# Patient Record
Sex: MALE | Race: WHITE | NOT HISPANIC OR LATINO | Employment: OTHER | URBAN - METROPOLITAN AREA
[De-identification: names, ages, dates, MRNs, and addresses within clinical notes are randomized per-mention and may not be internally consistent; named-entity substitution may affect disease eponyms.]

---

## 2017-01-26 ENCOUNTER — ALLSCRIPTS OFFICE VISIT (OUTPATIENT)
Dept: OTHER | Facility: OTHER | Age: 77
End: 2017-01-26

## 2017-01-31 ENCOUNTER — GENERIC CONVERSION - ENCOUNTER (OUTPATIENT)
Dept: OTHER | Facility: OTHER | Age: 77
End: 2017-01-31

## 2017-02-07 ENCOUNTER — APPOINTMENT (OUTPATIENT)
Dept: LAB | Facility: HOSPITAL | Age: 77
End: 2017-02-07
Attending: INTERNAL MEDICINE
Payer: MEDICARE

## 2017-02-07 ENCOUNTER — TRANSCRIBE ORDERS (OUTPATIENT)
Dept: ADMINISTRATIVE | Facility: HOSPITAL | Age: 77
End: 2017-02-07

## 2017-02-07 DIAGNOSIS — R06.89 HYPOVENTILATION, IDIOPATHIC: ICD-10-CM

## 2017-02-07 DIAGNOSIS — E78.5 HYPERLIPIDEMIA, UNSPECIFIED HYPERLIPIDEMIA TYPE: ICD-10-CM

## 2017-02-07 DIAGNOSIS — Z00.00 ROUTINE GENERAL MEDICAL EXAMINATION AT A HEALTH CARE FACILITY: ICD-10-CM

## 2017-02-07 DIAGNOSIS — R06.89 HYPOVENTILATION, IDIOPATHIC: Primary | ICD-10-CM

## 2017-02-07 LAB
ALBUMIN SERPL BCP-MCNC: 3.7 G/DL (ref 3.5–5)
ALP SERPL-CCNC: 67 U/L (ref 46–116)
ALT SERPL W P-5'-P-CCNC: 23 U/L (ref 12–78)
ANION GAP SERPL CALCULATED.3IONS-SCNC: 8 MMOL/L (ref 4–13)
AST SERPL W P-5'-P-CCNC: 14 U/L (ref 5–45)
BILIRUB SERPL-MCNC: 0.7 MG/DL (ref 0.2–1)
BUN SERPL-MCNC: 11 MG/DL (ref 5–25)
CALCIUM SERPL-MCNC: 8.9 MG/DL (ref 8.3–10.1)
CHLORIDE SERPL-SCNC: 101 MMOL/L (ref 100–108)
CHOLEST SERPL-MCNC: 105 MG/DL (ref 50–200)
CO2 SERPL-SCNC: 29 MMOL/L (ref 21–32)
CREAT SERPL-MCNC: 0.85 MG/DL (ref 0.6–1.3)
GFR SERPL CREATININE-BSD FRML MDRD: >60 ML/MIN/1.73SQ M
GLUCOSE SERPL-MCNC: 103 MG/DL (ref 65–140)
HDLC SERPL-MCNC: 58 MG/DL (ref 40–60)
LDLC SERPL CALC-MCNC: 22 MG/DL (ref 0–100)
POTASSIUM SERPL-SCNC: 4.2 MMOL/L (ref 3.5–5.3)
PROT SERPL-MCNC: 7.2 G/DL (ref 6.4–8.2)
SODIUM SERPL-SCNC: 138 MMOL/L (ref 136–145)
TRIGL SERPL-MCNC: 126 MG/DL
TSH SERPL DL<=0.05 MIU/L-ACNC: 1.28 UIU/ML (ref 0.36–3.74)

## 2017-02-07 PROCEDURE — 80061 LIPID PANEL: CPT

## 2017-02-07 PROCEDURE — 36415 COLL VENOUS BLD VENIPUNCTURE: CPT | Performed by: INTERNAL MEDICINE

## 2017-02-07 PROCEDURE — 84443 ASSAY THYROID STIM HORMONE: CPT

## 2017-02-07 PROCEDURE — 80053 COMPREHEN METABOLIC PANEL: CPT | Performed by: INTERNAL MEDICINE

## 2017-02-13 ENCOUNTER — ALLSCRIPTS OFFICE VISIT (OUTPATIENT)
Dept: OTHER | Facility: OTHER | Age: 77
End: 2017-02-13

## 2017-02-16 ENCOUNTER — APPOINTMENT (OUTPATIENT)
Dept: LAB | Facility: HOSPITAL | Age: 77
End: 2017-02-16
Attending: FAMILY MEDICINE
Payer: MEDICARE

## 2017-02-16 ENCOUNTER — TRANSCRIBE ORDERS (OUTPATIENT)
Dept: ADMINISTRATIVE | Facility: HOSPITAL | Age: 77
End: 2017-02-16

## 2017-02-16 DIAGNOSIS — Z12.5 SPECIAL SCREENING FOR MALIGNANT NEOPLASM OF PROSTATE: ICD-10-CM

## 2017-02-16 DIAGNOSIS — Z12.5 SPECIAL SCREENING FOR MALIGNANT NEOPLASM OF PROSTATE: Primary | ICD-10-CM

## 2017-02-16 LAB — PSA SERPL-MCNC: 7.8 NG/ML (ref 0–4)

## 2017-02-16 PROCEDURE — G0103 PSA SCREENING: HCPCS

## 2017-02-16 PROCEDURE — 36415 COLL VENOUS BLD VENIPUNCTURE: CPT

## 2017-03-01 ENCOUNTER — ALLSCRIPTS OFFICE VISIT (OUTPATIENT)
Dept: OTHER | Facility: OTHER | Age: 77
End: 2017-03-01

## 2017-03-21 ENCOUNTER — APPOINTMENT (OUTPATIENT)
Dept: LAB | Facility: HOSPITAL | Age: 77
End: 2017-03-21
Attending: SPECIALIST
Payer: MEDICARE

## 2017-03-21 ENCOUNTER — TRANSCRIBE ORDERS (OUTPATIENT)
Dept: ADMINISTRATIVE | Facility: HOSPITAL | Age: 77
End: 2017-03-21

## 2017-03-21 DIAGNOSIS — R97.20 ELEVATED PROSTATE SPECIFIC ANTIGEN (PSA): Primary | ICD-10-CM

## 2017-03-21 DIAGNOSIS — R97.20 ELEVATED PROSTATE SPECIFIC ANTIGEN (PSA): ICD-10-CM

## 2017-03-21 PROCEDURE — 36415 COLL VENOUS BLD VENIPUNCTURE: CPT

## 2017-03-21 PROCEDURE — 84153 ASSAY OF PSA TOTAL: CPT

## 2017-03-21 PROCEDURE — 84154 ASSAY OF PSA FREE: CPT

## 2017-03-22 LAB
PSA FREE MFR SERPL: 18.4 %
PSA FREE SERPL-MCNC: 1.34 NG/ML
PSA SERPL-MCNC: 7.3 NG/ML (ref 0–4)

## 2017-03-30 ENCOUNTER — GENERIC CONVERSION - ENCOUNTER (OUTPATIENT)
Dept: OTHER | Facility: OTHER | Age: 77
End: 2017-03-30

## 2017-03-30 DIAGNOSIS — M54.50 LOW BACK PAIN: ICD-10-CM

## 2017-03-30 DIAGNOSIS — G89.4 CHRONIC PAIN SYNDROME: ICD-10-CM

## 2017-03-30 DIAGNOSIS — Z98.890 OTHER SPECIFIED POSTPROCEDURAL STATES: ICD-10-CM

## 2017-03-30 DIAGNOSIS — Z01.812 ENCOUNTER FOR PREPROCEDURAL LABORATORY EXAMINATION: ICD-10-CM

## 2017-03-30 DIAGNOSIS — I48.20 CHRONIC ATRIAL FIBRILLATION (HCC): ICD-10-CM

## 2017-03-30 DIAGNOSIS — M51.16 INTERVERTEBRAL DISC DISORDER WITH RADICULOPATHY OF LUMBAR REGION: ICD-10-CM

## 2017-04-03 ENCOUNTER — GENERIC CONVERSION - ENCOUNTER (OUTPATIENT)
Dept: OTHER | Facility: OTHER | Age: 77
End: 2017-04-03

## 2017-04-05 ENCOUNTER — ALLSCRIPTS OFFICE VISIT (OUTPATIENT)
Dept: OTHER | Facility: OTHER | Age: 77
End: 2017-04-05

## 2017-04-06 ENCOUNTER — TRANSCRIBE ORDERS (OUTPATIENT)
Dept: ADMINISTRATIVE | Facility: HOSPITAL | Age: 77
End: 2017-04-06

## 2017-04-06 ENCOUNTER — LAB (OUTPATIENT)
Dept: LAB | Facility: HOSPITAL | Age: 77
End: 2017-04-06
Payer: MEDICARE

## 2017-04-06 DIAGNOSIS — M54.5 LOW BACK PAIN, UNSPECIFIED BACK PAIN LATERALITY, UNSPECIFIED CHRONICITY, WITH SCIATICA PRESENCE UNSPECIFIED: ICD-10-CM

## 2017-04-06 DIAGNOSIS — M54.5 LOW BACK PAIN, UNSPECIFIED BACK PAIN LATERALITY, UNSPECIFIED CHRONICITY, WITH SCIATICA PRESENCE UNSPECIFIED: Primary | ICD-10-CM

## 2017-04-06 DIAGNOSIS — Z01.812 PRE-OPERATIVE LABORATORY EXAMINATION: ICD-10-CM

## 2017-04-06 DIAGNOSIS — M51.16 NEURITIS OR RADICULITIS DUE TO RUPTURE OF LUMBAR INTERVERTEBRAL DISC: ICD-10-CM

## 2017-04-06 LAB
ALBUMIN SERPL BCP-MCNC: 3.6 G/DL (ref 3.5–5)
ALP SERPL-CCNC: 64 U/L (ref 46–116)
ALT SERPL W P-5'-P-CCNC: 26 U/L (ref 12–78)
ANION GAP SERPL CALCULATED.3IONS-SCNC: 8 MMOL/L (ref 4–13)
APTT PPP: 33 SECONDS (ref 24–36)
AST SERPL W P-5'-P-CCNC: 17 U/L (ref 5–45)
BASOPHILS # BLD AUTO: 0 THOUSANDS/ΜL (ref 0–0.1)
BASOPHILS NFR BLD AUTO: 0 % (ref 0–1)
BILIRUB SERPL-MCNC: 0.8 MG/DL (ref 0.2–1)
BUN SERPL-MCNC: 11 MG/DL (ref 5–25)
CALCIUM SERPL-MCNC: 9 MG/DL (ref 8.3–10.1)
CHLORIDE SERPL-SCNC: 98 MMOL/L (ref 100–108)
CO2 SERPL-SCNC: 30 MMOL/L (ref 21–32)
CREAT SERPL-MCNC: 0.94 MG/DL (ref 0.6–1.3)
EOSINOPHIL # BLD AUTO: 0.1 THOUSAND/ΜL (ref 0–0.61)
EOSINOPHIL NFR BLD AUTO: 1 % (ref 0–6)
ERYTHROCYTE [DISTWIDTH] IN BLOOD BY AUTOMATED COUNT: 13.7 % (ref 11.6–15.1)
GFR SERPL CREATININE-BSD FRML MDRD: >60 ML/MIN/1.73SQ M
GLUCOSE P FAST SERPL-MCNC: 98 MG/DL (ref 65–99)
HCT VFR BLD AUTO: 43.8 % (ref 42–52)
HGB BLD-MCNC: 14.6 G/DL (ref 14–18)
INR PPP: 1.18 (ref 0.86–1.16)
LYMPHOCYTES # BLD AUTO: 1.6 THOUSANDS/ΜL (ref 0.6–4.47)
LYMPHOCYTES NFR BLD AUTO: 18 % (ref 14–44)
MCH RBC QN AUTO: 32.8 PG (ref 27–31)
MCHC RBC AUTO-ENTMCNC: 33.3 G/DL (ref 31.4–37.4)
MCV RBC AUTO: 99 FL (ref 82–98)
MONOCYTES # BLD AUTO: 1.1 THOUSAND/ΜL (ref 0.17–1.22)
MONOCYTES NFR BLD AUTO: 12 % (ref 4–12)
NEUTROPHILS # BLD AUTO: 6 THOUSANDS/ΜL (ref 1.85–7.62)
NEUTS SEG NFR BLD AUTO: 68 % (ref 43–75)
NRBC BLD AUTO-RTO: 0 /100 WBCS
PLATELET # BLD AUTO: 262 THOUSANDS/UL (ref 130–400)
PMV BLD AUTO: 7.6 FL (ref 8.9–12.7)
POTASSIUM SERPL-SCNC: 4.2 MMOL/L (ref 3.5–5.3)
PROT SERPL-MCNC: 7 G/DL (ref 6.4–8.2)
PROTHROMBIN TIME: 12.4 SECONDS (ref 9.4–11.7)
RBC # BLD AUTO: 4.44 MILLION/UL (ref 4.7–6.1)
SODIUM SERPL-SCNC: 136 MMOL/L (ref 136–145)
WBC # BLD AUTO: 8.8 THOUSAND/UL (ref 4.8–10.8)

## 2017-04-06 PROCEDURE — 85730 THROMBOPLASTIN TIME PARTIAL: CPT

## 2017-04-06 PROCEDURE — 80053 COMPREHEN METABOLIC PANEL: CPT | Performed by: ANESTHESIOLOGY

## 2017-04-06 PROCEDURE — 36415 COLL VENOUS BLD VENIPUNCTURE: CPT | Performed by: ANESTHESIOLOGY

## 2017-04-06 PROCEDURE — 85610 PROTHROMBIN TIME: CPT

## 2017-04-06 PROCEDURE — 85025 COMPLETE CBC W/AUTO DIFF WBC: CPT

## 2017-04-12 ENCOUNTER — ANESTHESIA EVENT (OUTPATIENT)
Dept: PERIOP | Facility: AMBULARY SURGERY CENTER | Age: 77
End: 2017-04-12
Payer: MEDICARE

## 2017-04-13 ENCOUNTER — HOSPITAL ENCOUNTER (OUTPATIENT)
Dept: RADIOLOGY | Facility: HOSPITAL | Age: 77
Setting detail: OUTPATIENT SURGERY
Discharge: HOME | End: 2017-04-13
Payer: MEDICARE

## 2017-04-13 ENCOUNTER — ANESTHESIA (OUTPATIENT)
Dept: PERIOP | Facility: AMBULARY SURGERY CENTER | Age: 77
End: 2017-04-13
Payer: MEDICARE

## 2017-04-13 ENCOUNTER — HOSPITAL ENCOUNTER (OUTPATIENT)
Facility: AMBULARY SURGERY CENTER | Age: 77
Setting detail: OUTPATIENT SURGERY
Discharge: HOME | End: 2017-04-13
Attending: ANESTHESIOLOGY | Admitting: ANESTHESIOLOGY
Payer: MEDICARE

## 2017-04-13 VITALS
TEMPERATURE: 97.7 F | HEART RATE: 60 BPM | SYSTOLIC BLOOD PRESSURE: 148 MMHG | OXYGEN SATURATION: 95 % | WEIGHT: 250 LBS | RESPIRATION RATE: 20 BRPM | DIASTOLIC BLOOD PRESSURE: 67 MMHG | BODY MASS INDEX: 35.87 KG/M2

## 2017-04-13 DIAGNOSIS — R52 PAIN AGGRAVATED BY ACTIVITIES OF DAILY LIVING: ICD-10-CM

## 2017-04-13 PROBLEM — M51.16 INTERVERTEBRAL DISC DISORDER WITH RADICULOPATHY OF LUMBAR REGION: Status: ACTIVE | Noted: 2017-04-13

## 2017-04-13 PROCEDURE — C1897 LEAD, NEUROSTIM TEST KIT: HCPCS | Performed by: ANESTHESIOLOGY

## 2017-04-13 PROCEDURE — 72080 X-RAY EXAM THORACOLMB 2/> VW: CPT

## 2017-04-13 DEVICE — HEADER ACCESS KIT EXTRNL PULSE GEN INVISIBLE TRIAL SYS: Type: IMPLANTABLE DEVICE | Status: FUNCTIONAL

## 2017-04-13 DEVICE — LEAD KIT 60 CM OCTRODE 3086: Type: IMPLANTABLE DEVICE | Status: FUNCTIONAL

## 2017-04-13 RX ORDER — SODIUM CHLORIDE 9 MG/ML
125 INJECTION, SOLUTION INTRAVENOUS CONTINUOUS
Status: DISCONTINUED | OUTPATIENT
Start: 2017-04-13 | End: 2017-04-13 | Stop reason: HOSPADM

## 2017-04-13 RX ORDER — SODIUM CHLORIDE, SODIUM LACTATE, POTASSIUM CHLORIDE, CALCIUM CHLORIDE 600; 310; 30; 20 MG/100ML; MG/100ML; MG/100ML; MG/100ML
75 INJECTION, SOLUTION INTRAVENOUS CONTINUOUS
Status: DISCONTINUED | OUTPATIENT
Start: 2017-04-13 | End: 2017-04-13 | Stop reason: HOSPADM

## 2017-04-13 RX ORDER — LIDOCAINE WITH 8.4% SOD BICARB 0.9%(10ML)
SYRINGE (ML) INJECTION AS NEEDED
Status: DISCONTINUED | OUTPATIENT
Start: 2017-04-13 | End: 2017-04-13 | Stop reason: HOSPADM

## 2017-04-13 RX ORDER — PROPOFOL 10 MG/ML
INJECTION, EMULSION INTRAVENOUS AS NEEDED
Status: DISCONTINUED | OUTPATIENT
Start: 2017-04-13 | End: 2017-04-13 | Stop reason: SURG

## 2017-04-13 RX ADMIN — CEFAZOLIN SODIUM 1000 MG: 2 SOLUTION INTRAVENOUS at 14:25

## 2017-04-13 RX ADMIN — LIDOCAINE HYDROCHLORIDE 50 MG: 20 INJECTION, SOLUTION INTRAVENOUS at 14:33

## 2017-04-13 RX ADMIN — PROPOFOL 50 MG: 10 INJECTION, EMULSION INTRAVENOUS at 14:33

## 2017-04-13 RX ADMIN — SODIUM CHLORIDE 125 ML/HR: 0.9 INJECTION, SOLUTION INTRAVENOUS at 14:09

## 2017-04-13 RX ADMIN — PROPOFOL 50 MG: 10 INJECTION, EMULSION INTRAVENOUS at 14:31

## 2017-04-14 ENCOUNTER — GENERIC CONVERSION - ENCOUNTER (OUTPATIENT)
Dept: OTHER | Facility: OTHER | Age: 77
End: 2017-04-14

## 2017-04-19 ENCOUNTER — HOSPITAL ENCOUNTER (OUTPATIENT)
Dept: RADIOLOGY | Facility: CLINIC | Age: 77
Discharge: HOME | End: 2017-04-19
Payer: MEDICARE

## 2017-04-19 ENCOUNTER — ALLSCRIPTS OFFICE VISIT (OUTPATIENT)
Dept: OTHER | Facility: OTHER | Age: 77
End: 2017-04-19

## 2017-04-19 DIAGNOSIS — Z98.890 OTHER SPECIFIED POSTPROCEDURAL STATES: ICD-10-CM

## 2017-04-19 PROCEDURE — 72070 X-RAY EXAM THORAC SPINE 2VWS: CPT

## 2017-05-17 ENCOUNTER — ALLSCRIPTS OFFICE VISIT (OUTPATIENT)
Dept: OTHER | Facility: OTHER | Age: 77
End: 2017-05-17

## 2017-05-17 DIAGNOSIS — J44.9 CHRONIC OBSTRUCTIVE PULMONARY DISEASE (HCC): ICD-10-CM

## 2017-05-22 ENCOUNTER — ALLSCRIPTS OFFICE VISIT (OUTPATIENT)
Dept: OTHER | Facility: OTHER | Age: 77
End: 2017-05-22

## 2017-05-25 ENCOUNTER — HOSPITAL ENCOUNTER (OUTPATIENT)
Dept: RADIOLOGY | Facility: HOSPITAL | Age: 77
Discharge: HOME | End: 2017-05-25
Payer: MEDICARE

## 2017-05-25 ENCOUNTER — TRANSCRIBE ORDERS (OUTPATIENT)
Dept: ADMINISTRATIVE | Facility: HOSPITAL | Age: 77
End: 2017-05-25

## 2017-05-25 DIAGNOSIS — J44.9 CHRONIC OBSTRUCTIVE PULMONARY DISEASE (HCC): ICD-10-CM

## 2017-05-25 PROCEDURE — 71020 HB CHEST X-RAY 2VW FRONTAL&LATL: CPT

## 2017-06-01 ENCOUNTER — GENERIC CONVERSION - ENCOUNTER (OUTPATIENT)
Dept: OTHER | Facility: OTHER | Age: 77
End: 2017-06-01

## 2017-06-19 ENCOUNTER — APPOINTMENT (OUTPATIENT)
Dept: LAB | Facility: HOSPITAL | Age: 77
End: 2017-06-19
Attending: FAMILY MEDICINE
Payer: MEDICARE

## 2017-06-19 ENCOUNTER — TRANSCRIBE ORDERS (OUTPATIENT)
Dept: ADMINISTRATIVE | Facility: HOSPITAL | Age: 77
End: 2017-06-19

## 2017-06-19 DIAGNOSIS — N35.010 POST-TRAUMATIC MALE URETHRAL MEATAL STRICTURE: Primary | ICD-10-CM

## 2017-06-19 DIAGNOSIS — R35.0 URINARY FREQUENCY: Primary | ICD-10-CM

## 2017-06-19 LAB
BACTERIA UR QL AUTO: ABNORMAL /HPF
BILIRUB UR QL STRIP: NEGATIVE
CLARITY UR: ABNORMAL
COLOR UR: YELLOW
GLUCOSE UR STRIP-MCNC: NEGATIVE MG/DL
HGB UR QL STRIP.AUTO: ABNORMAL
KETONES UR STRIP-MCNC: NEGATIVE MG/DL
LEUKOCYTE ESTERASE UR QL STRIP: ABNORMAL
MUCOUS THREADS UR QL AUTO: ABNORMAL
NITRITE UR QL STRIP: POSITIVE
NON-SQ EPI CELLS URNS QL MICRO: ABNORMAL /HPF
PH UR STRIP.AUTO: 5 [PH] (ref 5–9)
PROT UR STRIP-MCNC: ABNORMAL MG/DL
RBC #/AREA URNS AUTO: ABNORMAL /HPF
SP GR UR STRIP.AUTO: 1.02 (ref 1–1.03)
UROBILINOGEN UR QL STRIP.AUTO: 1 E.U./DL
WBC #/AREA URNS AUTO: ABNORMAL /HPF

## 2017-06-19 PROCEDURE — 87077 CULTURE AEROBIC IDENTIFY: CPT

## 2017-06-19 PROCEDURE — 87086 URINE CULTURE/COLONY COUNT: CPT

## 2017-06-19 PROCEDURE — 81001 URINALYSIS AUTO W/SCOPE: CPT | Performed by: FAMILY MEDICINE

## 2017-06-19 PROCEDURE — 87186 SC STD MICRODIL/AGAR DIL: CPT

## 2017-06-21 LAB — BACTERIA UR CULT: NORMAL

## 2017-06-22 ENCOUNTER — GENERIC CONVERSION - ENCOUNTER (OUTPATIENT)
Dept: OTHER | Facility: OTHER | Age: 77
End: 2017-06-22

## 2017-06-22 DIAGNOSIS — I25.10 ATHEROSCLEROTIC HEART DISEASE OF NATIVE CORONARY ARTERY WITHOUT ANGINA PECTORIS: ICD-10-CM

## 2017-06-22 DIAGNOSIS — M48.061 SPINAL STENOSIS OF LUMBAR REGION: ICD-10-CM

## 2017-06-22 DIAGNOSIS — M51.36 OTHER INTERVERTEBRAL DISC DEGENERATION, LUMBAR REGION: ICD-10-CM

## 2017-06-22 DIAGNOSIS — M54.16 RADICULOPATHY OF LUMBAR REGION: ICD-10-CM

## 2017-06-22 DIAGNOSIS — G89.4 CHRONIC PAIN SYNDROME: ICD-10-CM

## 2017-06-22 DIAGNOSIS — M54.50 LOW BACK PAIN: ICD-10-CM

## 2017-06-22 DIAGNOSIS — K44.9 DIAPHRAGMATIC HERNIA WITHOUT OBSTRUCTION OR GANGRENE: ICD-10-CM

## 2017-06-23 ENCOUNTER — TRANSCRIBE ORDERS (OUTPATIENT)
Dept: ADMINISTRATIVE | Facility: HOSPITAL | Age: 77
End: 2017-06-23

## 2017-06-23 ENCOUNTER — ALLSCRIPTS OFFICE VISIT (OUTPATIENT)
Dept: OTHER | Facility: OTHER | Age: 77
End: 2017-06-23

## 2017-06-23 DIAGNOSIS — G89.4 CHRONIC PAIN SYNDROME: Primary | ICD-10-CM

## 2017-06-29 ENCOUNTER — ALLSCRIPTS OFFICE VISIT (OUTPATIENT)
Dept: OTHER | Facility: OTHER | Age: 77
End: 2017-06-29

## 2017-07-14 ENCOUNTER — APPOINTMENT (OUTPATIENT)
Dept: LAB | Facility: HOSPITAL | Age: 77
End: 2017-07-14
Payer: MEDICARE

## 2017-07-14 ENCOUNTER — TRANSCRIBE ORDERS (OUTPATIENT)
Dept: ADMINISTRATIVE | Facility: HOSPITAL | Age: 77
End: 2017-07-14

## 2017-07-14 DIAGNOSIS — K44.9 DIAPHRAGMATIC HERNIA WITHOUT OBSTRUCTION OR GANGRENE: ICD-10-CM

## 2017-07-14 DIAGNOSIS — I25.10 ATHEROSCLEROTIC HEART DISEASE OF NATIVE CORONARY ARTERY WITHOUT ANGINA PECTORIS: ICD-10-CM

## 2017-07-14 DIAGNOSIS — M48.061 SPINAL STENOSIS OF LUMBAR REGION: ICD-10-CM

## 2017-07-14 DIAGNOSIS — M54.50 LOW BACK PAIN: ICD-10-CM

## 2017-07-14 DIAGNOSIS — M54.16 RADICULOPATHY OF LUMBAR REGION: ICD-10-CM

## 2017-07-14 LAB
ALBUMIN SERPL BCP-MCNC: 3.6 G/DL (ref 3.5–5)
ALP SERPL-CCNC: 71 U/L (ref 46–116)
ALT SERPL W P-5'-P-CCNC: 20 U/L (ref 12–78)
ANION GAP SERPL CALCULATED.3IONS-SCNC: 6 MMOL/L (ref 4–13)
APTT PPP: 36 SECONDS (ref 23–35)
AST SERPL W P-5'-P-CCNC: 11 U/L (ref 5–45)
BASOPHILS # BLD AUTO: 0 THOUSANDS/ΜL (ref 0–0.1)
BASOPHILS NFR BLD AUTO: 1 % (ref 0–1)
BILIRUB SERPL-MCNC: 0.7 MG/DL (ref 0.2–1)
BILIRUB UR QL STRIP: NEGATIVE
BUN SERPL-MCNC: 12 MG/DL (ref 5–25)
CALCIUM SERPL-MCNC: 8.9 MG/DL (ref 8.3–10.1)
CHLORIDE SERPL-SCNC: 101 MMOL/L (ref 100–108)
CLARITY UR: CLEAR
CO2 SERPL-SCNC: 29 MMOL/L (ref 21–32)
COLOR UR: YELLOW
CREAT SERPL-MCNC: 1.01 MG/DL (ref 0.6–1.3)
EOSINOPHIL # BLD AUTO: 0.2 THOUSAND/ΜL (ref 0–0.61)
EOSINOPHIL NFR BLD AUTO: 2 % (ref 0–6)
ERYTHROCYTE [DISTWIDTH] IN BLOOD BY AUTOMATED COUNT: 14.2 % (ref 11.6–15.1)
EST. AVERAGE GLUCOSE BLD GHB EST-MCNC: 137 MG/DL
GFR SERPL CREATININE-BSD FRML MDRD: >60 ML/MIN/1.73SQ M
GLUCOSE P FAST SERPL-MCNC: 109 MG/DL (ref 65–99)
GLUCOSE UR STRIP-MCNC: NEGATIVE MG/DL
HBA1C MFR BLD: 6.4 % (ref 4.2–6.3)
HCT VFR BLD AUTO: 44.2 % (ref 42–52)
HGB BLD-MCNC: 14.7 G/DL (ref 14–18)
HGB UR QL STRIP.AUTO: NEGATIVE
INR PPP: 1.27 (ref 0.86–1.16)
KETONES UR STRIP-MCNC: NEGATIVE MG/DL
LEUKOCYTE ESTERASE UR QL STRIP: NEGATIVE
LYMPHOCYTES # BLD AUTO: 1.9 THOUSANDS/ΜL (ref 0.6–4.47)
LYMPHOCYTES NFR BLD AUTO: 27 % (ref 14–44)
MCH RBC QN AUTO: 32.4 PG (ref 27–31)
MCHC RBC AUTO-ENTMCNC: 33.2 G/DL (ref 31.4–37.4)
MCV RBC AUTO: 98 FL (ref 82–98)
MONOCYTES # BLD AUTO: 0.8 THOUSAND/ΜL (ref 0.17–1.22)
MONOCYTES NFR BLD AUTO: 11 % (ref 4–12)
NEUTROPHILS # BLD AUTO: 4.1 THOUSANDS/ΜL (ref 1.85–7.62)
NEUTS SEG NFR BLD AUTO: 59 % (ref 43–75)
NITRITE UR QL STRIP: NEGATIVE
NRBC BLD AUTO-RTO: 0 /100 WBCS
PH UR STRIP.AUTO: 6 [PH] (ref 5–9)
PLATELET # BLD AUTO: 251 THOUSANDS/UL (ref 130–400)
PMV BLD AUTO: 7.4 FL (ref 8.9–12.7)
POTASSIUM SERPL-SCNC: 4.1 MMOL/L (ref 3.5–5.3)
PROT SERPL-MCNC: 7.2 G/DL (ref 6.4–8.2)
PROT UR STRIP-MCNC: NEGATIVE MG/DL
PROTHROMBIN TIME: 13.4 SECONDS (ref 9.4–11.7)
RBC # BLD AUTO: 4.53 MILLION/UL (ref 4.7–6.1)
SODIUM SERPL-SCNC: 136 MMOL/L (ref 136–145)
SP GR UR STRIP.AUTO: <=1.005 (ref 1–1.03)
UROBILINOGEN UR QL STRIP.AUTO: 0.2 E.U./DL
WBC # BLD AUTO: 7 THOUSAND/UL (ref 4.8–10.8)

## 2017-07-14 PROCEDURE — 36415 COLL VENOUS BLD VENIPUNCTURE: CPT

## 2017-07-14 PROCEDURE — 83036 HEMOGLOBIN GLYCOSYLATED A1C: CPT

## 2017-07-14 PROCEDURE — 85025 COMPLETE CBC W/AUTO DIFF WBC: CPT

## 2017-07-14 PROCEDURE — 81003 URINALYSIS AUTO W/O SCOPE: CPT

## 2017-07-14 PROCEDURE — 85610 PROTHROMBIN TIME: CPT

## 2017-07-14 PROCEDURE — 80053 COMPREHEN METABOLIC PANEL: CPT

## 2017-07-14 PROCEDURE — 85730 THROMBOPLASTIN TIME PARTIAL: CPT

## 2017-08-03 ENCOUNTER — ALLSCRIPTS OFFICE VISIT (OUTPATIENT)
Dept: OTHER | Facility: OTHER | Age: 77
End: 2017-08-03

## 2017-08-11 ENCOUNTER — HOSPITAL ENCOUNTER (OUTPATIENT)
Dept: RADIOLOGY | Facility: HOSPITAL | Age: 77
Discharge: HOME | End: 2017-08-11
Payer: MEDICARE

## 2017-08-11 DIAGNOSIS — G89.4 CHRONIC PAIN SYNDROME: ICD-10-CM

## 2017-08-11 DIAGNOSIS — M51.36 OTHER INTERVERTEBRAL DISC DEGENERATION, LUMBAR REGION: ICD-10-CM

## 2017-08-11 PROCEDURE — 72128 CT CHEST SPINE W/O DYE: CPT

## 2017-08-15 ENCOUNTER — GENERIC CONVERSION - ENCOUNTER (OUTPATIENT)
Dept: OTHER | Facility: OTHER | Age: 77
End: 2017-08-15

## 2017-08-17 ENCOUNTER — ANESTHESIA EVENT (OUTPATIENT)
Dept: PERIOP | Facility: HOSPITAL | Age: 77
End: 2017-08-17
Payer: MEDICARE

## 2017-08-18 ENCOUNTER — ALLSCRIPTS OFFICE VISIT (OUTPATIENT)
Dept: OTHER | Facility: OTHER | Age: 77
End: 2017-08-18

## 2017-08-21 RX ORDER — TAMSULOSIN HYDROCHLORIDE 0.4 MG/1
0.4 CAPSULE ORAL
COMMUNITY

## 2017-08-21 RX ORDER — ALBUTEROL SULFATE 90 UG/1
2 AEROSOL, METERED RESPIRATORY (INHALATION) EVERY 6 HOURS PRN
COMMUNITY
End: 2020-02-04 | Stop reason: SDUPTHER

## 2017-08-21 RX ORDER — FLUTICASONE PROPIONATE 50 MCG
2 SPRAY, SUSPENSION (ML) NASAL DAILY
COMMUNITY

## 2017-08-22 ENCOUNTER — GENERIC CONVERSION - ENCOUNTER (OUTPATIENT)
Dept: OTHER | Facility: OTHER | Age: 77
End: 2017-08-22

## 2017-08-23 ENCOUNTER — ANESTHESIA (OUTPATIENT)
Dept: PERIOP | Facility: HOSPITAL | Age: 77
End: 2017-08-23
Payer: MEDICARE

## 2017-08-23 ENCOUNTER — GENERIC CONVERSION - ENCOUNTER (OUTPATIENT)
Dept: PERIOP | Facility: HOSPITAL | Age: 77
End: 2017-08-23

## 2017-08-23 ENCOUNTER — HOSPITAL ENCOUNTER (OUTPATIENT)
Facility: HOSPITAL | Age: 77
Setting detail: OUTPATIENT SURGERY
Discharge: HOME | End: 2017-08-23
Attending: NEUROLOGICAL SURGERY | Admitting: NEUROLOGICAL SURGERY
Payer: MEDICARE

## 2017-08-23 ENCOUNTER — HOSPITAL ENCOUNTER (OUTPATIENT)
Dept: RADIOLOGY | Facility: HOSPITAL | Age: 77
Setting detail: OUTPATIENT SURGERY
Discharge: HOME | End: 2017-08-23
Payer: MEDICARE

## 2017-08-23 VITALS
HEART RATE: 70 BPM | DIASTOLIC BLOOD PRESSURE: 95 MMHG | BODY MASS INDEX: 35.79 KG/M2 | HEIGHT: 70 IN | RESPIRATION RATE: 16 BRPM | SYSTOLIC BLOOD PRESSURE: 162 MMHG | TEMPERATURE: 98 F | WEIGHT: 250 LBS | OXYGEN SATURATION: 98 %

## 2017-08-23 DIAGNOSIS — G89.4 CHRONIC PAIN SYNDROME: ICD-10-CM

## 2017-08-23 LAB
ABO GROUP BLD: NORMAL
BLD GP AB SCN SERPL QL: NEGATIVE
GLUCOSE SERPL-MCNC: 111 MG/DL (ref 65–140)
GLUCOSE SERPL-MCNC: 120 MG/DL (ref 65–140)
RH BLD: POSITIVE
SPECIMEN EXPIRATION DATE: NORMAL

## 2017-08-23 PROCEDURE — 82948 REAGENT STRIP/BLOOD GLUCOSE: CPT

## 2017-08-23 PROCEDURE — 72070 X-RAY EXAM THORAC SPINE 2VWS: CPT

## 2017-08-23 PROCEDURE — 86901 BLOOD TYPING SEROLOGIC RH(D): CPT | Performed by: NEUROLOGICAL SURGERY

## 2017-08-23 PROCEDURE — C1778 LEAD, NEUROSTIMULATOR: HCPCS | Performed by: NEUROLOGICAL SURGERY

## 2017-08-23 PROCEDURE — 86900 BLOOD TYPING SEROLOGIC ABO: CPT | Performed by: NEUROLOGICAL SURGERY

## 2017-08-23 PROCEDURE — 86850 RBC ANTIBODY SCREEN: CPT | Performed by: NEUROLOGICAL SURGERY

## 2017-08-23 PROCEDURE — C1820 GENERATOR NEURO RECHG BAT SY: HCPCS | Performed by: NEUROLOGICAL SURGERY

## 2017-08-23 DEVICE — LEAD NEUROSTIM PENTA 3MM X 60CM: Type: IMPLANTABLE DEVICE | Site: SPINE THORACIC | Status: FUNCTIONAL

## 2017-08-23 DEVICE — IPG PROCLAIM XR5: Type: IMPLANTABLE DEVICE | Site: BUTTOCKS | Status: FUNCTIONAL

## 2017-08-23 RX ORDER — GLYCOPYRROLATE 0.2 MG/ML
INJECTION INTRAMUSCULAR; INTRAVENOUS AS NEEDED
Status: DISCONTINUED | OUTPATIENT
Start: 2017-08-23 | End: 2017-08-23 | Stop reason: SURG

## 2017-08-23 RX ORDER — MORPHINE SULFATE 2 MG/ML
2 INJECTION, SOLUTION INTRAMUSCULAR; INTRAVENOUS EVERY 4 HOURS PRN
Status: DISCONTINUED | OUTPATIENT
Start: 2017-08-23 | End: 2017-08-23 | Stop reason: HOSPADM

## 2017-08-23 RX ORDER — OXYCODONE HYDROCHLORIDE AND ACETAMINOPHEN 5; 325 MG/1; MG/1
2 TABLET ORAL EVERY 4 HOURS PRN
Status: DISCONTINUED | OUTPATIENT
Start: 2017-08-23 | End: 2017-08-23 | Stop reason: HOSPADM

## 2017-08-23 RX ORDER — SUCCINYLCHOLINE CHLORIDE 20 MG/ML
INJECTION INTRAMUSCULAR; INTRAVENOUS AS NEEDED
Status: DISCONTINUED | OUTPATIENT
Start: 2017-08-23 | End: 2017-08-23 | Stop reason: SURG

## 2017-08-23 RX ORDER — ALBUTEROL SULFATE 2.5 MG/3ML
2.5 SOLUTION RESPIRATORY (INHALATION) ONCE AS NEEDED
Status: DISCONTINUED | OUTPATIENT
Start: 2017-08-23 | End: 2017-08-23 | Stop reason: HOSPADM

## 2017-08-23 RX ORDER — CHLORHEXIDINE GLUCONATE 0.12 MG/ML
15 RINSE ORAL ONCE
Status: DISCONTINUED | OUTPATIENT
Start: 2017-08-23 | End: 2017-08-23

## 2017-08-23 RX ORDER — ONDANSETRON 2 MG/ML
4 INJECTION INTRAMUSCULAR; INTRAVENOUS EVERY 6 HOURS PRN
Status: DISCONTINUED | OUTPATIENT
Start: 2017-08-23 | End: 2017-08-23 | Stop reason: HOSPADM

## 2017-08-23 RX ORDER — ROCURONIUM BROMIDE 10 MG/ML
INJECTION, SOLUTION INTRAVENOUS AS NEEDED
Status: DISCONTINUED | OUTPATIENT
Start: 2017-08-23 | End: 2017-08-23 | Stop reason: SURG

## 2017-08-23 RX ORDER — FENTANYL CITRATE/PF 50 MCG/ML
25 SYRINGE (ML) INJECTION
Status: COMPLETED | OUTPATIENT
Start: 2017-08-23 | End: 2017-08-23

## 2017-08-23 RX ORDER — SODIUM CHLORIDE, SODIUM LACTATE, POTASSIUM CHLORIDE, CALCIUM CHLORIDE 600; 310; 30; 20 MG/100ML; MG/100ML; MG/100ML; MG/100ML
20 INJECTION, SOLUTION INTRAVENOUS CONTINUOUS
Status: DISCONTINUED | OUTPATIENT
Start: 2017-08-23 | End: 2017-08-23 | Stop reason: HOSPADM

## 2017-08-23 RX ORDER — KETAMINE HYDROCHLORIDE 50 MG/ML
INJECTION, SOLUTION, CONCENTRATE INTRAMUSCULAR; INTRAVENOUS AS NEEDED
Status: DISCONTINUED | OUTPATIENT
Start: 2017-08-23 | End: 2017-08-23 | Stop reason: SURG

## 2017-08-23 RX ORDER — ONDANSETRON 2 MG/ML
4 INJECTION INTRAMUSCULAR; INTRAVENOUS ONCE AS NEEDED
Status: DISCONTINUED | OUTPATIENT
Start: 2017-08-23 | End: 2017-08-23 | Stop reason: HOSPADM

## 2017-08-23 RX ORDER — CHLORHEXIDINE GLUCONATE 0.12 MG/ML
15 RINSE ORAL ONCE
Status: COMPLETED | OUTPATIENT
Start: 2017-08-23 | End: 2017-08-23

## 2017-08-23 RX ORDER — PROPOFOL 10 MG/ML
INJECTION, EMULSION INTRAVENOUS AS NEEDED
Status: DISCONTINUED | OUTPATIENT
Start: 2017-08-23 | End: 2017-08-23 | Stop reason: SURG

## 2017-08-23 RX ORDER — LIDOCAINE HYDROCHLORIDE 10 MG/ML
INJECTION, SOLUTION INFILTRATION; PERINEURAL AS NEEDED
Status: DISCONTINUED | OUTPATIENT
Start: 2017-08-23 | End: 2017-08-23 | Stop reason: SURG

## 2017-08-23 RX ORDER — EPHEDRINE SULFATE 50 MG/ML
INJECTION, SOLUTION INTRAVENOUS AS NEEDED
Status: DISCONTINUED | OUTPATIENT
Start: 2017-08-23 | End: 2017-08-23 | Stop reason: SURG

## 2017-08-23 RX ORDER — PROPOFOL 10 MG/ML
INJECTION, EMULSION INTRAVENOUS CONTINUOUS PRN
Status: DISCONTINUED | OUTPATIENT
Start: 2017-08-23 | End: 2017-08-23 | Stop reason: SURG

## 2017-08-23 RX ORDER — FENTANYL CITRATE 50 UG/ML
INJECTION, SOLUTION INTRAMUSCULAR; INTRAVENOUS AS NEEDED
Status: DISCONTINUED | OUTPATIENT
Start: 2017-08-23 | End: 2017-08-23 | Stop reason: SURG

## 2017-08-23 RX ORDER — BUPIVACAINE HYDROCHLORIDE AND EPINEPHRINE 5; 5 MG/ML; UG/ML
INJECTION, SOLUTION EPIDURAL; INTRACAUDAL; PERINEURAL AS NEEDED
Status: DISCONTINUED | OUTPATIENT
Start: 2017-08-23 | End: 2017-08-23 | Stop reason: HOSPADM

## 2017-08-23 RX ORDER — LIDOCAINE HYDROCHLORIDE AND EPINEPHRINE 10; 10 MG/ML; UG/ML
INJECTION, SOLUTION INFILTRATION; PERINEURAL AS NEEDED
Status: DISCONTINUED | OUTPATIENT
Start: 2017-08-23 | End: 2017-08-23 | Stop reason: HOSPADM

## 2017-08-23 RX ORDER — SODIUM CHLORIDE 9 MG/ML
INJECTION, SOLUTION INTRAVENOUS CONTINUOUS PRN
Status: DISCONTINUED | OUTPATIENT
Start: 2017-08-23 | End: 2017-08-23 | Stop reason: SURG

## 2017-08-23 RX ORDER — SODIUM CHLORIDE, SODIUM LACTATE, POTASSIUM CHLORIDE, CALCIUM CHLORIDE 600; 310; 30; 20 MG/100ML; MG/100ML; MG/100ML; MG/100ML
75 INJECTION, SOLUTION INTRAVENOUS CONTINUOUS
Status: DISCONTINUED | OUTPATIENT
Start: 2017-08-23 | End: 2017-08-23 | Stop reason: HOSPADM

## 2017-08-23 RX ADMIN — KETAMINE HYDROCHLORIDE 50 MG: 50 INJECTION, SOLUTION INTRAMUSCULAR; INTRAVENOUS at 10:52

## 2017-08-23 RX ADMIN — DEXMEDETOMIDINE HYDROCHLORIDE 0.5 MCG/KG/HR: 100 INJECTION, SOLUTION INTRAVENOUS at 11:19

## 2017-08-23 RX ADMIN — CEFAZOLIN SODIUM 2000 MG: 2 SOLUTION INTRAVENOUS at 11:21

## 2017-08-23 RX ADMIN — EPHEDRINE SULFATE 5 MG: 50 INJECTION, SOLUTION INTRAMUSCULAR; INTRAVENOUS; SUBCUTANEOUS at 11:42

## 2017-08-23 RX ADMIN — CHLORHEXIDINE GLUCONATE 15 ML: 1.2 RINSE ORAL at 09:42

## 2017-08-23 RX ADMIN — EPHEDRINE SULFATE 5 MG: 50 INJECTION, SOLUTION INTRAMUSCULAR; INTRAVENOUS; SUBCUTANEOUS at 11:25

## 2017-08-23 RX ADMIN — PROPOFOL 100 MCG/KG/MIN: 10 INJECTION, EMULSION INTRAVENOUS at 11:19

## 2017-08-23 RX ADMIN — EPHEDRINE SULFATE 5 MG: 50 INJECTION, SOLUTION INTRAMUSCULAR; INTRAVENOUS; SUBCUTANEOUS at 12:17

## 2017-08-23 RX ADMIN — LIDOCAINE HYDROCHLORIDE 50 MG: 10 INJECTION, SOLUTION INFILTRATION; PERINEURAL at 10:52

## 2017-08-23 RX ADMIN — NEOSTIGMINE METHYLSULFATE 3 MG: 1 INJECTION, SOLUTION INTRAMUSCULAR; INTRAVENOUS; SUBCUTANEOUS at 11:52

## 2017-08-23 RX ADMIN — FENTANYL CITRATE 25 MCG: 50 INJECTION INTRAMUSCULAR; INTRAVENOUS at 13:29

## 2017-08-23 RX ADMIN — HYDROMORPHONE HYDROCHLORIDE 0.4 MG: 1 INJECTION, SOLUTION INTRAMUSCULAR; INTRAVENOUS; SUBCUTANEOUS at 13:52

## 2017-08-23 RX ADMIN — OXYCODONE HYDROCHLORIDE AND ACETAMINOPHEN 2 TABLET: 5; 325 TABLET ORAL at 15:32

## 2017-08-23 RX ADMIN — EPHEDRINE SULFATE 5 MG: 50 INJECTION, SOLUTION INTRAMUSCULAR; INTRAVENOUS; SUBCUTANEOUS at 11:33

## 2017-08-23 RX ADMIN — GLYCOPYRROLATE 0.2 MG: 0.2 INJECTION, SOLUTION INTRAMUSCULAR; INTRAVENOUS at 11:52

## 2017-08-23 RX ADMIN — SUCCINYLCHOLINE CHLORIDE 140 MG: 20 INJECTION, SOLUTION INTRAMUSCULAR; INTRAVENOUS at 10:52

## 2017-08-23 RX ADMIN — SODIUM CHLORIDE: 0.9 INJECTION, SOLUTION INTRAVENOUS at 11:13

## 2017-08-23 RX ADMIN — PROPOFOL 200 MG: 10 INJECTION, EMULSION INTRAVENOUS at 10:52

## 2017-08-23 RX ADMIN — ROCURONIUM BROMIDE 10 MG: 10 INJECTION, SOLUTION INTRAVENOUS at 10:52

## 2017-08-23 RX ADMIN — FENTANYL CITRATE 100 MCG: 50 INJECTION, SOLUTION INTRAMUSCULAR; INTRAVENOUS at 10:45

## 2017-08-23 RX ADMIN — FENTANYL CITRATE 25 MCG: 50 INJECTION INTRAMUSCULAR; INTRAVENOUS at 13:18

## 2017-08-23 RX ADMIN — SODIUM CHLORIDE, SODIUM LACTATE, POTASSIUM CHLORIDE, AND CALCIUM CHLORIDE 20 ML/HR: .6; .31; .03; .02 INJECTION, SOLUTION INTRAVENOUS at 10:13

## 2017-08-23 RX ADMIN — ROCURONIUM BROMIDE 20 MG: 10 INJECTION, SOLUTION INTRAVENOUS at 10:59

## 2017-08-23 RX ADMIN — REMIFENTANIL HYDROCHLORIDE 0.1 MCG/KG/MIN: 1 INJECTION, POWDER, LYOPHILIZED, FOR SOLUTION INTRAVENOUS at 11:19

## 2017-09-02 ENCOUNTER — TRANSCRIBE ORDERS (OUTPATIENT)
Dept: ADMINISTRATIVE | Facility: HOSPITAL | Age: 77
End: 2017-09-02

## 2017-09-02 ENCOUNTER — APPOINTMENT (OUTPATIENT)
Dept: LAB | Facility: HOSPITAL | Age: 77
End: 2017-09-02
Attending: SPECIALIST
Payer: MEDICARE

## 2017-09-02 DIAGNOSIS — R97.20 ELEVATED PROSTATE SPECIFIC ANTIGEN (PSA): ICD-10-CM

## 2017-09-02 DIAGNOSIS — R97.20 ELEVATED PROSTATE SPECIFIC ANTIGEN (PSA): Primary | ICD-10-CM

## 2017-09-02 PROCEDURE — 84154 ASSAY OF PSA FREE: CPT

## 2017-09-02 PROCEDURE — 84153 ASSAY OF PSA TOTAL: CPT

## 2017-09-02 PROCEDURE — 36415 COLL VENOUS BLD VENIPUNCTURE: CPT

## 2017-09-06 LAB
PSA FREE MFR SERPL: 16.2 %
PSA FREE SERPL-MCNC: 7.27 NG/ML
PSA SERPL-MCNC: 44.9 NG/ML (ref 0–4)

## 2017-09-07 ENCOUNTER — ALLSCRIPTS OFFICE VISIT (OUTPATIENT)
Dept: OTHER | Facility: OTHER | Age: 77
End: 2017-09-07

## 2017-09-07 ENCOUNTER — GENERIC CONVERSION - ENCOUNTER (OUTPATIENT)
Dept: OTHER | Facility: OTHER | Age: 77
End: 2017-09-07

## 2017-09-13 ENCOUNTER — GENERIC CONVERSION - ENCOUNTER (OUTPATIENT)
Dept: OTHER | Facility: OTHER | Age: 77
End: 2017-09-13

## 2017-10-04 ENCOUNTER — ALLSCRIPTS OFFICE VISIT (OUTPATIENT)
Dept: OTHER | Facility: OTHER | Age: 77
End: 2017-10-04

## 2017-10-05 ENCOUNTER — GENERIC CONVERSION - ENCOUNTER (OUTPATIENT)
Dept: OTHER | Facility: OTHER | Age: 77
End: 2017-10-05

## 2017-10-05 DIAGNOSIS — G89.4 CHRONIC PAIN SYNDROME: ICD-10-CM

## 2017-10-05 DIAGNOSIS — M51.36 OTHER INTERVERTEBRAL DISC DEGENERATION, LUMBAR REGION: ICD-10-CM

## 2017-10-06 NOTE — CONSULTS
Assessment  1  Elevated PSA (790 93) (R97 20)    Plan   Analgesic use, Elevated PSA    · (1) URINE CULTURE; Source:Urine, First AM; Status:Active; Requested [id number];    Perform:[facility]; CHIQUIS:[id number]; Ordered; For:Analgesic use, Elevated PSA; Ordered By:Garcia Villavicencio;  Elevated PSA    · (1) PSA, DIAGNOSTIC (FOLLOW-UP); Status:Active; Requested [id number];    Perform:[facility]; QGB:[id number]; Ordered;For:Elevated PSA; Ordered By:Garcia Villavicencio;   · (1) URINALYSIS (will reflex a microscopy if leukocytes, occult blood, protein or nitrites are  not within normal limits); Status:Active; Requested UFQ:[id number];    Perform:[facility]; FWE:[id number]; Ordered;For:Elevated PSA; Ordered By:Joshua Villavicencio;    Uroflow w/ Post Void Residual - POC; Status:Active - Perform Order; Requested VFF:[id number];   Perform: In Office; WSO:[id number]; Ordered;    For:Elevated PSA; Ordered By:Joshua Villavicencio;      Discussion/Summary  Discussion Summary:   Patient is seen in regards to a PSA rise from mid 7 range to 44 9  Small nodule noted on examination as well  Patient has significant medical comorbidities  Suspect present PSA may be spurious due to recent surgical procedure  He will undergo PSA free and total in the next 3-4 weeks  Urinalysis and culture to be obtained along with ultrasound postvoid residual  Further recommendations to follow  All questions answered at this time  Chief Complaint  Chief Complaint Free Text Note Form: elevated PSA 44 9      History of Present Illness  HPI: Patient is a pleasant 53-year-old gentleman seen in regards to an elevated PSA of 44 9  Previous PSAs typically were in the mid 7 range  He has an established history of BPH reporting nocturia times 3-4, hesitancy, a fair flow, and incomplete bladder emptying sensation  He has significant comorbidities including severe back pain  Status post a recent spinal implant surgery   He has difficulty with sitting and maintaining position  No history of hematuria urinary tract infection  Review of Systems  Complete-Male Urology:   Constitutional: No fever or chills, feels well, no tiredness, no recent weight gain or weight loss  Respiratory: No complaints of shortness of breath, no wheezing, no cough, no SOB on exertion, no orthopnea or PND  Cardiovascular: No complaints of slow heart rate, no fast heart rate, no chest pain, no palpitations, no leg claudication, no lower extremity  Gastrointestinal: No complaints of abdominal pain, no constipation, no nausea or vomiting, no diarrhea or bloody stools  Genitourinary: stream quality fair, but-as noted in HPI,-no hematuria,-no empty sensation-(sometimes),-no incontinence-and-no feelings of urinary urgency-   The patient presents with complaints of dysuria (at times)  The patient presents with complaints of no nocturia (3-4 x night)   Musculoskeletal: No complaints of arthralgia, no myalgias, no joint swelling or stiffness, no limb pain or swelling  Integumentary: No complaints of skin rash or skin lesions, no itching, no skin wound, no dry skin  Hematologic/Lymphatic: No complaints of swollen glands, no swollen glands in the neck, does not bleed easily, no easy bruising  Neurological: No compliants of headache, no confusion, no convulsions, no numbness or tingling, no dizziness or fainting, no limb weakness, no difficulty walking  Active Problems  1  Allergic rhinitis due to pollen, unspecified rhinitis seasonality   2  Analgesic use (V58 69) (Z79 899)   3  Arteriosclerotic coronary artery disease (414 00) (I25 10)   4  Chronic atrial fibrillation (427 31) (I48 2)   5  Chronic low back pain (724 2,338 29) (M54 5,G89 29)   6  Chronic obstructive pulmonary disease (496) (J44 9)   7  Chronic pain syndrome (338 4) (G89 4)   8  Cigarette nicotine dependence with nicotine-induced disorder (292 9) (F17 219)   9  Continuous opioid dependence (304 01) (F11 20)   10   Dyspnea on exertion (786 09) (R06 09)   11  Elevated PSA (790 93) (R97 20)   12  Encounter for removal of staples (V58 32) (Z48 02)   13  Hiatal hernia (553 3) (K44 9)   14  Hyperlipidemia (272 4) (E78 5)   15  Hypertension (401 9) (I10)   16  Hypoxemia requiring supplemental oxygen (799 02) (R09 02,Z99 81)   17  Long term prescription opiate use (V58 69) (Z79 891)   18  Lumbar degenerative disc disease (722 52) (M51 36)   19  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)   20  Lumbar facet arthropathy (721 3) (M12 88)   21  Lumbar radiculopathy (724 4) (M54 16)   22  Lumbar spinal stenosis (724 02) (M48 061)   23  Morbid or severe obesity due to excess calories (278 01) (E66 01)   24  Nocturnal hypoxemia (327 24)   25  Obstructive sleep apnea (327 23) (G47 33)   26  PAD (peripheral artery disease) (443 9) (I73 9)   27  Preop examination (V72 84) (Z01 818)   28  Pre-procedure lab exam (V72 63) (Z01 812)   29  Sacroiliitis (720 2) (M46 1)   30  Status post surgery (V45 89) (V75 282)    Past Medical History  1  History of Alcohol abuse (305 00) (F10 10)   2  History of Atelectasis (518 0) (J98 11)   3  History of Compression fracture (829 0)   4  History of Coronary Artery Disease (V12 59)   5  History of Diabetes Mellitus (250 00)   6  History of acute renal failure (V13 09) (Z87 448)   7  History of atrial fibrillation (V12 59) (Z86 79)   8  History of hypercholesterolemia (V12 29) (Z86 39)   9  History of leukocytosis (V12 3) (Z86 2)   10  History of osteopenia (V13 59) (Z87 39)   11  History of pleural effusion (V12 69) (Z87 09)   12  History of Hypernatremia (276 0) (E87 0)   13  History of Multiple fractures of ribs of right side (807 09) (S22 41XA)   14  Personal history of fall (V15 88) (Z91 81)   15  Personal history of hydronephrosis (V13 09) (Z87 448)   16  History of Urinary retention (788 20) (R33 9)   17   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)  Active Problems And Past Medical History Reviewed: The active problems and past medical history were reviewed and updated today  Surgical History  1  History of Atrial Cardioversion   2  History of Cataract Surgery   3  History of Total Hip Replacement  Surgical History Reviewed: The surgical history was reviewed and updated today  Family History  Mother    1  No pertinent family history  Family History    2  Family history of Coronary Artery Disease (V17 49)  Family History Reviewed: The family history was reviewed and updated today  Social History   · Active smoker (305 1) (Z72 0)   · Alcohol   · Caffeine Use  Social History Reviewed: The social history was reviewed and updated today  Current Meds   1  Aspirin Low Dose 81 MG TABS; TAKE 1 TABLET DAILY; Therapy: (Kavita Mccallum) to Recorded   2  Bystolic 10 MG Oral Tablet; TAKE 1 TABLET DAILY  Requested for: 77NRU9393; Last   Rx:28Mar2017 Ordered   3  DilTIAZem HCl  MG Oral Capsule Extended Release 24 Hour; take 1 capsule   daily  Requested for: 65YKW1471; Last Rx:23May2017 Ordered   4  Fluticasone Propionate 50 MCG/ACT Nasal Suspension; USE 2 SPRAYS IN EACH   NOSTRIL ONCE DAILY; Therapy: 56KMR2205 to (Evaluate:16Jun2017); Last Rx:17May2017 Ordered   5  Hydrocodone-Acetaminophen  MG Oral Tablet; TAKE 1 TABLET 3 times daily PRN   pain; Therapy: 38YFR5067 to (Evaluate:12Nov2017); Last Rx:35Pzl4035 Ordered   6  Lipitor 40 MG Oral Tablet; 1 DAILY; Therapy: (Kavita Mccallum) to Recorded   7  Omeprazole 20 MG Oral Capsule Delayed Release; TAKE 2 CAPSULES DAILY EVERY   MORNING BEFORE BREAKFAST; Therapy: 47WYV3503 to Recorded   8  ProAir  (90 Base) MCG/ACT Inhalation Aerosol Solution; 2 PUFFS EVERY 6   HOURS  Requested for: 25TGC0153; Last Rx:17May2017 Ordered   9  Spiriva HandiHaler 18 MCG Inhalation Capsule; INHALE 1 CAPSULE Daily; Therapy: 72OSI5146 to (Evaluate:12May2018)  Requested for: 65XAR0956; Last   Rx:17May2017 Ordered   10   Tamsulosin HCl - 0 4 MG Oral Capsule; Therapy: 43Gjn7778 to Recorded   11  Xarelto 20 MG Oral Tablet; 1 DAILY; Therapy: 89SUC3731 to (Last Rx:39Lxa5686)  Requested for: 75YPD8028 Ordered  Medication List Reviewed: The medication list was reviewed and updated today  Allergies  1  No Known Drug Allergies    Vitals  Vital Signs    Recorded: 08KUT1497 10:29AM   Heart Rate 70   Systolic 718   Diastolic 70   Height 5 ft 10 in   Weight 248 lb 4 oz   BMI Calculated 35 62   BSA Calculated 2 29     Physical Exam    Constitutional   General appearance: No acute distress, well appearing and well nourished  Pulmonary   Auscultation of lungs: Clear to auscultation  Cardiovascular   Auscultation of heart: Normal rate and rhythm, normal S1 and S2, without murmurs  Abdomen   Abdomen: Non-tender, no masses  Genitourinary   Anus and perineum: Normal     Scrotum contents: Normal size, no masses  Epididymis: Normal, no masses  Testes: Normal testes, no masses  Urethral meatus: Normal, no lesions  Penis: Normal, no lesions  Digital rectal exam of prostate: Abnormal  -Greater than 50 g  A distinct approximate 5 mm nodule noted toward midline apex  Anus, perineum, and rectum: Normal     Musculoskeletal   Gait and station: Normal     Digits and nails: Normal without clubbing or cyanosis  Inspection/palpation of joints, bones, and muscles: Normal     Skin   Skin and subcutaneous tissue: Normal without rashes or lesions  Lymphatic   Palpation of lymph nodes in groin: Normal     Neurologic   Cranial nerves: Cranial nerves 2-12 intact  Future Appointments    Date/Time Provider Specialty Site   11/07/2017 10:00 AM RANDI Acuna  Urology [facility] [place]   11/16/2017 09:00 AM PAM Wade Pulmonary Medicine [facility] [place]   11/08/2017 08:00 AM RANDI Parra   Pain Management [address]     Signatures   Electronically signed by : RANDI Farfan ; Oct  5 2017 9:26AM BEN                       (Author)

## 2017-11-03 ENCOUNTER — APPOINTMENT (OUTPATIENT)
Dept: LAB | Facility: HOSPITAL | Age: 77
End: 2017-11-03
Payer: MEDICARE

## 2017-11-03 ENCOUNTER — TRANSCRIBE ORDERS (OUTPATIENT)
Dept: ADMINISTRATIVE | Facility: HOSPITAL | Age: 77
End: 2017-11-03

## 2017-11-03 DIAGNOSIS — R97.20 ELEVATED PROSTATE SPECIFIC ANTIGEN (PSA): ICD-10-CM

## 2017-11-03 DIAGNOSIS — Z79.899 OTHER LONG TERM (CURRENT) DRUG THERAPY: ICD-10-CM

## 2017-11-03 LAB
BACTERIA UR QL AUTO: ABNORMAL /HPF
BILIRUB UR QL STRIP: NEGATIVE
CLARITY UR: CLEAR
COLOR UR: YELLOW
GLUCOSE UR STRIP-MCNC: NEGATIVE MG/DL
HGB UR QL STRIP.AUTO: ABNORMAL
KETONES UR STRIP-MCNC: NEGATIVE MG/DL
LEUKOCYTE ESTERASE UR QL STRIP: ABNORMAL
NITRITE UR QL STRIP: POSITIVE
NON-SQ EPI CELLS URNS QL MICRO: ABNORMAL /HPF
PH UR STRIP.AUTO: 6 [PH] (ref 5–9)
PROT UR STRIP-MCNC: NEGATIVE MG/DL
PSA SERPL-MCNC: 10.4 NG/ML (ref 0–4)
RBC #/AREA URNS AUTO: ABNORMAL /HPF
SP GR UR STRIP.AUTO: 1.01 (ref 1–1.03)
UROBILINOGEN UR QL STRIP.AUTO: 0.2 E.U./DL
WBC #/AREA URNS AUTO: ABNORMAL /HPF

## 2017-11-03 PROCEDURE — 87077 CULTURE AEROBIC IDENTIFY: CPT

## 2017-11-03 PROCEDURE — 84153 ASSAY OF PSA TOTAL: CPT

## 2017-11-03 PROCEDURE — 81001 URINALYSIS AUTO W/SCOPE: CPT

## 2017-11-03 PROCEDURE — 87186 SC STD MICRODIL/AGAR DIL: CPT

## 2017-11-03 PROCEDURE — 87086 URINE CULTURE/COLONY COUNT: CPT

## 2017-11-03 PROCEDURE — 36415 COLL VENOUS BLD VENIPUNCTURE: CPT

## 2017-11-04 DIAGNOSIS — R97.20 ELEVATED PROSTATE SPECIFIC ANTIGEN (PSA): ICD-10-CM

## 2017-11-04 DIAGNOSIS — Z79.899 OTHER LONG TERM (CURRENT) DRUG THERAPY: ICD-10-CM

## 2017-11-05 LAB — BACTERIA UR CULT: ABNORMAL

## 2017-11-08 ENCOUNTER — APPOINTMENT (OUTPATIENT)
Dept: RADIOLOGY | Facility: CLINIC | Age: 77
End: 2017-11-08
Payer: MEDICARE

## 2017-11-08 ENCOUNTER — ALLSCRIPTS OFFICE VISIT (OUTPATIENT)
Dept: OTHER | Facility: OTHER | Age: 77
End: 2017-11-08

## 2017-11-08 DIAGNOSIS — G89.4 CHRONIC PAIN SYNDROME: ICD-10-CM

## 2017-11-08 DIAGNOSIS — M51.36 OTHER INTERVERTEBRAL DISC DEGENERATION, LUMBAR REGION: ICD-10-CM

## 2017-11-08 PROCEDURE — 72072 X-RAY EXAM THORAC SPINE 3VWS: CPT

## 2017-11-09 NOTE — PROGRESS NOTES
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)   4  Lumbar facet arthropathy (721 3) (M12 88)   5  Lumbar spinal stenosis (724 02) (M48 061)    Plan  Chronic low back pain, Chronic pain syndrome, Lumbar disc herniation withradiculopathy, Lumbar facet arthropathy, Lumbar spinal stenosis    · Follow-up visit in 2 months Evaluation and Treatment  Follow-up  Status: Hold For -Scheduling  Requested for: 61RLJ6122   Ordered;Chronic low back pain, Chronic pain syndrome, Lumbar disc herniation with radiculopathy, Lumbar facet arthropathy, Lumbar spinal stenosis; Ordered By: Jose Hopkins Performed:  Due: 30WXL0707  Chronic low back pain, Chronic pain syndrome, Lumbar facet arthropathy, Sacroiliitis    · Hydrocodone-Acetaminophen  MG Oral Tablet (Norco); TAKE 1 TABLET 3times daily PRN pain; Do Not Fill Before: 77Avd3302   Rx By: Jose Hopkins; Dispense: 30 Days ; #:90 Tablet; Refill: 0;Chronic low back pain, Chronic pain syndrome, Lumbar facet arthropathy, Sacroiliitis; NATHALIA = N; Print Rx    Discussion/Summary    My impressions and treatment recommendations were discussed in detail with the patient, who verbalized understanding and had no further questions  that the patient reports overall reduced pain and improved level functioning without significant side effects, I felt a reasonable to continue the patient on hydrocodone/acetaminophen 10/325 mg 1 tablet 3 times daily as needed for pain  risks and side effects of chronic opioid treatment were discussed in detail with the patient  Side effects include but are not limited to nausea, vomiting, GI intolerance, sedation, constipation, mental clouding, opioid-induced hyperalgesia, endocrine dysfunction, addiction, dependence, and tolerance   The patient was asked to take his medications only as prescribed and directed, never in excess, and never for any other reason other than for pain control  The patient was also asked to keep his medications out of the reach of others and away from children, preferably in a locked drawer  The patient verbalized understanding and wished to use these opioid medications  [facility] spinal cord stimulator representative was in the office at today's visit, so I asked him to fine tune the patient's spinal cord stimulator settings since he is not receiving good relief with the spinal cord stimulator at this time  is planned with the patient in 2 months time or sooner as warranted  Discharge instructions were provided  I personally saw and examined the patient and I agree with the above discussed plan of care  Chief Complaint    1  Back Pain    History of Present Illness  Mr Verna Rdz is a 64-year-old  male who presents to [facility] for interval re-evaluation of the above-stated pain complaints  The patient has a past medical and chronic pain history as outlined in the impression section below  He was last seen on September 13, 2017 at which time he was maintained on hydrocodone/acetaminophen 10/325 mg 1 tablet 3 times daily as needed for pain   today's office visit, the patient's pain score is 8/10 on the verbal numerical pain rating scale  The patient states that his pain is primarily in his low back and right buttocks region  He states that his pain is worse in the morning, evening, and night  His pain is constant in nature and he reports the quality of his pain is âsharp  â The patient does report that the hydrocodone/acetaminophen 10/325 mg is giving him significant relief  He still reports that his spinal cord stimulator settings have not been reaching his level of pain and he would like them adjusted at today's visit  patient reports controlled opioid induced constipation on prune juice    than as stated above, the patient denies any interval changes in medications, medical condition, mental condition, symptoms, or allergies since the last office visit  Carla Armas presents with complaints of gradual onset of constant episodes of moderate bilateral lower back pain, described as sharp, radiating to the right buttock, right thigh and right lower leg  On a scale of 1 to 10, the patient rates the pain as 8  Symptoms are improved by opioid analgesics, but not by NSAIDs  Symptoms are made worse by standing, sitting, bending and walking down stairs  Symptoms are unchanged  Review of Systems   Constitutional: no fever,-- no recent weight gain-- and-- no recent weight loss  Eyes: no double vision-- and-- no blurry vision  Cardiovascular: no chest pain,-- no palpitations-- and-- no lower extremity edema  Respiratory: shortness of breath, but-- no wheezing  Musculoskeletal: difficulty walking,-- muscle weakness,-- joint stiffness-- and-- decreased range of motion, but-- no joint swelling,-- no limb swelling-- and-- no pain in extremity  Neurological: memory loss, but-- no dizziness,-- no difficulty swallowing,-- no loss of consciousness-- and-- no seizures  Gastrointestinal: no nausea,-- no vomiting,-- no constipation-- and-- no diarrhea  Genitourinary: no difficulty initiating urine stream,-- no genital pain-- and-- no frequent urination  Integumentary: no complaints of skin rash  Psychiatric: no depression  Endocrine: no excessive thirst,-- no adrenal disease,-- no hypothyroidism-- and-- no hyperthyroidism  Hematologic/Lymphatic: no tendency for easy bruising-- and-- no tendency for easy bleeding  ROS reviewed  Active Problems  1  Allergic rhinitis due to pollen, unspecified rhinitis seasonality   2  Analgesic use (V58 69) (Z79 899)   3  Arteriosclerotic coronary artery disease (414 00) (I25 10)   4  Chronic atrial fibrillation (427 31) (I48 2)   5  Chronic low back pain (724 2,338 29) (M54 5,G89 29)   6   Chronic obstructive pulmonary disease (496) (J44 9) 7  Chronic pain syndrome (338 4) (G89 4)   8  Cigarette nicotine dependence with nicotine-induced disorder (292 9) (F17 219)   9  Continuous opioid dependence (304 01) (F11 20)   10  Dyspnea on exertion (786 09) (R06 09)   11  Elevated PSA (790 93) (R97 20)   12  Encounter for removal of staples (V58 32) (Z48 02)   13  Hiatal hernia (553 3) (K44 9)   14  Hyperlipidemia (272 4) (E78 5)   15  Hypertension (401 9) (I10)   16  Hypoxemia requiring supplemental oxygen (799 02) (R09 02,Z99 81)   17  Long term prescription opiate use (V58 69) (Z79 891)   18  Lumbar degenerative disc disease (722 52) (M51 36)   19  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)   20  Lumbar facet arthropathy (721 3) (M12 88)   21  Lumbar radiculopathy (724 4) (M54 16)   22  Lumbar spinal stenosis (724 02) (M48 061)   23  Morbid or severe obesity due to excess calories (278 01) (E66 01)   24  Nocturnal hypoxemia (327 24)   25  Obstructive sleep apnea (327 23) (G47 33)   26  PAD (peripheral artery disease) (443 9) (I73 9)   27  Preop examination (V72 84) (Z01 818)   28  Pre-procedure lab exam (V72 63) (Z01 812)   29  Sacroiliitis (720 2) (M46 1)   30  Status post surgery (V45 89) (P35 240)    Past Medical History  1  History of Alcohol abuse (305 00) (F10 10)   2  History of Atelectasis (518 0) (J98 11)   3  History of Compression fracture (829 0)   4  History of Coronary Artery Disease (V12 59)   5  History of Diabetes Mellitus (250 00)   6  History of acute renal failure (V13 09) (Z87 448)   7  History of atrial fibrillation (V12 59) (Z86 79)   8  History of hypercholesterolemia (V12 29) (Z86 39)   9  History of leukocytosis (V12 3) (Z86 2)   10  History of osteopenia (V13 59) (Z87 39)   11  History of pleural effusion (V12 69) (Z87 09)   12  History of Hypernatremia (276 0) (E87 0)   13  History of Multiple fractures of ribs of right side (807 09) (S22 41XA)   14  Personal history of fall (V15 88) (Z91 81)   15   Personal history of hydronephrosis (V13 09) (Z87 448)   16  History of Urinary retention (788 20) (R33 9)   17  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)    Surgical History  1  History of Atrial Cardioversion   2  History of Cataract Surgery   3  History of Total Hip Replacement    Family History  Mother    1  No pertinent family history  Family History    2  Family history of Coronary Artery Disease (V17 49)    Social History     · Active smoker (305 1) (Z72 0)   · Alcohol   · Caffeine Use    Current Meds   1  Aspirin Low Dose 81 MG TABS; TAKE 1 TABLET DAILY; Therapy: (Ileana Boyd) to Recorded   2  Bystolic 10 MG Oral Tablet; TAKE 1 TABLET DAILY  Requested for: 12OLI4920; Last Rx:28Mar2017 Ordered   3  DilTIAZem HCl  MG Oral Capsule Extended Release 24 Hour; take 1 capsule daily  Requested for: 44UWS8646; Last Rx:23May2017 Ordered   4  Fluticasone Propionate 50 MCG/ACT Nasal Suspension; USE 2 SPRAYS IN EACH NOSTRIL ONCE DAILY; Therapy: 06ZPL4649 to (Evaluate:16Jun2017); Last Rx:17May2017 Ordered   5  Hydrocodone-Acetaminophen  MG Oral Tablet; TAKE 1 TABLET 3 times daily PRN pain; Therapy: 33SDS7160 to (Evaluate:12Nov2017); Last Rx:86Pot7788 Ordered   6  Lipitor 40 MG Oral Tablet; 1 DAILY; Therapy: (Ileana Boyd) to Recorded   7  Omeprazole 20 MG Oral Capsule Delayed Release; TAKE 2 CAPSULES DAILY EVERY MORNING BEFORE BREAKFAST; Therapy: 76RWT7041 to Recorded   8  ProAir  (90 Base) MCG/ACT Inhalation Aerosol Solution; 2 PUFFS EVERY 6 HOURS  Requested for: 84OAK0216; Last Rx:17May2017 Ordered   9  Spiriva HandiHaler 18 MCG Inhalation Capsule; INHALE 1 CAPSULE Daily; Therapy: 07PEE5843 to (Evaluate:12May2018)  Requested for: 53NBA4910; Last Rx:17May2017 Ordered   10  Tamsulosin HCl - 0 4 MG Oral Capsule; Therapy: 91Lxe0142 to Recorded   11  Xarelto 20 MG Oral Tablet; 1 DAILY; Therapy: 54AIF6006 to (Last Rx:17Oct2017)  Requested for: 17Oct2017 Ordered    Allergies  1   No Known Drug Allergies    Vitals  Vital Signs    Recorded: 43OGL3779 08:02AM   Temperature 98 5 F   Heart Rate 74   Systolic 425   Diastolic 80   Height 5 ft 10 in   Weight 245 lb    BMI Calculated 35 15   BSA Calculated 2 28   Pain Scale 8       Physical Exam   Constitutional  General appearance: Abnormal   obese  Eyes  Sclera: anicteric  HEENT  Hearing grossly intact  Pulmonary  Respiratory effort: Even and unlabored  Cardiovascular  Examination of extremities: No edema or pitting edema present  Skin  Skin and subcutaneous tissue: Normal without rashes or lesions, well hydrated  Psychiatric  Mood and affect: Mood and affect appropriate  Neurologic  Cranial nerves: Cranial nerves II-XII grossly intact  Musculoskeletal  Gait and station: Abnormal  -- Gait is antalgic and the patient ambulates with the assistance of a single-point cane  Lumbar/Sacral Spine examination demonstrates Lumbosacral Spine:  Appearance: Normal   Tenderness: at lumbar spine,-- at the sacral spine,-- right paraspinal-- and-- left paraspinal  Lumbar muscle rigidity noted in the lumbar paraspinal musculature  Lumbosacral Spine Sensory: intact to light touch and pinprick in the lower extremities  ROM Lumbosacral Spine: Full  ROM Hips: Full  Foot and ankle strength was normal bilaterally  Knee strength was normal bilaterally  Hip strength was normal bilaterally  Results/Data  Results Free Text Form [facility]:   Results    MRI:    Lumbar 10/6/16  Normal alignment of the lumbar spine  No spondylolisthesis or spondylolysis  BODIES: No acute compression collapse of the vertebral seen  CHANGES:  Thoracic spine: Normal lower thoracic disc spaces ]  Normal disc height  No herniation  Normal facet joints  No canal or foraminal stenosis  There is disc desiccation with the loss of disc height  There is broad-based disc bulge  There is prominent epidural fat  There is moderate central canal narrowing   There is no significant left and moderate to severe right foraminal narrowing  There is disc desiccation with vacuum disc  There is broad-based disc bulge  There is a prominent epidural fat, facet joint disease with moderate to severe central canal narrowing  There is moderate to severe right foraminal narrowing andto severe left foraminal narrowing  There is moderate to severe loss of disc height  There is broad-based disc disc bulge with broad-based central and right foraminal protrusion  There is severe central canal narrowing and there is moderate left and severe right foraminalsevere facet joint disease seen at L4-5Moderate to severe loss of disc height seen  There is a vacuum disc is a central right paracentral protrusion  There is associated severe right foraminal narrowing and severe left foraminal narrowing  There is mild abutment of the traversing right S1 nerve root  SOFT TISSUES: Normal     ACQUIRED CENTRAL CANAL WITH MODERATE TO SEVERE CENTRAL CANAL NARROWING AT L3-4  CENTRAL CANAL NARROWING AT L4-5  DISC CENTRAL RIGHT PARACENTRAL AND FORAMINAL PROTRUSION AT L5-S1 LEVEL WITH BILATERAL SEVERE FORAMINAL NARROWING  RIGHT FORAMINAL NARROWING AT L4-5 LEVEL WITH BROAD-BASED [address], RIGHT FORAMINAL PROTRUSION WITH MODERATE LEFT FORAMINAL NARROWING  LIPOMATOSIS AT L3-4 AND L2-3  CENTRAL NARROWING AT L2-3  CANAL STENOSIS AT L4-5 LEVEL IS MORE PRONOUNCED FROM THE PREVIOUS STUDY    Future Appointments    Date/Time Provider Specialty Site   11/29/2017 09:15 AM RANDI Garibay  Urology [address]   11/16/2017 08:45 AM LASHON Velazquez  Pulmonary Medicine [facility]   12/06/2017 08:00 AM RANDI Almaraz   Cardiology [address]       Signatures   Electronically signed by : RANDI Garcia ; Nov 8 2017  8:51AM EST                       (Author)

## 2017-11-16 ENCOUNTER — ALLSCRIPTS OFFICE VISIT (OUTPATIENT)
Dept: OTHER | Facility: OTHER | Age: 77
End: 2017-11-16

## 2017-11-17 NOTE — PROGRESS NOTES
Assessment    1  Allergic rhinitis due to pollen, unspecified rhinitis seasonality (477 0) (J30 1)   2  Chronic obstructive pulmonary disease (496) (J44 9)   3  Cigarette nicotine dependence with nicotine-induced disorder (292 9) (F17 219)   4  Nocturnal hypoxemia (327 24)    Plan  Allergic rhinitis due to pollen, unspecified rhinitis seasonality    · Fluticasone Propionate 50 MCG/ACT Nasal Suspension (Flonase Allergy Relief); USE 2 SPRAYS IN EACH NOSTRIL ONCE DAILY   Rx By: Laurie Brown; Dispense: 30 Days ; #:1 ML; Refill: 0;Allergic rhinitis due to pollen, unspecified rhinitis seasonality; NATHALIA = N; Record  Allergic rhinitis due to pollen, unspecified rhinitis seasonality, Chronic obstructivepulmonary disease    · Breo Ellipta 200-25 MCG/INH Inhalation Aerosol Powder Breath Activated; INHALE1 PUFFS Daily   Rx By: Laurie Brown; Dispense: 30 Days ; #:1 Aerosol Powder Breath Activated; Refill: 5;Allergic rhinitis due to pollen, unspecified rhinitis seasonality, Chronic obstructive pulmonary disease; NATHALIA = N; Record  Chronic obstructive pulmonary disease    · Breo Ellipta 100-25 MCG/INH Inhalation Aerosol Powder Breath Activated; USE 1INHALATION ONCE DAILY   Rx By: Laurie Brown; Dispense: 28 Days ; #:1 Aerosol Powder Breath Activated; Refill: 4;Chronic obstructive pulmonary disease; NATHALIA = N; Record   · ProAir  (90 Base) MCG/ACT Inhalation Aerosol Solution; 2 PUFFSEVERY 6 HOURS   Rx By: Laurie Brown; Dispense: 90 Days ; #:3 X 8 5 GM Inhaler; Refill: 1;For: Chronic obstructive pulmonary disease; NATHALIA = N; Verified Transmission to [facility]; Last Updated By: System, SureScripts; 5/17/2017 9:17:13 AM   · Spiriva HandiHaler 18 MCG Inhalation Capsule; INHALE 1 CAPSULE Daily   Rx By: Laurie Brown; Dispense: 90 Days ; #:3 Capsule; Refill: 3;For: Chronic obstructive pulmonary disease; NATHALIA = N; Verified Transmission to [facility];  Last Updated By: System, SureScripts; 5/17/2017 9:17:12 AM   · * XR CHEST PA & LATERAL; Status:Active; Requested for:[id number];    Perform:[facility]; WGA:[id number]; Ordered;obstructive pulmonary disease; Ordered By:Estella Davila;    Discussion/Summary  Discussion Summary:   Walker Mitchell has stable COPD  He is agreeable to continue the use of Spiriva handihaler one puff daily  I did give him sample of Breo 100 mcg 1 puff daily to use in addition  He has Combivent and uses twice daily  continues to smoke  He is aware of the increased risk of lung cancer  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  chest Xray is ordered  has untreated VINICIO  he uses supplemental oxygen on PRN basis  I advised him to use every night He reports stuffy nose  i gave him sample of Flonase nasal spray and asked him to use 2 sprays each nostril daily  is stable  Smoking cessation was discussed  Follow up in 6 months  Active Problems    1  Analgesic use (V58 69) (Z79 899)   2  Arteriosclerotic coronary artery disease (414 00) (I25 10)   3  Chronic atrial fibrillation (427 31) (I48 2)   4  Chronic low back pain (724 2,338 29) (M54 5,G89 29)   5  Chronic obstructive pulmonary disease (496) (J44 9)   6  Chronic pain syndrome (338 4) (G89 4)   7  Cigarette nicotine dependence with nicotine-induced disorder (292 9) (F17 219)   8  Continuous opioid dependence (304 01) (F11 20)   9  Dyspnea on exertion (786 09) (R06 09)   10  Hiatal hernia (553 3) (K44 9)   11  Hyperlipidemia (272 4) (E78 5)   12  Hypertension (401 9) (I10)   13  Hypoxemia requiring supplemental oxygen (799 02) (R09 02,Z99 81)   14  Long term prescription opiate use (V58 69) (Z79 891)   15  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)   16  Lumbar facet arthropathy (721 3) (M12 88)   17  Lumbar spinal stenosis (724 02) (M48 06)   18  Morbid or severe obesity due to excess calories (278 01) (E66 01)   19  Nocturnal hypoxemia (327 24)   20   Obstructive sleep apnea (327 23) (G47 33)   21  PAD (peripheral artery disease) (443 9) (I73 9)   22  Pre-procedure lab exam (V72 63) (Z01 812)   23  Sacroiliitis (720 2) (M46 1)   24  Status post surgery (V45 89) (Z98 890)    Chief Complaint  Chief Complaint Free Text Note Form: F/U COPD, VINICIO      History of Present Illness  HPI: Lorenzo Garcia is a 64year old male with COPD, VINICIO and hypoxemia and morbid obesity  he was seen by Dr Cailin Yuan in 11/16 and chest Xray was ordered but not done  he is using Spiriva 1 inhalation daily  He uses Combivent about twice daily, has not used supplemental oxygen as his nose is stuffy  Sylvester  has controlled atrial fibrillation  he is now using Xarelto 20mg daily; Plavix is now distributed  remains a smoker; he smokes 1 PPD and has not been able to quit  Review of Systems  Complete-Male Pulm:  Constitutional: No fever or chills, feels well, no tiredness, no recent weight gain or weight loss  Eyes: no complaints of vision problems  ENT: no rhinitis, no PND, no epistaxis  Cardiovascular: no palpitations, no chest pain  Respiratory: shortness of breath-- and-- shortness of breath during exertion--   The patient presents with complaints of sudden onset of intermittent episodes of moderate cough, described as loose  His symptoms are caused by allergen contact  Symptoms are improved by sitting up  Symptoms are made worse by smoke exposure  Symptoms are unchanged  Gastrointestinal: no complaints of esophageal reflux, no abdominal pain  Genitourinary: no urinary retention  Musculoskeletal: joint stiffness  Integumentary: no rash, no lesions  Neurological: no headache, no fainting, no weakness  Past Medical History  1  History of Alcohol abuse (305 00) (F10 10)   2  History of Atelectasis (518 0) (J98 11)   3  History of Compression fracture (829 0)   4  History of Coronary Artery Disease (V12 59)   5  History of Diabetes Mellitus (250 00)   6  History of acute renal failure (V13 09) (Z87 448)   7   History of atrial fibrillation (V12 59) (Z86 79)   8  History of hypercholesterolemia (V12 29) (Z86 39)   9  History of leukocytosis (V12 3) (Z86 2)   10  History of osteopenia (V13 59) (Z87 39)   11  History of pleural effusion (V12 69) (Z87 09)   12  History of Hypernatremia (276 0) (E87 0)   13  History of Multiple fractures of ribs of right side (807 09) (S22 41XA)   14  Personal history of fall (V15 88) (Z91 81)   15  Personal history of hydronephrosis (V13 09) (Z87 448)   16  History of Urinary retention (788 20) (R33 9)   17  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)    Surgical History  1  History of Atrial Cardioversion   2  History of Cataract Surgery   3  History of Total Hip Replacement  Surgical History Reviewed: The surgical history was reviewed and updated today  Family History  Mother    1  No pertinent family history  Family History    2  Family history of Coronary Artery Disease (V17 49)    Social History     · Alcohol   · Caffeine Use   · Current Smoker (305 1)   · Smoked 1-1 1/2 packs per day for all of his adult life  Presently he smokes 3/4 pk day    Current Meds   1  Aspirin Low Dose 81 MG TABS; TAKE 1 TABLET DAILY; Therapy: (Damaris Blair) to Recorded   2  Bystolic 10 MG Oral Tablet; TAKE 1 TABLET DAILY  Requested for: 63STE5154; Last Rx:28Mar2017 Ordered   3  Diltiazem HCl 240 MG CP24; take 1 capsule daily; Therapy: (Damaris Blair) to Recorded   4  Hydrocodone-Acetaminophen  MG Oral Tablet; TAKE 1 TABLET 3 times daily PRN pain; Therapy: 77UPH7861 to (Evaluate:53Hlg9174); Last Rx:19Apr2017 Ordered   5  Lipitor 40 MG Oral Tablet; 1 DAILY; Therapy: (Damaris Blair) to Recorded   6  Omeprazole 20 MG Oral Capsule Delayed Release; TAKE 2 CAPSULES DAILY EVERY MORNING BEFORE BREAKFAST; Therapy: 28HRG6477 to Recorded   7  Plavix 75 MG Oral Tablet; TAKE 1 TABLET DAILY; Therapy: (Preciousrkim Blair) to Recorded   8   ProAir  (90 Base) MCG/ACT Inhalation Aerosol Solution; INHALE 2 PUFFS EVERY 4 HOURS AS NEEDED; Last Rx:[id number] Ordered   9  Spiriva HandiHaler 18 MCG Inhalation Capsule; INHALE 1 CAPSULE Daily; Therapy: 70HNL8460 to (Jared Mota)  Requested for: 38TOD2090; Last Rx:34Ubu0604 Ordered   10  Tamsulosin HCl - 0 4 MG Oral Capsule; Therapy: 11Aug2016 to Recorded   11  Xarelto 10 MG Oral Tablet; Therapy: (Recorded:26Jan2017) to Recorded    Allergies  1  No Known Drug Allergies    Vitals  Vital Signs    Recorded: 90EIN4628 08:52AM   Temperature 97 7 F, Oral   Heart Rate 67   Respiration 20   Systolic 345, RUE, Sitting   Diastolic 84, RUE, Sitting   O2 Saturation 95       Physical Exam   Constitutional  General appearance: No acute distress, well appearing and well nourished  Eyes  Examination of pupil and irises: Anicteric, pupils reactive  Ears, Nose, Mouth, and Throat  External inspection of ears and nose: Normal    Nasal mucosa, septum, and turbinates: Normal without edema or erythema  Lips, teeth, and gums: Normal, good dentition  Oropharynx: Normal with no erythema, edema, exudate or lesions  Mallampati Classification: 4  Neck  Neck: Supple, symmetric, trachea midline, no masses  Jugular veins: Normal    Pulmonary  Chest: Normal    Respiratory effort: No increased work of breathing or signs of respiratory distress  Auscultation of lungs: Clear to auscultation, no rales, no crackles, no wheezing  Cardiovascular  Auscultation of heart: Normal rate and rhythm, normal S1 and S2, no murmurs  Examination of extremities for edema and/or varicosities: Normal    Abdomen  Abdomen: Soft, non-tender  Lymphatic  Palpation of lymph nodes in neck: No lymphadenopathy  Musculoskeletal  Gait and station: Normal    Digits and nails: Normal without clubbing or cyanosis  Neurologic  Mental Status: Normal  Not confused, no evidence of dementia, good comprehension, good concentration     Motor Exam: Gross motor function normal    Skin  Skin and subcutaneous tissue: Limited exam shows no rash  Psychiatric  Orientation to person, place and time: Normal    Mood and affect: Normal        Future Appointments    Date/Time Provider Specialty Site   06/22/2017 08:30 AM RANDI Ruvalcaba  Neurosurgery [facility]   05/22/2017 09:00 AM RANDI Lord   Cardiology [address]       Signatures   Electronically signed by : PAM Marion; May 17 2017  9:24AM EST                       (Author)    Electronically signed by : LASHON Guerrero ; Nov 16 2017  9:34AM EST                       (Author)

## 2017-11-17 NOTE — PROGRESS NOTES
Assessment    1  Centrilobular emphysema (492 8) (J43 2)   2  History of Spinal Neurostimulator Pulse  Subcutaneous Placement   3  Dyspnea on exertion (786 09) (R06 09)   4  Obstructive sleep apnea (327 23) (G47 33)    Plan  Centrilobular emphysema    · Breo Ellipta 100-25 MCG/INH Inhalation Aerosol Powder Breath Activated; INHALE1 PUFFS Daily   Rx By: Rosemary Wei; Dispense: 30 Days ; #:1 X 60 Aerosol Powder Breath Activated Disp Pack; Refill: 5;Centrilobular emphysema; NATHALIA = N; Print Rx   · Ipratropium-Albuterol 0 5-2 5 (3) MG/3ML Inhalation Solution; USE 1 UNIT DOSE INNEBULIZER 4 TIMES DAILY   Rx By: Rosemary Wei; Dispense: 30 Days ; #:3 X 3 ML Plas Cont (60 Plas Conts); Refill: 5;Centrilobular emphysema; NATHALIA = N; Print Rx   · Follow-up visit in 3 months Evaluation and Treatment  Follow-up  Status: Hold For -Scheduling  Requested for: 41UBV0595   Ordered; For: Centrilobular emphysema; Ordered By: Rosemary Wei Performed:  Due: 04LRJ0398  Chronic obstructive pulmonary disease    · Spiriva HandiHaler 18 MCG Inhalation Capsule   Rx By: Ensa; Dispense: 90 Days ; #:3 Capsule; Refill: 3;Chronic obstructive pulmonary disease; NATHALIA = N; Transmitted To: [facility]; Last Updated By: Rosemary Wei; 5/17/2017 9:17:12 AM    Discussion/Summary  Discussion Summary:     Centrilobular emphysema is stable  Last measured FEV1 August 2016 was 1 96 liters or 66 percent of predicted  Angie Young wanted to stop using Spiriva and try Breo 100 microgram 1 puff daily  I gave him samples of the Breo 100 microgram 1 puff daily to use  He does use a nebulizer with albuterol at least 3 times a day and gets his medication via [employer] mail order  I will switch his nebulizer solution to albuterol-ipratropium as he will be stopping the Spiriva      I did discuss smoking cessation with him but he states he has no desire to quit smoking at this time    He does have a history of severe VINICIO diagnosed 2006 but could not tolerate BiPAP therapy  He does not want to pursue treatment for this at this time  I did advise weight loss  He states he is not using oxygen at nighttime  Follow-up in 3 months  Goals and Barriers: The patient has the current Goals: Try to lose weight and try to reduce cigarettes smoking  Patient's Capacity to Self-Care: Patient is able to Self-Care  Patient agrees and allows to involve family/caregiver in development of care plan:   Medication SE Review and Pt Understands Tx: Possible side effects of new medications were reviewed with the patient/guardian today  The treatment plan was reviewed with the patient/guardian  The patient/guardian understands and agrees with the treatment plan      Active Problems    1  Allergic rhinitis due to pollen, unspecified rhinitis seasonality   2  Analgesic use (V58 69) (Z79 899)   3  Arteriosclerotic coronary artery disease (414 00) (I25 10)   4  Chronic atrial fibrillation (427 31) (I48 2)   5  Chronic low back pain (724 2,338 29) (M54 5,G89 29)   6  Chronic obstructive pulmonary disease (496) (J44 9)   7  Chronic pain syndrome (338 4) (G89 4)   8  Cigarette nicotine dependence with nicotine-induced disorder (292 9) (F17 219)   9  Continuous opioid dependence (304 01) (F11 20)   10  Dyspnea on exertion (786 09) (R06 09)   11  Elevated PSA (790 93) (R97 20)   12  Encounter for removal of staples (V58 32) (Z48 02)   13  Hiatal hernia (553 3) (K44 9)   14  Hyperlipidemia (272 4) (E78 5)   15  Hypertension (401 9) (I10)   16  Hypoxemia requiring supplemental oxygen (799 02) (R09 02,Z99 81)   17  Long term prescription opiate use (V58 69) (Z79 891)   18  Lumbar degenerative disc disease (722 52) (M51 36)   19  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)   20  Lumbar facet arthropathy (721 3) (M12 88)   21  Lumbar radiculopathy (724 4) (M54 16)   22  Lumbar spinal stenosis (724 02) (M48 061)   23   Morbid or severe obesity due to excess calories (278 01) (E66 01)   24  Nocturnal hypoxemia (327 24)   25  Obstructive sleep apnea (327 23) (G47 33)   26  PAD (peripheral artery disease) (443 9) (I73 9)   27  Preop examination (V72 84) (Z01 818)   28  Pre-procedure lab exam (V72 63) (Z01 812)   29  Sacroiliitis (720 2) (M46 1)   30  Status post surgery (V45 89) (Z98 890)    Chief Complaint  Chief Complaint Free Text Note Form: F/u copd and elier      History of Present Illness  HPI: Siddharth Mcclellan presents for follow-up of his COPD  On August 23, 2017 he had placement of the thoracic spinal cord stimulator paddle electrode be at T9-10 laminectomy and had an implantable pulse generator placed in the left buttocks region  This was done for chronic lower back pain  Thus far he states he not really having much improvement with the stimulator in his pain  Estle Garter is still smoking a pack of cigarettes per day and states he does use a nick out filter on every cigarette he smokes  He states he is not ready to quit smoking  He does have shortness of breath going up a flight of stairs or walking at a fast pace  He does have some cough in the morning expectorates a small amount of white mucus  He denies any recent respiratory tract infections  No nocturnal dyspnea  He did have his influenza vaccine    he does have a history of severe ELIER diagnosed 2006 and was titrated to BiPAP 19/15  He cannot tolerate BiPAP and has not used any therapy since then  He is not interested in pursuing treatment of ELIER at this time  Review of Systems  Complete-Male Pulm:  Constitutional: No fever or chills, feels well, no tiredness, no recent weight gain or weight loss  Eyes: no complaints of vision problems  ENT: no rhinitis, no PND, no epistaxis  Cardiovascular: no palpitations, no chest pain  Respiratory: as noted in HPI  Gastrointestinal: no complaints of esophageal reflux, no abdominal pain  Genitourinary: no urinary retention    Musculoskeletal: lower bck pain  Integumentary: no rash, no lesions  Neurological: no headache, no fainting, no weakness  Psychiatric: no anxiety, no depression  Hematologic/Lymphatic: no complaints of swollen glands  ROS Reviewed:   ROS reviewed  Past Medical History  1  History of Alcohol abuse (305 00) (F10 10)   2  History of Atelectasis (518 0) (J98 11)   3  History of Compression fracture (829 0)   4  History of Coronary Artery Disease (V12 59)   5  History of Diabetes Mellitus (250 00)   6  History of acute renal failure (V13 09) (Z87 448)   7  History of atrial fibrillation (V12 59) (Z86 79)   8  History of hypercholesterolemia (V12 29) (Z86 39)   9  History of leukocytosis (V12 3) (Z86 2)   10  History of osteopenia (V13 59) (Z87 39)   11  History of pleural effusion (V12 69) (Z87 09)   12  History of Hypernatremia (276 0) (E87 0)   13  History of Multiple fractures of ribs of right side (807 09) (S22 41XA)   14  Personal history of fall (V15 88) (Z91 81)   15  Personal history of hydronephrosis (V13 09) (Z87 448)   16  History of Urinary retention (788 20) (R33 9)   17  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)    Surgical History  1  History of Atrial Cardioversion   2  History of Cataract Surgery   3  History of Spinal Neurostimulator Pulse  Subcutaneous Placement   4  History of Total Hip Replacement  Surgical History Reviewed: The surgical history was reviewed and updated today  Family History  Mother    1  No pertinent family history  Family History    2  Family history of Coronary Artery Disease (V17 49)  Family History Reviewed: The family history was reviewed and updated today  Social History     · Active smoker (305 1) (Z72 0)   · Smoked 1-1 1/2 packs per day for all of his adult life  Presently he smokes 3/4 pk day   · Alcohol   · Caffeine Use  Social History Reviewed: The social history was reviewed and updated today  The social history was reviewed and is unchanged  Current Meds   1  Aspirin Low Dose 81 MG TABS; TAKE 1 TABLET DAILY; Therapy: (Dorothy Karimi) to Recorded   2  Bystolic 10 MG Oral Tablet; TAKE 1 TABLET DAILY  Requested for: 50XRG8328; Last Rx:28Mar2017 Ordered   3  DilTIAZem HCl  MG Oral Capsule Extended Release 24 Hour; take 1 capsule daily  Requested for: 84GPO9801; Last Rx:23May2017 Ordered   4  Fluticasone Propionate 50 MCG/ACT Nasal Suspension; USE 2 SPRAYS IN EACH NOSTRIL ONCE DAILY; Therapy: 99PLV9358 to (Evaluate:16Jun2017); Last Rx:17May2017 Ordered   5  Hydrocodone-Acetaminophen  MG Oral Tablet; TAKE 1 TABLET 3 times daily PRN pain; Therapy: 50TXZ4573 to (Evaluate:11Jan2018); Last Rx:08Nov2017 Ordered   6  Lipitor 40 MG Oral Tablet; 1 DAILY; Therapy: (Dorothy Karimi) to Recorded   7  Omeprazole 20 MG Oral Capsule Delayed Release; TAKE 2 CAPSULES DAILY EVERY MORNING BEFORE BREAKFAST; Therapy: 57WNK0649 to Recorded   8  ProAir  (90 Base) MCG/ACT Inhalation Aerosol Solution; 2 PUFFS EVERY 6 HOURS  Requested for: 45CSY0118; Last Rx:17May2017 Ordered   9  Spiriva HandiHaler 18 MCG Inhalation Capsule; INHALE 1 CAPSULE Daily; Therapy: 62VHS0728 to (Evaluate:12May2018)  Requested for: 54VLB3654; Last Rx:17May2017 Ordered   10  Tamsulosin HCl - 0 4 MG Oral Capsule; Therapy: 11Aug2016 to Recorded   11  Xarelto 20 MG Oral Tablet; 1 DAILY; Therapy: 66SVT5702 to (Last Rx:17Oct2017)  Requested for: 17Oct2017 Ordered  Medication List Reviewed: The medication list was reviewed and updated today  Allergies  1  No Known Drug Allergies    Vitals  Vital Signs    Recorded: 65NPF8425 08:52AM   Temperature 97 5 F, Oral   Heart Rate 77   Pulse Quality Normal   Respiration Quality Normal   Respiration 20   Systolic 573, LUE, Sitting   Diastolic 80, LUE, Sitting   Height 5 ft 10 in   Weight 245 lb    BMI Calculated 35 15   BSA Calculated 2 28   O2 Saturation 96       Physical Exam   Constitutional  General appearance: Abnormal   overweight  Eyes  Examination of pupil and irises: Anicteric, pupils reactive  Ears, Nose, Mouth, and Throat  External inspection of ears and nose: Normal    Nasal mucosa, septum, and turbinates: Normal without edema or erythema  Lips, teeth, and gums: Normal, good dentition  Oropharynx: Normal with no erythema, edema, exudate or lesions  Neck  Neck: Supple, symmetric, trachea midline, no masses  Jugular veins: Normal    Pulmonary  Chest: Normal    Respiratory effort: No increased work of breathing or signs of respiratory distress  Auscultation of lungs: Clear to auscultation, no rales, no crackles, no wheezing  Cardiovascular  Auscultation of heart: Normal rate and rhythm, normal S1 and S2, no murmurs  Examination of extremities for edema and/or varicosities: Normal    Abdomen  Abdomen: Soft, non-tender  Lymphatic  Palpation of lymph nodes in neck: No lymphadenopathy  Musculoskeletal  Gait and station: Normal    Digits and nails: Normal without clubbing or cyanosis  Neurologic  Mental Status: Normal  Not confused, no evidence of dementia, good comprehension, good concentration  Skin  Skin and subcutaneous tissue: Limited exam shows no rash  Psychiatric  Orientation to person, place and time: Normal    Mood and affect: Normal        Future Appointments    Date/Time Provider Specialty Site   12/21/2017 07:30 AM RANDI Wilson  Neurosurgery [facility]   11/29/2017 09:15 AM RANDI Diop  Urology [facility] [place]   02/06/2018 08:15 AM LASHON Pickard  Pulmonary Medicine [facility]   12/06/2017 08:00 AM RANDI Leonardo   Cardiology [address]       Signatures   Electronically signed by : LAHSON Hopkins ; Nov 16 2017  9:36AM EST                       (Author)    Electronically signed by : LASHON Hopkins ; Nov 16 2017  9:37AM EST                       (Author)

## 2017-11-29 ENCOUNTER — ALLSCRIPTS OFFICE VISIT (OUTPATIENT)
Dept: OTHER | Facility: OTHER | Age: 77
End: 2017-11-29

## 2017-11-29 LAB
CLARITY UR: NORMAL
COLOR UR: YELLOW
GLUCOSE (HISTORICAL): NORMAL
HGB UR QL STRIP.AUTO: NORMAL
KETONES UR STRIP-MCNC: NORMAL MG/DL
LEUKOCYTE ESTERASE UR QL STRIP: NORMAL
NITRITE UR QL STRIP: NORMAL
PH UR STRIP.AUTO: 5 [PH]
PROT UR STRIP-MCNC: NORMAL MG/DL
SP GR UR STRIP.AUTO: 1.01

## 2017-12-06 ENCOUNTER — GENERIC CONVERSION - ENCOUNTER (OUTPATIENT)
Dept: OTHER | Facility: OTHER | Age: 77
End: 2017-12-06

## 2017-12-06 ENCOUNTER — ALLSCRIPTS OFFICE VISIT (OUTPATIENT)
Dept: OTHER | Facility: OTHER | Age: 77
End: 2017-12-06

## 2017-12-07 ENCOUNTER — APPOINTMENT (OUTPATIENT)
Dept: LAB | Facility: HOSPITAL | Age: 77
End: 2017-12-07
Attending: INTERNAL MEDICINE
Payer: MEDICARE

## 2017-12-07 ENCOUNTER — TRANSCRIBE ORDERS (OUTPATIENT)
Dept: ADMINISTRATIVE | Facility: HOSPITAL | Age: 77
End: 2017-12-07

## 2017-12-07 DIAGNOSIS — F17.219 CIGARETTE NICOTINE DEPENDENCE WITH NICOTINE-INDUCED DISORDER: ICD-10-CM

## 2017-12-07 DIAGNOSIS — I10 ESSENTIAL HYPERTENSION, MALIGNANT: ICD-10-CM

## 2017-12-07 DIAGNOSIS — E78.5 HYPERLIPIDEMIA, UNSPECIFIED HYPERLIPIDEMIA TYPE: ICD-10-CM

## 2017-12-07 DIAGNOSIS — R97.20 ELEVATED PROSTATE SPECIFIC ANTIGEN (PSA): ICD-10-CM

## 2017-12-07 DIAGNOSIS — I48.20 CHRONIC ATRIAL FIBRILLATION (HCC): ICD-10-CM

## 2017-12-07 DIAGNOSIS — I48.20 CHRONIC ATRIAL FIBRILLATION (HCC): Primary | ICD-10-CM

## 2017-12-07 LAB
ALBUMIN SERPL BCP-MCNC: 3.7 G/DL (ref 3.5–5)
ALP SERPL-CCNC: 71 U/L (ref 46–116)
ALT SERPL W P-5'-P-CCNC: 20 U/L (ref 12–78)
ANION GAP SERPL CALCULATED.3IONS-SCNC: 10 MMOL/L (ref 4–13)
AST SERPL W P-5'-P-CCNC: 11 U/L (ref 5–45)
BILIRUB SERPL-MCNC: 0.6 MG/DL (ref 0.2–1)
BUN SERPL-MCNC: 16 MG/DL (ref 5–25)
CALCIUM SERPL-MCNC: 9 MG/DL (ref 8.3–10.1)
CHLORIDE SERPL-SCNC: 101 MMOL/L (ref 100–108)
CHOLEST SERPL-MCNC: 116 MG/DL (ref 50–200)
CO2 SERPL-SCNC: 28 MMOL/L (ref 21–32)
CREAT SERPL-MCNC: 0.88 MG/DL (ref 0.6–1.3)
GFR SERPL CREATININE-BSD FRML MDRD: 82 ML/MIN/1.73SQ M
GLUCOSE P FAST SERPL-MCNC: 109 MG/DL (ref 65–99)
HDLC SERPL-MCNC: 59 MG/DL (ref 40–60)
LDLC SERPL CALC-MCNC: 34 MG/DL (ref 0–100)
POTASSIUM SERPL-SCNC: 4.3 MMOL/L (ref 3.5–5.3)
PROT SERPL-MCNC: 7.6 G/DL (ref 6.4–8.2)
PSA SERPL-MCNC: 11.2 NG/ML (ref 0–4)
SODIUM SERPL-SCNC: 139 MMOL/L (ref 136–145)
TRIGL SERPL-MCNC: 113 MG/DL

## 2017-12-07 PROCEDURE — 84153 ASSAY OF PSA TOTAL: CPT

## 2017-12-07 PROCEDURE — 80061 LIPID PANEL: CPT

## 2017-12-07 PROCEDURE — 80053 COMPREHEN METABOLIC PANEL: CPT | Performed by: INTERNAL MEDICINE

## 2017-12-07 PROCEDURE — 36415 COLL VENOUS BLD VENIPUNCTURE: CPT | Performed by: INTERNAL MEDICINE

## 2017-12-07 NOTE — PROGRESS NOTES
Assessment  Assessed    1  Chronic atrial fibrillation (427 31) (I48 2)   2  Chronic obstructive pulmonary disease (496) (J44 9)   3  Arteriosclerotic coronary artery disease (414 00) (I25 10)   4  Chronic low back pain (724 2,338 29) (M54 5,G89 29)   5  Cigarette nicotine dependence with nicotine-induced disorder (292 9) (F17 219)   6  Chronic pain syndrome (338 4) (G89 4)   7  Dyspnea on exertion (786 09) (R06 09)   8  Hyperlipidemia (272 4) (E78 5)   9  Hypertension (401 9) (I10)   10  Hypoxemia requiring supplemental oxygen (799 02) (R09 02,Z99 81)   11  Long term prescription opiate use (V58 69) (Z79 891)   12  Morbid or severe obesity due to excess calories (278 01) (E66 01)   13  Obstructive sleep apnea (327 23) (G47 33)   14  Status post surgery (V45 89) (Z98 890)    Plan  Chronic atrial fibrillation    · EKG/ECG- POC; Status:Complete;   Done: 43CPG3082   Perform: In Office; [phone]; Last Updated By:Davon Gonsales; 12/6/2017 8:07:03 AM;Ordered;atrial fibrillation; Ordered By:Ramesh Perdomo;   1   Smoking cessation was counseled for 4 minutes  2   Continue present medications  3   Get previously ordered lipids and CMP tomorrow  4   Follow-up in 6 months with EKG  Discussion/Summary  Goals and Barriers: The patient has the current Goals: Maintenance of cardiovascular health  The patent has the current Barriers: Continued cigarette smokingobesity, class II  Patient's Capacity to Self-Care: Patient is able to Self-Care  Medication SE Review and Pt Understands Tx: The treatment plan was reviewed with the patient/guardian  The patient/guardian understands and agrees with the treatment plan   Counseling Documentation With Imm: The patient was counseled regarding diagnostic results,-- instructions for management,-- risk factor reductions,-- prognosis,-- patient and family education,-- impressions,-- risks and benefits of treatment options,-- importance of compliance with treatment     Self Referrals:   Self Referrals: Yes   Transitional Care: Co-manage care with PCP/Referring Provider  Discussion Summary:   Controlled chronic atrial fibrillation on Xarelto oral anticoagulation plus diltiazem and Bystolic for rate control  Frequent PVCs or aberrantly conducted complexes, asymptomatic, and pattern of bigeminy, chronic, less pronounced today   Stable CAD with history of remote PCI, details unknown to me, done 8 years ago, with patient alleging last stress test 2-3 years ago  Significant COPD with chronic exertional dyspnea with hypoxemia and ongoing cigarette abuse at one pack per day and prior occasional exacerbations  Obstructive sleep apnea, patient refusing CPAP and using nasal oxygen 2 L/m h s  and p r  n  Marked obesity, class II, with 3 pound weight gain in 6 months  History of right pleural effusion, smallHistory of asbestosis due to his exposure as a plumberChronic low back pain and continuous opioid dependence with failure of lumbar epidural steroid injections and lumbar facet radiofrequency ablations with unsatisfactory response to permanent spinal stimulator implant on 08/23/2017  Long-standing hypertension and dyslipidemia, treatedHistory of hiatal hernia and GERDHistory of former alcoholism, now controlled, with fall caused by alcohol abuse resulting in hospital stay 8/18/13 and recent trip and fall 6 months ago  History of bladder outlet obstruction 8/13 and attributed to [facility] of pneumonia/pleurisy March, 2012Unremarkable cardiac catheterization in 2005Cervical spine fracture after fall in 2009, rib fractures after fall in 2013, left total hip replacement April, 2010  Chief Complaint  Chief Complaint Free Text Note Form: Patient here for 6 month follow up visit with ekg; lipid/cmp panels not completed   Some shortness of breath  ylm/ma      History of Present Illness  Cardiology HPI Free Text Note Form [facility]:   59-year-old man underwent uneventful spinal stimulator implant on 08/23/2017 by Dr Briseyda Ashford, but he has had no relief of his back pain despite multiple attempts to adjust the settings of the system  He has follow-up with Dr Briseyda Ashford on 12/21/2017  There have been no new cardiopulmonary or medical symptoms otherwise  He continues with his chronic low back pain and need for opiate medication  He has a stable level of chronic exertional dyspnea, nonproductive cough, smoking of 1 pack per day, and use of nasal oxygen at 2 liters/minute HS daily  There have been no falls, syncope, or dizziness  He denies any palpitations  Review of Systems  ROS Reviewed:   ROS reviewed  All other systems negative, except as noted in history of present illness  Active Problems  Problems    1  Allergic rhinitis due to pollen, unspecified rhinitis seasonality   2  Analgesic use (V58 69) (Z79 899)   3  Arteriosclerotic coronary artery disease (414 00) (I25 10)   4  Centrilobular emphysema (492 8) (J43 2)   5  Chronic atrial fibrillation (427 31) (I48 2)   6  Chronic low back pain (724 2,338 29) (M54 5,G89 29)   7  Chronic obstructive pulmonary disease (496) (J44 9)   8  Chronic pain syndrome (338 4) (G89 4)   9  Cigarette nicotine dependence with nicotine-induced disorder (292 9) (F17 219)   10  Continuous opioid dependence (304 01) (F11 20)   11  Dyspnea on exertion (786 09) (R06 09)   12  Elevated PSA (790 93) (R97 20)   13  Encounter for removal of staples (V58 32) (Z48 02)   14  Hiatal hernia (553 3) (K44 9)   15  Hyperlipidemia (272 4) (E78 5)   16  Hypertension (401 9) (I10)   17  Hypoxemia requiring supplemental oxygen (799 02) (R09 02,Z99 81)   18  Long term prescription opiate use (V58 69) (Z79 891)   19  Lumbar degenerative disc disease (722 52) (M51 36)   20  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)   21  Lumbar facet arthropathy (721 3) (M46 96)   22  Lumbar radiculopathy (724 4) (M54 16)   23  Lumbar spinal stenosis (724 02) (M48 061)   24   Morbid or severe obesity due to excess calories (278 01) (E66 01)   25  Nocturnal hypoxemia (327 24)   26  Obstructive sleep apnea (327 23) (G47 33)   27  PAD (peripheral artery disease) (443 9) (I73 9)   28  Preop examination (V72 84) (Z01 818)   29  Pre-procedure lab exam (V72 63) (Z01 812)   30  Sacroiliitis (720 2) (M46 1)   31  Status post surgery (V45 89) (B68 618)    Past Medical History  Problems    1  History of Alcohol abuse (305 00) (F10 10)   2  History of Atelectasis (518 0) (J98 11)   3  History of Compression fracture (829 0)   4  History of Coronary Artery Disease (V12 59)   5  History of Diabetes Mellitus (250 00)   6  History of acute renal failure (V13 09) (Z87 448)   7  History of atrial fibrillation (V12 59) (Z86 79)   8  History of hypercholesterolemia (V12 29) (Z86 39)   9  History of leukocytosis (V12 3) (Z86 2)   10  History of osteopenia (V13 59) (Z87 39)   11  History of pleural effusion (V12 69) (Z87 09)   12  History of Hypernatremia (276 0) (E87 0)   13  History of Multiple fractures of ribs of right side (807 09) (S22 41XA)   14  Personal history of fall (V15 88) (Z91 81)   15  Personal history of hydronephrosis (V13 09) (Z87 448)   16  History of Urinary retention (788 20) (R33 9)   17  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)  Active Problems And Past Medical History Reviewed: The active problems and past medical history were reviewed and updated today  Surgical History  Problems    1  History of Atrial Cardioversion   2  History of Cataract Surgery   3  History of Spinal Neurostimulator Pulse  Subcutaneous Placement   4  History of Total Hip Replacement  Surgical History Reviewed: The surgical history was reviewed and updated today  Family History  Mother    1  No pertinent family history  Family History    2  Family history of Coronary Artery Disease (V17 49)  Family History Reviewed: The family history was reviewed and updated today         Social History  Problems    · Active smoker (305 1) (Z72 0)   · Alcohol   · Caffeine Use  Social History Reviewed: The social history was reviewed and is unchanged  Current Meds   1  Aspirin Low Dose 81 MG TABS; TAKE 1 TABLET DAILY; Therapy: (Cortes Pedraza) to Recorded   2  Breo Ellipta 100-25 MCG/INH Inhalation Aerosol Powder Breath Activated; INHALE 1 PUFFS Daily; Therapy: 96BZS4243 to (Evaluate:15May2018); Last Rx:16Nov2017 Ordered   3  Bystolic 10 MG Oral Tablet; TAKE 1 TABLET DAILY  Requested for: 45WDM0721; Last Rx:28Mar2017 Ordered   4  DilTIAZem HCl  MG Oral Capsule Extended Release 24 Hour; take 1 capsule daily  Requested for: 14ORI1108; Last Rx:23May2017 Ordered   5  Hydrocodone-Acetaminophen  MG Oral Tablet; TAKE 1 TABLET 3 times daily PRN pain; Therapy: 85BIA2897 to (Evaluate:11Jan2018); Last Rx:08Nov2017 Ordered   6  Ipratropium-Albuterol 0 5-2 5 (3) MG/3ML Inhalation Solution; USE 1 UNIT DOSE IN NEBULIZER 4 TIMES DAILY; Therapy: 30NLQ9527 to (Evaluate:15May2018); Last Rx:16Nov2017 Ordered   7  Lipitor 40 MG Oral Tablet; 1 DAILY; Therapy: (Cortes Pedraza) to Recorded   8  Omeprazole 20 MG Oral Capsule Delayed Release; TAKE 2 CAPSULES DAILY EVERY MORNING BEFORE BREAKFAST; Therapy: 07JYM5384 to Recorded   9  ProAir  (90 Base) MCG/ACT Inhalation Aerosol Solution; 2 PUFFS EVERY 6 HOURS  Requested for: 05NFY3418; Last Rx:17May2017 Ordered   10  Tamsulosin HCl - 0 4 MG Oral Capsule; Therapy: 11Aug2016 to Recorded   11  Xarelto 20 MG Oral Tablet; 1 DAILY; Therapy: 50SEM3449 to (Last Rx:17Oct2017)  Requested for: 17Oct2017 Ordered  Medication List Reviewed: The medication list was reviewed and updated today  Allergies  Medication    1   No Known Drug Allergies    Vitals  Vital Signs    Recorded: 10AAM8159 07:57AM   Heart Rate 67, Apical   Systolic 191, LUE, Sitting   Diastolic 64, LUE, Sitting   BP CUFF SIZE Large   Height 5 ft 10 in   Weight 253 lb    BMI Calculated 36 3   BSA Calculated 2 31   O2 Saturation 95, RA     3 lb weight gain in approximately 6 months  Physical Exam   Constitutional  General appearance: Abnormal  -- Markedly obese white male in no acute distress he is alert, oriented, and cooperative  Weight is currently similar to 2012 weight  Eyes  Conjunctiva and Sclera examination: Conjunctiva pink, sclera anicteric  Ears, Nose, Mouth, and Throat - Oropharynx: Clear, nares are clear, mucous membranes are moist   Neck  Neck and thyroid: Normal, supple, trachea midline, no thyromegaly  Pulmonary  Respiratory effort: No increased work of breathing or signs of respiratory distress  Auscultation of lungs: Abnormal  -- Diminished breath sounds with scattered inspiratory rhonchi and no rales or wheezes wheezes, rhonchi, rales  Cardiovascular  Auscultation of heart: Abnormal  -- Irregularly irregular cardiac rhythm with grade 2/6 aortic systolic ejection murmur transmitted to left sternal border and manubrium  No gallop or click  Carotid pulses: Normal, 2+ bilaterally  Peripheral vascular exam: Normal pulses throughout, no tenderness, erythema or swelling  Pedal pulses: Normal, 2+ bilaterally  Examination of extremities for edema and/or varicosities: Normal    Abdomen  Abdomen: Abnormal  -- Markedly obese and nontender  Liver and spleen: No hepatomegaly or splenomegaly  Musculoskeletal Gait and station: Normal gait  -- Digits and nails: Normal without clubbing or cyanosis  -- Inspection/palpation of joints, bones, and muscles: Normal, ROM normal    Skin - Skin and subcutaneous tissue: Normal without rashes or lesions  Skin is warm and well perfused, normal turgor  Neurologic - Cranial nerves: II - XII intact  -- Speech: Normal    Psychiatric - Orientation to person, place, and time: Normal -- Mood and affect: Normal       Results/Data  ECG Report: 12/06/2017  fibrillation with controlled ratewide complex beats consistent with aberrant conduction or PVCsrule out old inferior infarctR-wave progressionto 06/23/2017, there has been resolution of apparent ventricular bigeminy with no other change       Future Appointments    Date/Time Provider Specialty Site   12/21/2017 07:30 AM RANDI Chin  Neurosurgery [facility]   02/06/2018 08:15 AM Iva Hashimoto, D O   Pulmonary Medicine [facility]   04/17/2018 09:45 AM Pastora Moser Urology [facility] [place]       Signatures   Electronically signed by : RANDI Blount ; Dec  6 2017  8:49AM EST                       (Author)

## 2017-12-10 ENCOUNTER — GENERIC CONVERSION - ENCOUNTER (OUTPATIENT)
Dept: OTHER | Facility: OTHER | Age: 77
End: 2017-12-10

## 2017-12-18 ENCOUNTER — ALLSCRIPTS OFFICE VISIT (OUTPATIENT)
Dept: OTHER | Facility: OTHER | Age: 77
End: 2017-12-18

## 2017-12-21 ENCOUNTER — ALLSCRIPTS OFFICE VISIT (OUTPATIENT)
Dept: OTHER | Facility: OTHER | Age: 77
End: 2017-12-21

## 2017-12-22 NOTE — PROGRESS NOTES
Assessment   1  Chronic pain syndrome (338 4) (G89 4)   2  Lumbar spinal stenosis (724 02) (M48 061)   3  Lumbar degenerative disc disease (722 52) (M51 36)    Plan    · Follow-up visit in 6 weeks Evaluation and Treatment  Follow-up  Status: Hold For -    Scheduling  Requested for: 46Ysn6726   Ordered; For: Chronic pain syndrome; Ordered By: Krishna Amador Performed:  Due: 03NFC7005   · 1 - Maribel TRIPLETT, Latanya Zapata  (Neurosurgery) Co-Management  *  Status: Active  Requested for:    22VSE4355   Ordered; For: Lumbar degenerative disc disease; Ordered By: Krishna Amador Performed:  Due: 44JTG8277  Care Summary provided  : Yes    Discussion/Summary      This is a 63 yo male with chronic LBP and right greater than left leg pain  No previous lumbar surgery  Underwent successful SCS trial with Dr Judd Enter  is four months from SCS placement  He is not obtaining pain relief  Underwent programming adjustment in the office today and given a bipole programs with Burst DR PW 1000, Rate 40Hz, and intraburst 500Hz  He was also given a MSAR program in which the entire paddle was used as an anode with single cathode  neuromonitoring mapping in the OR was also not consistent as dictated in the operative note  His latest x-rays shows stable lead placement at the T8-9 interspace  Lying flat and midline  Has steel in his right eye and cannot obtain MR imaging  of CAD with cardiac stent and AFIB  Also history of LE stent  On Xarelto and Aspirin  of COPD on Home oxygen at night  one ppd for 60 years  does have moderate to severe lumbar DDD and central stenosis  He does have elements of vascular and neurogenic claudication  He appears to have a clear surgical pathology  Given the lack of relief with the spinal cord stimulator the additional option would be lumbar decompression and/or fusion  I have referred him to my partner Dr Ariella Delgadillo who can evaluate him for this procedure   He would be considered a high risk patient given his medical history  This was discussed with the patient  total time of encounter was 35 minutes  Greater than 50% in care coordination      Chief Complaint   Patient presents to office for 1 month f/u for continued pain in center lower back and right hip  History of Present Illness   He is four months from SCS placement  Reg incisional pain  No fevers or chills  He remains reporting only mild relief, but remains with severe LBP and hip pain  Difficulty walking  he has tried several tonic and bursal DR programs  previous office notes: review, this is a very pleasant 66yo male with long standing chronic pain of his back and legs  He reports a constant aching LBP that is always present and worse with standing and walking  He also has fairly constant right greater than left posterior/lateral leg pain to his knee  He underwent a successful SCS trial with Dr Linda Russell obtaining greater than 50% pain relief with improved sleep and ambulation  He was very pleased and wishes to proceed with permanent implant  a cane since the 1970's  of CAD with cardiac stent and AFIB  Also history of LE stent  On Xarelto and Aspirin  of emphysema on Home oxygen at night  one ppd for 60 years  steel in his right eye and cannot obtain MR imaging  Review of Systems        Constitutional: feeling tired, but-- no fever,-- no recent weight gain,-- no chills-- and-- no recent weight loss  Eyes: No complaints of eye pain, no red eyes, no discharge from eyes, no itchy eyes  ENT: no complaints of earache, no hearing loss, no nosebleeds, no nasal discharge, no sore throat, no hoarseness  Cardiovascular: Afib, CAD, stent in place  Respiratory: shortness of breath,-- shortness of breath during exertion-- and-- Emphysema, Sleep apnea, oxygen at HS  Gastrointestinal: No complaints of abdominal pain, no constipation, no nausea or vomiting, no diarrhea or bloody stools        Genitourinary: No complaints of dysuria, no incontinence, no hesitancy, no nocturia, no genital lesion, no testicular pain  Musculoskeletal: limb pain-- and-- bilateral leg pain worse on right, but-- no joint swelling-- and-- no limb swelling  Integumentary: dry skin  Neurological: limb weakness-- and-- difficulty walking, but-- no headache,-- no numbness,-- no tingling,-- no confusion,-- no dizziness-- and-- no fainting  Psychiatric: Is not suicidal, no sleep disturbances, no anxiety or depression, no change in personality, no emotional problems  Endocrine: No complaints of proptosis, no hot flashes, no muscle weakness, no erectile dysfunction, no deepening of the voice, no feelings of weakness  Hematologic/Lymphatic: Xarelto 20mg, Asa 81 mg  ROS reviewed  Active Problems   1  Allergic rhinitis due to pollen, unspecified rhinitis seasonality   2  Analgesic use (V58 69) (Z79 899)   3  Arteriosclerotic coronary artery disease (414 00) (I25 10)   4  Centrilobular emphysema (492 8) (J43 2)   5  Chronic atrial fibrillation (427 31) (I48 2)   6  Chronic low back pain (724 2,338 29) (M54 5,G89 29)   7  Chronic obstructive pulmonary disease (496) (J44 9)   8  Chronic pain syndrome (338 4) (G89 4)   9  Cigarette nicotine dependence with nicotine-induced disorder (292 9) (F17 219)   10  Continuous opioid dependence (304 01) (F11 20)   11  Dyspnea on exertion (786 09) (R06 09)   12  Elevated PSA (790 93) (R97 20)   13  Encounter for removal of staples (V58 32) (Z48 02)   14  Hiatal hernia (553 3) (K44 9)   15  Hyperlipidemia (272 4) (E78 5)   16  Hypertension (401 9) (I10)   17  Hypoxemia requiring supplemental oxygen (799 02) (R09 02,Z99 81)   18  Long term prescription opiate use (V58 69) (Z79 891)   19  Lumbar degenerative disc disease (722 52) (M51 36)   20  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)   21  Lumbar facet arthropathy (721 3) (M46 96)   22   Lumbar radiculopathy (724 4) (M54 16)   23  Lumbar spinal stenosis (724 02) (M48 061)   24  Morbid or severe obesity due to excess calories (278 01) (E66 01)   25  Nocturnal hypoxemia (327 24)   26  Obstructive sleep apnea (327 23) (G47 33)   27  PAD (peripheral artery disease) (443 9) (I73 9)   28  Preop examination (V72 84) (Z01 818)   29  Pre-procedure lab exam (V72 63) (Z01 812)   30  Sacroiliitis (720 2) (M46 1)   31  Status post surgery (V45 89) (V48 412)    Past Medical History   1  History of Alcohol abuse (305 00) (F10 10)   2  History of Atelectasis (518 0) (J98 11)   3  History of Compression fracture (829 0)   4  History of Coronary Artery Disease (V12 59)   5  History of Diabetes Mellitus (250 00)   6  History of acute renal failure (V13 09) (Z87 448)   7  History of atrial fibrillation (V12 59) (Z86 79)   8  History of hypercholesterolemia (V12 29) (Z86 39)   9  History of leukocytosis (V12 3) (Z86 2)   10  History of osteopenia (V13 59) (Z87 39)   11  History of pleural effusion (V12 69) (Z87 09)   12  History of Hypernatremia (276 0) (E87 0)   13  History of Multiple fractures of ribs of right side (807 09) (S22 41XA)   14  Personal history of fall (V15 88) (Z91 81)   15  Personal history of hydronephrosis (V13 09) (Z87 448)   16  History of Urinary retention (788 20) (R33 9)   17  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)     The active problems and past medical history were reviewed and updated today  Surgical History   1  History of Atrial Cardioversion   2  History of Cataract Surgery   3  History of Spinal Neurostimulator Pulse  Subcutaneous Placement   4  History of Total Hip Replacement     The surgical history was reviewed and updated today  Family History   Mother    1  No pertinent family history  Family History    2  Family history of Coronary Artery Disease (V17 49)     The family history was reviewed and updated today         Social History    · Active smoker (305 1) (Z72 0)   · Alcohol · Caffeine Use  The social history was reviewed and updated today  Current Meds    1  Aspirin Low Dose 81 MG TABS; TAKE 1 TABLET DAILY; Therapy: () to Recorded   2  Breo Ellipta 100-25 MCG/INH Inhalation Aerosol Powder Breath Activated; INHALE 1     PUFFS Daily; Therapy: 07ECB9660 to (Evaluate:15May2018); Last Rx:16Nov2017 Ordered   3  Bystolic 10 MG Oral Tablet; TAKE 1 TABLET DAILY  Requested for: 68LNL2984; Last     Rx:28Mar2017 Ordered   4  DilTIAZem HCl  MG Oral Capsule Extended Release 24 Hour; take 1 capsule     daily  Requested for: 25JFN6208; Last Rx:23May2017 Ordered   5  Hydrocodone-Acetaminophen  MG Oral Tablet; TAKE 1 TABLET 3 times daily PRN     pain; Therapy: 12ZKE2078 to (Evaluate:11Jan2018); Last Rx:08Nov2017 Ordered   6  Ipratropium-Albuterol 0 5-2 5 (3) MG/3ML Inhalation Solution; USE 1 UNIT DOSE IN     NEBULIZER 4 TIMES DAILY; Therapy: 85ENS2876 to (Evaluate:05Jun2018)  Requested for: 30HSR0392; Last     Rx:48Eao6180; Status: ACTIVE - Retrospective By Protocol Authorization Ordered   7  Ipratropium-Albuterol 0 5-2 5 (3) MG/3ML Inhalation Solution; USE 1 UNIT DOSE IN     NEBULIZER 4 TIMES DAILY; Therapy: 46YLF4244 to (Evaluate:16Jun2018)  Requested for: 33ILL2515; Last     Rx:28Apd1418 Ordered   8  Lipitor 40 MG Oral Tablet; 1 DAILY; Therapy: () to Recorded   9  Omeprazole 20 MG Oral Capsule Delayed Release; TAKE 2 CAPSULES DAILY EVERY     MORNING BEFORE BREAKFAST; Therapy: 26HGZ0326 to Recorded   10  ProAir  (90 Base) MCG/ACT Inhalation Aerosol Solution; 2 PUFFS EVERY 6      HOURS  Requested for: 75JUC9258; Last Rx:17May2017 Ordered   11  Tamsulosin HCl - 0 4 MG Oral Capsule; Therapy: 11Aug2016 to Recorded   12  Xarelto 20 MG Oral Tablet; 1 DAILY; Therapy: 23CVJ2960 to (Last Rx:17Oct2017)  Requested for: 17Oct2017 Ordered     The medication list was reviewed and updated today  Allergies   1   No Known Drug Allergies    Vitals   Vital Signs    Recorded: 99Dpm7390 07:32AM   Temperature 97 5 F, Tympanic   Heart Rate 72, L Brachial Artery   Pulse Quality Normal, L Brachial Artery   Respiration Quality Normal   Respiration 20   Systolic 640, LUE, Sitting   Diastolic 245, LUE, Sitting   Height 5 ft 10 in   Weight 256 lb    BMI Calculated 36 73   BSA Calculated 2 32     Physical Exam         Constitutional Patient appears healthy and well developed  Musculo: Spine Contour is normal  No tenderness of the spine billaterally  Incisions well healed  Neurologic - Mental Status: Alert and Oriented x3  Motor System General Motor Strength: No pronator drift and no parietal drift  Gait and Station: cane  Results/Data   Diagnostic Studies Reviewed Neurosurger [facility]:      I personally reviewed the Thoracic x-ray, CT lumbar spine in detail with the patient  X-ray Review reviewed images and report of thoracic x-ray November 2017 stable lead placement at the T8-9 interspace  Lying flat and midline  images and report of SCS trial with Dr Dino Alvarado  Single midline 8 contact St  Иван perc lead placed to Mid T8  Burst DR  CT Scan Review Reviewed images and report of CT lumbar spine 2016  Severe multilevel DDD throughout  Alignment fairly maintained  L3-S1 moderate to severe central stenosis  Future Appointments      Date/Time Provider Specialty Site   02/06/2018 08:15 AM Kriste Blizzard, D O   Pulmonary Medicine [facility]   04/17/2018 09:45 AM Pastora Moser Urology [address]     Signatures    Electronically signed by : RANDI Dillard ; Dec 21 2017  9:24AM EST                       (Author)

## 2018-01-10 NOTE — MISCELLANEOUS
Message   Recorded as Task   Date: 01/26/2017 11:33 AM, Created By: Moy Virk   Task Name: Med Renewal Request   Assigned To: [address]   Regarding Patient: Yumiko Lord, Status: In Progress   Comment:    Susan Newsomey - 26 Jan 2017 11:33 AM     TASK CREATED  Caller: Self; Renew Medication; [phone] (Home)  Received a vmlom from [phone] from the pt  requesting a refill on the hydrocodone/acet 10/325mg, last prescribed on 12/14 c/ a DNF date of 12/29  No sovs is scheduled  AS to advise  thanks   [facility] - 26 Jan 2017 12:44 PM     TASK REPLIED TO: Previously Assigned To [address]  Refilled prescription for him to  at [facility] office  He needs a follow up on or before Feb 27  Susan Newsomey - 26 Jan 2017 1:39 PM     TASK EDITED  Attempted to call the pt  and left a detailed mom in regards to the previous task  Anju Newsome - 26 Jan 2017 1:39 PM     TASK IN PROGRESS   So Hernandez - 31 Jan 2017 9:43 AM     TASK EDITED  Pt p/u scripts  Sovs w/Dr Celaya 2/13/17  Active Problems    1  Analgesic use (V58 69) (Z79 899)   2  Arteriosclerotic coronary artery disease (414 00) (I25 10)   3  Chronic low back pain (724 2,338 29) (M54 5,G89 29)   4  Chronic obstructive pulmonary disease (496) (J44 9)   5  Chronic pain syndrome (338 4) (G89 4)   6  Cigarette nicotine dependence with nicotine-induced disorder (292 9) (F17 219)   7  Continuous opioid dependence (304 01) (F11 20)   8  Dyspnea on exertion (786 09) (R06 09)   9  Hiatal hernia (553 3) (K44 9)   10  Hyperlipidemia (272 4) (E78 5)   11  Hypertension (401 9) (I10)   12  Hypoxemia requiring supplemental oxygen (799 02) (R09 02,Z99 81)   13  Long term prescription opiate use (V58 69) (Z79 891)   14  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)   15  Lumbar facet arthropathy (721 3) (M12 88)   16  Lumbar spinal stenosis (724 02) (M48 06)   17   Morbid or severe obesity due to excess calories (278 01) (E66 01)   18  Nocturnal hypoxemia (327 24)   19  Obstructive sleep apnea (327 23) (G47 33)   20  PAD (peripheral artery disease) (443 9) (I73 9)   21  Sacroiliitis (720 2) (M46 1)    Current Meds   1  Aspirin Low Dose 81 MG TABS; TAKE 1 TABLET DAILY; Therapy: (77 385 106) to Recorded   2  Bystolic 10 MG Oral Tablet; TAKE 1 TABLET DAILY; Therapy: (77 385 106) to Recorded   3  Diltiazem HCl 240 MG CP24; take 1 capsule daily; Therapy: (77 385 106) to Recorded   4  Hydrocodone-Acetaminophen  MG Oral Tablet (Norco); TAKE 1 TABLET 3 times   daily PRN pain; Therapy: 03YAP3479 to (Evaluate:44Jej4755); Last Rx:26Jan2017 Ordered   5  Lipitor 40 MG Oral Tablet (Atorvastatin Calcium); 1 DAILY; Therapy: (77 385 106) to Recorded   6  Omeprazole 20 MG Oral Capsule Delayed Release; TAKE 2 CAPSULES DAILY EVERY   MORNING BEFORE BREAKFAST; Therapy: 56POY9816 to Recorded   7  Plavix 75 MG Oral Tablet (Clopidogrel Bisulfate); TAKE 1 TABLET DAILY; Therapy: (77 385 106) to Recorded   8  ProAir  (90 Base) MCG/ACT Inhalation Aerosol Solution; Therapy: (Recorded:17Nov2016) to Recorded   9  Tamsulosin HCl - 0 4 MG Oral Capsule; Therapy: 11Aug2016 to Recorded   10  Xarelto 10 MG Oral Tablet; Therapy: (Recorded:26Jan2017) to Recorded    Allergies    1   No Known Drug Allergies    Signatures   Electronically signed by : Cassidy Adams RN; Jan 31 2017  9:43AM EST                       (Author)

## 2018-01-11 ENCOUNTER — GENERIC CONVERSION - ENCOUNTER (OUTPATIENT)
Dept: OTHER | Facility: OTHER | Age: 78
End: 2018-01-11

## 2018-01-11 NOTE — MISCELLANEOUS
Message   Recorded as Task   Date: 04/14/2017 01:48 PM, Created By: Jose Ramírez   Task Name: Follow Up   Assigned To: [address] [facility] [facility],Team   Regarding Patient: Lauryn Herndon, Status: Active   Comment:    So Hernandez - 14 Apr 2017 1:48 PM     TASK CREATED  Pt is post SCS TRIAL [facility] done on 4/13/17 w/Dr Celaya    Lead removal 4/19/17 @ 8:30am   So Hernandez - 14 Apr 2017 2:27 PM     TASK EDITED  Spoke to pt he states that he is a little sore but not that bad  He denies fever and forgot to  the abt  He will get them today and start on them  He has someone at home who is able to reinforce the tape around the dressing as it was coming loose  Pt states the dressing is fine otherwise  Confirmed lead removal/xray appt  Gwendolyn Whitten - 14 Apr 2017 3:13 PM     TASK REPLIED TO: Previously Assigned To [place]  Thanks  Active Problems    1  Analgesic use (V58 69) (Z79 899)   2  Arteriosclerotic coronary artery disease (414 00) (I25 10)   3  Chronic atrial fibrillation (427 31) (I48 2)   4  Chronic low back pain (724 2,338 29) (M54 5,G89 29)   5  Chronic obstructive pulmonary disease (496) (J44 9)   6  Chronic pain syndrome (338 4) (G89 4)   7  Cigarette nicotine dependence with nicotine-induced disorder (292 9) (F17 219)   8  Continuous opioid dependence (304 01) (F11 20)   9  Dyspnea on exertion (786 09) (R06 09)   10  Hiatal hernia (553 3) (K44 9)   11  Hyperlipidemia (272 4) (E78 5)   12  Hypertension (401 9) (I10)   13  Hypoxemia requiring supplemental oxygen (799 02) (R09 02,Z99 81)   14  Long term prescription opiate use (V58 69) (Z79 891)   15  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)   16  Lumbar facet arthropathy (721 3) (M12 88)   17  Lumbar spinal stenosis (724 02) (M48 06)   18  Morbid or severe obesity due to excess calories (278 01) (E66 01)   19  Nocturnal hypoxemia (327 24)   20   Obstructive sleep apnea (327 23) (G47 33) 21  PAD (peripheral artery disease) (443 9) (I73 9)   22  Pre-procedure lab exam (V72 63) (Z01 812)   23  Sacroiliitis (720 2) (M46 1)   24  Status post surgery (V45 89) (D91 312)    Current Meds   1  Aspirin Low Dose 81 MG TABS; TAKE 1 TABLET DAILY; Therapy: ([employer]) to Recorded   2  Bystolic 10 MG Oral Tablet; TAKE 1 TABLET DAILY  Requested for: 41UHK2901; Last   Rx:28Mar2017 Ordered   3  Cephalexin 500 MG Oral Tablet (Cephalexin Monohydrate); TAKE 1 CAPSULE 4 TIMES   DAILY with food for 7 days post procedure; Therapy: 91CAK7126 to (Evaluate:20Apr2017)  Requested for: 13Apr2017; Last   Rx:13Apr2017 Ordered   4  Diltiazem HCl 240 MG CP24; take 1 capsule daily; Therapy: ([employer]) to Recorded   5  Hydrocodone-Acetaminophen  MG Oral Tablet (Norco); TAKE 1 TABLET 3 times   daily PRN pain; Therapy: 43OPG4515 to (Evaluate:30Apr2017); Last Rx:29Mar2017 Ordered   6  Lipitor 40 MG Oral Tablet (Atorvastatin Calcium); 1 DAILY; Therapy: ([employer]) to Recorded   7  Omeprazole 20 MG Oral Capsule Delayed Release; TAKE 2 CAPSULES DAILY EVERY   MORNING BEFORE BREAKFAST; Therapy: 72JOU7325 to Recorded   8  Plavix 75 MG Oral Tablet (Clopidogrel Bisulfate); TAKE 1 TABLET DAILY; Therapy: (Roll[employer] Media) to Recorded   9  ProAir  (90 Base) MCG/ACT Inhalation Aerosol Solution; INHALE 2 PUFFS   EVERY 4 HOURS AS NEEDED; Last Rx:07Feb2017 Ordered   10  Spiriva HandiHaler 18 MCG Inhalation Capsule; INHALE 1 CAPSULE Daily; Therapy: 54BLO8680 to (95 265067)  Requested for: 90NUD4084; Last    Rx:07Feb2017 Ordered   11  Tamsulosin HCl - 0 4 MG Oral Capsule; Therapy: 11Aug2016 to Recorded   12  Xarelto 10 MG Oral Tablet; Therapy: (Recorded:26Jan2017) to Recorded    Allergies    1   No Known Drug Allergies    Signatures   Electronically signed by : Lily Cui RN; Apr 14 2017  3:16PM EST                       (Author)

## 2018-01-11 NOTE — PROGRESS NOTES
Assessment    1  Preop examination (V72 84) (Z01 818)   2  Chronic pain syndrome (338 4) (G89 4)   3  Cigarette nicotine dependence with nicotine-induced disorder (292 9) (F17 219)   4  Chronic obstructive pulmonary disease (496) (J44 9)   5  Lumbar degenerative disc disease (722 52) (M51 36)   6  Lumbar spinal stenosis (724 02) (M48 06)    Discussion/Summary    78-year-old male, presents for preoperative evaluation, he has planned "thoracic SCS with left buttocks IPG"scheduled for 8/23/17  The procedure is scheduled with Dr Shanice Cormier, and the patient reports Dr Shanice Cormier has explained in a very detailed fashion the proposed surgical procedure, risks and possible complications, as well as the benefits of the procedure  He expresses understanding of this information/explanation, and is in agreement with the proposed procedure and wishes to proceed  As noted:  He is a current every day smoker, one pack per day  Smoking cessation was reviewed, in addition the patient is advised to discontinue smoking 10 days prior to surgery  He has a prior history of surgery performed under general anesthesia which was tolerated without complication    He denies SOB or CP with activity  He denies bleeding disorder or history of same  He has a active history of COPD/emphysema  He is under the care of a primary care provider and is followed regularly for hypertension and dyslipidemia  He is under the care of a cardiologist and follows regularly for atrial fibrillation and coronary artery disease  He denies medication allergies  He understands to discontinue aspirin 7 days prior to his surgery, and additionally understands discontinue Xarelto, recommended 7 days, he reports his CARDIOLOGIST ADVISED TO 5 DAYS  He understands he is to take his usual medications with a small sip of water in early AM day of surgery      Postoperative activities were briefly reviewed, he understands he is to lift no greater than 10 pounds until follow-up in approximately 6 weeks, he also understands to avoid immersion in water for approximately 3 weeks, and additionally understands he may ambulate as tolerated and is encouraged to do so  Pre operative skin preparation was discussed with the patient and he understand to wash/ shower with Hibiclens (chlorhexidine) and utilize Anthony cloths as directed  These findings, impressions and recommendations are reviewed in great detail with the patient, he expressed understanding and agreement, his questions were answered completely and to his satisfaction  Follow up has been scheduled  The patient was counseled regarding instructions for management, patient and family education, importance of compliance with treatment  The patient has the current Goals: Improvement/resolution chronic back and leg pain  Patient is able to Self-Care  Chief Complaint  Preop H&P, planned thoracic dorsal column stimulator placement      History of Present Illness  Very pleasant 26-year-old male, presents for preoperative H&P as noted above    He has planned "thoracic SCS with left buttocks IPG" scheduled for 8/23/17, to be performed by Dr Elizabeth Vazquez  He reports chronic low back pain for many years, with right greater than left leg pain  He has spinal cord stimulator trial by Dr Juana Avila which he reports was particularly effective area    He is a current every day smoker, approximately one pack a day, with a fifth a 2-3 pack per day history  He has multiple surgeries in the past under general anesthesia with tolerated without complication including appendectomy, tonsillectomy, colon surgery, bilateral knee arthroplasty  He reports he tolerated these without complication      He denies shortness breath or chest pain with ambulation distance of approximately or blocks or climbing 2 flights of stairs, he does admit however that his back pain neck and leg pain is significantly increased by these activities  He has a history of COPD/emphysema times approximately 20 years he otherwise denies asthma, chronic bronchitis or recurrent pulmonary infections  He denies hospital admissions for pulmonary condition  His emphysema/COPD is managed by Jamia Lux twice daily, and Spiriva HandiHaler once daily, he also has an albuterol inhaler which he reports he uses very infrequently  He denies bleeding tendencies or history of same  He does take aspirin 81 mg once daily as well as Xarelto 20 once daily  He reports his cardiologist has advised him to stop the Xarelto for only 5 days  He is aware that our recommendation is 7 days preop 2 days postop  He reports he will discontinue the aspirin 7 days prior  Also his primary care provider, Dr Ivory Carrel on regular basis for hypertension and dyslipidemia  He follows with cardiology, Dr Sulma Gonzalez on a regular basis, for atrial fibrillation and coronary artery disease  Reports no known medication allergies  He otherwise denies diabetes mellitus, kidney or liver disorder, seizure, cancer, ulcer, rheumatic fever or tuberculosis  He understands the need for preoperative clearance and has appointment scheduled  He also reports he has had cardiac clearance with visit 6/23/17  Review of Systems    Constitutional: No fever or chills, feels well, no tiredness, no recent weight gain or weight loss  Eyes: No complaints of eye pain, no red eyes, no discharge from eyes, no itchy eyes  ENT: no complaints of earache, no hearing loss, no nosebleeds, no nasal discharge, no sore throat, no hoarseness  Cardiovascular: No complaints of slow heart rate, no fast heart rate, no chest pain, no palpitations, no leg claudication, no lower extremity  Respiratory: cough, wheezing, shortness of breath during exertion and emphysema and severe SOB on exertion     Gastrointestinal: No complaints of abdominal pain, no constipation, no nausea or vomiting, no diarrhea or bloody stools  Genitourinary: No complaints of dysuria, no incontinence, no hesitancy, no nocturia, no genital lesion, no testicular pain  Musculoskeletal: arthralgias  Integumentary: No complaints of skin rash or skin lesions, no itching, no skin wound, no dry skin  Neurological: limb weakness and difficulty walking  Psychiatric: anxiety, sleep disturbances and cant sleep right and easily irritated  Endocrine: No complaints of proptosis, no hot flashes, no muscle weakness, no erectile dysfunction, no deepening of the voice, no feelings of weakness  Hematologic/Lymphatic: a tendency for easy bruising and from xarelto  ROS reviewed  Active Problems    1  Allergic rhinitis due to pollen, unspecified rhinitis seasonality   2  Analgesic use (V58 69) (Z79 899)   3  Arteriosclerotic coronary artery disease (414 00) (I25 10)   4  Chronic atrial fibrillation (427 31) (I48 2)   5  Chronic low back pain (724 2,338 29) (M54 5,G89 29)   6  Chronic obstructive pulmonary disease (496) (J44 9)   7  Chronic pain syndrome (338 4) (G89 4)   8  Cigarette nicotine dependence with nicotine-induced disorder (292 9) (F17 219)   9  Continuous opioid dependence (304 01) (F11 20)   10  Dyspnea on exertion (786 09) (R06 09)   11  Hiatal hernia (553 3) (K44 9)   12  Hyperlipidemia (272 4) (E78 5)   13  Hypertension (401 9) (I10)   14  Hypoxemia requiring supplemental oxygen (799 02) (R09 02,Z99 81)   15  Long term prescription opiate use (V58 69) (Z79 891)   16  Lumbar degenerative disc disease (722 52) (M51 36)   17  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)   18  Lumbar facet arthropathy (721 3) (M12 88)   19  Lumbar radiculopathy (724 4) (M54 16)   20  Lumbar spinal stenosis (724 02) (M48 06)   21  Morbid or severe obesity due to excess calories (278 01) (E66 01)   22  Nocturnal hypoxemia (327 24)   23  Obstructive sleep apnea (327 23) (G47 33)   24   PAD (peripheral artery disease) (443  9) (I73 9)   25  Pre-procedure lab exam (V72 63) (Z01 812)   26  Sacroiliitis (720 2) (M46 1)   27  Status post surgery (V45 89) (J64 159)    Past Medical History    1  History of Alcohol abuse (305 00) (F10 10)   2  History of Atelectasis (518 0) (J98 11)   3  History of Compression fracture (829 0)   4  History of Coronary Artery Disease (V12 59)   5  History of Diabetes Mellitus (250 00)   6  History of acute renal failure (V13 09) (Z87 448)   7  History of atrial fibrillation (V12 59) (Z86 79)   8  History of hypercholesterolemia (V12 29) (Z86 39)   9  History of leukocytosis (V12 3) (Z86 2)   10  History of osteopenia (V13 59) (Z87 39)   11  History of pleural effusion (V12 69) (Z87 09)   12  History of Hypernatremia (276 0) (E87 0)   13  History of Multiple fractures of ribs of right side (807 09) (S22 41XA)   14  Personal history of fall (V15 88) (Z91 81)   15  Personal history of hydronephrosis (V13 09) (Z87 448)   16  History of Urinary retention (788 20) (R33 9)   17  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)    The active problems and past medical history were reviewed and updated today  Surgical History    1  History of Atrial Cardioversion   2  History of Cataract Surgery   3  History of Total Hip Replacement    The surgical history was reviewed and updated today  Family History  Mother    1  No pertinent family history  Family History    2  Family history of Coronary Artery Disease (V17 49)    The family history was reviewed and updated today  Social History    · Active smoker (305 1) (Z72 0)   · Alcohol   · Caffeine Use  The social history was reviewed and updated today  Current Meds   1  Aspirin Low Dose 81 MG TABS; TAKE 1 TABLET DAILY; Therapy: (71 805 106) to Recorded   2  Bystolic 10 MG Oral Tablet; TAKE 1 TABLET DAILY  Requested for: 88SRZ4722; Last   Rx:28Mar2017 Ordered   3   DilTIAZem HCl  MG Oral Capsule Extended Release 24 Hour; take 1 capsule daily  Requested for: 28GAK0935; Last Rx:23May2017 Ordered   4  Fluticasone Propionate 50 MCG/ACT Nasal Suspension; USE 2 SPRAYS IN EACH   NOSTRIL ONCE DAILY; Therapy: 46EHZ4276 to (Evaluate:16Jun2017); Last Rx:17May2017 Ordered   5  Hydrocodone-Acetaminophen  MG Oral Tablet; TAKE 1 TABLET 3 times daily PRN   pain; Therapy: 25YIK8280 to (Evaluate:89Rtx0783); Last Rx:29Jun2017 Ordered   6  Lipitor 40 MG Oral Tablet; 1 DAILY; Therapy: (Vera Arriaga) to Recorded   7  Omeprazole 20 MG Oral Capsule Delayed Release; TAKE 2 CAPSULES DAILY EVERY   MORNING BEFORE BREAKFAST; Therapy: 25KQR4993 to Recorded   8  ProAir  (90 Base) MCG/ACT Inhalation Aerosol Solution; 2 PUFFS EVERY 6   HOURS  Requested for: 31UDT9362; Last Rx:17May2017 Ordered   9  Spiriva HandiHaler 18 MCG Inhalation Capsule; INHALE 1 CAPSULE Daily; Therapy: 39AOI5031 to (Evaluate:12May2018)  Requested for: 41VNX5287; Last   Rx:17May2017 Ordered   10  Tamsulosin HCl - 0 4 MG Oral Capsule; Therapy: 37Kgi8574 to Recorded   11  Xarelto 20 MG Oral Tablet; 1 DAILY; Therapy: 61ADI5153 to (Last Rx:23May2017)  Requested for: 03MGG9762 Ordered    The medication list was reviewed and updated today  Allergies    1  No Known Drug Allergies    Vitals  Vital Signs    Recorded: 03Aug2017 08:06AM   Temperature 97 9 F, Tympanic   Heart Rate 56, L Radial   Respiration 16   Systolic 977, LUE, Sitting   Diastolic 76, LUE, Sitting   Height 5 ft 10 in   Weight 254 lb 12 8 oz   BMI Calculated 36 56   BSA Calculated 2 31   Pain Scale 8     Physical Exam     Constitutional   General appearance: Abnormal   uncomfortable, obese and appearance reflects stated age  Respiratory Respiratory effort: Normal   Auscultation of lungs: Clear to auscultation bilaterally  Cardiovascular Auscultation of heart: Rate is regular  Rhythm is regular  No heart murmur appreciated  Abdomen The abdomen is flat  No abdominal scars   Abdomen is soft, nontender, and nondistended  The abdomen was obese  The abdomen was soft and nontender  Neurologic - Mental Status: Alert and Oriented x3  Mood and affect: Affect is normal   Grossly nonfocal    Motor System General Motor Strength: No pronator drift and no parietal drift  Gait and Station:   Gait and station: Abnormal   Gait evaluation demonstrated a wide-based and ataxic gait and shuffling  Future Appointments    Date/Time Provider Specialty Site   08/23/2017 11:00 AM RANDI Floyd  [address] [place] [address],[address] OR   10/06/2017 08:30 AM RANDI Floyd  Neurosurgery [facility]   09/07/2017 08:00 AM Deb Qureshi, [facility] Neurosurgery [facility]   11/16/2017 09:00 AM PAM Miller Pulmonary Medicine [facility] Trip Bocanegra   09/13/2017 08:00 AM RANDI Olmedo  Pain Management [address]     Signatures   Electronically signed by : RAUDEL Sue; Aug  3 2017  9:13AM EST                       (Author)    Electronically signed by : RAUDEL Sue;  Aug  3 2017  9:16AM EST                       (Author)    Electronically signed by : RANDI Aguila ; Aug  8 2017 10:35AM EST                       (Author)

## 2018-01-12 VITALS
DIASTOLIC BLOOD PRESSURE: 80 MMHG | HEART RATE: 74 BPM | WEIGHT: 245 LBS | SYSTOLIC BLOOD PRESSURE: 140 MMHG | BODY MASS INDEX: 35.07 KG/M2 | HEIGHT: 70 IN | TEMPERATURE: 98.5 F

## 2018-01-12 VITALS
WEIGHT: 254 LBS | RESPIRATION RATE: 20 BRPM | OXYGEN SATURATION: 95 % | HEIGHT: 70 IN | HEART RATE: 64 BPM | SYSTOLIC BLOOD PRESSURE: 128 MMHG | DIASTOLIC BLOOD PRESSURE: 86 MMHG | TEMPERATURE: 98 F | BODY MASS INDEX: 36.36 KG/M2

## 2018-01-12 NOTE — MISCELLANEOUS
Message  Pre operative call for prior surgery in the AM w/ [facility] the following information is confirmed/verified:  Pharmacy, allergies and hold medications, Patient reports ASA on hold for the past 7 days , Xarelto past 5 days on hold, as per his PCP restart Xarelto day 2 post-op  NPO after MN, night prior surgery___  Any medications instructed by healthcare provider to take the morning of surgery w/ sip of water 4 OZ--no medications required am of surgery  Post operative scripts electronic transmission for ABX Keflex  Printed script for oxycodone  PDMP site reviewed , scheduled drug list printed and scanned into record--patient has hydrocodone from a script filled 6/29/17 qty 90 for 30 days with 10 pills remaining  He indicated he normally takes about 3 per day  he is advised to retain medications and do not combine with script for oxycodone -use this first starting tomorrow post op, total pills 30 should last for approximately 4 days, he can resume use of hydrocodone only when supply of oxycodone is depleted---he verbalized an understanding of the danger of mixing opiate drugs  Discuss preoperative shower ---tonight is the second shower and he plans to shower in the am  Clarify instructions as per protocol, chlorhexidine shower tonight , after use TAY wipes as per instructions on packing , reviewed with patient  Use a clean towel and wash cloth starting tonight and continue nightly until seen 2 weeks post -op for staple removal  Change bed linens tonight and continue at least 1-2 times weekly  PAT packet review and is complete as per protocol-  If you are a smoker -consider cessation to decrease risk factors for poor surgical outcome, wound healing etc   Will receive a phone call tonight from a hospital representative with report time for day of surgery  You will receive a f/u call within in 24 -48 hours post operative from DR [facility] NP to see how you are recovering, provide additional instructions and to answer any questions you may have   Patient verbalized an understanding of discussion  Plan  Status post surgery    · Cephalexin 500 MG Oral Capsule; TAKE 1 CAPSULE EVERY 6 HOURS DAILY   · Oxycodone-Acetaminophen 5-325 MG Oral Tablet; TAKE 1 TO 2 TABLETS EVERY  4 HOURS AS NEEDED FOR PAIN   NO MORE THAN 8 TABLETS PER DAY    Signatures   Electronically signed by : Eric Salgado NP; Aug 22 2017  6:26PM EST                       (Author)

## 2018-01-12 NOTE — MISCELLANEOUS
Message   Recorded as Task   Date: 09/07/2017 10:00 AM, Created By: Brandon Fragoso   Task Name: Call Back   Assigned To: [address] [facility] Clinical,Team   Regarding Patient: Rakel Arita, Status: Active   Comment:    Parvin Black YAN - 07 Sep 2017 10:00 AM     TASK CREATED  [facility] Call Center- Patient called requesting to get a refill of Hydrocodine 10 325T   stated he has 9 pills left (enough for 3 days)   patient is scheduled to be seen 09/13/17  [facility] [phone]   [facility] - 07 Sep 2017 11:33 AM     TASK EDITED  **CONNORI--[place] pt**    S/W pt  Advised pt need SOVS for narcotic refill and AS is out of the office to tuesday  Pt stated "No problem  Thank you "  Pt stated he will see AS at his office visit  Offered to get this message to the on call doctor to see if he has suggestions  Pt stated "It's fine "  Pt disconnected the call  [facility] - 07 Sep 2017 11:34 AM     TASK EDITED   Mahi Enriquez - 07 Sep 2017 1:04 PM     TASK REPLIED TO: Previously Assigned To Mahi Enriquez md aware        Active Problems    1  Allergic rhinitis due to pollen, unspecified rhinitis seasonality   2  Analgesic use (V58 69) (Z79 899)   3  Arteriosclerotic coronary artery disease (414 00) (I25 10)   4  Chronic atrial fibrillation (427 31) (I48 2)   5  Chronic low back pain (724 2,338 29) (M54 5,G89 29)   6  Chronic obstructive pulmonary disease (496) (J44 9)   7  Chronic pain syndrome (338 4) (G89 4)   8  Cigarette nicotine dependence with nicotine-induced disorder (292 9) (F17 219)   9  Continuous opioid dependence (304 01) (F11 20)   10  Dyspnea on exertion (786 09) (R06 09)   11  Hiatal hernia (553 3) (K44 9)   12  Hyperlipidemia (272 4) (E78 5)   13  Hypertension (401 9) (I10)   14  Hypoxemia requiring supplemental oxygen (799 02) (R09 02,Z99 81)   15  Long term prescription opiate use (V58 69) (Z79 891)   16  Lumbar degenerative disc disease (722 52) (M51 36)   17  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)   18  Lumbar facet arthropathy (721 3) (M12 88)   19  Lumbar radiculopathy (724 4) (M54 16)   20  Lumbar spinal stenosis (724 02) (M48 06)   21  Morbid or severe obesity due to excess calories (278 01) (E66 01)   22  Nocturnal hypoxemia (327 24)   23  Obstructive sleep apnea (327 23) (G47 33)   24  PAD (peripheral artery disease) (443 9) (I73 9)   25  Preop examination (V72 84) (Z01 818)   26  Pre-procedure lab exam (V72 63) (Z01 812)   27  Sacroiliitis (720 2) (M46 1)   28  Status post surgery (V45 89) (X22 822)    Current Meds   1  Aspirin Low Dose 81 MG TABS; TAKE 1 TABLET DAILY; Therapy: (Opal Garcia) to Recorded   2  Bystolic 10 MG Oral Tablet; TAKE 1 TABLET DAILY  Requested for: 87FAY2572; Last   Rx:28Mar2017 Ordered   3  DilTIAZem HCl  MG Oral Capsule Extended Release 24 Hour; take 1 capsule   daily  Requested for: 97NLD6285; Last Rx:23May2017 Ordered   4  Fluticasone Propionate 50 MCG/ACT Nasal Suspension (Flonase Allergy Relief); USE 2   SPRAYS IN EACH NOSTRIL ONCE DAILY; Therapy: 64ADO8603 to (Evaluate:16Jun2017); Last Rx:17May2017 Ordered   5  Hydrocodone-Acetaminophen  MG Oral Tablet (Norco); TAKE 1 TABLET 3 times   daily PRN pain; Therapy: 03FQH2759 to (Evaluate:69Vne6559); Last Rx:29Jun2017 Ordered   6  Lipitor 40 MG Oral Tablet (Atorvastatin Calcium); 1 DAILY; Therapy: (Opal Garcia) to Recorded   7  Omeprazole 20 MG Oral Capsule Delayed Release; TAKE 2 CAPSULES DAILY EVERY   MORNING BEFORE BREAKFAST; Therapy: 75BXV2188 to Recorded   8  ProAir  (90 Base) MCG/ACT Inhalation Aerosol Solution; 2 PUFFS EVERY 6   HOURS  Requested for: 80IVZ5276; Last Rx:17May2017 Ordered   9  Spiriva HandiHaler 18 MCG Inhalation Capsule; INHALE 1 CAPSULE Daily; Therapy: 29SQG0751 to (Evaluate:87Jxi7256)  Requested for: 39GKV1116; Last   Rx:06Jxr3262 Ordered   10  Tamsulosin HCl - 0 4 MG Oral Capsule; Therapy: 11Aug2016 to Recorded   11   Xarelto 20 MG Oral Tablet; 1 DAILY; Therapy: 83IMY2796 to (Last Rx:23Lsh6363)  Requested for: 21VNJ0149 Ordered    Allergies    1   No Known Drug Allergies    Signatures   Electronically signed by : Marcellus Joyec, ; Sep  7 2017  1:28PM EST                       (Author)

## 2018-01-13 VITALS
HEIGHT: 70 IN | WEIGHT: 250 LBS | BODY MASS INDEX: 35.79 KG/M2 | SYSTOLIC BLOOD PRESSURE: 114 MMHG | OXYGEN SATURATION: 96 % | HEART RATE: 78 BPM | DIASTOLIC BLOOD PRESSURE: 72 MMHG | TEMPERATURE: 97.8 F

## 2018-01-13 VITALS
OXYGEN SATURATION: 92 % | HEIGHT: 70 IN | WEIGHT: 250 LBS | HEART RATE: 80 BPM | SYSTOLIC BLOOD PRESSURE: 134 MMHG | DIASTOLIC BLOOD PRESSURE: 80 MMHG | BODY MASS INDEX: 35.79 KG/M2

## 2018-01-13 VITALS
TEMPERATURE: 97.9 F | HEIGHT: 70 IN | WEIGHT: 254.8 LBS | RESPIRATION RATE: 16 BRPM | SYSTOLIC BLOOD PRESSURE: 138 MMHG | BODY MASS INDEX: 36.48 KG/M2 | DIASTOLIC BLOOD PRESSURE: 76 MMHG | HEART RATE: 56 BPM

## 2018-01-13 VITALS
SYSTOLIC BLOOD PRESSURE: 111 MMHG | HEART RATE: 84 BPM | RESPIRATION RATE: 22 BRPM | DIASTOLIC BLOOD PRESSURE: 62 MMHG | TEMPERATURE: 98.3 F | HEIGHT: 70 IN | WEIGHT: 254 LBS | BODY MASS INDEX: 36.36 KG/M2

## 2018-01-13 VITALS
OXYGEN SATURATION: 92 % | BODY MASS INDEX: 35.4 KG/M2 | HEIGHT: 70 IN | SYSTOLIC BLOOD PRESSURE: 136 MMHG | WEIGHT: 247.25 LBS | DIASTOLIC BLOOD PRESSURE: 72 MMHG | HEART RATE: 80 BPM | TEMPERATURE: 98 F

## 2018-01-13 VITALS
DIASTOLIC BLOOD PRESSURE: 80 MMHG | SYSTOLIC BLOOD PRESSURE: 130 MMHG | HEART RATE: 72 BPM | OXYGEN SATURATION: 95 % | WEIGHT: 251.2 LBS | HEIGHT: 70 IN | BODY MASS INDEX: 35.96 KG/M2

## 2018-01-13 VITALS
SYSTOLIC BLOOD PRESSURE: 150 MMHG | HEART RATE: 67 BPM | RESPIRATION RATE: 20 BRPM | DIASTOLIC BLOOD PRESSURE: 84 MMHG | OXYGEN SATURATION: 95 % | TEMPERATURE: 97.7 F

## 2018-01-13 VITALS
HEIGHT: 70 IN | SYSTOLIC BLOOD PRESSURE: 134 MMHG | OXYGEN SATURATION: 90 % | HEART RATE: 81 BPM | DIASTOLIC BLOOD PRESSURE: 74 MMHG | WEIGHT: 245.25 LBS | BODY MASS INDEX: 35.11 KG/M2

## 2018-01-13 NOTE — MISCELLANEOUS
Message   Recorded as Task   Date: 2016 11:18 AM, Created By:  [facility]   Task Name: Med Renewal Request   Assigned To: [address]   Regarding Patient: Georgette Mejias, Status: Active   Comment:     [facility]  11:18 AM     TASK CREATED  Caller: Self; Renew Medication; [phone] (Home)  pt left vm on the [place] line calling b/c his hydrocodone is about to  and he need someone to c/b if he can refill  Last refill was filled at [facility] on [facility]  LMOM for pt to c/b the [facility] office to s/w nurse  Alissa Love - 2016 1:10 PM     TASK EDITED  S/W pt who called to say he only has 1 hydrocodone left, he said last Rx was filled on 10/24  I asked pt how he was taking it and he said usually 2 tabs in the morning and some days he doesn't take any more than that, other days he has to take 2 tabs 4-6 hrs later  I advised pt that he is not to take more than prescribed, he said Dr Cristiane Wang told him he could take whenever he needed them, he said he doesn't abuse them  Pt aware I will forward message to Dr Cristiane Wang at the [facility] office, pt said he would prfer to p/u another Rx at [facility] b/c that is closer for him  Pt has procedure this  w/ Dr Cristiane Wang  Please advise, [place] clinical team     [facility] - 2016 2:34 PM     TASK REASSIGNED: Previously Assigned To [employer] - 2016 2:47 PM     TASK REPLIED TO: Previously Assigned To [facility]  The patient is appropriately out of hydrocodone (he was due for it on )  I refilled his prescription for him to  at the [facility] office  Alissa Love - 2016 2:54 PM     TASK EDITED  Pt aware he can p/u hydrocodone Rx at the [facility] office today before 4pm or Tu after 8am         Active Problems    1  Analgesic use (V58 69) (Z79 899)   2  Chronic low back pain (724 2,338 29) (M54 5,H04 66)   3   Chronic obstructive pulmonary disease (496) (J44 9)   4  Chronic pain syndrome (338 4) (G89 4)   5  Cigarette nicotine dependence with nicotine-induced disorder (292 9) (F17 219)   6  Dyspnea on exertion (786 09) (R06 09)   7  Hiatal hernia (553 3) (K44 9)   8  Hyperlipidemia (272 4) (E78 5)   9  Hypertension (401 9) (I10)   10  Hypoxemia requiring supplemental oxygen (799 02) (R09 02,Z99 81)   11  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)   12  Lumbar facet arthropathy (721 3) (M12 88)   13  Lumbar spinal stenosis (724 02) (M48 06)   14  Morbid or severe obesity due to excess calories (278 01) (E66 01)   15  Nocturnal hypoxemia (327 24)   16  Obstructive sleep apnea (327 23) (G47 33)   17  Sacroiliitis (720 2) (M46 1)    Current Meds   1  Albuterol Sulfate (2 5 MG/3ML) 0 083% Inhalation Nebulization Solution; USE 1 UNIT   DOSE IN NEBULIZER 4 TIMES DAILY; Therapy: 14DNS3797 to (Evaluate:30Jan2017)  Requested for: 02Kpa0546; Last   Rx:98Bvw8184 Ordered   2  Aspirin Low Dose 81 MG TABS; Therapy: (Recorded:63Emq0661) to Recorded   3  Bystolic 10 MG Oral Tablet; Therapy: (Recorded:82Jkk7358) to Recorded   4  Combivent AERO; Therapy: ([phone]) to Recorded   5  Diltiazem HCl 240 MG CP24;   Therapy: (Recorded:96Qax9919) to Recorded   6  Hydrocodone-Acetaminophen  MG Oral Tablet (Norco); TAKE 1 TABLET 3 times   daily PRN pain; Therapy: 18UNT4602 to (Evaluate:72Cnw7456); Last Rx:28Nov2016 Ordered   7  Lipitor 40 MG Oral Tablet (Atorvastatin Calcium); Therapy: ([phone]) to Recorded   8  Omeprazole 20 MG Oral Capsule Delayed Release; Therapy: 21GIZ4164 to Recorded   9  Plavix 75 MG Oral Tablet (Clopidogrel Bisulfate); Therapy: [phone]) to Recorded   10  ProAir  (90 Base) MCG/ACT Inhalation Aerosol Solution; Therapy: (Recorded:17Nov2016) to Recorded   11  Spiriva HandiHaler 18 MCG Inhalation Capsule; INHALE 1 CAPSULE Daily;     Therapy: 14BHY4176 to (Evaluate:11Aug2017)  Requested for: 30Bqh0206; Last    Rx:93Gpa5411 Ordered   12  Tamsulosin HCl - 0 4 MG Oral Capsule; Therapy: 35CKK3816 to Recorded   13  Tamsulosin HCl 0 4 MG CAPS; Therapy: [phone]) to Recorded   14  Xarelto 20 MG Oral Tablet; Therapy: (Recorded:64Fbt7624) to Recorded    Allergies    1   No Known Drug Allergies    Signatures   Electronically signed by : Bhargav Lawson, ; Nov 28 2016  2:54PM EST                       (Author)

## 2018-01-13 NOTE — MISCELLANEOUS
Message   Recorded as Task   Date: 05/31/2017 08:09 AM, Created By: Kellen Carrion   Task Name: Miscellaneous   Assigned To: [facility] clerical,Team   Regarding Patient: Klever Mac, Status: Complete   Comment:    Talia Marie - 31 May 2017 8:09 AM     TASK CREATED  Pt called for refill of Hydrocodone  He said he is out of meds  He said he was sick and did not take them for a few days  Pt can be reached on cell # [phone] until 1:00  He said he can  script  Sergei Mak - 31 May 2017 8:49 AM     TASK REASSIGNED: Previously Assigned To [address]  No Fu ov w/ [facility] scheduled at this time  6/22 ov w/ Tona Mckee re: permanant scs  Last rx 4/19/2017 #90    **Should I offer an office visit for refill? Kerline Weathers - 31 May 2017 8:59 AM     TASK REPLIED TO: Previously Assigned To Kerline Weathers  Printed the script for him to  from [facility] office  Please have him return for an office visit on June 29-30 for a f/u before next refill  Sergei Mak - 31 May 2017 10:09 AM     TASK EDITED  s/w pt, advised of above  Pt verbalized understanding  States he will schedule his ov when he picks up his rx      ***Please schedule ov w/ Dr Donta Isaac on 6/29 or 30   Elo Jenkins - 01 Jun 2017 9:14 AM     TASK COMPLETED        Active Problems    1  Allergic rhinitis due to pollen, unspecified rhinitis seasonality (477 0) (J30 1)   2  Analgesic use (V58 69) (Z79 899)   3  Arteriosclerotic coronary artery disease (414 00) (I25 10)   4  Chronic atrial fibrillation (427 31) (I48 2)   5  Chronic low back pain (724 2,338 29) (M54 5,G89 29)   6  Chronic obstructive pulmonary disease (496) (J44 9)   7  Chronic pain syndrome (338 4) (G89 4)   8  Cigarette nicotine dependence with nicotine-induced disorder (292 9) (F17 219)   9  Continuous opioid dependence (304 01) (F11 20)   10  Dyspnea on exertion (786 09) (R06 09)   11  Hiatal hernia (553 3) (K44 9)   12   Hyperlipidemia (272 4) (E78 5)   13  Hypertension (401 9) (I10)   14  Hypoxemia requiring supplemental oxygen (799 02) (R09 02,Z99 81)   15  Long term prescription opiate use (V58 69) (Z79 891)   16  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)   17  Lumbar facet arthropathy (721 3) (M12 88)   18  Lumbar spinal stenosis (724 02) (M48 06)   19  Morbid or severe obesity due to excess calories (278 01) (E66 01)   20  Nocturnal hypoxemia (327 24)   21  Obstructive sleep apnea (327 23) (G47 33)   22  PAD (peripheral artery disease) (443 9) (I73 9)   23  Pre-procedure lab exam (V72 63) (Z01 812)   24  Sacroiliitis (720 2) (M46 1)   25  Status post surgery (V45 89) (R37 282)    Current Meds   1  Aspirin Low Dose 81 MG TABS; TAKE 1 TABLET DAILY; Therapy: (Deonna Silverman) to Recorded   2  Breo Ellipta 100-25 MCG/INH Inhalation Aerosol Powder Breath Activated; USE 1   INHALATION ONCE DAILY; Therapy: 60TPX8015 to (Evaluate:04Oct2017); Last Rx:17May2017 Ordered   3  Breo Ellipta 200-25 MCG/INH Inhalation Aerosol Powder Breath Activated; INHALE 1   PUFFS Daily; Therapy: 81HSH2980 to (Evaluate:13Nov2017); Last Rx:17May2017 Ordered   4  Bystolic 10 MG Oral Tablet; TAKE 1 TABLET DAILY  Requested for: 17CFY7286; Last   Rx:28Mar2017 Ordered   5  DilTIAZem HCl  MG Oral Capsule Extended Release 24 Hour; take 1 capsule   daily  Requested for: 00VZS3471; Last Rx:23May2017 Ordered   6  Fluticasone Propionate 50 MCG/ACT Nasal Suspension (Flonase Allergy Relief); USE 2   SPRAYS IN EACH NOSTRIL ONCE DAILY; Therapy: 35ZXP0625 to (Evaluate:16Jun2017); Last Rx:17May2017 Ordered   7  Hydrocodone-Acetaminophen  MG Oral Tablet (Norco); TAKE 1 TABLET 3 times   daily PRN pain; Therapy: 35EXD2343 to (Evaluate:30Jun2017); Last Rx:31May2017 Ordered   8  Lipitor 40 MG Oral Tablet (Atorvastatin Calcium); 1 DAILY; Therapy: (Deonna Herrera) to Recorded   9   Omeprazole 20 MG Oral Capsule Delayed Release; TAKE 2 CAPSULES DAILY EVERY MORNING BEFORE BREAKFAST; Therapy: 16JOU7651 to Recorded   10  Phenazopyridine HCl - 200 MG Oral Tablet; Therapy: 38Hts8214 to Recorded   11  Plavix 75 MG Oral Tablet (Clopidogrel Bisulfate); TAKE 1 TABLET DAILY; Therapy: (Armaan Ebbs) to Recorded   12  ProAir  (90 Base) MCG/ACT Inhalation Aerosol Solution; 2 PUFFS EVERY 6    HOURS  Requested for: 90OGX1667; Last Rx:15Voz4655 Ordered   13  Spiriva HandiHaler 18 MCG Inhalation Capsule; INHALE 1 CAPSULE Daily; Therapy: 45RNM7655 to (Evaluate:27Awp5745)  Requested for: 63JYP3952; Last    Rx:92Dcv0842 Ordered   14  Sulfamethoxazole-Trimethoprim 800-160 MG Oral Tablet; Therapy: 65Wrv6249 to Recorded   15  Tamsulosin HCl - 0 4 MG Oral Capsule; Therapy: 93Rao4347 to Recorded   16  Xarelto 20 MG Oral Tablet; 1 DAILY; Therapy: 27FAB1171 to (Last Rx:30Cxu3647)  Requested for: 89LJA3885 Ordered    Allergies    1   No Known Drug Allergies    Signatures   Electronically signed by : Hien Garcia, ; Jun 1 2017  9:15AM EST                       (Author)

## 2018-01-13 NOTE — MISCELLANEOUS
Message   Recorded as Task   Date: 10/17/2016 11:29 AM, Created By: Dustin Thomas   Task Name: Care Coordination   Assigned To: [address]   Regarding Patient: Bing Jerez, Status: In Progress   Comment:    Inocencia Bonilla - 17 Oct 2016 11:29 AM     TASK CREATED  Faxed Xarelto and Plavix hold request to Dr Ange Valencia for [facility]  [phone]   So Hernandez - 17 Oct 2016 11:50 AM     TASK EDITED   Garcia Portillo - 18 Oct 2016 9:59 AM     TASK EDITED       Received anticoagulant hold form back from Dr Stahl's office stating it has to be stopped by his cardiologist     Michelle Wu for pt to call the office with name of cardiologist    Garcia Portillo - 21 Oct 2016 8:27 AM     TASK EDITED          *2nd attempt to reach pt, states that he is no longer going to Dr Yi's and Dr Stahl does not know that  Informed pt I would call Dr Stahl's office and make them aware and ask if Dr Stahl would complete the form  Tried calling Dr Stahl at [phone], office is closed at present  Will try again today  So Hernandez - 21 Oct 2016 9:17 AM     TASK EDITED    Spoke to Dre Malone @ Dr Stahl's office who spoke to Dr Stahl  Informed him that th ept is no longer seeing [facility]  Dr Stahl is not going to give permission for pt to hold anticoagulant medications and his office will be calling the pt to inform him that he needs to see [facility]  Anticoagulant form unable to be completed at this time  [address] - 25 Oct 2016 8:04 AM     TASK REPLIED TO: Previously Assigned To Autumn Heaton - 25 Oct 2016 8:24 AM     TASK IN PROGRESS        Active Problems    1  Analgesic use (V58 69) (Z79 899)   2  Chronic low back pain (724 2,338 29) (M54 5,G89 29)   3  Chronic obstructive pulmonary disease (496) (J44 9)   4  Chronic pain syndrome (338 4) (G89 4)   5  Cigarette nicotine dependence with nicotine-induced disorder (292 9) (F17 219)   6   Dyspnea on exertion (786 09) (R06 09)   7  Hiatal hernia (553 3) (K44 9)   8  Hyperlipidemia (272 4) (E78 5)   9  Hypertension (401 9) (I10)   10  Hypoxemia requiring supplemental oxygen (799 02) (R09 02,Z99 81)   11  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)   12  Lumbar facet arthropathy (721 3) (M12 88)   13  Lumbar spinal stenosis (724 02) (M48 06)   14  Morbid or severe obesity due to excess calories (278 01) (E66 01)   15  Nocturnal hypoxemia (327 24)   16  Obstructive sleep apnea (327 23) (G47 33)   17  Pleural effusion (511 9) (J90)   18  Sacroiliitis (720 2) (M46 1)    Current Meds   1  Albuterol Sulfate (2 5 MG/3ML) 0 083% Inhalation Nebulization Solution; USE 1 UNIT   DOSE IN NEBULIZER 4 TIMES DAILY; Therapy: 66YMW5393 to (Evaluate:30Jan2017)  Requested for: 07Rde8234; Last   Rx:71Kfi4021 Ordered   2  Aspirin Low Dose 81 MG TABS; Therapy: (Recorded:17Lff8851) to Recorded   3  Bystolic 10 MG Oral Tablet; Therapy: (Recorded:58Sog4295) to Recorded   4  Combivent AERO; Therapy: ([phone]) to Recorded   5  Diltiazem HCl 240 MG CP24;   Therapy: (Recorded:32Wuy7528) to Recorded   6  Flovent  MCG/ACT Inhalation Aerosol; INHALE 2 PUFFS Every twelve hours; Therapy: 21OZE0558 to (Evaluate:01Phy7237)  Requested for: 99HCN7726; Last   Rx:25Ooj2070 Ordered   7  Hydrocodone-Acetaminophen  MG Oral Tablet (Norco); TAKE 1 TABLET 3 times   daily PRN pain; Therapy: 58DYK1013 to (Evaluate:22Nov2016); Last Rx:17Oct2016 Ordered   8  Incruse Ellipta 62 5 MCG/INH Inhalation Aerosol Powder Breath Activated; Therapy: (Recorded:84Ler3199) to Recorded   9  Lipitor 40 MG Oral Tablet (Atorvastatin Calcium); Therapy: ([phone]) to Recorded   10  Omeprazole 20 MG Oral Capsule Delayed Release; Therapy: 27YJM0434 to Recorded   11  Plavix 75 MG Oral Tablet (Clopidogrel Bisulfate); Therapy: [phone]) to Recorded   12   Spiriva HandiHaler 18 MCG Inhalation Capsule; INHALE 1 CAPSULE Daily; Therapy: 13RMU4958 to (Evaluate:18Uza1534)  Requested for: 45Tdr4482; Last    Rx:06Aur9825 Ordered   13  Tamsulosin HCl 0 4 MG CAPS; Therapy: ([phone]) to Recorded   14  Xarelto 20 MG Oral Tablet; Therapy: (Recorded:05Ruk9427) to Recorded    Allergies    1   No Known Drug Allergies    Signatures   Electronically signed by : Kelsey Jimenez RN; Oct 25 2016  8:25AM EST                       (Author)

## 2018-01-14 VITALS
DIASTOLIC BLOOD PRESSURE: 74 MMHG | OXYGEN SATURATION: 96 % | BODY MASS INDEX: 35.96 KG/M2 | TEMPERATURE: 97.7 F | WEIGHT: 251.19 LBS | SYSTOLIC BLOOD PRESSURE: 140 MMHG | HEART RATE: 78 BPM | HEIGHT: 70 IN

## 2018-01-14 VITALS
DIASTOLIC BLOOD PRESSURE: 70 MMHG | BODY MASS INDEX: 35.54 KG/M2 | SYSTOLIC BLOOD PRESSURE: 122 MMHG | HEIGHT: 70 IN | HEART RATE: 70 BPM | WEIGHT: 248.25 LBS

## 2018-01-14 VITALS
SYSTOLIC BLOOD PRESSURE: 144 MMHG | DIASTOLIC BLOOD PRESSURE: 92 MMHG | WEIGHT: 251 LBS | HEART RATE: 80 BPM | HEIGHT: 70 IN | BODY MASS INDEX: 35.93 KG/M2

## 2018-01-14 NOTE — RESULT NOTES
Message   Recorded as Task   Date: 03/28/2017 09:20 AM, Created By: Denia Ly   Task Name: Miscellaneous   Assigned To: [facility] [place] Clinical,Team   Regarding Patient: Janelle Caicedo, Status: Active   CommentCy Copping - 28 Mar 2017 9:20 AM     TASK CREATED  PT called regarding a picking up a prescription for his hydrocodeine  Call back number is: [phone]   Gladys Osborne - 28 Mar 2017 11:20 AM     TASK REASSIGNED: Previously Assigned To MONICA Guerrero - 28 Mar 2017 12:42 PM     TASK REPLIED TO: Previously Assigned To Denia Ly  not my patient - Dr Antione Beckett patient   Denia Ly - 28 Mar 2017 4:15 PM     TASK REASSIGNED: Previously Assigned To Júnior Sorto - 28 Mar 2017 4:23 PM     TASK EDITED  Spoke to pt, requesting to p/u a script for hydrocodone 10/325mg one tab tid  He p/u the last script on 3/1/17  Pt has no sovs as he is waiting to hear about the approval for the SCS trial    [address] - 29 Mar 2017 9:10 AM     TASK REPLIED TO: Previously Assigned To [address]  Prescription ready for pickup at [facility]  Also please reach out to Jeremiah Arechiga regarding the SCS so we can give the patient an update before calling him back  So Hernandez - 29 Mar 2017 9:16 AM     TASK EDITED  Sulma Macias an update on the scs trial per below*  Pt aware & will p/u script at [address] [facility] office  Fallon Tamayo - 29 Mar 2017 4:29 PM     TASK EDITED  The stim is being addressed in a separate task  Attempt to reach pt to scheduled, LM asking for return call          Signatures   Electronically signed by : Vernell Crenshaw, ; Mar 30 2017  8:49AM EST                       (Author)

## 2018-01-15 ENCOUNTER — ALLSCRIPTS OFFICE VISIT (OUTPATIENT)
Dept: OTHER | Facility: OTHER | Age: 78
End: 2018-01-15

## 2018-01-15 VITALS
SYSTOLIC BLOOD PRESSURE: 140 MMHG | HEIGHT: 70 IN | DIASTOLIC BLOOD PRESSURE: 80 MMHG | TEMPERATURE: 97.5 F | RESPIRATION RATE: 20 BRPM | OXYGEN SATURATION: 96 % | WEIGHT: 245 LBS | BODY MASS INDEX: 35.07 KG/M2 | HEART RATE: 77 BPM

## 2018-01-15 VITALS
OXYGEN SATURATION: 97 % | WEIGHT: 251.19 LBS | DIASTOLIC BLOOD PRESSURE: 80 MMHG | BODY MASS INDEX: 35.96 KG/M2 | HEART RATE: 82 BPM | SYSTOLIC BLOOD PRESSURE: 132 MMHG | HEIGHT: 70 IN

## 2018-01-15 VITALS
HEART RATE: 62 BPM | HEIGHT: 70 IN | OXYGEN SATURATION: 94 % | DIASTOLIC BLOOD PRESSURE: 78 MMHG | BODY MASS INDEX: 36.73 KG/M2 | WEIGHT: 256.6 LBS | SYSTOLIC BLOOD PRESSURE: 140 MMHG

## 2018-01-15 NOTE — MISCELLANEOUS
Message   Recorded as Task   Date: 12/16/2016 10:03 AM, Created By: Kareem Rothman   Task Name: Care Coordination   Assigned To: Kareem Rothman   Regarding Patient: Thuy Quintana, Status: In Progress   Comment:    Fallon Tamayo - 16 Dec 2016 10:03 AM     TASK CREATED  Pt to be a [facility] trial with Dr Lorenzo Mcadams in [facility]  Pt signed release of info  Pt received [facility] book, script for psych eval with list of providers and script for CT thoracic spine  Pt will not attend an education class  Email sent to Jodi to reach out to pt for education  Clinical info excluding psych eval faxed to [facility] at [facility] - 16 Dec 2016 10:03 AM     TASK IN PROGRESS   Fallon Tamayo - 15 Mar 2017 2:00 PM     TASK EDITED  Received psych eval from Dr Tsering Sánchez, forwarded to Dr Lorenzo Mcadams for review  Fallon Tamayo - 16 Mar 2017 10:24 AM     TASK EDITED  Signed psych eval faxed to [facility] at [facility]  LM informing pt of such  Fallon Tamayo - 29 Mar 2017 4:28 PM     TASK EDITED  Received benefit confirmation from [facility], pt ready to be scheduled  Attempt to reach pt  LM for return call  Fallon Tamayo - 30 Mar 2017 10:40 AM     TASK EDITED  Spoke with pt and he is scheduled as follows:  4/3/17 @ 0800 to sign consents in [facility]  4/13/17 arriving @ 1300 for 1400 trial at [facility]  4/19/17 @ 0830 for lead removal in [facility]  Pt taking ASA and Xarelto  NKDA  Email sent to [facility] to inform them of trial dates  Pt wanted all morning appointments  I was unable to accommodate him for the trial, pt was okay with time of trial    Fallon Tamayo - 30 Mar 2017 10:44 AM     TASK EDITED  Spoke with Pauline Long to have her double book 4/3/17 appointment as I do not have db access in [place]     Fallon Tamayo - 30 Mar 2017 11:00 AM     TASK EDITED  Request to hold Xarelto and ASA faxed to Dr Fitzgerald Ra scheduling form faxed to scheduling  Paperwork for 4/3/17 [facility] complete   Fallon Tamayo - 30 Mar 2017 11:00 AM     TASK REASSIGNED: Previously Assigned To MONICA Lucho Pagan - 03 Apr 2017 9:19 AM     TASK EDITED  Paperwork for 4/3/17 sent to [facility], Dr Latanya Collier MA  Active Problems    1  Analgesic use (V58 69) (Z79 899)   2  Arteriosclerotic coronary artery disease (414 00) (I25 10)   3  Chronic atrial fibrillation (427 31) (I48 2)   4  Chronic low back pain (724 2,338 29) (M54 5,G89 29)   5  Chronic obstructive pulmonary disease (496) (J44 9)   6  Chronic pain syndrome (338 4) (G89 4)   7  Cigarette nicotine dependence with nicotine-induced disorder (292 9) (F17 219)   8  Continuous opioid dependence (304 01) (F11 20)   9  Dyspnea on exertion (786 09) (R06 09)   10  Hiatal hernia (553 3) (K44 9)   11  Hyperlipidemia (272 4) (E78 5)   12  Hypertension (401 9) (I10)   13  Hypoxemia requiring supplemental oxygen (799 02) (R09 02,Z99 81)   14  Long term prescription opiate use (V58 69) (Z79 891)   15  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)   16  Lumbar facet arthropathy (721 3) (M12 88)   17  Lumbar spinal stenosis (724 02) (M48 06)   18  Morbid or severe obesity due to excess calories (278 01) (E66 01)   19  Nocturnal hypoxemia (327 24)   20  Obstructive sleep apnea (327 23) (G47 33)   21  PAD (peripheral artery disease) (443 9) (I73 9)   22  Pre-procedure lab exam (V72 63) (Z01 812)   23  Sacroiliitis (720 2) (M46 1)   24  Status post surgery (V45 89) (C22 879)    Current Meds   1  Aspirin Low Dose 81 MG TABS; TAKE 1 TABLET DAILY; Therapy: (Abgiail Kidney) to Recorded   2  Bystolic 10 MG Oral Tablet; TAKE 1 TABLET DAILY  Requested for: 30GAR5489; Last   Rx:28Mar2017 Ordered   3  Cephalexin 500 MG Oral Tablet (Cephalexin Monohydrate); TAKE 1 CAPSULE 4 TIMES   DAILY with food for 7 days post procedure; Therapy: 58RTZ5585 to (Evaluate:06Apr2017); Last Rx:30Mar2017 Ordered   4  Diltiazem HCl 240 MG CP24; take 1 capsule daily;    Therapy: (Abigail Kidney) to Recorded 5  Hydrocodone-Acetaminophen  MG Oral Tablet (Norco); TAKE 1 TABLET 3 times   daily PRN pain; Therapy: 02QWK1150 to (Evaluate:30Apr2017); Last Rx:29Mar2017 Ordered   6  Lipitor 40 MG Oral Tablet (Atorvastatin Calcium); 1 DAILY; Therapy: (Higinio Stephenson) to Recorded   7  Omeprazole 20 MG Oral Capsule Delayed Release; TAKE 2 CAPSULES DAILY EVERY   MORNING BEFORE BREAKFAST; Therapy: 25GWP0032 to Recorded   8  Plavix 75 MG Oral Tablet (Clopidogrel Bisulfate); TAKE 1 TABLET DAILY; Therapy: (Higinio Stephenson) to Recorded   9  ProAir  (90 Base) MCG/ACT Inhalation Aerosol Solution; INHALE 2 PUFFS   EVERY 4 HOURS AS NEEDED; Last Rx:35Rpr3812 Ordered   10  Spiriva HandiHaler 18 MCG Inhalation Capsule; INHALE 1 CAPSULE Daily; Therapy: 34UWC9774 to (Teresa Celis)  Requested for: 11QOZ6613; Last    Rx:07Tnv7464 Ordered   11  Tamsulosin HCl - 0 4 MG Oral Capsule; Therapy: 11Aug2016 to Recorded   12  Xarelto 10 MG Oral Tablet; Therapy: (Recorded:26Jan2017) to Recorded    Allergies    1   No Known Drug Allergies    Signatures   Electronically signed by : Sania Izaguirre, ; Apr  3 2017  9:19AM EST                       (Author)

## 2018-01-15 NOTE — RESULT NOTES
Message   Recorded as Task   Date: 10/17/2016 11:29 AM, Created By: Maldonado Burciaga   Task Name: Care Coordination   Assigned To: [address]   Regarding Patient: Steff Perez, Status: In Progress   Comment:    Inocencia Bonilla - 17 Oct 2016 11:29 AM     TASK CREATED  Faxed Xarelto and Plavix hold request to Dr Sabrina Chan for [facility]  [phone]   ShawnchristianoSo - 17 Oct 2016 11:50 AM     TASK EDITED   Cherise Brunson - 18 Oct 2016 9:59 AM     TASK EDITED       Received anticoagulant hold form back from Dr Stahl's office stating it has to be stopped by his cardiologist     Amy Hassan for pt to call the office with name of cardiologist    Cherise Brunson - 21 Oct 2016 8:27 AM     TASK EDITED          *2nd attempt to reach pt, states that he is no longer going to Dr Yi's and Dr Stahl does not know that  Informed pt I would call Dr Stahl's office and make them aware and ask if Dr Stahl would complete the form  Tried calling Dr Stahl at [phone], office is closed at present  Will try again today  So Hernandez - 21 Oct 2016 9:17 AM     TASK EDITED    Spoke to Heike West @ Dr Stahl's office who spoke to Dr Stahl  Informed him that th ept is no longer seeing Symone Stahl is not going to give permission for pt to hold anticoagulant medications and his office will be calling the pt to inform him that he needs to see Symone Monet  Anticoagulant form unable to be completed at this time  [address] - 25 Oct 2016 8:04 AM     TASK REPLIED TO: Previously Assigned To Autumn Heaton - 25 Oct 2016 8:24 AM     TASK IN PROGRESS   Autumn East - 25 Oct 2016 8:25 AM     TASK Real Presto - 10 Nov 2016 2:21 PM     TASK REACTIVATED   Cherise Brunson - 10 Nov 2016 2:26 PM     TASK EDITED  Pt called inquiring on when he can have injection done   I advised the pt that Dr Stahl refused to sign the hold for plavix,xarelto,asa and the pt confirmed he was no longer going to his cardiologist Tayla Rey  Pt stated that Tayla Rey wouldn't have given permission even if he was still seeing him  Pt would like to just take responsibility and hold it on his own  I explained to the pt that it would increase his risk for a stroke/death  Pt states that "what will be will be" Im not able to bianca anyone if Im dead  Advised pt I would speak to Dr Celaya & return his call  [address] [facility] [address] - 10 Nov 2016 2:28 PM     TASK REPLIED TO: Previously Assigned To [address] [facility] [employer] [address]  If patient would like to assume risk of increased stroke or death, then I am ok with it  Please discuss the holding instructions with the patient  Archie Hearn - 10 Nov 2016 3:35 PM     TASK EDITED     Spoke to pt, he is willing to assume that risk  Pt would like to be scheduled at [place]  He is requesting to call back tomorrow as he is tired at present  **Pt to be scheduled for L5-S1 LESI, he is taking plavix,xarelto,asa   Anju Newsome - 11 Nov 2016 10:00 AM     TASK EDITED  S/w the pt  and scheduled a L5-S1 LESI c/ AS on 12/2/16 at 0800 in [address] per pt  request  Pt  verbalized understanding of the preopertive instructions, NPO 1 hour before, need of a , to notify us if he becomes sick or starts an Antibx  Pt  will stop the Plavix on 11/25, Xarelto on 11/30, and the ASA on 11/26 per protocol  Pt  understands the risks and denies taking other NSAIDS  Pt  verbalized understanding of the preoperative instructions and will notify us if he has anymore questions  [address] [facility] [address] - 11 Nov 2016 11:57 AM     TASK REPLIED TO: Previously Assigned To [place]  Thanks          Signatures   Electronically signed by : Sandy Landry, ; Nov 11 2016 12:26PM EST                       (Author)

## 2018-01-16 NOTE — PROGRESS NOTES
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)   4  Lumbar facet arthropathy (721 3) (M46 96)   5  Lumbar spinal stenosis (724 02) (M48 061)    Plan   Chronic low back pain, Chronic pain syndrome, Lumbar disc herniation with    radiculopathy, Lumbar facet arthropathy, Lumbar spinal stenosis    · Follow-up visit in 2 months Evaluation and Treatment  Follow-up  Status: Hold For -    Scheduling  Requested for: 99MMO9975   Ordered; For: Chronic low back pain, Chronic pain syndrome, Lumbar disc herniation with radiculopathy, Lumbar facet arthropathy, Lumbar spinal stenosis; Ordered By: Chandana Benedict Performed:  Due: 38XQQ9116  Chronic low back pain, Chronic pain syndrome, Lumbar facet arthropathy, Sacroiliitis    · Hydrocodone-Acetaminophen  MG Oral Tablet (Norco); TAKE 1 TABLET 3    times daily PRN pain; Do Not Fill Before: 60GUI2967   Rx By: Chandana Benedict; Dispense: 30 Days ; #:90 Tablet; Refill: 0;For: Chronic low back pain, Chronic pain syndrome, Lumbar facet arthropathy, Sacroiliitis; NATHALIA = N; Print Rx    Discussion/Summary      My impressions and treatment recommendations were discussed in detail with the patient, who verbalized understanding and had no further questions  that the patient reports overall reduced pain and improved level of functioning without significant side effects, I felt it reasonable to continue the patient on hydrocodone/acetaminophen 10/325 mg 1 tablet 3 times daily as needed for pain  risks and side effects of chronic opioid treatment were discussed in detail with the patient  Side effects include but are not limited to nausea, vomiting, GI intolerance, sedation, constipation, mental clouding, opioid-induced hyperalgesia, endocrine dysfunction, addiction, dependence, and tolerance   The patient was asked to take his medications only as prescribed and directed, never in excess, and never for any other reason other than for pain control  The patient was also asked to keep his medications out of the reach of others and away from children, preferably in a locked drawer  The patient verbalized understanding and wished to use these opioid medications  urine drug test was collected at today's office visit as part of our medication management protocol  The point of care testing results were appropriate for what was being prescribed  The specimen will be sent for confirmatory testing  The drug screen is medically necessary because the patient is either dependent on opioid medication or is being considered for opioid medication therapy and the results could impact ongoing or future treatment  The drug screen is to evaluate for the presence or absence of prescribed, non-prescribed, and/or illicit drugs and substances  patient continues to work with the [employer] spinal cord stimulator representatives in regards to getting bit better pain relief with his spinal cord stimulator  He is also considering undergoing lumbar spine surgery and will be meeting with Dr Cynthia Rod at the end of January 2018  is planned with the patient in 2 months time or sooner as warranted  Discharge instructions were provided  I personally saw and examined the patient and I agree with the above discussed plan of care  The patient has the current Goals: Decreased pain and improved level of functioning  Patient is able to Self-Care  Chief Complaint   1  Back Pain    History of Present Illness   Mr Raul Stephenson is a pleasant 53-year-old  male who presents to [facility] for interval re-evaluation of the above-stated pain complaints  The patient has a past medical and chronic pain history as outlined in the impression section below   He was last seen on November 8, 2017 at which time he was maintained on hydrocodone/acetaminophen 10/325 mg 1 tablet 3 times daily as needed for pain    today's office visit, the patient's pain score is 8/10 on the verbal numerical pain rating scale  The patient continues to complain of low back pain, bilateral hip pain, and right lower extremity pain  CC he reports that his pain is worse in the morning, evening, and night  His pain is constant in nature and he reports the quality of his pain as âburning, sharp, throbbing, and shooting  â The patient would like to his continue using hydrocodone/acetaminophen to help treat his overall pain  He uses 3 tablets of hydrocodone/acetaminophen per day  The patient denies opioid induced constipation  patient also reports that his [employer] spinal cord stimulator has not been helping him significantly  He is currently considering undergoing surgery  than as stated above, the patient denies any interval changes in medications, medical condition, mental condition, symptoms, or allergies since the last office visit  Sofia Isabel presents with complaints of gradual onset of constant episodes of moderate bilateral lower back pain, described as sharp, burning and throbbing, radiating to the right thigh  Symptoms are unchanged  Review of Systems        Constitutional: no fever,-- no recent weight gain-- and-- no recent weight loss  Eyes: no double vision-- and-- no blurry vision  Cardiovascular: no chest pain,-- no palpitations-- and-- no lower extremity edema  Respiratory: shortness of breath, but-- no wheezing  Musculoskeletal: difficulty walking,-- joint stiffness,-- pain in extremity right hip-- and-- decreased range of motion, but-- no muscle weakness,-- no joint swelling-- and-- no limb swelling  Neurological: memory loss, but-- no dizziness,-- no difficulty swallowing,-- no loss of consciousness-- and-- no seizures  Gastrointestinal: no nausea,-- no vomiting,-- no constipation-- and-- no diarrhea        Genitourinary: no difficulty initiating urine stream,-- no genital pain-- and-- no frequent urination  Integumentary: no complaints of skin rash  Psychiatric: no depression  Endocrine: no excessive thirst,-- no adrenal disease,-- no hypothyroidism-- and-- no hyperthyroidism  Hematologic/Lymphatic: no tendency for easy bruising-- and-- no tendency for easy bleeding  ROS reviewed  Active Problems   1  Allergic rhinitis due to pollen, unspecified rhinitis seasonality   2  Analgesic use (V58 69) (Z79 899)   3  Arteriosclerotic coronary artery disease (414 00) (I25 10)   4  Centrilobular emphysema (492 8) (J43 2)   5  Chronic atrial fibrillation (427 31) (I48 2)   6  Chronic low back pain (724 2,338 29) (M54 5,G89 29)   7  Chronic obstructive pulmonary disease (496) (J44 9)   8  Chronic pain syndrome (338 4) (G89 4)   9  Cigarette nicotine dependence with nicotine-induced disorder (292 9) (F17 219)   10  Continuous opioid dependence (304 01) (F11 20)   11  Dyspnea on exertion (786 09) (R06 09)   12  Elevated PSA (790 93) (R97 20)   13  Encounter for removal of staples (V58 32) (Z48 02)   14  Hiatal hernia (553 3) (K44 9)   15  Hyperlipidemia (272 4) (E78 5)   16  Hypertension (401 9) (I10)   17  Hypoxemia requiring supplemental oxygen (799 02) (R09 02,Z99 81)   18  Long term prescription opiate use (V58 69) (Z79 891)   19  Lumbar degenerative disc disease (722 52) (M51 36)   20  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)   21  Lumbar facet arthropathy (721 3) (M46 96)   22  Lumbar radiculopathy (724 4) (M54 16)   23  Lumbar spinal stenosis (724 02) (M48 061)   24  Morbid or severe obesity due to excess calories (278 01) (E66 01)   25  Nocturnal hypoxemia (327 24)   26  Obstructive sleep apnea (327 23) (G47 33)   27  PAD (peripheral artery disease) (443 9) (I73 9)   28  Preop examination (V72 84) (Z01 818)   29  Pre-procedure lab exam (V72 63) (Z01 812)   30  Sacroiliitis (720 2) (M46 1)   31   Status post surgery (V45 89) (F81 864)    Past Medical History   1  History of Alcohol abuse (305 00) (F10 10)   2  History of Atelectasis (518 0) (J98 11)   3  History of Compression fracture (829 0)   4  History of Coronary Artery Disease (V12 59)   5  History of Diabetes Mellitus (250 00)   6  History of acute renal failure (V13 09) (Z87 448)   7  History of atrial fibrillation (V12 59) (Z86 79)   8  History of hypercholesterolemia (V12 29) (Z86 39)   9  History of leukocytosis (V12 3) (Z86 2)   10  History of osteopenia (V13 59) (Z87 39)   11  History of pleural effusion (V12 69) (Z87 09)   12  History of Hypernatremia (276 0) (E87 0)   13  History of Multiple fractures of ribs of right side (807 09) (S22 41XA)   14  Personal history of fall (V15 88) (Z91 81)   15  Personal history of hydronephrosis (V13 09) (Z87 448)   16  History of Urinary retention (788 20) (R33 9)   17  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)    Surgical History   1  History of Atrial Cardioversion   2  History of Cataract Surgery   3  History of Spinal Neurostimulator Pulse  Subcutaneous Placement   4  History of Total Hip Replacement    Family History   Mother    1  No pertinent family history  Family History    2  Family history of Coronary Artery Disease (V17 49)    Social History    · Active smoker (305 1) (Z72 0)   · Alcohol   · Caffeine Use    Current Meds    1  Aspirin Low Dose 81 MG TABS; TAKE 1 TABLET DAILY; Therapy: (Vazquez Esparza) to Recorded   2  Breo Ellipta 100-25 MCG/INH Inhalation Aerosol Powder Breath Activated; INHALE 1     PUFFS Daily; Therapy: 53WBR4252 to (Evaluate:82Xri9219); Last Rx:16Nov2017 Ordered   3  Bystolic 10 MG Oral Tablet; TAKE 1 TABLET DAILY  Requested for: 70PTX4863; Last     Rx:28Mar2017 Ordered   4  DilTIAZem HCl  MG Oral Capsule Extended Release 24 Hour; take 1 capsule     daily  Requested for: 08LVA8236; Last Rx:23May2017 Ordered   5   Hydrocodone-Acetaminophen  MG Oral Tablet; TAKE 1 TABLET 3 times daily PRN     pain; Therapy: 38ZJM4724 to (Evaluate:11Jan2018); Last Rx:08Nov2017 Ordered   6  Ipratropium-Albuterol 0 5-2 5 (3) MG/3ML Inhalation Solution; USE 1 UNIT DOSE IN     NEBULIZER 4 TIMES DAILY; Therapy: 57DQV9263 to (Evaluate:05Jun2018)  Requested for: 69QFR7378; Last     Rx:11Jzb1439; Status: ACTIVE - Retrospective By Protocol Authorization Ordered   7  Ipratropium-Albuterol 0 5-2 5 (3) MG/3ML Inhalation Solution; USE 1 UNIT DOSE IN     NEBULIZER 4 TIMES DAILY; Therapy: 08ODN1088 to (Evaluate:16Jun2018)  Requested for: 83URO7978; Last     Rx:25Wwm4635 Ordered   8  Lipitor 40 MG Oral Tablet; 1 DAILY; Therapy: (Maximilian Christensen) to Recorded   9  Omeprazole 20 MG Oral Capsule Delayed Release; TAKE 2 CAPSULES DAILY EVERY     MORNING BEFORE BREAKFAST; Therapy: 57LFO1019 to Recorded   10  ProAir  (90 Base) MCG/ACT Inhalation Aerosol Solution; 2 PUFFS EVERY 6      HOURS  Requested for: 42ZKQ2029; Last Rx:24Gpb2208 Ordered   11  Tamsulosin HCl - 0 4 MG Oral Capsule; Therapy: 11Aug2016 to Recorded   12  Xarelto 20 MG Oral Tablet; 1 DAILY; Therapy: 99RTR9570 to (Last Rx:17Oct2017)  Requested for: 17Oct2017 Ordered    Allergies   1  No Known Drug Allergies    Vitals   Vital Signs    Recorded: 73POP5898 08:28AM   Temperature 97 2 F   Heart Rate 68   Respiration 20   Systolic 219   Diastolic 68   Height 5 ft 10 in   Weight 255 lb    BMI Calculated 36 59   BSA Calculated 2 31   Pain Scale 8     Physical Exam        Constitutional      General appearance: Abnormal   obese  Eyes      Sclera: anicteric      HEENT      Hearing grossly intact  Pulmonary      Respiratory effort: Even and unlabored  Cardiovascular      Examination of extremities: No edema or pitting edema present  Skin      Skin and subcutaneous tissue: Normal without rashes or lesions, well hydrated  Psychiatric      Mood and affect: Mood and affect appropriate  Neurologic      Cranial nerves: Cranial nerves II-XII grossly intact  Musculoskeletal      Gait and station: Abnormal  -- Gait is antalgic and the patient ambulates with the assistance of a single-point cane  Lumbar/Sacral Spine examination demonstrates Lumbosacral Spine:      Appearance: Normal       Tenderness: at lumbar spine,-- at the sacral spine,-- right paraspinal-- and-- left paraspinal  Lumbar muscle rigidity noted in the lumbar paraspinal musculature  Lumbosacral Spine Sensory: intact to light touch and pinprick in the lower extremities  ROM Lumbosacral Spine: Full  ROM Hips: Full      Foot and ankle strength was normal bilaterally  Knee strength was normal bilaterally  Hip strength was normal bilaterally  Future Appointments      Date/Time Provider Specialty Site   02/08/2018 08:00 AM RANDI Samuels  Neurosurgery [facility]   02/06/2018 08:15 AM LASHON Drew  Pulmonary Medicine [facility]   01/30/2018 09:00 AM RANDI Foster   Neurosurgery [facility]   04/17/2018 09:45 AM [facility]Pstora [facility] [address]     Signatures    Electronically signed by : RANDI Putnam ; Riki 15 2018  8:43AM EST                       (Author)

## 2018-01-16 NOTE — RESULT NOTES
Message   Recorded as Task   Date: 12/08/2016 11:11 AM, Created By: Ender Gan   Task Name: Follow Up   Assigned To: [facility]   Regarding Patient: Clayton Galindo, Status: Active   Comment:    Inocencia Bonilla - 08 Dec 2016 11:11 AM     TASK CREATED  Patient reports he received zero relief from his procedure  Patient is awre of the 2wk wait  He does not want to repeat procedure  He states that he has had injections done in the past and had no relief from them  His pain level today is a 7  S/p L5-S1 LESI-PLAVIX XARELTO ASA done 12/2/16 by Dr Julee Garza - 08 Dec 2016 11:17 AM     TASK REPLIED TO: Previously Assigned To [address]  Would he be willing to trial a SCS? Anju Newsome - 08 Dec 2016 1:46 PM     TASK EDITED  S/w the pt  and Sharlene Cordova made a sovs to discuss a possible SCS trial    Kady Garza - 08 Dec 2016 1:59 PM     TASK REPLIED TO: Previously Assigned To [place]  Thanks          Signatures   Electronically signed by : Julee Lanier, ; Dec  8 2016  4:17PM EST                       (Author)

## 2018-01-16 NOTE — PROGRESS NOTES
Assessment    1  Chronic pain syndrome (338 4) (G89 4)   2  Chronic low back pain (724 2,338 29) (M54 5,G89 29)   3  Sacroiliitis (720 2) (M46 1)    Plan  Chronic low back pain, Chronic pain syndrome, Lumbar facet arthropathy, Sacroiliitis    · Hydrocodone-Acetaminophen  MG Oral Tablet (Norco); TAKE 1 TABLET 3  times daily PRN pain; Do Not Fill Before: 84Mbh2353   Rx By: [facility]; Dispense: 30 Days ; #:90 Tablet; Refill: 0; For: Chronic low back pain, Chronic pain syndrome, Lumbar facet arthropathy, Sacroiliitis; NATHALIA = N; Print Rx    Discussion/Summary    My impressions and treatment recommendations were discussed in detail with the patient, who verbalized understanding and had no further questions  The patient was unable to schedule the bilateral sacroiliac joint injections at his last office visit due to problems at work  He stated that he would schedule him before he leaves today  As such, I'll have the patient undergo bilateral sacroiliac joint injections since this could be potentially therapeutic  Given that the patient reports overall reduced pain and improved level of functioning without significant side effects, I felt it reasonable to continue the patient on hydrocodone/acetaminophen 10/325 mg 1 tablet 3 times daily as needed for pain  The risks and side effects of chronic opioid treatment were discussed in detail with the patient  Side effects include but are not limited to nausea, vomiting, GI intolerance, sedation, constipation, mental clouding, opioid-induced hyperalgesia, endocrine dysfunction, addiction, dependence, and tolerance  The patient was asked to take his medications only as prescribed and directed, never in excess, and never for any other reason other than for pain control  The patient was also asked to keep his medications out of the reach of others and away from children, preferably in a locked drawer   The patient verbalized understanding and wished to use these opioid medications  Follow-up is planned with the patient in 4 weeks' time or sooner as warranted  Discharge instructions were provided  I personally saw and examined the patient and I agree with the above discussed plan of care  Chief Complaint    1  Back Pain    History of Present Illness  Mr Anni Murphy is a pleasant 40-year-old  male who presents to [facility] for interval reevaluation of the above-stated pain complaints  The patient has a past medical and chronic pain history is outlined in the impression section below  He was last seen on July 25, 2016 at which time he was asked to come in for bilateral sacroiliac joint injections  Unfortunately, due to a conflict with the patient's work, he was unable to present for bilateral sacroiliac joint injections  He has been prescribed hydrocodone/acetaminophen 10/325 mg 1 tablet 3 times daily as needed for pain  At today's office visit, the patient's pain score is 7-10 out of 10 on the per verbal numerical pain rating scale  The patient states that his pain is worse in the morning, evening, and night  His pain is constant in nature and reports the quality of his pain as "sharp and throbbing " He reports taking 2-3 tablets of hydrocodone/acetaminophen 10/325 mg per day  He reports that it does alleviate many of his pain complaints and allow him to perform normally  He is still interested in undergoing bilateral sacroiliac joint injections and wishes to schedule it before he leaves today  The patient admits to controlled opioid-induced constipation on over-the-counter stool softeners  Other than as stated above, the patient denies any interval changes in medications, medical condition, mental condition, symptoms, or allergies since the last office visit       Ivy Dwaine presents with complaints of gradual onset of constant episodes of severe bilateral lower back pain, described as sharp and throbbing, radiating to the bilateral thigh  On a scale of 1 to 10, the patient rates the pain as 9  Symptoms are made worse by standing, prolonged standing, bending and walking down stairs  Symptoms are unchanged  Review of Systems    Constitutional: no fever, no recent weight gain and no recent weight loss  Eyes: no double vision and no blurry vision  Cardiovascular: no chest pain, no palpitations and no lower extremity edema  Respiratory: shortness of breath, but no wheezing  Musculoskeletal: difficulty walking, joint stiffness, pain in extremity b/l hip and thigh and decreased range of motion, but no muscle weakness, no joint swelling and no limb swelling  Neurological: no dizziness, no difficulty swallowing, no memory loss, no loss of consciousness and no seizures  Gastrointestinal: no nausea, no vomiting, no constipation and no diarrhea  Genitourinary: no difficulty initiating urine stream, no genital pain and no frequent urination  Integumentary: no complaints of skin rash  Psychiatric: no depression  Endocrine: no excessive thirst, no adrenal disease, no hypothyroidism and no hyperthyroidism  Hematologic/Lymphatic: no tendency for easy bruising and no tendency for easy bleeding  ROS reviewed  Active Problems    1  Analgesic use (V58 69) (Z79 899)   2  Chronic low back pain (724 2,338 29) (M54 5,G89 29)   3  Chronic obstructive pulmonary disease (496) (J44 9)   4  Chronic pain syndrome (338 4) (G89 4)   5  Cigarette nicotine dependence with nicotine-induced disorder (292 9) (F17 219)   6  Dyspnea on exertion (786 09) (R06 09)   7  Hiatal hernia (553 3) (K44 9)   8  Hyperlipidemia (272 4) (E78 5)   9  Hypertension (401 9) (I10)   10  Hypoxemia requiring supplemental oxygen (799 02) (R09 02,Z99 81)   11  Lumbar facet arthropathy (721 3) (M46 96)   12  Morbid or severe obesity due to excess calories (278 01) (E66 01)   13  Nocturnal hypoxemia (327 24)   14   Obstructive sleep apnea (327 23) (G47 33)   15  Pleural effusion (511 9) (J90)   16  Sacroiliitis (720 2) (M46 1)    Past Medical History    1  History of Alcohol abuse (305 00) (F10 10)   2  History of Atelectasis (518 0) (J98 11)   3  History of Compression fracture (829 0) (T14 8)   4  History of Coronary Artery Disease (V12 59)   5  History of Diabetes Mellitus (250 00)   6  History of acute renal failure (V13 09) (Z87 448)   7  History of atrial fibrillation (V12 59) (Z86 79)   8  History of hypercholesterolemia (V12 29) (Z86 39)   9  History of leukocytosis (V12 3) (Z86 2)   10  History of osteopenia (V13 59) (Z87 39)   11  History of Hypernatremia (276 0) (E87 0)   12  History of Multiple fractures of ribs of right side (807 09) (S22 41XA)   13  Personal history of fall (V15 88) (Z91 81)   14  Personal history of hydronephrosis (V13 09) (Z87 448)   15  History of Urinary retention (788 20) (R33 9)   16  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)    Surgical History    1  History of Atrial Cardioversion   2  History of Cataract Surgery   3  History of Total Hip Replacement    Family History  Mother    1  No pertinent family history  Family History    2  Family history of Coronary Artery Disease (V17 49)    Social History    · Alcohol   · Caffeine Use   · Current Smoker (305 1)    Current Meds   1  Albuterol Sulfate (2 5 MG/3ML) 0 083% Inhalation Nebulization Solution; USE 1 UNIT   DOSE IN NEBULIZER 4 TIMES DAILY; Therapy: 28ZSR8248 to (Evaluate:30Jan2017)  Requested for: 97Qlp5136; Last   Rx:50Mwh1002 Ordered   2  Aspirin Low Dose 81 MG TABS; Therapy: (Recorded:63Ffq9552) to Recorded   3  Bystolic 10 MG Oral Tablet; Therapy: (Recorded:40Art0846) to Recorded   4  Combivent AERO; Therapy: (433 6084) to Recorded   5  Diltiazem HCl 240 MG CP24;   Therapy: (Recorded:20Wut9572) to Recorded   6  Flovent  MCG/ACT Inhalation Aerosol; INHALE 2 PUFFS Every twelve hours;    Therapy: 23REZ2350 to (Evaluate:60Hfu3328)  Requested for: 64HPX0350; Last   Rx:19Ezh1310 Ordered   7  Hydrocodone-Acetaminophen  MG Oral Tablet; TAKE 1 TABLET 3 times daily PRN   pain; Therapy: 33RZJ0883 to (Evaluate:36Kdj3909); Last Rx:33Kiq8382 Ordered   8  Incruse Ellipta 62 5 MCG/INH Inhalation Aerosol Powder Breath Activated; Therapy: (Recorded:89Erz7402) to Recorded   9  Lipitor 40 MG Oral Tablet; Therapy: (78 547 517) to Recorded   10  Omeprazole 20 MG Oral Capsule Delayed Release; Therapy: 40ZSN1969 to Recorded   11  Plavix 75 MG Oral Tablet; Therapy: ([phone]) to Recorded   12  Spiriva HandiHaler 18 MCG Inhalation Capsule; INHALE 1 CAPSULE Daily; Therapy: 36EMC6824 to (Evaluate:11Aug2017)  Requested for: 60Uft4900; Last    Rx:78Oku1478 Ordered   13  Tamsulosin HCl 0 4 MG CAPS; Therapy: ([phone]) to Recorded   14  Xarelto 20 MG Oral Tablet; Therapy: (Recorded:82Qjl6612) to Recorded    Allergies    1  No Known Drug Allergies    Vitals  Vital Signs    Recorded: 30JEX2848 55:48WZ   Systolic 654   Diastolic 86   Heart Rate 83   Pain Scale 9   Height 5 ft 10 in   Weight 234 lb    BMI Calculated 33 58   BSA Calculated 2 23     Physical Exam    Constitutional   General appearance: Abnormal   obese  Eyes   Sclera: anicteric   HEENT   Hearing grossly intact  Pulmonary   Respiratory effort: Even and unlabored  Cardiovascular   Examination of extremities: No edema or pitting edema present  Skin   Skin and subcutaneous tissue: Normal without rashes or lesions, well hydrated  Psychiatric   Mood and affect: Mood and affect appropriate  Neurologic   Cranial nerves: Cranial nerves II-XII grossly intact  Musculoskeletal   Gait and station: Abnormal   Gait is antalgic and the patient ambulates with the assistance of a single-point cane     Lumbar/Sacral Spine examination demonstrates Lumbosacral Spine:   Appearance: Normal    Tenderness: at lumbar spine, at the sacral spine, right paraspinal, left paraspinal and both sacroiliac joints  Tenderness to palpation of the bilateral sacroiliac joints  TINO testing is positive bilaterally  Lumbar muscle rigidity noted in the lumbar paraspinal musculature  Lumbosacral Spine Sensory: intact to light touch and pinprick in the lower extremities  ROM Lumbosacral Spine: Full  ROM Hips: Full   Foot and ankle strength was normal bilaterally  Knee strength was normal bilaterally  Hip strength was normal bilaterally  Results/Data  Results Free Text Form [facility]:   Results     Other  Last dose of Hydrocodone--Ace was on 8//22/16        Future Appointments    Date/Time Provider Specialty Site   11/17/2016 09:00 AM [facility]     Signatures   Electronically signed by : Vincent Klinefelter, M D ; Aug 22 2016 11:40AM EST                       (Author)

## 2018-01-16 NOTE — RESULT NOTES
Message   Recorded as Task   Date: 12/20/2016 02:04 PM, Created By: Kwesi Edwards   Task Name: Miscellaneous   Assigned To: [facility] [place] Clinical,Team   Regarding Patient: Tamika Fuentes, Status: In Progress   Comment:    Jannette Jaramillo - 20 Dec 2016 2:04 PM     TASK CREATED  patient called and stated that he does not want to do any procedures at this time and is there anything else that Dr Safia Manzo can do for him? Please call him at [phone]  Thank you   So Hernandez - 20 Dec 2016 2:05 PM     TASK EDITED   Teresa Contrerasner - 20 Dec 2016 2:12 PM     TASK REPLIED TO: Previously Assigned To Teresa Jonathan  Other than the medications he is regularly taking, there is not much else I can do for him if he does not wish to undergo the spinal cord stimulator trial     The other option is to seek out a surgical opinion and I can send him to a neurosurgeon or spinal surgeon if he is interested  So Hernandez - 20 Dec 2016 3:08 PM     TASK EDITED  Sb Bhakta for pt to return call  Jannette Jaramillo - 21 Dec 2016 11:02 AM     TASK REPLIED TO: Previously Assigned To [address]  Patient called and would like a call back to let him know if there is anything other than the procedure that Dr Safia Manzo can do for him  Thank you   Anju Newsome - 22 Dec 2016 9:38 AM     TASK EDITED  Attempted to call the pt  and LMOM to CB  Anju Newsome - 23 Dec 2016 9:23 AM     TASK EDITED  S/w the pt  and he stated he is going to decide what to do over the holidays and he will CB to schedule accordingly in january Mary Jonathan - 23 Dec 2016 9:28 AM     TASK REPLIED TO: Previously Assigned To [place]  Thanks          Signatures   Electronically signed by : Safia Manzo, ; Dec 23 2016  9:35AM EST                       (Author)

## 2018-01-17 NOTE — MISCELLANEOUS
Message   Recorded as Task   Date: 12/01/2016 09:41 AM, Created By: Marge Donohue   Task Name: Follow Up   Assigned To: Alissa Love   Regarding Patient: Yumiko Lord, Status: Active   Comment:    Marge Donohue - 01 Dec 2016 9:41 AM     TASK CREATED  Caller: Self; General Medical Question  Pt left vm on [place] line at 8:47 that he is scheduled for a procedure tomorrow in [place] and would like a c b to confirm it  C/B # [phone]    Pt is scheduled for LESI at 8:15 tomorrow w/ Dr Quagn Connor at [facility] office  Pt asked for location and asked if it was at building he went to before [address]  I told pt Dr Quang Connor is no longer performing procedures at the [place] location but he now only goes to the [facility] location which is located in [place]  I provided pt with the address for the [address] [facility] office, he will google directions  Pt informed he must arrive at 8am         Active Problems    1  Analgesic use (V58 69) (Z79 899)   2  Chronic low back pain (724 2,338 29) (M54 5,G89 29)   3  Chronic obstructive pulmonary disease (496) (J44 9)   4  Chronic pain syndrome (338 4) (G89 4)   5  Cigarette nicotine dependence with nicotine-induced disorder (292 9) (F17 219)   6  Dyspnea on exertion (786 09) (R06 09)   7  Hiatal hernia (553 3) (K44 9)   8  Hyperlipidemia (272 4) (E78 5)   9  Hypertension (401 9) (I10)   10  Hypoxemia requiring supplemental oxygen (799 02) (R09 02,Z99 81)   11  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)   12  Lumbar facet arthropathy (721 3) (M12 88)   13  Lumbar spinal stenosis (724 02) (M48 06)   14  Morbid or severe obesity due to excess calories (278 01) (E66 01)   15  Nocturnal hypoxemia (327 24)   16  Obstructive sleep apnea (327 23) (G47 33)   17  Sacroiliitis (720 2) (M46 1)    Current Meds   1  Albuterol Sulfate (2 5 MG/3ML) 0 083% Inhalation Nebulization Solution; USE 1 UNIT   DOSE IN NEBULIZER 4 TIMES DAILY;    Therapy: 26RNO4188 to (Evaluate:30Jan2017)  Requested for: 12Xao3886; Last   Rx:68Gdl1505 Ordered   2  Aspirin Low Dose 81 MG TABS; Therapy: (Recorded:64Ily4726) to Recorded   3  Bystolic 10 MG Oral Tablet; Therapy: (Recorded:78Sys0043) to Recorded   4  Combivent AERO; Therapy: ([phone]) to Recorded   5  Diltiazem HCl 240 MG CP24;   Therapy: (Recorded:20Oqc7398) to Recorded   6  Hydrocodone-Acetaminophen  MG Oral Tablet (Norco); TAKE 1 TABLET 3 times   daily PRN pain; Therapy: 76LRA7950 to (Evaluate:53Msk8869); Last Rx:28Nov2016 Ordered   7  Lipitor 40 MG Oral Tablet (Atorvastatin Calcium); Therapy: (78 549 517) to Recorded   8  Omeprazole 20 MG Oral Capsule Delayed Release; Therapy: 43QEQ0358 to Recorded   9  Plavix 75 MG Oral Tablet (Clopidogrel Bisulfate); Therapy: ([phone]) to Recorded   10  ProAir  (90 Base) MCG/ACT Inhalation Aerosol Solution; Therapy: (Recorded:17Nov2016) to Recorded   11  Spiriva HandiHaler 18 MCG Inhalation Capsule; INHALE 1 CAPSULE Daily; Therapy: 92SQH0029 to (Evaluate:11Aug2017)  Requested for: 83Ayx8043; Last    Rx:67Iuh2499 Ordered   12  Tamsulosin HCl - 0 4 MG Oral Capsule; Therapy: 92PII1678 to Recorded   13  Tamsulosin HCl 0 4 MG CAPS; Therapy: ([phone]) to Recorded   14  Xarelto 20 MG Oral Tablet; Therapy: (Recorded:01Yve3161) to Recorded    Allergies    1   No Known Drug Allergies    Signatures   Electronically signed by : Jackelin Pompa, ; Dec  1 2016  9:41AM EST                       (Author)

## 2018-01-18 NOTE — MISCELLANEOUS
Message  Post operative call on 8/24 2017 late note entry today 8/27  Post operative call s/p surgery on 8/23 w/ DR Briseyda Ashford s/p ;  Procedure and Technique:  1  Placement of a thoracic spinal cord stimulator paddle electrode via T9-10 laminectomy Â   2  Placement of a left buttock implantable pulse generator and Â   3  Electronic analysis complex programming spinal cord stimulator system postoperative period approximately 1 hour    Operative Findings:  SSEPS and Motors Stable, EMG responses, Two repositioning of lead  Difficult to map physiologic midline as each column despite repositioning had right greater than left coverage  Used SSEP collision testing as well  Patient with prior skin incision in thoracic midline without signs of surgery deep in tissue or spine  Implants:  1  St  [employer] Penta 60cm electrode  Ref 3228  SN [id number]  2  [employer] [address] 5 Elite Non- rechargeable Generator Ref [id number]  SN [id number]  Indications for procedure; This is a 68-year-old male with a long-standing history of chronic pain involving the lower back and lower extremities  Underwent successful SCS trial and presents for permanent implantation  The risks and benefits of the procedure reviewed with the patient and family and they wished to proceed  Post operative pain:experiencing pain in surgical sites providing relief w/ Percocet  Is holding hydrocodone (routine scheduled pain medication) until deplete supply of Percocet  Antibiotic: started Keflex after surgery and continues q 6 hours for seven days   Gastrointestinal: No bowel issues, denies N/V and constipation, Appetite is good, taking fluids without difficultly  Reports during initial post operative period had some urination difficult but resolved , now back to baseline  Continues daily dosing Tamsulosin     Advised to call if he develops any signs or symptoms of incision (s) redness, drainage, dehiscence, or a fever of 101 or higher , uncontrolled nausea and /or vomiting  Wound/dressing; as per patient discharge instructions remove dressing in 48 hours post op--Friday , then start showering  Post operative Hygiene: Gently wash surgical incision(s) with a clean wash cloth and pat dry using a clean wash towel  Reinforced use clean wash cloth and towel daily, use antibacterial soap, change bed linens 1-2 times per week and pajamas several times per week  Post operative Activity: Ambulate as tolerate, Lift no greater than 10 LBS until 6 weeks, Avoid submersion in water x 3 weeks, and refrain for bending or twisting until about 4 weeks -light duty until then    Patent verbalized an understanding of information communicated           Signatures   Electronically signed by : Bartolome Coronel NP; Aug 27 2017  1:33PM EST                       (Author)

## 2018-01-22 VITALS
HEART RATE: 63 BPM | BODY MASS INDEX: 36.65 KG/M2 | SYSTOLIC BLOOD PRESSURE: 131 MMHG | RESPIRATION RATE: 24 BRPM | HEIGHT: 70 IN | DIASTOLIC BLOOD PRESSURE: 60 MMHG | TEMPERATURE: 98.3 F | WEIGHT: 256 LBS

## 2018-01-22 VITALS
DIASTOLIC BLOOD PRESSURE: 68 MMHG | TEMPERATURE: 97.2 F | HEART RATE: 68 BPM | HEIGHT: 70 IN | RESPIRATION RATE: 20 BRPM | WEIGHT: 255 LBS | SYSTOLIC BLOOD PRESSURE: 155 MMHG | BODY MASS INDEX: 36.51 KG/M2

## 2018-01-22 VITALS
RESPIRATION RATE: 20 BRPM | HEIGHT: 70 IN | WEIGHT: 256 LBS | SYSTOLIC BLOOD PRESSURE: 161 MMHG | TEMPERATURE: 97.5 F | DIASTOLIC BLOOD PRESSURE: 112 MMHG | HEART RATE: 72 BPM | BODY MASS INDEX: 36.65 KG/M2

## 2018-01-22 VITALS
SYSTOLIC BLOOD PRESSURE: 123 MMHG | BODY MASS INDEX: 35.5 KG/M2 | HEIGHT: 70 IN | HEART RATE: 58 BPM | RESPIRATION RATE: 22 BRPM | DIASTOLIC BLOOD PRESSURE: 75 MMHG | TEMPERATURE: 98.2 F | WEIGHT: 248 LBS

## 2018-01-22 VITALS
BODY MASS INDEX: 35.79 KG/M2 | HEIGHT: 70 IN | TEMPERATURE: 97.2 F | DIASTOLIC BLOOD PRESSURE: 68 MMHG | WEIGHT: 250 LBS | SYSTOLIC BLOOD PRESSURE: 124 MMHG | HEART RATE: 68 BPM

## 2018-01-22 VITALS
WEIGHT: 253 LBS | BODY MASS INDEX: 36.22 KG/M2 | OXYGEN SATURATION: 95 % | HEIGHT: 70 IN | SYSTOLIC BLOOD PRESSURE: 116 MMHG | HEART RATE: 67 BPM | DIASTOLIC BLOOD PRESSURE: 64 MMHG

## 2018-01-23 NOTE — RESULT NOTES
Message   Recorded as Task   Date: 12/06/2017 09:03 AM, Created By: Ana Curtis   Task Name: Miscellaneous   Assigned To: [address] [facility] [address] Clinical,Team   Regarding Patient: Selvin Benedict, Status: Active   CommentMichealflorencio Edouardezekiel - 06 Dec 2017 9:03 AM     TASK CREATED  patient needs med refill for hydrocodone has about 5 left, cb-    Anju Newsome - 06 Dec 2017 9:12 AM     TASK EDITED  Looks like his last script was on 11/8, and another script with DNF until 12/12  Pt  will run out on 12/10  AS to advise  thanks   [facility] - 06 Dec 2017 9:52 AM     TASK REPLIED TO: Previously Assigned To [facility]  He received two scripts at his last office visit  Has he been using more than prescribed? Anju Newsome - 06 Dec 2017 10:59 AM     TASK EDITED  S/w the pt  and he stated he didn't realise he had another script  Pt  was thankful          Signatures   Electronically signed by : Flavio Chin, ; Dec  6 2017 10:59AM EST                       (Author)

## 2018-01-23 NOTE — RESULT NOTES
Discussion/Summary   Labs were good and unchanged  Verified Results  (1) LIPID PANEL, FASTING 71RQR1371 06:40AM Traci Irizarry     Test Name Result Flag Reference   CHOLESTEROL 116 mg/dL     HDL,DIRECT 59 mg/dL  40-60   Specimen collection should occur prior to Metamizole administration due to the potential for falsley depressed results  LDL CHOLESTEROL CALCULATED 34 mg/dL  0-100   Triglyceride:        Normal <150 mg/dl   Borderline High 150-199 mg/dl   High 200-499 mg/dl   Very High >499 mg/dl      Cholesterol:       Desirable <200 mg/dl    Borderline High 200-239 mg/dl    High >239 mg/dl      HDL Cholesterol:       High>59 mg/dL    Low <41 mg/dL      This screening LDL is a calculated result  It does not have the accuracy of the Direct Measured LDL in the monitoring of patients with hyperlipidemia and/or statin therapy  Direct Measure LDL (IJR501) must be ordered separately in these patients  TRIGLYCERIDES 113 mg/dL  <=150   Specimen collection should occur prior to N-Acetylcysteine or Metamizole administration due to the potential for falsely depressed results

## 2018-01-23 NOTE — PROGRESS NOTES
Assessment    1  Centrilobular emphysema (492 8) (J43 2)   2  Dyspnea on exertion (786 09) (R06 09)   3  Cigarette nicotine dependence with nicotine-induced disorder (292 9) (F17 219)   4  Obstructive sleep apnea (327 23) (G47 33)    Plan  Centrilobular emphysema    · Ipratropium-Albuterol 0 5-2 5 (3) MG/3ML Inhalation Solution; USE 1 UNIT DOSE IN  NEBULIZER 4 TIMES DAILY   Rx By: Zak Gillespie; Dispense: 30 Days ; #:180 ML; Refill: 5; For: Centrilobular emphysema; NATHALIA = N; Sent To: [facility]    Results/Data  PFT Results v2:     Spirometry: Forced vital capacity: 2 76L and 68% Predicted Values  Forced expiratory volume in one second: 1 16L and 40% Predicted Value  FEV1/FVC ratio is 42  Post Bronchodilator Spirometry:   Lung Volumes:   DLCO:    PFT Interpretation:   moderate-severe COPD  Discussion/Summary  Discussion Summary:   Cristino Sage has moderately to severe airway obstruction  FEV1 today is 1 16 L or 40% of predicted  He is aware that this has decreased from last PFTs that were done in August 2016  At that time FEV1 was 1 96 L or 66% of predicted  He is using Breo 100 mcg 1 puff daily  He is awaiting delivery of Duo neb to using his nebulizer  They were ordered today through mail order  Room air oxygen today was 95% and with ambulation went to 93%  Diagnosis in 2006 with severe obstructive sleep apnea, Meghana Sánchez was unable to use BiPAP  He is compliant in the use of 2 L of supplemental oxygen and I did reinforce with him the importance of this  He has history of chronic atrial fibrillation, coronary artery disease  And follows with Cardiology  Smoking cessation was discussed with Mr Mcmahon Poor goal is to cut down on his cigarette usage  Goals and Barriers: The patient has the current Goals: Patient's goal is to reduce the amount of cigarettes he smokes daily  He currently is smoking 1 pack per day  Active Problems    1   Allergic rhinitis due to pollen, unspecified rhinitis seasonality   2  Analgesic use (V58 69) (Z79 899)   3  Arteriosclerotic coronary artery disease (414 00) (I25 10)   4  Centrilobular emphysema (492 8) (J43 2)   5  Chronic atrial fibrillation (427 31) (I48 2)   6  Chronic low back pain (724 2,338 29) (M54 5,G89 29)   7  Chronic obstructive pulmonary disease (496) (J44 9)   8  Chronic pain syndrome (338 4) (G89 4)   9  Cigarette nicotine dependence with nicotine-induced disorder (292 9) (F17 219)   10  Continuous opioid dependence (304 01) (F11 20)   11  Dyspnea on exertion (786 09) (R06 09)   12  Elevated PSA (790 93) (R97 20)   13  Encounter for removal of staples (V58 32) (Z48 02)   14  Hiatal hernia (553 3) (K44 9)   15  Hyperlipidemia (272 4) (E78 5)   16  Hypertension (401 9) (I10)   17  Hypoxemia requiring supplemental oxygen (799 02) (R09 02,Z99 81)   18  Long term prescription opiate use (V58 69) (Z79 891)   19  Lumbar degenerative disc disease (722 52) (M51 36)   20  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)   21  Lumbar facet arthropathy (721 3) (M46 96)   22  Lumbar radiculopathy (724 4) (M54 16)   23  Lumbar spinal stenosis (724 02) (M48 061)   24  Morbid or severe obesity due to excess calories (278 01) (E66 01)   25  Nocturnal hypoxemia (327 24)   26  Obstructive sleep apnea (327 23) (G47 33)   27  PAD (peripheral artery disease) (443 9) (I73 9)   28  Preop examination (V72 84) (Z01 818)   29  Pre-procedure lab exam (V72 63) (Z01 812)   30  Sacroiliitis (720 2) (M46 1)   31  Status post surgery (V45 89) (Z98 890)    Chief Complaint  Chief Complaint Free Text Note Form: Mahi Walls is here today for a follow up   CHAVEZ      History of Present Illness  HPI: Mahi Walls is a 75-year-old male with moderately severe COPD  He has centrilobular emphysema  Last FEV1 was in August 2016 and was 1 point 9 6 L or 66% of predicted  Today his FEV1 is 1 16 L or 40% of predicted  Next he has history of severe obstructive sleep apnea   He was unable to use BiPAP  Diagnosed in 2006 he has been using 2 L of supplemental oxygen at nighttime  Sb Expose continues to smoke cigarettes  He has been smoking 1 pack per day and reports being unable to quit  Review of Systems  Complete-Male Pulm:   Constitutional: No fever or chills, feels well, no tiredness, no recent weight gain or weight loss  Eyes: no complaints of vision problems  ENT: no rhinitis, no PND, no epistaxis  Cardiovascular: no palpitations, no chest pain  Respiratory: shortness of breath and shortness of breath during exertion, but as noted in HPI    The patient presents with complaints of sudden onset of intermittent episodes of mild cough, described as loose and productive  His symptoms are caused by irritant contact  Symptoms are improved by inhaled bronchodilator use  Symptoms are made worse by smoke exposure  Symptoms are unchanged  Gastrointestinal: no complaints of esophageal reflux, no abdominal pain  Genitourinary: no urinary retention  Musculoskeletal: no arthralgias, no joint swelling, no myalgias  Integumentary: no rash, no lesions  Neurological: no headache, no fainting, no weakness  Psychiatric: no anxiety, no depression  Hematologic/Lymphatic: no complaints of swollen glands  Past Medical History    1  History of Alcohol abuse (305 00) (F10 10)   2  History of Atelectasis (518 0) (J98 11)   3  History of Compression fracture (829 0)   4  History of Coronary Artery Disease (V12 59)   5  History of Diabetes Mellitus (250 00)   6  History of acute renal failure (V13 09) (Z87 448)   7  History of atrial fibrillation (V12 59) (Z86 79)   8  History of hypercholesterolemia (V12 29) (Z86 39)   9  History of leukocytosis (V12 3) (Z86 2)   10  History of osteopenia (V13 59) (Z87 39)   11  History of pleural effusion (V12 69) (Z87 09)   12  History of Hypernatremia (276 0) (E87 0)   13  History of Multiple fractures of ribs of right side (807 09) (S22 41XA)   14   Personal history of fall (V15 88) (Z91 81)   15  Personal history of hydronephrosis (V13 09) (Z87 448)   16  History of Urinary retention (788 20) (R33 9)   17  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)    Surgical History    1  History of Atrial Cardioversion   2  History of Cataract Surgery   3  History of Spinal Neurostimulator Pulse  Subcutaneous Placement   4  History of Total Hip Replacement  Surgical History Reviewed: The surgical history was reviewed and updated today  Family History  Mother    1  No pertinent family history  Family History    2  Family history of Coronary Artery Disease (V17 49)  Family History Reviewed: The family history was reviewed and updated today  Social History    · Active smoker (305 1) (Z72 0)   · Smoked 1-1 1/2 packs per day for all of his adult life  Presently he smokes 3/4 pk day   · Alcohol   · Caffeine Use  Social History Reviewed: The social history was reviewed and updated today  Current Meds   1  Aspirin Low Dose 81 MG TABS; TAKE 1 TABLET DAILY; Therapy: (Yokasta Walter to Recorded   2  Breo Ellipta 100-25 MCG/INH Inhalation Aerosol Powder Breath Activated; INHALE 1   PUFFS Daily; Therapy: 34UFE9481 to (Evaluate:88Vcq7094); Last Rx:16Nov2017 Ordered   3  Bystolic 10 MG Oral Tablet; TAKE 1 TABLET DAILY  Requested for: 93NQE5469; Last   Rx:28Mar2017 Ordered   4  DilTIAZem HCl  MG Oral Capsule Extended Release 24 Hour; take 1 capsule   daily  Requested for: 10KCI7912; Last Rx:23May2017 Ordered   5  Hydrocodone-Acetaminophen  MG Oral Tablet; TAKE 1 TABLET 3 times daily PRN   pain; Therapy: 33PTA6238 to (Evaluate:11Jan2018); Last Rx:08Nov2017 Ordered   6  Ipratropium-Albuterol 0 5-2 5 (3) MG/3ML Inhalation Solution; USE 1 UNIT DOSE IN   NEBULIZER 4 TIMES DAILY; Therapy: 96FWF8798 to (Evaluate:05Jun2018)  Requested for: 52PTS4539; Last   Rx:[id number]; Status: ACTIVE - Retrospective By Protocol Authorization Ordered   7   Lipitor 40 MG Oral Tablet; 1 DAILY; Therapy: (Cleo Kwok) to Recorded   8  Omeprazole 20 MG Oral Capsule Delayed Release; TAKE 2 CAPSULES DAILY EVERY   MORNING BEFORE BREAKFAST; Therapy: 69ONI8610 to Recorded   9  ProAir  (90 Base) MCG/ACT Inhalation Aerosol Solution; 2 PUFFS EVERY 6   HOURS  Requested for: 09RIW9858; Last Rx:[id number] Ordered   10  Tamsulosin HCl - 0 4 MG Oral Capsule; Therapy: 20Kdi2182 to Recorded   11  Xarelto 20 MG Oral Tablet; 1 DAILY; Therapy: 52NNC5481 to (Last Rx:72Ish1938)  Requested for: 17Oct2017 Ordered  Medication List Reviewed: The medication list was reviewed and updated today  Allergies    1  No Known Drug Allergies    Physical Exam    Constitutional   General appearance: No acute distress, well appearing and well nourished  Eyes   Examination of pupil and irises: Anicteric, pupils reactive  Ears, Nose, Mouth, and Throat   External inspection of ears and nose: Normal     Nasal mucosa, septum, and turbinates: Normal without edema or erythema  Lips, teeth, and gums: Normal, good dentition  Oropharynx: Normal with no erythema, edema, exudate or lesions  Mallampati Classification: 4  Neck   Neck: Supple, symmetric, trachea midline, no masses  Jugular veins: Normal     Pulmonary   Chest: Normal     Respiratory effort: No increased work of breathing or signs of respiratory distress  Auscultation of lungs: Clear to auscultation, no rales, no crackles, no wheezing  Cardiovascular   Auscultation of heart: Normal rate and rhythm, normal S1 and S2, no murmurs  Examination of extremities for edema and/or varicosities: Normal     Abdomen   Abdomen: Soft, non-tender  Lymphatic   Palpation of lymph nodes in neck: No lymphadenopathy  Musculoskeletal   Gait and station: Normal     Digits and nails: Normal without clubbing or cyanosis      Neurologic   Mental Status: Normal  Not confused, no evidence of dementia, good comprehension, good concentration  Motor Exam: Gross motor function normal     Skin   Skin and subcutaneous tissue: Limited exam shows no rash  Psychiatric   Orientation to person, place and time: Normal     Mood and affect: Normal        Future Appointments    Date/Time Provider Specialty Site   12/21/2017 07:30 AM RANDI Hernandez  Neurosurgery [facility]   02/06/2018 08:15 AM LASHON Krishnan   Pulmonary Medicine [facility] [id number]   04/17/2018 09:45 AM Pastora Moser Urology [facility]     Signatures   Electronically signed by : PAM Douglas; Dec 18 2017  8:32AM EST                       (Author)

## 2018-01-29 NOTE — PROGRESS NOTES
Assessment/Plan:    No problem-specific Assessment & Plan notes found for this encounter  Diagnoses and all orders for this visit:    Intervertebral disc disorder with radiculopathy of lumbar region    Chronic pain syndrome    Pain of lumbar spine          Subjective:      Patient ID: Sunshine Meneses is a 76 y o  male  Chief Complaint: Patient presents for follow-up regarding chronic LBP and lumbar degenerative disc disease per Dr Yosi Frank     History of Present Illness: Patient is a 64y old male with long standing chronic pain of his back and legs  He underwent a successful SCS trial with Dr William Cheng  He then underwent permanent placement of thoracic spinal cord stimulator by Dr Yosi Frank on 8/23/17  On 12/21/17, patient was seen by Dr Yosi Frank still complaining of chronic lower back pain with R>L leg pain  No previous lumbar surgery  Patient does have moderate to severe lumbar DDD and central stenosis  Given the lack of relief with the spinal cord stimulator the additional option would be lumbar decompression and/or fusion  This patient can ambulate approximately 100 feet before his pain is severely increased with increased right leg pain and inability to walk further  He has increasing difficulty with bowel and bladder    He has fallen multiple times and now uses a cane for routine ambulation  The following portions of the patient's history were reviewed and updated as appropriate: allergies, current medications, past family history, past medical history, past social history, past surgical history and problem list     Review of Systems   HENT: Negative  Eyes: Negative  Respiratory: Negative  Cardiovascular: Negative  Gastrointestinal: Negative  Endocrine: Negative  Genitourinary: Positive for difficulty urinating (sometimes it burns when he goes)   Negative for decreased urine volume, discharge, dysuria, enuresis, flank pain, frequency, genital sores, hematuria, penile pain, penile swelling, scrotal swelling, testicular pain and urgency  Musculoskeletal: Positive for back pain (constant low back pain into the right hip and occasional pain in bilateral legs), gait problem, neck pain (occasional) and neck stiffness  Negative for arthralgias, joint swelling and myalgias  Skin: Negative  Allergic/Immunologic: Negative  Neurological: Positive for weakness (arms and legs) and numbness (when sitting in hard chair for a long time)  Negative for dizziness, tremors, seizures, syncope, facial asymmetry, speech difficulty, light-headedness and headaches  Hematological: Negative  Psychiatric/Behavioral: Negative  Objective:     Physical Exam      On physical examination his power is 5/5 bilaterally in the lower extremities  He has patchy sensory changes in the nonradicular form  He has absent deep tendon reflexes    His [facility] testing is extremely poor with loss of position sense bilaterally in the lower extremities      He has pain to palpation in the mid lumbar spine the pain does not radiate from the region of palpation

## 2018-01-30 ENCOUNTER — OFFICE VISIT (OUTPATIENT)
Dept: NEUROSURGERY | Facility: CLINIC | Age: 78
End: 2018-01-30
Payer: MEDICARE

## 2018-01-30 VITALS
RESPIRATION RATE: 16 BRPM | DIASTOLIC BLOOD PRESSURE: 67 MMHG | TEMPERATURE: 97.3 F | SYSTOLIC BLOOD PRESSURE: 124 MMHG | BODY MASS INDEX: 36.51 KG/M2 | WEIGHT: 255 LBS | HEART RATE: 69 BPM | HEIGHT: 70 IN

## 2018-01-30 DIAGNOSIS — M51.16 INTERVERTEBRAL DISC DISORDER WITH RADICULOPATHY OF LUMBAR REGION: Primary | ICD-10-CM

## 2018-01-30 DIAGNOSIS — G89.29 CHRONIC RIGHT-SIDED LOW BACK PAIN WITH RIGHT-SIDED SCIATICA: ICD-10-CM

## 2018-01-30 DIAGNOSIS — M54.50 PAIN OF LUMBAR SPINE: ICD-10-CM

## 2018-01-30 DIAGNOSIS — M54.41 CHRONIC RIGHT-SIDED LOW BACK PAIN WITH RIGHT-SIDED SCIATICA: ICD-10-CM

## 2018-01-30 DIAGNOSIS — G89.4 CHRONIC PAIN SYNDROME: ICD-10-CM

## 2018-01-30 PROCEDURE — 99213 OFFICE O/P EST LOW 20 MIN: CPT | Performed by: NEUROLOGICAL SURGERY

## 2018-02-01 ENCOUNTER — TELEPHONE (OUTPATIENT)
Dept: NEUROSURGERY | Facility: CLINIC | Age: 78
End: 2018-02-01

## 2018-02-01 ENCOUNTER — TRANSCRIBE ORDERS (OUTPATIENT)
Dept: NEUROSURGERY | Facility: CLINIC | Age: 78
End: 2018-02-01

## 2018-02-01 DIAGNOSIS — M51.16 INTERVERTEBRAL DISC DISORDER WITH RADICULOPATHY OF LUMBAR REGION: Primary | ICD-10-CM

## 2018-02-01 NOTE — TELEPHONE ENCOUNTER
CT lumbar spine with and without contrast order was placed today by Dr Irma Cazares  Please make sure this is scheduled with patient   Thanks

## 2018-02-01 NOTE — TELEPHONE ENCOUNTER
Pt called he is unable to have mri lspine you ordered states he has steel in right eye and cant have mri please advise thanks

## 2018-02-02 NOTE — TELEPHONE ENCOUNTER
This was scheduled for [facility] 2/06/2018 at 10am 3 hr fasting I have call out to pt Miguel Angel @ 10:29am -

## 2018-02-06 ENCOUNTER — OFFICE VISIT (OUTPATIENT)
Dept: PULMONOLOGY | Facility: MEDICAL CENTER | Age: 78
End: 2018-02-06
Payer: MEDICARE

## 2018-02-06 VITALS
HEIGHT: 70 IN | RESPIRATION RATE: 16 BRPM | DIASTOLIC BLOOD PRESSURE: 86 MMHG | SYSTOLIC BLOOD PRESSURE: 122 MMHG | BODY MASS INDEX: 36.51 KG/M2 | WEIGHT: 255 LBS | OXYGEN SATURATION: 96 % | TEMPERATURE: 97.8 F | HEART RATE: 64 BPM

## 2018-02-06 DIAGNOSIS — R06.00 DYSPNEA ON EXERTION: ICD-10-CM

## 2018-02-06 DIAGNOSIS — J43.2 CENTRILOBULAR EMPHYSEMA (HCC): Primary | ICD-10-CM

## 2018-02-06 DIAGNOSIS — J43.9 NOCTURNAL HYPOXEMIA DUE TO EMPHYSEMA (HCC): ICD-10-CM

## 2018-02-06 DIAGNOSIS — I48.20 CHRONIC ATRIAL FIBRILLATION (HCC): ICD-10-CM

## 2018-02-06 DIAGNOSIS — G47.36 NOCTURNAL HYPOXEMIA DUE TO EMPHYSEMA (HCC): ICD-10-CM

## 2018-02-06 PROBLEM — R06.09 DYSPNEA ON EXERTION: Status: ACTIVE | Noted: 2018-02-06

## 2018-02-06 PROCEDURE — 99213 OFFICE O/P EST LOW 20 MIN: CPT | Performed by: INTERNAL MEDICINE

## 2018-02-06 RX ORDER — IPRATROPIUM BROMIDE AND ALBUTEROL SULFATE 2.5; .5 MG/3ML; MG/3ML
3 SOLUTION RESPIRATORY (INHALATION)
COMMUNITY
End: 2018-03-27 | Stop reason: SDUPTHER

## 2018-02-06 NOTE — PROGRESS NOTES
Assessment/Plan:     Problem List Items Addressed This Visit        Respiratory    Centrilobular emphysema ([facility] [address] ) - Primary    Relevant Medications      Spirometry last visit showed an FEV1 of 1 61 L or 40% of predicted     ipratropium-albuterol (DUO-NEB) 0 5-2 5 mg/3 mL    Continue Breo 100 mcg 1 puff daily and use nebulizer with albuterol ipratropium 3 to 4 times a day    Try to quit smoking    I did discuss smoking cessation with the patient       Cardiovascular and Mediastinum    Chronic atrial fibrillation (HCC)    Relevant Medications    rivaroxaban (XARELTO) 20 mg tablet       Other    Dyspnea on exertion    Try to stay active and lose weight    Nocturnal - hypoxemia  He does use 2 L of oxygen at bedtime and has been compliant with this  No nocturnal dyspnea  He also has severe VINICIO but could not tolerate CPAP therapy  I did advise weight loss            Return in about 3 months (around 5/6/2018)  All questions are answered to the patient's satisfaction and understanding  He verbalizes understanding  He is encouraged to call with any further questions or concerns  Portions of the record may have been created with voice recognition software  Occasional wrong word or "sound a like" substitutions may have occurred due to the inherent limitations of voice recognition software  Read the chart carefully and recognize, using context, where substitutions have occurred  ______________________________________________________________________    Chief Complaint:   Chief Complaint   Patient presents with    Follow-up     no cough /sob       Patient ID: Gilda Engle is a 76 y o  y o  male has a past medical history of Acid reflux; Arthritis; BPH (benign prostatic hyperplasia); Calcium disorder; Chronic pain; COPD (chronic obstructive pulmonary disease) ([facility] [address] ); Coronary artery disease (2001); Eye redness; Hiatal hernia; Hydronephrosis; Hyperlipidemia;  Hypertension; Irregular heart beat; Nocturnal leg cramps; Osteopenia; Peripheral artery disease ([facility] [address] ); Pleural effusion; Sleep apnea; and 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)  2/6/2018  HPI     Nicole Allen presents for a follow-up visit today  He denies any new problems  He is not having any hemoptysis, leg swelling, chest pain or nocturnal dyspnea  He does get short of breath such as walking up a flight of stairs  No fever or chills  He denies any recent respiratory tract infections  He does have a history of severe emphysema with FEV1 of 1 61 L or 40% of predicted      He also has a history of severe VINICIO but could not tolerate CPAP therapy and uses 2 L of oxygen at bedtime for sleep-related hypoxemia  He presently is using Breo 100 mcg 1 puff daily and nebulizer with DuoNeb 4 times a day  He still smoking anywhere from 3/4 to 1 pack of cigarettes per day  He does have a history of chronic atrial fibrillation and is on Xarelto    Review of Systems   Constitutional:        Has chronic shortness of breath with activity such as going up a flight of stairs  No change in his exertional dyspnea  No recent fever or chills  He denies any recent respiratory tract infections   HENT: Negative for congestion, facial swelling and rhinorrhea  Eyes: Negative for redness and itching  Respiratory: Positive for shortness of breath  Negative for cough, chest tightness and wheezing  Cardiovascular: Negative for chest pain and leg swelling  Gastrointestinal: Negative for abdominal pain, nausea and vomiting  Endocrine: Negative for polydipsia and polyphagia  Genitourinary: Negative for dysuria  Musculoskeletal: Negative for joint swelling and myalgias  Skin: Negative for rash  Neurological: Negative for dizziness and light-headedness  Psychiatric/Behavioral: Negative for hallucinations  Smoking history: He reports that he has been smoking  He has a 60 00 pack-year smoking history   He has never used smokeless tobacco     The following portions of the patient's history were reviewed and updated as appropriate: allergies, PMhx,  past family history and past surgical history  There is no immunization history on file for this patient  Current Outpatient Prescriptions   Medication Sig Dispense Refill    albuterol (PROAIR HFA) 90 mcg/act inhaler Inhale 2 puffs every 6 (six) hours as needed for wheezing      atorvastatin (LIPITOR) 40 mg tablet Take 40 mg by mouth daily        diltiazem (DILACOR XR) 240 MG 24 hr capsule Take 240 mg by mouth daily at bedtime        HYDROcodone-acetaminophen (NORCO)  mg per tablet Take 1 tablet by mouth 3 (three) times a day Hydrocodone-Acetaminophen  MG Oral Tablet take 1 tablet every 8 hours prn pain  Quantity: 90;  Refills: 0    [address] ;  Started 8-Oct-2015 Active       ipratropium-albuterol (DUO-NEB) 0 5-2 5 mg/3 mL Take 3 mL by nebulization every 6 (six) hours      nebivolol (BYSTOLIC) 10 mg tablet 10 mg daily at bedtime   OMEPRAZOLE PO Take 40 mg by mouth daily        OXYGEN-HELIUM IN Inhale 2 L daily at bedtime      tamsulosin (FLOMAX) 0 4 mg Take 0 4 mg by mouth daily with dinner      fluticasone (FLONASE) 50 mcg/act nasal spray 2 sprays into each nostril daily      rivaroxaban (XARELTO) 20 mg tablet Take 1 tablet (20 mg total) by mouth daily with dinner  0     No current facility-administered medications for this visit  Allergies: Patient has no known allergies  Objective:  Vitals:    02/06/18 0813   BP: 122/86   BP Location: Left arm   Patient Position: Sitting   Cuff Size: Adult   Pulse: 64   Resp: 16   Temp: 97 8 °F (36 6 °C)   TempSrc: Oral   SpO2: 96%   Weight: 116 kg (255 lb)   Height: 5' 10" (1 778 m)   Oxygen Therapy  SpO2: 96 %    Wt Readings from Last 3 Encounters:   02/06/18 116 kg (255 lb)   01/30/18 116 kg (255 lb)   01/15/18 116 kg (255 lb)     Body mass index is 36 59 kg/m²  Physical Exam   Constitutional: He is oriented to person, place, and time     Patient is awake, alert no distress    He is overweight    HENT:   Head: Normocephalic  Nose: Nose normal    Mouth/Throat: Oropharynx is clear and moist    Eyes: Conjunctivae are normal    Neck: Neck supple  No JVD present  Cardiovascular: Normal rate, regular rhythm and normal heart sounds  Pulmonary/Chest: Effort normal    Lung sounds are diminished but clear   Abdominal: Soft  He exhibits no distension  There is no tenderness  Musculoskeletal: He exhibits no edema  Lymphadenopathy:     He has no cervical adenopathy  Neurological: He is alert and oriented to person, place, and time  Skin: Skin is warm  No rash noted  No erythema  Psychiatric: He has a normal mood and affect   His behavior is normal  Thought content normal

## 2018-02-06 NOTE — TELEPHONE ENCOUNTER
I already spoke with pt he advised he wanted me to cx ct which I did and f/u with Dr Solis Wells, I also sent an fYI to [facility]

## 2018-02-06 NOTE — PATIENT INSTRUCTIONS

## 2018-02-23 ENCOUNTER — TELEPHONE (OUTPATIENT)
Dept: CARDIOLOGY CLINIC | Facility: CLINIC | Age: 78
End: 2018-02-23

## 2018-02-23 NOTE — TELEPHONE ENCOUNTER
PT is scheduled for a colonoscopy on 03/13 and wants to confirm that it is ok to stop the Xarelto the day before procedure

## 2018-02-28 DIAGNOSIS — R97.20 ELEVATED PROSTATE SPECIFIC ANTIGEN (PSA): ICD-10-CM

## 2018-03-06 DIAGNOSIS — I48.20 ATRIAL FIBRILLATION, CHRONIC (HCC): Primary | ICD-10-CM

## 2018-03-06 RX ORDER — DILTIAZEM HYDROCHLORIDE 240 MG/1
CAPSULE, EXTENDED RELEASE ORAL
Qty: 90 CAPSULE | Refills: 3 | Status: SHIPPED | OUTPATIENT
Start: 2018-03-06 | End: 2018-04-19 | Stop reason: SDUPTHER

## 2018-03-13 ENCOUNTER — OFFICE VISIT (OUTPATIENT)
Dept: PAIN MEDICINE | Facility: CLINIC | Age: 78
End: 2018-03-13
Payer: MEDICARE

## 2018-03-13 VITALS
HEIGHT: 70 IN | SYSTOLIC BLOOD PRESSURE: 134 MMHG | DIASTOLIC BLOOD PRESSURE: 60 MMHG | TEMPERATURE: 98 F | RESPIRATION RATE: 17 BRPM | WEIGHT: 259 LBS | BODY MASS INDEX: 37.08 KG/M2 | HEART RATE: 89 BPM

## 2018-03-13 DIAGNOSIS — M51.16 INTERVERTEBRAL DISC DISORDER WITH RADICULOPATHY OF LUMBAR REGION: ICD-10-CM

## 2018-03-13 DIAGNOSIS — G89.4 CHRONIC PAIN SYNDROME: Primary | ICD-10-CM

## 2018-03-13 DIAGNOSIS — M54.50 PAIN OF LUMBAR SPINE: ICD-10-CM

## 2018-03-13 PROCEDURE — 99214 OFFICE O/P EST MOD 30 MIN: CPT | Performed by: ANESTHESIOLOGY

## 2018-03-13 RX ORDER — HYDROCODONE BITARTRATE AND ACETAMINOPHEN 10; 325 MG/1; MG/1
1 TABLET ORAL 3 TIMES DAILY PRN
Qty: 90 TABLET | Refills: 0 | Status: SHIPPED | OUTPATIENT
Start: 2018-03-16 | End: 2018-03-13 | Stop reason: SDUPTHER

## 2018-03-13 RX ORDER — HYDROCODONE BITARTRATE AND ACETAMINOPHEN 10; 325 MG/1; MG/1
1 TABLET ORAL 3 TIMES DAILY PRN
Qty: 90 TABLET | Refills: 0 | Status: SHIPPED | OUTPATIENT
Start: 2018-04-15 | End: 2018-05-10 | Stop reason: SDUPTHER

## 2018-03-13 NOTE — PATIENT INSTRUCTIONS
Hydrocodone/Acetaminophen (By mouth)   Acetaminophen (n-jkpo-f-MIN-oh-fen), Hydrocodone Bitartrate (fgq-bfhe-LHC-done bye-TAR-trate)  Treats pain  This medicine contains a narcotic pain reliever  Brand Name(s): Hycet, Lorcet, Lorcet HD, Lorcet Plus, Lortab 10/325, Lortab 5/325, Lortab 7 5/325, Lortab Elixir, Norco, Verdrocet, Vicodin, Vicodin ES, Vicodin HP, Xodol, Xodol 5/300   There may be other brand names for this medicine  When This Medicine Should Not Be Used: This medicine is not right for everyone  Do not use it if you had an allergic reaction to acetaminophen, hydrocodone, or other narcotic medicines, or stomach or bowel blockage (including paralytic ileus)  How to Use This Medicine:   Capsule, Liquid, Tablet  · Your doctor will tell you how much medicine to use  Do not use more than directed  · An overdose can be dangerous  Follow directions carefully so you do not get too much medicine at one time  · Oral liquid: Measure the oral liquid medicine with a marked measuring spoon, oral syringe, or medicine cup  · Drink plenty of liquids to help avoid constipation  · This medicine should come with a Medication Guide  Ask your pharmacist for a copy if you do not have one  · Missed dose: Take a dose as soon as you remember  If it is almost time for your next dose, wait until then and take a regular dose  Do not take extra medicine to make up for a missed dose  · Store the medicine in a closed container at room temperature, away from heat, moisture, and direct light  Flush any unused Norco® tablets down the toilet  Drugs and Foods to Avoid:   Ask your doctor or pharmacist before using any other medicine, including over-the-counter medicines, vitamins, and herbal products  · Do not use this medicine if you are using or have used an MAO inhibitor within the past 14 days  · Some medicines can affect how hydrocodone/acetaminophen works   Tell your doctor if you are using any of the following: ¨ Carbamazepine, erythromycin, ketoconazole, mirtazapine, phenytoin, rifampin, ritonavir, tramadol, trazodone  ¨ Diuretic (water pill)  ¨ Medicine to treat depression or mental health problems  ¨ Medicine to treat migraine headaches  ¨ Phenothiazine medicine  · Tell your doctor if you use anything else that makes you sleepy  Some examples are allergy medicine, narcotic pain medicine, and alcohol  Tell your doctor if you are using buprenorphine, butorphanol, nalbuphine, pentazocine, or a muscle relaxer  · Do not drink alcohol while you are using this medicine  Acetaminophen can damage your liver, and your risk is higher if you also drink alcohol  Warnings While Using This Medicine:   · Tell your doctor if you are pregnant or breastfeeding, or if you have kidney disease, liver disease, lung or breathing problems, gallbladder or pancreas problems, an underactive thyroid, Canyon disease, prostate problems, trouble urinating, stomach problems, or a history of head injury or brain tumor, seizures, alcohol or drug addiction  · This medicine may cause the following problems:   ¨ High risk of overdose, which can lead to death  ¨ Respiratory depression (serious breathing problem that can be life-threatening)  ¨ Liver problems  ¨ Serious skin reactions  ¨ Serotonin syndrome (when used with certain medicines)  · This medicine can be habit-forming  Do not use more than your prescribed dose  Call your doctor if you think your medicine is not working  · This medicine may make you dizzy or drowsy  Do not drive or doing anything else that could be dangerous until you know how this medicine affects you  · This medicine contains acetaminophen  Read the labels of all other medicines you are using to see if they also contain acetaminophen, or ask your doctor or pharmacist  Veronica Police not use more than 4 grams (4,000 milligrams) total of acetaminophen in one day    · Tell any doctor or dentist who treats you that you are using this medicine  This medicine may affect certain medical test results  · This medicine may cause constipation, especially with long-term use  Ask your doctor if you should use a laxative to prevent and treat constipation  · This medicine could cause infertility  Talk with your doctor before using this medicine if you plan to have children  · Keep all medicine out of the reach of children  Never share your medicine with anyone  Possible Side Effects While Using This Medicine:   Call your doctor right away if you notice any of these side effects:  · Allergic reaction: Itching or hives, swelling in your face or hands, swelling or tingling in your mouth or throat, chest tightness, trouble breathing  · Anxiety, restlessness, fast heartbeat, fever, sweating, muscle spasms, twitching, diarrhea, seeing or hearing things that are not there  · Blistering, peeling, red skin rash  · Blue lips, fingernails, or skin  · Dark urine or pale stools, loss of appetite, nausea or vomiting, stomach pain, yellow skin or eyes  · Extreme weakness, shallow breathing, slow heartbeat, sweating, seizures, cold or clammy skin  · Lightheadedness, dizziness, fainting  If you notice these less serious side effects, talk with your doctor:   · Constipation, nausea, vomiting  · Tiredness or sleepiness  If you notice other side effects that you think are caused by this medicine, tell your doctor  Call your doctor for medical advice about side effects  You may report side effects to FDA at 1-830-FDA-3682  © 2017 [address] [employer] Information is for End User's use only and may not be sold, redistributed or otherwise used for commercial purposes  The above information is an  only  It is not intended as medical advice for individual conditions or treatments  Talk to your doctor, nurse or pharmacist before following any medical regimen to see if it is safe and effective for you

## 2018-03-13 NOTE — PROGRESS NOTES
Pain Medicine Follow-Up Note    Assessment:  1  Chronic pain syndrome    2  Pain of lumbar spine    3  Intervertebral disc disorder with radiculopathy of lumbar region        Plan:  New Medications Ordered This Visit   Medications    HYDROcodone-acetaminophen (NORCO)  mg per tablet     Sig: Take 1 tablet by mouth 3 (three) times a day as needed for severe pain Max Daily Amount: 3 tablets     Dispense:  90 tablet     Refill:  0       My impressions and treatment recommendations were discussed in detail with the patient who verbalized understanding and had no further questions  Given that the patient reports overall reduced pain and improved level of functioning without significant side effects, I felt a reasonable to continue the patient on hydrocodone/acetaminophen 10/325 mg 1 tablet 3 times daily as needed for pain  I have provided the patient E-prescriptions for March 16, 2018 and April 15, 2018  The risks and side effects of chronic opioid treatment were discussed in detail with the patient  Side effects include but are not limited to nausea, vomiting, GI intolerance, sedation, constipation, mental clouding, opioid-induced hyperalgesia, endocrine dysfunction, addiction, dependence, and tolerance  The patient was asked to take his medications only as prescribed and directed, never in excess, and never for any other reason other than for pain control  The patient was also asked to keep his medications out of the reach of others and away from children, preferably in a locked drawer  The patient verbalized understanding and wished to use these opioid medications  [place] Prescription Drug Monitoring Program report was reviewed and was appropriate     Follow-up is planned in 2 months time or sooner as warranted  Discharge instructions were provided  I personally saw and examined the patient and I agree with the above discussed plan of care      History of Present Illness:    Navarro Last is a 76 y o  male who presents to [facility] for interval re-evaluation of the above stated pain complaints  The patient has a past medical and chronic pain history as outlined in the assessment section  He was last seen on January 15, 2018 at which time he was maintained on hydrocodone/acetaminophen 10/325 mg 1 tablet 3 times daily as needed for pain  At today's office visit, the patient's pain score is 8 to 10/10 on the verbal numerical pain rating scale  The patient states that his pain is primarily in his low back with radiation to the right lower extremity  He describes his pain as worse in the morning evening and constant nature  He reports the quality of his pain as burning, dull/aching, sharp, cramping, and numbness    The patient denies opioid induced constipation  He is using 3 tablets of hydrocodone/acetaminophen 10/325 mg per day  Last Urine Drug Screen:  January 15, 2018    Other than as stated above, the patient denies any interval changes in medications, medical condition, mental condition, symptoms, or allergies since the last office visit  Review of Systems:    Review of Systems   Respiratory: Positive for shortness of breath  Cardiovascular: Negative for chest pain  Gastrointestinal: Negative for constipation, diarrhea, nausea and vomiting  Musculoskeletal: Positive for gait problem (decreased range of motion) and joint swelling (joint stiffness)  Negative for arthralgias and myalgias  Skin: Negative for rash  Neurological: Negative for dizziness, seizures and weakness  All other systems reviewed and are negative          Patient Active Problem List   Diagnosis    Chronic pain syndrome    Pain of lumbar spine    Sacroiliitis ([address] )    Intervertebral disc disorder with radiculopathy of lumbar region    Centrilobular emphysema (HCC)    Dyspnea on exertion    Chronic atrial fibrillation [facility])       Past Medical History:   Diagnosis Date    Acid reflux     Arthritis     BPH (benign prostatic hyperplasia)     no treatment    Calcium disorder     Chronic pain     COPD (chronic obstructive pulmonary disease) (HCC)     Coronary artery disease 2001    PTCA with one stent    Eye redness     Poked his eye on 3/18/16 "bloody" right eye    Hiatal hernia     Hydronephrosis     Hyperlipidemia     Hypertension     Irregular heart beat     A-Fib    Nocturnal leg cramps     Osteopenia     Peripheral artery disease (HCC)     Pleural effusion     Sleep apnea     REFUSES MACHINE HS    Vocal cord polyp 10/2012    panendoscopy       Past Surgical History:   Procedure Laterality Date    ANGIOPLASTY      APPENDECTOMY  04/2007    CARDIOVERSION      x2, for A-Fib    CARDIOVERSION      CATARACT EXTRACTION      CATARACT EXTRACTION W/ INTRAOCULAR LENS IMPLANT Left 01/2014    CORONARY ANGIOPLASTY WITH STENT PLACEMENT  2001    one stent    HERNIA REPAIR Right     inquinal    JOINT REPLACEMENT Left     Hip   [address]    also had arthroscopy right knee 2005    PANENDOSCOPY  10/23/2012    vocal cord polyp    PILONIDAL CYST DRAINAGE      x 3    MO REMV CATARACT EXTRACAP,INSERT LENS Right 4/6/2016    Procedure: EXTRACTION EXTRACAPSULAR CATARACT PHACO INTRAOCULAR LENS (IOL); Surgeon: Ashish Mendoza MD;  Location: [facility] MAIN OR;  Service: Ophthalmology    MO SURG IMPLNT [address] Left 8/23/2017    Procedure: Placement of Thoracic SCS with left buttock IPG (IMPULSE); Surgeon: Carla Marin MD;  Location:  MAIN OR;  Service: Neurosurgery    SPINAL CORD STIMULATOR TRIAL W/ LAMINOTOMY N/A 4/13/2017    Procedure: INSERTION DORSAL COLUMN SPINAL CORD STIMULATOR TRIAL;  Surgeon: Humberto Bird MD;  [facility] MAIN OR;  Service:     TONSILLECTOMY         Family History   Problem Relation Age of Onset    Cancer Father 80     prostate       Social History     Occupational History    Not on file       Social History Main Topics  Smoking status: Current Every Day Smoker     Packs/day: 1 00     Years: 60 00    Smokeless tobacco: Never Used    Alcohol use No      Comment: monthly - "used to drink alot"    Drug use: No    Sexual activity: Not on file         Current Outpatient Prescriptions:     albuterol (PROAIR HFA) 90 mcg/act inhaler, Inhale 2 puffs every 6 (six) hours as needed for wheezing, Disp: , Rfl:     [START ON 4/15/2018] HYDROcodone-acetaminophen (NORCO)  mg per tablet, Take 1 tablet by mouth 3 (three) times a day as needed for severe pain Max Daily Amount: 3 tablets, Disp: 90 tablet, Rfl: 0    atorvastatin (LIPITOR) 40 mg tablet, Take 40 mg by mouth daily  , Disp: , Rfl:     CARTIA  MG 24 hr capsule, TAKE 1 CAPSULE EVERY DAY, Disp: 90 capsule, Rfl: 3    diltiazem (DILACOR XR) 240 MG 24 hr capsule, Take 240 mg by mouth daily at bedtime  , Disp: , Rfl:     fluticasone (FLONASE) 50 mcg/act nasal spray, 2 sprays into each nostril daily, Disp: , Rfl:     ipratropium-albuterol (DUO-NEB) 0 5-2 5 mg/3 mL, Take 3 mL by nebulization every 6 (six) hours, Disp: , Rfl:     nebivolol (BYSTOLIC) 10 mg tablet, 10 mg daily at bedtime    , Disp: , Rfl:     OMEPRAZOLE PO, Take 40 mg by mouth daily  , Disp: , Rfl:     OXYGEN-HELIUM IN, Inhale 2 L daily at bedtime, Disp: , Rfl:     rivaroxaban (XARELTO) 20 mg tablet, Take 1 tablet (20 mg total) by mouth daily with dinner, Disp: , Rfl: 0    tamsulosin (FLOMAX) 0 4 mg, Take 0 4 mg by mouth daily with dinner, Disp: , Rfl:     No Known Allergies    Physical Exam:    /60 (BP Location: Left arm, Patient Position: Sitting, Cuff Size: Standard)   Pulse 89   Temp 98 °F (36 7 °C) (Oral)   Resp 17   Ht 5' 10" (1 778 m)   Wt 117 kg (259 lb)   BMI 37 16 kg/m²     Constitutional:obese  Eyes:anicteric  HEENT:grossly intact  Neck:supple, symmetric, trachea midline and no masses   Pulmonary:even and unlabored  Cardiovascular:No edema or pitting edema present  Skin:Normal without rashes or lesions and well hydrated  Psychiatric:Mood and affect appropriate  Neurologic:Cranial Nerves II-XII grossly intact  Musculoskeletal:normal

## 2018-03-13 NOTE — LETTER
March 13, 2018     Darina Primrose, DO  [address] [facility]  [address]  [place]  [id number]    Patient: Melani Conte   YOB: 1940   Date of Visit: 3/13/2018       Dear Dr Gladis Borden: Thank you for referring Ebonyflorencio Celestins to me for evaluation  Below are my notes for this consultation  If you have questions, please do not hesitate to call me  I look forward to following your patient along with you  Sincerely,        Stephany Rosas MD        CC: No Recipients  Stephany Rosas MD  3/13/2018  9:46 AM  Sign at close encounter  Pain Medicine Follow-Up Note    Assessment:  1  Chronic pain syndrome    2  Pain of lumbar spine    3  Intervertebral disc disorder with radiculopathy of lumbar region        Plan:  New Medications Ordered This Visit   Medications    HYDROcodone-acetaminophen (NORCO)  mg per tablet     Sig: Take 1 tablet by mouth 3 (three) times a day as needed for severe pain Max Daily Amount: 3 tablets     Dispense:  90 tablet     Refill:  0       My impressions and treatment recommendations were discussed in detail with the patient who verbalized understanding and had no further questions  Given that the patient reports overall reduced pain and improved level of functioning without significant side effects, I felt a reasonable to continue the patient on hydrocodone/acetaminophen 10/325 mg 1 tablet 3 times daily as needed for pain  I have provided the patient E-prescriptions for March 16, 2018 and April 15, 2018  The risks and side effects of chronic opioid treatment were discussed in detail with the patient  Side effects include but are not limited to nausea, vomiting, GI intolerance, sedation, constipation, mental clouding, opioid-induced hyperalgesia, endocrine dysfunction, addiction, dependence, and tolerance   The patient was asked to take his medications only as prescribed and directed, never in excess, and never for any other reason other than for pain control  The patient was also asked to keep his medications out of the reach of others and away from children, preferably in a locked drawer  The patient verbalized understanding and wished to use these opioid medications  [place] Prescription Drug Monitoring Program report was reviewed and was appropriate     Follow-up is planned in 2 months time or sooner as warranted  Discharge instructions were provided  I personally saw and examined the patient and I agree with the above discussed plan of care  History of Present Illness:    Serena Mcclelland is a 76 y o  male who presents to [facility] for interval re-evaluation of the above stated pain complaints  The patient has a past medical and chronic pain history as outlined in the assessment section  He was last seen on January 15, 2018 at which time he was maintained on hydrocodone/acetaminophen 10/325 mg 1 tablet 3 times daily as needed for pain  At today's office visit, the patient's pain score is 8 to 10/10 on the verbal numerical pain rating scale  The patient states that his pain is primarily in his low back with radiation to the right lower extremity  He describes his pain as worse in the morning evening and constant nature  He reports the quality of his pain as burning, dull/aching, sharp, cramping, and numbness    The patient denies opioid induced constipation  He is using 3 tablets of hydrocodone/acetaminophen 10/325 mg per day  Last Urine Drug Screen:  January 15, 2018    Other than as stated above, the patient denies any interval changes in medications, medical condition, mental condition, symptoms, or allergies since the last office visit  Review of Systems:    Review of Systems   Respiratory: Positive for shortness of breath  Cardiovascular: Negative for chest pain  Gastrointestinal: Negative for constipation, diarrhea, nausea and vomiting     Musculoskeletal: Positive for gait problem (decreased range of motion) and joint swelling (joint stiffness)  Negative for arthralgias and myalgias  Skin: Negative for rash  Neurological: Negative for dizziness, seizures and weakness  All other systems reviewed and are negative  Patient Active Problem List   Diagnosis    Chronic pain syndrome    Pain of lumbar spine    [address] [address]    Intervertebral disc disorder with radiculopathy of lumbar region    Centrilobular emphysema ([facility] [address] )    Dyspnea on exertion    Chronic atrial fibrillation ([facility] )       Past Medical History:   Diagnosis Date    Acid reflux     Arthritis     BPH (benign prostatic hyperplasia)     no treatment    Calcium disorder     Chronic pain     COPD (chronic obstructive pulmonary disease) ([facility] )     Coronary artery disease 2001    PTCA with one stent    Eye redness     Poked his eye on 3/18/16 "bloody" right eye    Hiatal hernia     Hydronephrosis     Hyperlipidemia     Hypertension     Irregular heart beat     A-Fib    Nocturnal leg cramps     Osteopenia     Peripheral artery disease (HCC)     Pleural effusion     Sleep apnea     REFUSES MACHINE HS    Vocal cord polyp 10/2012    panendoscopy       Past Surgical History:   Procedure Laterality Date    ANGIOPLASTY      APPENDECTOMY  04/2007    CARDIOVERSION      x2, for A-Fib    CARDIOVERSION      CATARACT EXTRACTION      CATARACT EXTRACTION W/ INTRAOCULAR LENS IMPLANT Left 01/2014    CORONARY ANGIOPLASTY WITH STENT PLACEMENT  2001    one stent    HERNIA REPAIR Right     inquinal    JOINT REPLACEMENT Left     Hip   [address]    also had arthroscopy right knee 2005    PANENDOSCOPY  10/23/2012    vocal cord polyp    PILONIDAL CYST DRAINAGE      x 3    ND REMV CATARACT EXTRACAP,INSERT LENS Right 4/6/2016    Procedure: EXTRACTION EXTRACAPSULAR CATARACT PHACO INTRAOCULAR LENS (IOL);   Surgeon: Denisa Davis MD;  Location: [facility] MAIN OR;  Service: Ophthalmology    ND SURG IMPLNT [address] S[address] Left 8/23/2017    Procedure: Placement of Thoracic SCS with left buttock IPG (IMPULSE); Surgeon: Guanakito Hazel MD;  [facility] MAIN OR;  Service: Neurosurgery    SPINAL CORD STIMULATOR TRIAL W/ LAMINOTOMY N/A 4/13/2017    Procedure: INSERTION DORSAL COLUMN SPINAL CORD STIMULATOR TRIAL;  Surgeon: Pauline Tanner MD;  Location: [facility] MAIN OR;  Service:     TONSILLECTOMY         Family History   Problem Relation Age of Onset    Cancer Father 80     prostate       Social History     Occupational History    Not on file  Social History Main Topics    Smoking status: Current Every Day Smoker     Packs/day: 1 00     Years: 60 00    Smokeless tobacco: Never Used    Alcohol use No      Comment: monthly - "used to drink alot"    Drug use: No    Sexual activity: Not on file         Current Outpatient Prescriptions:     albuterol (PROAIR HFA) 90 mcg/act inhaler, Inhale 2 puffs every 6 (six) hours as needed for wheezing, Disp: , Rfl:     [START ON 4/15/2018] HYDROcodone-acetaminophen (NORCO)  mg per tablet, Take 1 tablet by mouth 3 (three) times a day as needed for severe pain Max Daily Amount: 3 tablets, Disp: 90 tablet, Rfl: 0    atorvastatin (LIPITOR) 40 mg tablet, Take 40 mg by mouth daily  , Disp: , Rfl:     CARTIA  MG 24 hr capsule, TAKE 1 CAPSULE EVERY DAY, Disp: 90 capsule, Rfl: 3    diltiazem (DILACOR XR) 240 MG 24 hr capsule, Take 240 mg by mouth daily at bedtime  , Disp: , Rfl:     fluticasone (FLONASE) 50 mcg/act nasal spray, 2 sprays into each nostril daily, Disp: , Rfl:     ipratropium-albuterol (DUO-NEB) 0 5-2 5 mg/3 mL, Take 3 mL by nebulization every 6 (six) hours, Disp: , Rfl:     nebivolol (BYSTOLIC) 10 mg tablet, 10 mg daily at bedtime    , Disp: , Rfl:     OMEPRAZOLE PO, Take 40 mg by mouth daily  , Disp: , Rfl:     OXYGEN-HELIUM IN, Inhale 2 L daily at bedtime, Disp: , Rfl:     rivaroxaban (XARELTO) 20 mg tablet, Take 1 tablet (20 mg total) by mouth daily with dinner, Disp: , Rfl: 0    tamsulosin (FLOMAX) 0 4 mg, Take 0 4 mg by mouth daily with dinner, Disp: , Rfl:     No Known Allergies    Physical Exam:    /60 (BP Location: Left arm, Patient Position: Sitting, Cuff Size: Standard)   Pulse 89   Temp 98 °F (36 7 °C) (Oral)   Resp 17   Ht 5' 10" (1 778 m)   Wt 117 kg (259 lb)   BMI 37 16 kg/m²      Constitutional:obese  Eyes:anicteric  HEENT:grossly intact  Neck:supple, symmetric, trachea midline and no masses   Pulmonary:even and unlabored  Cardiovascular:No edema or pitting edema present  Skin:Normal without rashes or lesions and well hydrated  Psychiatric:Mood and affect appropriate  Neurologic:Cranial Nerves II-XII grossly intact  Musculoskeletal:normal

## 2018-03-14 ENCOUNTER — TELEPHONE (OUTPATIENT)
Dept: PAIN MEDICINE | Facility: MEDICAL CENTER | Age: 78
End: 2018-03-14

## 2018-03-14 ENCOUNTER — TELEPHONE (OUTPATIENT)
Dept: PAIN MEDICINE | Facility: CLINIC | Age: 78
End: 2018-03-14

## 2018-03-14 NOTE — TELEPHONE ENCOUNTER
Patient came in [place] office stating date on script to be refilled is incorrect  Please cb at mobile    TY

## 2018-03-14 NOTE — TELEPHONE ENCOUNTER
According to the [address] [place] [address] , the patient was filled on January 15, 2018 and February 14, 2018  Since February has 28 days, his next date will be March 16, 2018

## 2018-03-14 NOTE — TELEPHONE ENCOUNTER
Pt is calling because he was n office yesterday and got a rx for hydrocodine and says that it says to get filled on the 16th and should be for the 14th   Please call pt back at [phone]

## 2018-03-14 NOTE — TELEPHONE ENCOUNTER
AS,  Per Sigrid Logan, pt is at the office in pain and needs his prescription filled  Apparently, the date should be 14th instead of the 16th  Please advise  Thanks

## 2018-03-14 NOTE — TELEPHONE ENCOUNTER
There are two task on this patient  S/W pt  Advised pt due to the fact of February having 28 days the prescription cannot be filled until March 16th      Reviewed this with the patient several times  Pt verbalized understanding

## 2018-03-22 DIAGNOSIS — I10 ESSENTIAL HYPERTENSION: Primary | ICD-10-CM

## 2018-03-22 RX ORDER — NEBIVOLOL HYDROCHLORIDE 10 MG/1
TABLET ORAL
Qty: 90 TABLET | Refills: 3 | Status: SHIPPED | OUTPATIENT
Start: 2018-03-22 | End: 2020-01-29

## 2018-03-27 DIAGNOSIS — J44.9 COPD (CHRONIC OBSTRUCTIVE PULMONARY DISEASE) WITH CHRONIC BRONCHITIS (HCC): Primary | ICD-10-CM

## 2018-03-27 RX ORDER — IPRATROPIUM BROMIDE AND ALBUTEROL SULFATE 2.5; .5 MG/3ML; MG/3ML
3 SOLUTION RESPIRATORY (INHALATION)
Qty: 360 ML | Refills: 2 | Status: SHIPPED | OUTPATIENT
Start: 2018-03-27 | End: 2020-02-10 | Stop reason: SDUPTHER

## 2018-04-16 ENCOUNTER — TELEPHONE (OUTPATIENT)
Dept: PULMONOLOGY | Facility: MEDICAL CENTER | Age: 78
End: 2018-04-16

## 2018-04-16 DIAGNOSIS — J44.9 COPD (CHRONIC OBSTRUCTIVE PULMONARY DISEASE) WITH CHRONIC BRONCHITIS (HCC): Primary | ICD-10-CM

## 2018-04-16 RX ORDER — TRAZODONE HYDROCHLORIDE 50 MG/1
TABLET ORAL
COMMUNITY
Start: 2018-03-20

## 2018-04-16 RX ORDER — FLUTICASONE FUROATE AND VILANTEROL 100; 25 UG/1; UG/1
1 POWDER RESPIRATORY (INHALATION) DAILY
Qty: 90 EACH | Refills: 0 | Status: SHIPPED | OUTPATIENT
Start: 2018-04-16 | End: 2018-07-15 | Stop reason: ALTCHOICE

## 2018-04-19 ENCOUNTER — OFFICE VISIT (OUTPATIENT)
Dept: CARDIOLOGY CLINIC | Facility: CLINIC | Age: 78
End: 2018-04-19
Payer: MEDICARE

## 2018-04-19 VITALS
HEART RATE: 78 BPM | WEIGHT: 253 LBS | HEIGHT: 70 IN | OXYGEN SATURATION: 95 % | DIASTOLIC BLOOD PRESSURE: 84 MMHG | SYSTOLIC BLOOD PRESSURE: 133 MMHG | BODY MASS INDEX: 36.22 KG/M2

## 2018-04-19 DIAGNOSIS — G47.33 OBSTRUCTIVE SLEEP APNEA: ICD-10-CM

## 2018-04-19 DIAGNOSIS — Z98.61 CAD S/P PERCUTANEOUS CORONARY ANGIOPLASTY: ICD-10-CM

## 2018-04-19 DIAGNOSIS — G89.4 CHRONIC PAIN SYNDROME: ICD-10-CM

## 2018-04-19 DIAGNOSIS — E66.9 CLASS 2 OBESITY: ICD-10-CM

## 2018-04-19 DIAGNOSIS — I10 ESSENTIAL HYPERTENSION: ICD-10-CM

## 2018-04-19 DIAGNOSIS — I25.10 CAD S/P PERCUTANEOUS CORONARY ANGIOPLASTY: ICD-10-CM

## 2018-04-19 DIAGNOSIS — E78.5 DYSLIPIDEMIA: ICD-10-CM

## 2018-04-19 DIAGNOSIS — I48.20 CHRONIC ATRIAL FIBRILLATION (HCC): Primary | ICD-10-CM

## 2018-04-19 DIAGNOSIS — I49.3 PVCS (PREMATURE VENTRICULAR CONTRACTIONS): ICD-10-CM

## 2018-04-19 DIAGNOSIS — J43.2 CENTRILOBULAR EMPHYSEMA (HCC): ICD-10-CM

## 2018-04-19 DIAGNOSIS — R06.00 DYSPNEA ON EXERTION: ICD-10-CM

## 2018-04-19 DIAGNOSIS — M54.50 PAIN OF LUMBAR SPINE: ICD-10-CM

## 2018-04-19 PROCEDURE — 99406 BEHAV CHNG SMOKING 3-10 MIN: CPT | Performed by: INTERNAL MEDICINE

## 2018-04-19 PROCEDURE — 99214 OFFICE O/P EST MOD 30 MIN: CPT | Performed by: INTERNAL MEDICINE

## 2018-04-19 PROCEDURE — 93000 ELECTROCARDIOGRAM COMPLETE: CPT | Performed by: INTERNAL MEDICINE

## 2018-04-19 NOTE — PROGRESS NOTES
Cardiology Progress Note    ASSESSMENT:    1  Controlled chronic atrial fibrillation on Xarelto oral anticoagulation plus diltiazem and Bystolic for rate control  2  Frequent PVCs or aberrantly conducted complexes, asymptomatic, and pattern of bigeminy, chronic, more frequent today  Ventricular triplet noted today  3  Stable CAD with history of remote PCI, details unknown to me, done 8 years ago, with patient alleging last stress test 2-3 years ago  He now refuses any further stress testing  4  Significant COPD with slowly worsening chronic exertional dyspnea with hypoxemia and ongoing cigarette abuse at one pack per day and prior occasional exacerbations  5  Obstructive sleep apnea, patient refusing CPAP and using nasal oxygen 2 L/m h s  and p r n   6  Marked obesity, class II, with 3 pound weight gain in 1 year  7  History of right pleural effusion, small  8  History of asbestosis due to his exposure as a   5  Chronic low back pain and continuous opioid dependence with failure of lumbar epidural steroid injections and lumbar facet radiofrequency ablations with unsatisfactory response to permanent spinal stimulator implant on 08/23/2017  10  Long-standing hypertension and dyslipidemia, treated  11  History of hiatal hernia and GERD  12  History of former alcoholism, now controlled, with fall caused by alcohol abuse resulting in hospital stay 8/18/13 and last trip and fall 1 year ago  13  History of bladder outlet obstruction 8/13 and attributed to BPH  14  History of pneumonia/pleurisy March, 2012  15  Unremarkable cardiac catheterization in 2005  16  Cervical spine fracture after fall in 2009, rib fractures after fall in 2013, left total hip replacement April, 2010  Plan       Patient Instructions     1    Recommended but patient refused nuclear stress test, owing to its inconvenience and how it makes him feel    2   Echocardiogram and 24 hour Holter monitor to be done sometime in the next several months  3   Cardiology follow-up in 6 months with EKG, lipids, CMP  4   Smoking cessation was counseled for 4 minutes  HPI    This 76 y o  male  denies new cardiopulmonary and medical symptoms, except that he believes his dyspnea with exertion may be somewhat worse  He denies chest pain, falls, syncope, palpitations, PND, orthopnea  Overall, he feels is health is stable compared to nearly 5 months ago  Review of Systems    All other systems negative, except as noted in history of present illness    Historical Information   Past Medical History:   Diagnosis Date    Acid reflux     Arthritis     BPH (benign prostatic hyperplasia)     no treatment    Calcium disorder     Chronic atrial fibrillation (HCC) 2/6/2018    Chronic pain     COPD (chronic obstructive pulmonary disease) ([facility] [address] )     Coronary artery disease 2001    PTCA with one stent    Eye redness     Poked his eye on 3/18/16 "bloody" right eye    Hiatal hernia     Hydronephrosis     Hyperlipidemia     Hypertension     Irregular heart beat     A-Fib    Nocturnal leg cramps     Osteopenia     Peripheral artery disease (HCC)     Pleural effusion     Sleep apnea     REFUSES MACHINE HS    Vocal cord polyp 10/2012    panendoscopy     Past Surgical History:   Procedure Laterality Date    ANGIOPLASTY      APPENDECTOMY  04/2007    CARDIOVERSION      x2, for A-Fib    CARDIOVERSION      CATARACT EXTRACTION      CATARACT EXTRACTION W/ INTRAOCULAR LENS IMPLANT Left 01/2014    CORONARY ANGIOPLASTY WITH STENT PLACEMENT  2001    one stent    HERNIA REPAIR Right     inquinal    JOINT REPLACEMENT Left     Hip   [address]    also had arthroscopy right knee 2005    PANENDOSCOPY  10/23/2012    vocal cord polyp    PILONIDAL CYST DRAINAGE      x 3    NE REMV CATARACT EXTRACAP,INSERT LENS Right 4/6/2016    Procedure: EXTRACTION EXTRACAPSULAR CATARACT PHACO INTRAOCULAR LENS (IOL);   Surgeon: Karyna Dent MD;  [facility] MAIN OR;  Service: Ophthalmology    MA SURG IMPLNT [address] Left 8/23/2017    Procedure: Placement of Thoracic SCS with left buttock IPG (IMPULSE); Surgeon: Hayde Brambila MD;  Location:  MAIN OR;  Service: Neurosurgery    SPINAL CORD STIMULATOR TRIAL W/ LAMINOTOMY N/A 4/13/2017    Procedure: INSERTION DORSAL COLUMN SPINAL CORD STIMULATOR TRIAL;  Surgeon: Toni Millard MD;  Location: [facility] MAIN OR;  Service:     TONSILLECTOMY       History   Alcohol Use    Yes     Comment: occasionally     History   Drug Use No     History   Smoking Status    Current Every Day Smoker    Packs/day: 1 00    Years: 60 00   Smokeless Tobacco    Never Used       Family History:  Family History   Problem Relation Age of Onset    Cancer Father 80     prostate         Meds/Allergies     Prior to Admission medications    Medication Sig Start Date End Date Taking?  Authorizing Provider   albuterol (PROAIR HFA) 90 mcg/act inhaler Inhale 2 puffs every 6 (six) hours as needed for wheezing    Historical Provider, MD   atorvastatin (LIPITOR) 40 mg tablet Take 40 mg by mouth daily      Historical Provider, MD   BYSTOLIC 10 MG tablet TAKE 1 TABLET EVERY DAY 3/22/18   Angel Choi MD   diltiazem (DILACOR XR) 240 MG 24 hr capsule Take 240 mg by mouth daily at bedtime      Historical Provider, MD   fluticasone (FLONASE) 50 mcg/act nasal spray 2 sprays into each nostril daily    Historical Provider, MD   fluticasone furoate-vilanterol (BREO ELLIPTA) Inhale 1 puff daily for 90 days 4/16/18 7/15/18  MITCHELL Choi   HYDROcodone-acetaminophen [facility])  mg per tablet Take 1 tablet by mouth 3 (three) times a day as needed for severe pain Max Daily Amount: 3 tablets 4/15/18   Toni Millard MD   ipratropium-albuterol (DUO-NEB) 0 5-2 5 mg/3 mL Take 3 mL by nebulization every 6 (six) hours 3/27/18   MITCHELL Choi   OMEPRAZOLE PO Take 40 mg by mouth daily      Historical Provider, MD   OXYGEN-HELIUM IN Inhale 2 L daily at bedtime    Historical Provider, MD   rivaroxaban (XARELTO) 20 mg tablet Take 1 tablet (20 mg total) by mouth daily with dinner 2/6/18   Nimo Mendez DO   tamsulosin [facility]) 0 4 mg Take 0 4 mg by mouth daily with dinner    Historical Provider, MD   traZODone (DESYREL) 50 mg tablet  3/20/18   Historical Provider, MD   CARTIA  MG 24 hr capsule TAKE 1 CAPSULE EVERY DAY 3/6/18 4/19/18  Tony Kaplan MD       No Known Allergies      Vitals:    04/19/18 0808 04/19/18 0835   BP: 146/78 133/84   BP Location: Left arm Right arm   Patient Position: Sitting Standing   Cuff Size: Large Large   Pulse: 78    SpO2: 95%    Weight: 115 kg (253 lb)    Height: 5' 10" (1 778 m)        Body mass index is 36 3 kg/m²  Physical Exam:    General Appearance:  Alert, cooperative, no distress, appears stated age and is markedly obese   Head:  Normocephalic, without obvious abnormality, atraumatic   Eyes:  PERRL, conjunctiva/corneas clear, EOM's intact,   both eyes   Ears:  Normal TM's and external ear canals, both ears   Nose: Nares normal, septum midline, mucosa normal, no drainage or sinus tenderness   Throat: Lips, mucosa, and tongue normal; teeth and gums normal   Neck: Supple, symmetrical, trachea midline, no adenopathy, thyroid: not enlarged, symmetric, no tenderness/mass/nodules, no carotid bruit or JVD   Back:   Symmetric, no curvature, ROM normal, no CVA tenderness   Lungs:   Clear to auscultation bilaterally, respirations unlabored expiratory rhonchi are heard over large airways  Chest Wall:  No tenderness or deformity   Heart:  Regular rate and rhythm, S1, S2 normal, no murmur, rub or gallop   Abdomen:   Soft, non-tender, bowel sounds active all four quadrants,  no masses, no organomegaly with fairly marked obesity noted     Extremities: Extremities normal, atraumatic, no cyanosis or edema   Pulses: 2+ on left and barely palpable on right, but both feet are equally warm   Skin: Skin showed normal color, texture, turgor and no rashes or lesions   Lymph nodes: Cervical, supraclavicular, and axillary nodes normal   Neurologic: Normal         Cardiographics    ECG 04/19/2018:    Controlled atrial fibrillation  LAFB  Nondiagnostic inferior Q-waves  Nonspecific inferolateral ST-T abnormalities  Ventricular bigeminy and 1 ventricular triplet, with more frequent PVCs compared to 12/06/2017 with no other changes    Imaging    Chest X-Ray :  Xr Chest Pa & Lateral    Result Date: 5/26/2017  Impression COPD  No active pulmonary disease   Workstation performed: [id number]             Lab Review       Lab Results   Component Value Date     12/07/2017    K 4 3 12/07/2017     12/07/2017    CO2 28 12/07/2017    ANIONGAP 10 12/07/2017    BUN 16 12/07/2017    CREATININE 0 88 12/07/2017    GLUCOSE 103 02/07/2017    GLUF 109 (H) 12/07/2017    CALCIUM 9 0 12/07/2017    AST 11 12/07/2017    ALT 20 12/07/2017    ALKPHOS 71 12/07/2017    PROT 7 6 12/07/2017    BILITOT 0 60 12/07/2017    EGFR 82 12/07/2017       Lab Results   Component Value Date    CHOL 116 12/07/2017    CHOL 105 02/07/2017     Lab Results   Component Value Date    HDL 59 12/07/2017    HDL 58 02/07/2017     Lab Results   Component Value Date    LDLCALC 34 12/07/2017    LDLCALC 22 02/07/2017     Lab Results   Component Value Date    TRIG 113 12/07/2017    TRIG 126 02/07/2017     No components found for: CHOLHDL    Lab Results   Component Value Date    GLUCOSE 103 02/07/2017    CALCIUM 9 0 12/07/2017     12/07/2017    K 4 3 12/07/2017    CO2 28 12/07/2017     12/07/2017    BUN 16 12/07/2017    CREATININE 0 88 12/07/2017           Stephanie Ricks MD

## 2018-04-19 NOTE — PATIENT INSTRUCTIONS
1   Recommended but patient refused nuclear stress test, owing to its in convenience and how it makes him feel    2   Echocardiogram and 24 hour Holter monitor to be done sometime in the next several months  3   Cardiology follow-up in 6 months with EKG, lipids, CMP

## 2018-04-24 DIAGNOSIS — R97.20 ELEVATED PSA: Primary | ICD-10-CM

## 2018-04-26 ENCOUNTER — APPOINTMENT (OUTPATIENT)
Dept: LAB | Facility: HOSPITAL | Age: 78
End: 2018-04-26
Attending: FAMILY MEDICINE
Payer: MEDICARE

## 2018-04-26 ENCOUNTER — TRANSCRIBE ORDERS (OUTPATIENT)
Dept: ADMINISTRATIVE | Facility: HOSPITAL | Age: 78
End: 2018-04-26

## 2018-04-26 DIAGNOSIS — Z13.9 SCREENING FOR CONDITION: ICD-10-CM

## 2018-04-26 DIAGNOSIS — E55.9 VITAMIN D DEFICIENCY: ICD-10-CM

## 2018-04-26 DIAGNOSIS — I10 ESSENTIAL HYPERTENSION, MALIGNANT: ICD-10-CM

## 2018-04-26 DIAGNOSIS — R97.20 ELEVATED PROSTATE SPECIFIC ANTIGEN (PSA): ICD-10-CM

## 2018-04-26 DIAGNOSIS — R97.20 ELEVATED PROSTATE SPECIFIC ANTIGEN (PSA): Primary | ICD-10-CM

## 2018-04-26 DIAGNOSIS — I10 ESSENTIAL HYPERTENSION, MALIGNANT: Primary | ICD-10-CM

## 2018-04-26 LAB
25(OH)D3 SERPL-MCNC: 23 NG/ML (ref 30–100)
ALBUMIN SERPL BCP-MCNC: 3.8 G/DL (ref 3.5–5)
ALP SERPL-CCNC: 64 U/L (ref 46–116)
ALT SERPL W P-5'-P-CCNC: 26 U/L (ref 12–78)
ANION GAP SERPL CALCULATED.3IONS-SCNC: 8 MMOL/L (ref 4–13)
AST SERPL W P-5'-P-CCNC: 18 U/L (ref 5–45)
BASOPHILS # BLD AUTO: 0 THOUSANDS/ΜL (ref 0–0.1)
BASOPHILS NFR BLD AUTO: 0 % (ref 0–1)
BILIRUB SERPL-MCNC: 0.9 MG/DL (ref 0.2–1)
BUN SERPL-MCNC: 11 MG/DL (ref 5–25)
CALCIUM SERPL-MCNC: 9.2 MG/DL (ref 8.3–10.1)
CHLORIDE SERPL-SCNC: 100 MMOL/L (ref 100–108)
CO2 SERPL-SCNC: 30 MMOL/L (ref 21–32)
CREAT SERPL-MCNC: 0.88 MG/DL (ref 0.6–1.3)
EOSINOPHIL # BLD AUTO: 0.1 THOUSAND/ΜL (ref 0–0.61)
EOSINOPHIL NFR BLD AUTO: 1 % (ref 0–6)
ERYTHROCYTE [DISTWIDTH] IN BLOOD BY AUTOMATED COUNT: 13.8 % (ref 11.6–15.1)
GFR SERPL CREATININE-BSD FRML MDRD: 82 ML/MIN/1.73SQ M
GLUCOSE P FAST SERPL-MCNC: 117 MG/DL (ref 65–99)
HCT VFR BLD AUTO: 45 % (ref 42–52)
HGB BLD-MCNC: 14.8 G/DL (ref 14–18)
LYMPHOCYTES # BLD AUTO: 1.3 THOUSANDS/ΜL (ref 0.6–4.47)
LYMPHOCYTES NFR BLD AUTO: 15 % (ref 14–44)
MCH RBC QN AUTO: 32.8 PG (ref 27–31)
MCHC RBC AUTO-ENTMCNC: 32.9 G/DL (ref 31.4–37.4)
MCV RBC AUTO: 100 FL (ref 82–98)
MONOCYTES # BLD AUTO: 0.7 THOUSAND/ΜL (ref 0.17–1.22)
MONOCYTES NFR BLD AUTO: 9 % (ref 4–12)
NEUTROPHILS # BLD AUTO: 6.2 THOUSANDS/ΜL (ref 1.85–7.62)
NEUTS SEG NFR BLD AUTO: 75 % (ref 43–75)
NRBC BLD AUTO-RTO: 0 /100 WBCS
PLATELET # BLD AUTO: 234 THOUSANDS/UL (ref 130–400)
PMV BLD AUTO: 7.4 FL (ref 8.9–12.7)
POTASSIUM SERPL-SCNC: 4.3 MMOL/L (ref 3.5–5.3)
PROT SERPL-MCNC: 7.4 G/DL (ref 6.4–8.2)
PSA SERPL-MCNC: 9.4 NG/ML (ref 0–4)
RBC # BLD AUTO: 4.52 MILLION/UL (ref 4.7–6.1)
SODIUM SERPL-SCNC: 138 MMOL/L (ref 136–145)
WBC # BLD AUTO: 8.3 THOUSAND/UL (ref 4.8–10.8)

## 2018-04-26 PROCEDURE — 36415 COLL VENOUS BLD VENIPUNCTURE: CPT | Performed by: FAMILY MEDICINE

## 2018-04-26 PROCEDURE — 85025 COMPLETE CBC W/AUTO DIFF WBC: CPT | Performed by: FAMILY MEDICINE

## 2018-04-26 PROCEDURE — 80053 COMPREHEN METABOLIC PANEL: CPT | Performed by: FAMILY MEDICINE

## 2018-04-26 PROCEDURE — 82306 VITAMIN D 25 HYDROXY: CPT

## 2018-04-26 PROCEDURE — 84153 ASSAY OF PSA TOTAL: CPT

## 2018-04-30 NOTE — PROGRESS NOTES
1  Elevated PSA  PSA Total, Diagnostic         Assessment and plan:       1  Elevated PSA -- managed by Dr Gloria Dotson  - prostate examination is benign in the office today  - PSA continues to trend down  - has a history of negative prostate biopsies  Will try to obtain reports from Dr Guanakito Beck  - We did discuss there is a possibility of underlying prostate cancer  We discussed observation versus MRI versus prostate biopsy  Patient is unable to obtain prostate MRI due to metal in his body  He is not interested in prostate biopsy at this time given his PSA trend, which I feel is reasonable as well given his age and comorbidities  - plan will be to repeat a PSA in 6 months with ALICIA to ensure stability  - all questions answered  Cary Graf PA-C      Chief Complaint     f/u elevated PSA    History of Present Illness     Charlie Mcardle is a 76 y o  male patient of Dr Gloria Dotson with a history of elevated PSA presenting for follow up  Patient previously had a PSA baseline in the mid 7 range  States he previously followed with Dr Guanakito Beck and has had prostate biopsies in the past that were reportedly negative, however we do not have the records to review  Patient is unsure what his PSA was at his last biopsy, however believes it was done approximately 10 years ago  Patient had a spinal surgery performed  His PSA shortly thereafter was significant elevated at 44  Since, it has decreased down to 10, 11, and now most recently 9  His most recent PSA is 9 4 (4/26/18)  Patient feels that at baseline his urination is fine  Feels like he has a fair stream, feels empty after urination, and has nocturia 2-4x nightly  Denies any dysuria, gross hematuria, flank pain, or weight loss         Laboratory     Lab Results   Component Value Date    CREATININE 0 88 04/26/2018       Lab Results   Component Value Date    PSA 9 4 (H) 04/26/2018    PSA 11 2 (H) 12/07/2017    PSA 10 4 (H) 11/03/2017 Review of Systems     Review of Systems          Allergies     No Known Allergies    Physical Exam     Physical Exam   Constitutional: He is oriented to person, place, and time  He appears well-developed and well-nourished  No distress  Overweight  Smells of smoke   HENT:   Head: Normocephalic and atraumatic  Eyes: Conjunctivae are normal    Neck: Normal range of motion  No tracheal deviation present  Pulmonary/Chest: Effort normal    Genitourinary:   Genitourinary Comments: Prostate: 50g, smooth, symmetric, no nodules   Musculoskeletal: He exhibits no edema or deformity  Ambulates with cane assistance   Neurological: He is alert and oriented to person, place, and time  Skin: Skin is warm and dry  No rash noted  He is not diaphoretic  No erythema  Psychiatric: He has a normal mood and affect   His behavior is normal          Vital Signs     Vitals:    05/01/18 0928   BP: 140/88   BP Location: Left arm   Patient Position: Sitting   Cuff Size: Adult   Pulse: 80   Weight: 116 kg (256 lb 3 2 oz)   Height: 5' 10" (1 778 m)         Current Medications       Current Outpatient Prescriptions:     albuterol (PROAIR HFA) 90 mcg/act inhaler, Inhale 2 puffs every 6 (six) hours as needed for wheezing, Disp: , Rfl:     atorvastatin (LIPITOR) 40 mg tablet, Take 40 mg by mouth daily  , Disp: , Rfl:     BYSTOLIC 10 MG tablet, TAKE 1 TABLET EVERY DAY, Disp: 90 tablet, Rfl: 3    diltiazem (DILACOR XR) 240 MG 24 hr capsule, Take 240 mg by mouth daily at bedtime  , Disp: , Rfl:     fluticasone (FLONASE) 50 mcg/act nasal spray, 2 sprays into each nostril daily, Disp: , Rfl:     fluticasone furoate-vilanterol (BREO ELLIPTA), Inhale 1 puff daily for 90 days, Disp: 90 each, Rfl: 0    HYDROcodone-acetaminophen (NORCO)  mg per tablet, Take 1 tablet by mouth 3 (three) times a day as needed for severe pain Max Daily Amount: 3 tablets, Disp: 90 tablet, Rfl: 0    ipratropium-albuterol (DUO-NEB) 0 5-2 5 mg/3 mL, Take 3 mL by nebulization every 6 (six) hours, Disp: 360 mL, Rfl: 2    OMEPRAZOLE PO, Take 40 mg by mouth daily  , Disp: , Rfl:     OXYGEN-HELIUM IN, Inhale 2 L daily at bedtime, Disp: , Rfl:     rivaroxaban (XARELTO) 20 mg tablet, Take 1 tablet (20 mg total) by mouth daily with dinner, Disp: , Rfl: 0    tamsulosin (FLOMAX) 0 4 mg, Take 0 4 mg by mouth daily with dinner, Disp: , Rfl:     traZODone (DESYREL) 50 mg tablet, , Disp: , Rfl:       Active Problems     Patient Active Problem List   Diagnosis    Chronic pain syndrome    Pain of lumbar spine    Sacroiliitis ([address] )    Intervertebral disc disorder with radiculopathy of lumbar region    Centrilobular emphysema ([address] )    Dyspnea on exertion    Chronic atrial fibrillation ([address] )    Coronary artery disease       Past Medical History     Past Medical History:   Diagnosis Date    Acid reflux     Arthritis     BPH (benign prostatic hyperplasia)     no treatment    Calcium disorder     Chronic atrial fibrillation (HCC) 2/6/2018    Chronic pain     COPD (chronic obstructive pulmonary disease) ([address] )     Coronary artery disease 2001    PTCA with one stent    Eye redness     Poked his eye on 3/18/16 "bloody" right eye    Hiatal hernia     Hydronephrosis     Hyperlipidemia     Hypertension     Irregular heart beat     A-Fib    Nocturnal leg cramps     Osteopenia     Peripheral artery disease (HCC)     Pleural effusion     Sleep apnea     REFUSES MACHINE HS    Vocal cord polyp 10/2012    panendoscopy       Surgical History     Past Surgical History:   Procedure Laterality Date    ANGIOPLASTY      APPENDECTOMY  04/2007    CARDIOVERSION      x2, for A-Fib    CARDIOVERSION      CATARACT EXTRACTION      CATARACT EXTRACTION W/ INTRAOCULAR LENS IMPLANT Left 01/2014    CORONARY ANGIOPLASTY WITH STENT PLACEMENT  2001    one stent    HERNIA REPAIR Right     inquinal    JOINT REPLACEMENT Left     Hip    KNEE SURGERY  1994    also had arthroscopy right knee 2005    PANENDOSCOPY  10/23/2012    vocal cord polyp    PILONIDAL CYST DRAINAGE      x 3    VA REMV CATARACT EXTRACAP,INSERT LENS Right 4/6/2016    Procedure: EXTRACTION EXTRACAPSULAR CATARACT PHACO INTRAOCULAR LENS (IOL); Surgeon: Anh Limon MD;  [facility] MAIN OR;  Service: Ophthalmology    VA SURG IMPLNT [address] Left 8/23/2017    Procedure: Placement of Thoracic SCS with left buttock IPG (IMPULSE);   Surgeon: Maximilian Madden MD;  Location:  MAIN OR;  Service: Neurosurgery    SPINAL CORD STIMULATOR TRIAL W/ LAMINOTOMY N/A 4/13/2017    Procedure: INSERTION DORSAL COLUMN SPINAL CORD STIMULATOR TRIAL;  Surgeon: Preet Blas MD;  Location: [facility] MAIN OR;  Service:     TONSILLECTOMY       Family History     Family History   Problem Relation Age of Onset    Cancer Father 80     prostate       Social History     Social History       Radiology

## 2018-05-01 ENCOUNTER — OFFICE VISIT (OUTPATIENT)
Dept: UROLOGY | Facility: CLINIC | Age: 78
End: 2018-05-01
Payer: MEDICARE

## 2018-05-01 VITALS
WEIGHT: 256.2 LBS | DIASTOLIC BLOOD PRESSURE: 88 MMHG | HEIGHT: 70 IN | HEART RATE: 80 BPM | SYSTOLIC BLOOD PRESSURE: 140 MMHG | BODY MASS INDEX: 36.68 KG/M2

## 2018-05-01 DIAGNOSIS — R97.20 ELEVATED PSA: Primary | ICD-10-CM

## 2018-05-01 PROCEDURE — 99213 OFFICE O/P EST LOW 20 MIN: CPT | Performed by: PHYSICIAN ASSISTANT

## 2018-05-09 ENCOUNTER — TELEPHONE (OUTPATIENT)
Dept: PULMONOLOGY | Facility: MEDICAL CENTER | Age: 78
End: 2018-05-09

## 2018-05-09 ENCOUNTER — OFFICE VISIT (OUTPATIENT)
Dept: PULMONOLOGY | Facility: MEDICAL CENTER | Age: 78
End: 2018-05-09
Payer: MEDICARE

## 2018-05-09 VITALS
BODY MASS INDEX: 36.94 KG/M2 | TEMPERATURE: 97.8 F | RESPIRATION RATE: 20 BRPM | HEIGHT: 70 IN | SYSTOLIC BLOOD PRESSURE: 150 MMHG | WEIGHT: 258 LBS | DIASTOLIC BLOOD PRESSURE: 74 MMHG | HEART RATE: 80 BPM | OXYGEN SATURATION: 93 %

## 2018-05-09 DIAGNOSIS — F17.210 CIGARETTE NICOTINE DEPENDENCE WITHOUT COMPLICATION: ICD-10-CM

## 2018-05-09 DIAGNOSIS — R06.02 SHORTNESS OF BREATH: Primary | ICD-10-CM

## 2018-05-09 DIAGNOSIS — J96.11 CHRONIC RESPIRATORY FAILURE WITH HYPOXIA (HCC): ICD-10-CM

## 2018-05-09 DIAGNOSIS — J43.2 CENTRILOBULAR EMPHYSEMA (HCC): ICD-10-CM

## 2018-05-09 DIAGNOSIS — R06.00 DYSPNEA ON EXERTION: ICD-10-CM

## 2018-05-09 DIAGNOSIS — J44.1 COPD EXACERBATION (HCC): ICD-10-CM

## 2018-05-09 PROBLEM — F17.200 NICOTINE DEPENDENCE: Status: ACTIVE | Noted: 2018-05-09

## 2018-05-09 PROCEDURE — 99214 OFFICE O/P EST MOD 30 MIN: CPT | Performed by: NURSE PRACTITIONER

## 2018-05-09 PROCEDURE — 94010 BREATHING CAPACITY TEST: CPT | Performed by: NURSE PRACTITIONER

## 2018-05-09 RX ORDER — FLUTICASONE FUROATE AND VILANTEROL 100; 25 UG/1; UG/1
1 POWDER RESPIRATORY (INHALATION) DAILY
Qty: 3 EACH | Refills: 3 | Status: SHIPPED | OUTPATIENT
Start: 2018-05-09 | End: 2020-02-04

## 2018-05-09 RX ORDER — PREDNISONE 10 MG/1
TABLET ORAL
Qty: 20 TABLET | Refills: 0 | Status: SHIPPED | OUTPATIENT
Start: 2018-05-09 | End: 2019-10-25 | Stop reason: ALTCHOICE

## 2018-05-09 NOTE — ASSESSMENT & PLAN NOTE
Aydin Vick has moderately severe emphysema  PFT was done today that has similar results as the last 1 done in December 2017  Forced vital capacity is 2 85 L or 70% of predicted, FEV1 was 1 48 L or 51% of predicted and obstruction ratio is 52%  He has moderately severe obstruction with low vital  Capacity  Plan includes continuation of Breo 100 mics 1 puff daily  He also is using Duo nebs 4 times daily and this should be maintained  He is a bit more short of breath than usual   Although his lungs were clear to auscultation I did review his last CT scan of chest done in August 2013  He has chronic scarring versus fibrosis  We will consider a CT scan in the future as he defers at this point  I am going to give him a short course of oral prednisone

## 2018-05-09 NOTE — ASSESSMENT & PLAN NOTE
Read continues to smoke 3/4 to 1 pack of cigarettes per day  He is aware how smoking is detrimental to his overall health  He has history of pulmonary emphysema as well as coronary artery disease  We continue to discuss at every visit smoking cessation or at the very least cutting down significantly on this habit

## 2018-05-09 NOTE — ASSESSMENT & PLAN NOTE
Blayne Liang has nocturnal hypoxia  He has history of obstructive sleep apnea and was unable to use CPAP  He is using nasal cannula oxygen 2 liters/minute  This uses at nighttime and as needed  Room air oxygen was 93% and with ambulation went to 89%  He is using an benefitting from supplemental oxygen  He has a history chronic atrial fibrillation, stable coronary artery disease and moderately severe COPD

## 2018-05-09 NOTE — PROGRESS NOTES
Assessment/Plan:     Problem List Items Addressed This Visit        Respiratory    Centrilobular emphysema ([address] )     Maria Luisa Olson has moderately severe emphysema  PFT was done today that has similar results as the last 1 done in December 2017  Forced vital capacity is 2 85 L or 70% of predicted, FEV1 was 1 48 L or 51% of predicted and obstruction ratio is 52%  He has moderately severe obstruction with low vital  Capacity  Plan includes continuation of Breo 100 mics 1 puff daily  He also is using Duo nebs 4 times daily and this should be maintained  He is a bit more short of breath than usual   Although his lungs were clear to auscultation I did review his last CT scan of chest done in August 2013  He has chronic scarring versus fibrosis  We will consider a CT scan in the future as he defers at this point  I am going to give him a short course of oral prednisone  Relevant Medications    predniSONE 10 mg tablet    fluticasone furoate-vilanterol (BREO ELLIPTA)    Chronic respiratory failure with hypoxia (HCC)     David Page has nocturnal hypoxia  He has history of obstructive sleep apnea and was unable to use CPAP  He is using nasal cannula oxygen 2 liters/minute  This uses at nighttime and as needed  Room air oxygen was 93% and with ambulation went to 89%  He is using an benefitting from supplemental oxygen  He has a history chronic atrial fibrillation, stable coronary artery disease and moderately severe COPD  Other    Dyspnea on exertion     David Page has dyspnea with exertion  Walked him today in his room air oxygen reminded 89%  He does have supplemental oxygen that he uses at home when needed  At rest his oxygen was 94%  He uses 2 L of supplemental oxygen at night  Reasons for shortness of breath is multifactorial   He has chronic AFib and also frequent PVCs  He also has marked obesity and history of asbestosis           Nicotine dependence     Read continues to smoke 3/4 to 1 pack of cigarettes per day  He is aware how smoking is detrimental to his overall health  He has history of pulmonary emphysema as well as coronary artery disease  We continue to discuss at every visit smoking cessation or at the very least cutting down significantly on this habit  Other Visit Diagnoses     Shortness of breath    -  Primary    Relevant Orders    POCT spirometry (Completed)    COPD exacerbation (HCC)        Relevant Medications    predniSONE 10 mg tablet    fluticasone furoate-vilanterol (BREO ELLIPTA)            No Follow-up on file  All questions are answered to the patient's satisfaction and understanding  He verbalizes understanding  He is encouraged to call with any further questions or concerns  Portions of the record may have been created with voice recognition software  Occasional wrong word or "sound a like" substitutions may have occurred due to the inherent limitations of voice recognition software  Read the chart carefully and recognize, using context, where substitutions have occurred  ______________________________________________________________________    Chief Complaint:   Chief Complaint   Patient presents with    Follow-up     Routine    Shortness of Breath     minimal exertion    Cough     From smoking       Patient ID: Reyna Veras is a 76 y o  y o  male has a past medical history of Acid reflux; Arthritis; BPH (benign prostatic hyperplasia); Calcium disorder; Chronic atrial fibrillation ([address] ) (2/6/2018); Chronic pain; COPD (chronic obstructive pulmonary disease) ([address] ); Coronary artery disease (2001); Eye redness; Hiatal hernia; Hydronephrosis; Hyperlipidemia; Hypertension; Irregular heart beat; Nocturnal leg cramps; Osteopenia; Peripheral artery disease ([address] ); Pleural effusion; Sleep apnea; and 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)  5/9/2018  Deni Alvarez is here today for follow-up  He is 76years old and has centrilobular emphysema    His last pulmonary function test was in December of 2017  He had moderate airway obstruction seen on flow volume loop with forced vital capacity of 2 76 L or 68% of predicted, FEV1 of 1 16 or 40% and obstruction ratio 42  He is continuing to use Breo once daily he is using nebulizer with duo nebs about 4 times daily  Krish Abraham also has a history of chronic atrial fibrillation he is on Xarelto  According to patient he has been feeling more short of breath  He continues to smoke about 3/4 to 1 pack of cigarettes per day  Last CT of the chest was done in August of 2013  He had chronic infiltrates deemed to be scarring versus fibrosis  There was stable pleural plaques  Krish Abraham is now following with Cardiology with Dr Jordon Lofton  He was last seen at the office on April 19, 2018  He has controlled chronic atrial fibrillation and is on Xarelto  He also has frequent PVCs  He has history of stable coronary artery disease with remote PCI done about 8 years ago his significant COPD is also exhibited with chronic exertional dyspnea  He does have hypoxemia and uses 2 L of oxygen at nighttime  He has history of obstructive sleep apnea for which he was unable to use CPAP and instead is using supplemental oxygen  He also has history of asbestosis due to his exposure as a       Shortness of Breath   This is a chronic problem  The current episode started more than 1 year ago  The problem occurs daily  The problem has been gradually worsening  The symptoms are aggravated by exercise and any activity  The patient has no known risk factors for DVT/PE  He has tried ipratropium inhalers and steroid inhalers for the symptoms  The treatment provided mild relief  His past medical history is significant for chronic lung disease  Cough   Associated symptoms include shortness of breath  Review of Systems   Constitutional: Negative  HENT: Negative  Eyes: Negative  Respiratory: Positive for cough and shortness of breath  Cardiovascular: Negative  Gastrointestinal: Negative  Endocrine: Negative  Genitourinary: Negative  Musculoskeletal: Positive for back pain  Skin: Negative  Neurological: Negative  Hematological: Negative  Psychiatric/Behavioral: Negative  Smoking history: He reports that he has been smoking  He has a 60 00 pack-year smoking history  He has never used smokeless tobacco           There is no immunization history on file for this patient  Current Outpatient Prescriptions   Medication Sig Dispense Refill    albuterol (PROAIR HFA) 90 mcg/act inhaler Inhale 2 puffs every 6 (six) hours as needed for wheezing      atorvastatin (LIPITOR) 40 mg tablet Take 40 mg by mouth daily        BYSTOLIC 10 MG tablet TAKE 1 TABLET EVERY DAY 90 tablet 3    diltiazem (DILACOR XR) 240 MG 24 hr capsule Take 240 mg by mouth daily at bedtime        fluticasone furoate-vilanterol (BREO ELLIPTA) Inhale 1 puff daily for 90 days 90 each 0    HYDROcodone-acetaminophen (NORCO)  mg per tablet Take 1 tablet by mouth 3 (three) times a day as needed for severe pain Max Daily Amount: 3 tablets 90 tablet 0    ipratropium-albuterol (DUO-NEB) 0 5-2 5 mg/3 mL Take 3 mL by nebulization every 6 (six) hours 360 mL 2    OMEPRAZOLE PO Take 40 mg by mouth daily        OXYGEN-HELIUM IN Inhale 2 L daily at bedtime      rivaroxaban (XARELTO) 20 mg tablet Take 1 tablet (20 mg total) by mouth daily with dinner  0    tamsulosin (FLOMAX) 0 4 mg Take 0 4 mg by mouth daily with dinner      traZODone (DESYREL) 50 mg tablet       fluticasone (FLONASE) 50 mcg/act nasal spray 2 sprays into each nostril daily      fluticasone furoate-vilanterol (BREO ELLIPTA) Inhale 1 puff daily 3 each 3    predniSONE 10 mg tablet Three tabs daily x3 days  2 tabs daily x3 days 1 tab daily x3 days  Extra tabs 20 tablet 0     No current facility-administered medications for this visit  Allergies: Patient has no known allergies      Objective:  Vitals:    05/09/18 0809   BP: 150/74   BP Location: Left arm   Patient Position: Sitting   Cuff Size: Standard   Pulse: 80   Resp: 20   Temp: 97 8 °F (36 6 °C)   TempSrc: Tympanic   SpO2: 93%   Weight: 117 kg (258 lb)   Height: 5' 10" (1 778 m)   Oxygen Therapy  SpO2: 93 %    Wt Readings from Last 3 Encounters:   05/09/18 117 kg (258 lb)   05/01/18 116 kg (256 lb 3 2 oz)   04/19/18 115 kg (253 lb)     Body mass index is 37 02 kg/m²  Physical Exam   Constitutional: He is oriented to person, place, and time  He appears well-developed and well-nourished  HENT:   Head: Normocephalic and atraumatic  Eyes: Pupils are equal, round, and reactive to light  Mallampati 4   Neck: Normal range of motion  Cardiovascular: Normal rate  Irregular   Pulmonary/Chest: Effort normal and breath sounds normal    Abdominal: Soft  Musculoskeletal: Normal range of motion  Neurological: He is alert and oriented to person, place, and time  Skin: Skin is warm and dry  Psychiatric: He has a normal mood and affect   His behavior is normal  Thought content normal        Lab Review:   Appointment on 04/26/2018   Component Date Value    Vit D, 25-Hydroxy 04/26/2018 23 0*   Transcribe Orders on 04/26/2018   Component Date Value    Sodium 04/26/2018 138     Potassium 04/26/2018 4 3     Chloride 04/26/2018 100     CO2 04/26/2018 30     Anion Gap 04/26/2018 8     BUN 04/26/2018 11     Creatinine 04/26/2018 0 88     Glucose, Fasting 04/26/2018 117*    Calcium 04/26/2018 9 2     AST 04/26/2018 18     ALT 04/26/2018 26     Alkaline Phosphatase 04/26/2018 64     Total Protein 04/26/2018 7 4     Albumin 04/26/2018 3 8     Total Bilirubin 04/26/2018 0 90     eGFR 04/26/2018 82     WBC 04/26/2018 8 30     RBC 04/26/2018 4 52*    Hemoglobin 04/26/2018 14 8     Hematocrit 04/26/2018 45 0     MCV 04/26/2018 100*    MCH 04/26/2018 32 8*    MCHC 04/26/2018 32 9     RDW 04/26/2018 13 8     MPV 04/26/2018 7 4*    Platelets 46/82/3170 234  nRBC 04/26/2018 0     Neutrophils Relative 04/26/2018 75     Lymphocytes Relative 04/26/2018 15     Monocytes Relative 04/26/2018 9     Eosinophils Relative 04/26/2018 1     Basophils Relative 04/26/2018 0     Neutrophils Absolute 04/26/2018 6 20     Lymphocytes Absolute 04/26/2018 1 30     Monocytes Absolute 04/26/2018 0 70     Eosinophils Absolute 04/26/2018 0 10     Basophils Absolute 04/26/2018 0 00    Orders Only on 04/24/2018   Component Date Value    PSA 04/26/2018 9 4*   Appointment on 12/07/2017   Component Date Value    Cholesterol 12/07/2017 116     Triglycerides 12/07/2017 113     HDL, Direct 12/07/2017 59     LDL Calculated 12/07/2017 34     PSA 12/07/2017 11 2*   Transcribe Orders on 12/07/2017   Component Date Value    Sodium 12/07/2017 139     Potassium 12/07/2017 4 3     Chloride 12/07/2017 101     CO2 12/07/2017 28     Anion Gap 12/07/2017 10     BUN 12/07/2017 16     Creatinine 12/07/2017 0 88     Glucose, Fasting 12/07/2017 109*    Calcium 12/07/2017 9 0     AST 12/07/2017 11     ALT 12/07/2017 20     Alkaline Phosphatase 12/07/2017 71     Total Protein 12/07/2017 7 6     Albumin 12/07/2017 3 7     Total Bilirubin 12/07/2017 0 60     eGFR 12/07/2017 82    [employer] Office Visit on 11/29/2017   Component Date Value    Color, UA 11/29/2017 Yellow     Clarity, UA 11/29/2017 Transparent     Leukocytes, UA 11/29/2017 Mod     Nitrite, UA 11/29/2017 +     Blood, UA 11/29/2017 -     Protein, UA 11/29/2017 -     pH, UA 11/29/2017 5 0     Specific [place], UA 11/29/2017 1 010     Ketones, UA 11/29/2017 -     Glucose 11/29/2017 -        Diagnostics:  I have personally reviewed pertinent reports  fice Spirometry Results:     ESS:    No results found

## 2018-05-09 NOTE — ASSESSMENT & PLAN NOTE
Lorenzo Garcia has dyspnea with exertion  Walked him today in his room air oxygen reminded 89%  He does have supplemental oxygen that he uses at home when needed  At rest his oxygen was 94%  He uses 2 L of supplemental oxygen at night  Reasons for shortness of breath is multifactorial   He has chronic AFib and also frequent PVCs  He also has marked obesity and history of asbestosis

## 2018-05-09 NOTE — PATIENT INSTRUCTIONS
You have stable moderately severe emphysema  U will be taking prednisone 10 mg tablets  Take 3 tabs daily x3 days, 2 tabs daily x3 days, 1 tab daily x3 days  Please rinse mouth every time you usual Breo  We may consider doing a follow-up CT scan and discussed at the next visit  This will be of year lungs

## 2018-05-10 ENCOUNTER — OFFICE VISIT (OUTPATIENT)
Dept: PAIN MEDICINE | Facility: CLINIC | Age: 78
End: 2018-05-10
Payer: MEDICARE

## 2018-05-10 VITALS
TEMPERATURE: 98.7 F | HEIGHT: 70 IN | SYSTOLIC BLOOD PRESSURE: 128 MMHG | WEIGHT: 256.6 LBS | HEART RATE: 60 BPM | BODY MASS INDEX: 36.73 KG/M2 | RESPIRATION RATE: 20 BRPM | DIASTOLIC BLOOD PRESSURE: 62 MMHG

## 2018-05-10 DIAGNOSIS — G89.4 CHRONIC PAIN SYNDROME: Primary | ICD-10-CM

## 2018-05-10 DIAGNOSIS — F11.20 UNCOMPLICATED OPIOID DEPENDENCE (HCC): ICD-10-CM

## 2018-05-10 DIAGNOSIS — M54.50 PAIN OF LUMBAR SPINE: ICD-10-CM

## 2018-05-10 DIAGNOSIS — Z79.891 LONG-TERM CURRENT USE OF OPIATE ANALGESIC: ICD-10-CM

## 2018-05-10 DIAGNOSIS — M51.16 INTERVERTEBRAL DISC DISORDER WITH RADICULOPATHY OF LUMBAR REGION: ICD-10-CM

## 2018-05-10 PROCEDURE — 99214 OFFICE O/P EST MOD 30 MIN: CPT | Performed by: ANESTHESIOLOGY

## 2018-05-10 PROCEDURE — 80305 DRUG TEST PRSMV DIR OPT OBS: CPT | Performed by: ANESTHESIOLOGY

## 2018-05-10 RX ORDER — HYDROCODONE BITARTRATE AND ACETAMINOPHEN 10; 325 MG/1; MG/1
1 TABLET ORAL 3 TIMES DAILY PRN
Qty: 90 TABLET | Refills: 0 | Status: SHIPPED | OUTPATIENT
Start: 2018-06-15 | End: 2018-07-02 | Stop reason: SDUPTHER

## 2018-05-10 RX ORDER — HYDROCODONE BITARTRATE AND ACETAMINOPHEN 10; 325 MG/1; MG/1
1 TABLET ORAL 3 TIMES DAILY PRN
Qty: 90 TABLET | Refills: 0 | Status: SHIPPED | OUTPATIENT
Start: 2018-05-16 | End: 2018-07-02 | Stop reason: SDUPTHER

## 2018-05-10 NOTE — PROGRESS NOTES
Pain Medicine Follow-Up Note    Assessment:  1  Chronic pain syndrome    2  Pain of lumbar spine    3  Intervertebral disc disorder with radiculopathy of lumbar region    4  Long-term current use of opiate analgesic    5  Uncomplicated opioid dependence ([facility] [address] )        Plan:  New Medications Ordered This Visit   Medications    HYDROcodone-acetaminophen (NORCO)  mg per tablet     Sig: Take 1 tablet by mouth 3 (three) times a day as needed for severe pain for up to 30 days Max Daily Amount: 3 tablets     Dispense:  90 tablet     Refill:  0    HYDROcodone-acetaminophen (NORCO)  mg per tablet     Sig: Take 1 tablet by mouth 3 (three) times a day as needed (PAIN) for up to 30 days Max Daily Amount: 3 tablets     Dispense:  90 tablet     Refill:  0     My impressions and treatment recommendations were discussed in detail with the patient who verbalized understanding and had no further questions  Given that the patient reports overall reduced pain and improved level of functioning without significant side effects, I felt a reasonable to continue the patient on hydrocodone/acetaminophen 10/325 milligrams 1 tablet up to 3 times daily as needed for pain  The risks and side effects of chronic opioid treatment were discussed in detail with the patient  Side effects include but are not limited to nausea, vomiting, GI intolerance, sedation, constipation, mental clouding, opioid-induced hyperalgesia, endocrine dysfunction, addiction, dependence, and tolerance  The patient was asked to take his medications only as prescribed and directed, never in excess, and never for any other reason other than for pain control  The patient was also asked to keep his medications out of the reach of others and away from children, preferably in a locked drawer  The patient verbalized understanding and wished to use these opioid medications      A urine drug test was collected at today's office visit as part of our medication management protocol  The point of care testing results were appropriate for what was being prescribed  The specimen will be sent for confirmatory testing  The drug screen is medically necessary because the patient is either dependent on opioid medication or is being considered for opioid medication therapy and the results could impact ongoing or future treatment  The drug screen is to evaluate for the presence or absence of prescribed, non-prescribed, and/or illicit drugs and substances  The patient reports that he has not been using the spinal cord stimulator since he has not been getting good pain relief with this  I will reach out to the spinal cord stimulator representatives to see if any other stimulation settings can help him  [place] Prescription Drug Monitoring Program report was reviewed and was appropriate     Follow-up is planned in 2 months time or sooner as warranted  Discharge instructions were provided  I personally saw and examined the patient and I agree with the above discussed plan of care  History of Present Illness:    Safia Villegas is a 76 y o  male who presents to [facility] for interval re-evaluation of the above stated pain complaints  The patient has a past medical and chronic pain history as outlined in the assessment section  He was last seen on March 13, 2018 at which time he was maintained on hydrocodone/acetaminophen 10/325 milligrams 1 tablet 3 times daily as needed for pain  At today's office visit, the patient's pain score is 9/10 on the verbal numerical pain rating scale  He is complaining of pain in his low back with radiation into his right lower extremity  He states that his pain is worse in the morning, evening, and night  His pain is constant in nature and he reports the quality of his pain as burning, dull/aching, sharp, throbbing, and shooting "  He is using 3 tablets of hydrocodone/acetaminophen per day    He admits to controlled opioid induced constipation with prune juice  Last Urine Drug Screen:  January 15, 2018    Other than as stated above, the patient denies any interval changes in medications, medical condition, mental condition, symptoms, or allergies since the last office visit  Review of Systems:    Review of Systems   Respiratory: Negative for shortness of breath  Cardiovascular: Negative for chest pain  Gastrointestinal: Negative for constipation, diarrhea, nausea and vomiting  Musculoskeletal: Positive for gait problem (difficulty walking/ decreased range of motion) and joint swelling (joint stiffness)  Negative for arthralgias and myalgias  Skin: Negative for rash  Neurological: Positive for weakness (muscle weakness)  Negative for dizziness and seizures  All other systems reviewed and are negative          Patient Active Problem List   Diagnosis    Chronic pain syndrome    Pain of lumbar spine    Sacroiliitis (HCC)    Intervertebral disc disorder with radiculopathy of lumbar region    Centrilobular emphysema (HCC)    Dyspnea on exertion    Chronic atrial fibrillation (HCC)    Coronary artery disease    Chronic respiratory failure with hypoxia ([address] )    Nicotine dependence       Past Medical History:   Diagnosis Date    Acid reflux     Arthritis     BPH (benign prostatic hyperplasia)     no treatment    Calcium disorder     Chronic atrial fibrillation (HCC) 2/6/2018    Chronic pain     COPD (chronic obstructive pulmonary disease) (HCC)     Coronary artery disease 2001    PTCA with one stent    Eye redness     Poked his eye on 3/18/16 "bloody" right eye    Hiatal hernia     Hydronephrosis     Hyperlipidemia     Hypertension     Irregular heart beat     A-Fib    Nocturnal leg cramps     Osteopenia     Peripheral artery disease (HCC)     Pleural effusion     Sleep apnea     REFUSES MACHINE HS    Vocal cord polyp 10/2012    panendoscopy       Past Surgical History: Procedure Laterality Date    ANGIOPLASTY      APPENDECTOMY  04/2007    CARDIOVERSION      x2, for A-Fib    CARDIOVERSION      CATARACT EXTRACTION      CATARACT EXTRACTION W/ INTRAOCULAR LENS IMPLANT Left 01/2014    CORONARY ANGIOPLASTY WITH STENT PLACEMENT  2001    one stent    HERNIA REPAIR Right     inquinal    JOINT REPLACEMENT Left     Hip    KNEE SURGERY  1994    also had arthroscopy right knee 2005    PANENDOSCOPY  10/23/2012    vocal cord polyp    PILONIDAL CYST DRAINAGE      x 3    CT REMV CATARACT EXTRACAP,INSERT LENS Right 4/6/2016    Procedure: EXTRACTION EXTRACAPSULAR CATARACT PHACO INTRAOCULAR LENS (IOL); Surgeon: Leslie Reyes MD;  Location[facility] MAIN OR;  Service: Ophthalmology    CT SURG IMPLNT [address] Left 8/23/2017    Procedure: Placement of Thoracic SCS with left buttock IPG (IMPULSE); Surgeon: Dianna Berumen MD;  Location:  MAIN OR;  Service: Neurosurgery    SPINAL CORD STIMULATOR TRIAL W/ LAMINOTOMY N/A 4/13/2017    Procedure: INSERTION DORSAL COLUMN SPINAL CORD STIMULATOR TRIAL;  Surgeon: Kira Barone MD;  Location[facility] MAIN OR;  Service:     TONSILLECTOMY         Family History   Problem Relation Age of Onset    Cancer Father 80     prostate       Social History     Occupational History    Not on file       Social History Main Topics    Smoking status: Current Every Day Smoker     Packs/day: 1 00     Years: 60 00    Smokeless tobacco: Never Used    Alcohol use Yes      Comment: occasionally    Drug use: No    Sexual activity: Not on file         Current Outpatient Prescriptions:     albuterol (PROAIR HFA) 90 mcg/act inhaler, Inhale 2 puffs every 6 (six) hours as needed for wheezing, Disp: , Rfl:     atorvastatin (LIPITOR) 40 mg tablet, Take 40 mg by mouth daily  , Disp: , Rfl:     BYSTOLIC 10 MG tablet, TAKE 1 TABLET EVERY DAY, Disp: 90 tablet, Rfl: 3    diltiazem (DILACOR XR) 240 MG 24 hr capsule, Take 240 mg by mouth daily at bedtime  , Disp: , Rfl:     fluticasone (FLONASE) 50 mcg/act nasal spray, 2 sprays into each nostril daily, Disp: , Rfl:     fluticasone furoate-vilanterol (BREO ELLIPTA), Inhale 1 puff daily for 90 days, Disp: 90 each, Rfl: 0    fluticasone furoate-vilanterol (BREO ELLIPTA), Inhale 1 puff daily, Disp: 3 each, Rfl: 3    [START ON 5/16/2018] HYDROcodone-acetaminophen (NORCO)  mg per tablet, Take 1 tablet by mouth 3 (three) times a day as needed for severe pain for up to 30 days Max Daily Amount: 3 tablets, Disp: 90 tablet, Rfl: 0    ipratropium-albuterol (DUO-NEB) 0 5-2 5 mg/3 mL, Take 3 mL by nebulization every 6 (six) hours, Disp: 360 mL, Rfl: 2    OMEPRAZOLE PO, Take 40 mg by mouth daily  , Disp: , Rfl:     OXYGEN-HELIUM IN, Inhale 2 L daily at bedtime, Disp: , Rfl:     predniSONE 10 mg tablet, Three tabs daily x3 days  2 tabs daily x3 days 1 tab daily x3 days Extra tabs, Disp: 20 tablet, Rfl: 0    rivaroxaban (XARELTO) 20 mg tablet, Take 1 tablet (20 mg total) by mouth daily with dinner, Disp: , Rfl: 0    tamsulosin (FLOMAX) 0 4 mg, Take 0 4 mg by mouth daily with dinner, Disp: , Rfl:     traZODone (DESYREL) 50 mg tablet, , Disp: , Rfl:     [START ON 6/15/2018] HYDROcodone-acetaminophen (NORCO)  mg per tablet, Take 1 tablet by mouth 3 (three) times a day as needed (PAIN) for up to 30 days Max Daily Amount: 3 tablets, Disp: 90 tablet, Rfl: 0    No Known Allergies    Physical Exam:    /62 (BP Location: Left arm, Patient Position: Sitting, Cuff Size: Standard)   Pulse 60   Temp 98 7 °F (37 1 °C) (Oral)   Resp 20   Ht 5' 10" (1 778 m)   Wt 116 kg (256 lb 9 6 oz)   BMI 36 82 kg/m²     Constitutional:obese  Eyes:anicteric  HEENT:grossly intact  Neck:supple, symmetric, trachea midline and no masses   Pulmonary:even and unlabored  Cardiovascular:No edema or pitting edema present  Skin:Normal without rashes or lesions and well hydrated  Psychiatric:Mood and affect appropriate  Neurologic:Cranial Nerves II-XII grossly intact  Musculoskeletal:normal

## 2018-05-10 NOTE — LETTER
May 10, 2018     Basia Celaya DO  [address] [facility]  [address]  [address] [place] [address]    Patient: Caroline Russell   YOB: 1940   Date of Visit: 5/10/2018       Dear Dr Maximilian Chung: Thank you for referring Antione Barton to me for evaluation  Below are my notes for this consultation  If you have questions, please do not hesitate to call me  I look forward to following your patient along with you  Sincerely,        Lisbet Perla MD        CC: No Recipients  Lisbet Perla MD  5/10/2018  9:21 AM  Sign at close encounter  Pain Medicine Follow-Up Note    Assessment:  1  Chronic pain syndrome    2  Pain of lumbar spine    3  Intervertebral disc disorder with radiculopathy of lumbar region    4  Long-term current use of opiate analgesic    5  Uncomplicated opioid dependence ([address] )        Plan:  New Medications Ordered This Visit   Medications    HYDROcodone-acetaminophen (NORCO)  mg per tablet     Sig: Take 1 tablet by mouth 3 (three) times a day as needed for severe pain for up to 30 days Max Daily Amount: 3 tablets     Dispense:  90 tablet     Refill:  0    HYDROcodone-acetaminophen (NORCO)  mg per tablet     Sig: Take 1 tablet by mouth 3 (three) times a day as needed (PAIN) for up to 30 days Max Daily Amount: 3 tablets     Dispense:  90 tablet     Refill:  0     My impressions and treatment recommendations were discussed in detail with the patient who verbalized understanding and had no further questions  Given that the patient reports overall reduced pain and improved level of functioning without significant side effects, I felt a reasonable to continue the patient on hydrocodone/acetaminophen 10/325 milligrams 1 tablet up to 3 times daily as needed for pain  The risks and side effects of chronic opioid treatment were discussed in detail with the patient   Side effects include but are not limited to nausea, vomiting, GI intolerance, sedation, constipation, mental clouding, opioid-induced hyperalgesia, endocrine dysfunction, addiction, dependence, and tolerance  The patient was asked to take his medications only as prescribed and directed, never in excess, and never for any other reason other than for pain control  The patient was also asked to keep his medications out of the reach of others and away from children, preferably in a locked drawer  The patient verbalized understanding and wished to use these opioid medications  A urine drug test was collected at today's office visit as part of our medication management protocol  The point of care testing results were appropriate for what was being prescribed  The specimen will be sent for confirmatory testing  The drug screen is medically necessary because the patient is either dependent on opioid medication or is being considered for opioid medication therapy and the results could impact ongoing or future treatment  The drug screen is to evaluate for the presence or absence of prescribed, non-prescribed, and/or illicit drugs and substances  The patient reports that he has not been using the spinal cord stimulator since he has not been getting good pain relief with this  I will reach out to the spinal cord stimulator representatives to see if any other stimulation settings can help him  [place] Prescription Drug Monitoring Program report was reviewed and was appropriate     Follow-up is planned in 2 months time or sooner as warranted  Discharge instructions were provided  I personally saw and examined the patient and I agree with the above discussed plan of care  History of Present Illness:    Marc Saint is a 76 y o  male who presents to [facility] for interval re-evaluation of the above stated pain complaints  The patient has a past medical and chronic pain history as outlined in the assessment section   He was last seen on March 13, 2018 at which time he was maintained on hydrocodone/acetaminophen 10/325 milligrams 1 tablet 3 times daily as needed for pain  At today's office visit, the patient's pain score is 9/10 on the verbal numerical pain rating scale  He is complaining of pain in his low back with radiation into his right lower extremity  He states that his pain is worse in the morning, evening, and night  His pain is constant in nature and he reports the quality of his pain as burning, dull/aching, sharp, throbbing, and shooting "  He is using 3 tablets of hydrocodone/acetaminophen per day  He admits to controlled opioid induced constipation with prune juice  Last Urine Drug Screen:  January 15, 2018    Other than as stated above, the patient denies any interval changes in medications, medical condition, mental condition, symptoms, or allergies since the last office visit  Review of Systems:    Review of Systems   Respiratory: Negative for shortness of breath  Cardiovascular: Negative for chest pain  Gastrointestinal: Negative for constipation, diarrhea, nausea and vomiting  Musculoskeletal: Positive for gait problem (difficulty walking/ decreased range of motion) and joint swelling (joint stiffness)  Negative for arthralgias and myalgias  Skin: Negative for rash  Neurological: Positive for weakness (muscle weakness)  Negative for dizziness and seizures  All other systems reviewed and are negative          Patient Active Problem List   Diagnosis    Chronic pain syndrome    Pain of lumbar spine    Sacroiliitis ([address] )    Intervertebral disc disorder with radiculopathy of lumbar region    Centrilobular emphysema (HCC)    Dyspnea on exertion    Chronic atrial fibrillation (HCC)    Coronary artery disease    Chronic respiratory failure with hypoxia ([address] )    Nicotine dependence       Past Medical History:   Diagnosis Date    Acid reflux     Arthritis     BPH (benign prostatic hyperplasia)     no treatment    Calcium disorder     Chronic atrial fibrillation ([facility] [address] [facility] ) 2/6/2018    Chronic pain     COPD (chronic obstructive pulmonary disease) ([facility] [address] )     Coronary artery disease 2001    PTCA with one stent    Eye redness     Poked his eye on 3/18/16 "bloody" right eye    Hiatal hernia     Hydronephrosis     Hyperlipidemia     Hypertension     Irregular heart beat     A-Fib    Nocturnal leg cramps     Osteopenia     Peripheral artery disease (HCC)     Pleural effusion     Sleep apnea     REFUSES MACHINE HS    Vocal cord polyp 10/2012    panendoscopy       Past Surgical History:   Procedure Laterality Date    ANGIOPLASTY      APPENDECTOMY  04/2007    CARDIOVERSION      x2, for A-Fib    CARDIOVERSION      CATARACT EXTRACTION      CATARACT EXTRACTION W/ INTRAOCULAR LENS IMPLANT Left 01/2014    CORONARY ANGIOPLASTY WITH STENT PLACEMENT  2001    one stent    HERNIA REPAIR Right     inquinal    JOINT REPLACEMENT Left     Hip   [address]    also had arthroscopy right knee 2005    PANENDOSCOPY  10/23/2012    vocal cord polyp    PILONIDAL CYST DRAINAGE      x 3    MD REMV CATARACT EXTRACAP,INSERT LENS Right 4/6/2016    Procedure: EXTRACTION EXTRACAPSULAR CATARACT PHACO INTRAOCULAR LENS (IOL); Surgeon: Mary Hu MD;  Location: [facility] MAIN OR;  Service: Ophthalmology    MD SURG IMPLNT [address] Left 8/23/2017    Procedure: Placement of Thoracic SCS with left buttock IPG (IMPULSE); Surgeon: Nisha Alvarez MD;  Location:  MAIN OR;  Service: Neurosurgery    SPINAL CORD STIMULATOR TRIAL W/ LAMINOTOMY N/A 4/13/2017    Procedure: INSERTION DORSAL COLUMN SPINAL CORD STIMULATOR TRIAL;  Surgeon: Nayan Hair MD;  Location: [facility] MAIN OR;  Service:     TONSILLECTOMY         Family History   Problem Relation Age of Onset    Cancer Father 80     prostate       Social History     Occupational History    Not on file       Social History Main Topics    Smoking status: Current Every Day Smoker     Packs/day: 1 00 Years: 60 00    Smokeless tobacco: Never Used    Alcohol use Yes      Comment: occasionally    Drug use: No    Sexual activity: Not on file         Current Outpatient Prescriptions:     albuterol (PROAIR HFA) 90 mcg/act inhaler, Inhale 2 puffs every 6 (six) hours as needed for wheezing, Disp: , Rfl:     atorvastatin (LIPITOR) 40 mg tablet, Take 40 mg by mouth daily  , Disp: , Rfl:     BYSTOLIC 10 MG tablet, TAKE 1 TABLET EVERY DAY, Disp: 90 tablet, Rfl: 3    diltiazem (DILACOR XR) 240 MG 24 hr capsule, Take 240 mg by mouth daily at bedtime  , Disp: , Rfl:     fluticasone (FLONASE) 50 mcg/act nasal spray, 2 sprays into each nostril daily, Disp: , Rfl:     fluticasone furoate-vilanterol (BREO ELLIPTA), Inhale 1 puff daily for 90 days, Disp: 90 each, Rfl: 0    fluticasone furoate-vilanterol (BREO ELLIPTA), Inhale 1 puff daily, Disp: 3 each, Rfl: 3    [START ON 5/16/2018] HYDROcodone-acetaminophen (NORCO)  mg per tablet, Take 1 tablet by mouth 3 (three) times a day as needed for severe pain for up to 30 days Max Daily Amount: 3 tablets, Disp: 90 tablet, Rfl: 0    ipratropium-albuterol (DUO-NEB) 0 5-2 5 mg/3 mL, Take 3 mL by nebulization every 6 (six) hours, Disp: 360 mL, Rfl: 2    OMEPRAZOLE PO, Take 40 mg by mouth daily  , Disp: , Rfl:     OXYGEN-HELIUM IN, Inhale 2 L daily at bedtime, Disp: , Rfl:     predniSONE 10 mg tablet, Three tabs daily x3 days  2 tabs daily x3 days 1 tab daily x3 days Extra tabs, Disp: 20 tablet, Rfl: 0    rivaroxaban (XARELTO) 20 mg tablet, Take 1 tablet (20 mg total) by mouth daily with dinner, Disp: , Rfl: 0    tamsulosin (FLOMAX) 0 4 mg, Take 0 4 mg by mouth daily with dinner, Disp: , Rfl:     traZODone (DESYREL) 50 mg tablet, , Disp: , Rfl:     [START ON 6/15/2018] HYDROcodone-acetaminophen (NORCO)  mg per tablet, Take 1 tablet by mouth 3 (three) times a day as needed (PAIN) for up to 30 days Max Daily Amount: 3 tablets, Disp: 90 tablet, Rfl: 0    No Known Allergies    Physical Exam:    /62 (BP Location: Left arm, Patient Position: Sitting, Cuff Size: Standard)   Pulse 60   Temp 98 7 °F (37 1 °C) (Oral)   Resp 20   Ht 5' 10" (1 778 m)   Wt 116 kg (256 lb 9 6 oz)   BMI 36 82 kg/m²      Constitutional:obese  Eyes:anicteric  HEENT:grossly intact  Neck:supple, symmetric, trachea midline and no masses   Pulmonary:even and unlabored  Cardiovascular:No edema or pitting edema present  Skin:Normal without rashes or lesions and well hydrated  Psychiatric:Mood and affect appropriate  Neurologic:Cranial Nerves II-XII grossly intact  Musculoskeletal:normal

## 2018-05-31 ENCOUNTER — TELEPHONE (OUTPATIENT)
Dept: RADIOLOGY | Facility: CLINIC | Age: 78
End: 2018-05-31

## 2018-05-31 NOTE — TELEPHONE ENCOUNTER
----- Message from Kassy Jackson MD sent at 5/31/2018  9:45 AM EDT -----  Regarding: UDT Results  Urine drug testing from May 10, 2018 shows positivity for ethanol  Please note that this is an opioid treatment agreement violation  Any further opioid treatment agreement violations will result in me no longer being able to prescribe him opioid medications

## 2018-06-20 ENCOUNTER — TELEPHONE (OUTPATIENT)
Dept: PAIN MEDICINE | Facility: CLINIC | Age: 78
End: 2018-06-20

## 2018-06-20 ENCOUNTER — TELEPHONE (OUTPATIENT)
Dept: PAIN MEDICINE | Facility: MEDICAL CENTER | Age: 78
End: 2018-06-20

## 2018-06-20 NOTE — TELEPHONE ENCOUNTER
I called Dr Rafa Santillan office and I spoke with Dr Sarah Park personally  He states that patient has a large abscess and is scheduled to see Dr Michele Schlatter on 6/22/18 for drainage  Patient is in significant amount of pain  I advised that he may take an additionally tab of his pain medication until he gets the abscess drained  Dr Sarha Park will communicate this to the patient  Please contact patient and also advise him of this as well  He should move his f/u appointment earlier to see AS as he will be running out early on his pain meds  Thank you

## 2018-06-20 NOTE — TELEPHONE ENCOUNTER
Patient called phone room stating if medication can be increased per Dr Kay Montero  Patient is taking 10mg of Hydrocodone  1 tablet 3 times a day  Patient states he is in pain and current dose is not doing anything  Please cb at [phone]

## 2018-06-20 NOTE — TELEPHONE ENCOUNTER
Dr Reba Baig called on behalf of pt stating that pt was seen by him today which a huge abscess on the his face which is causing him pain  Dr Reba Baig stated he put him on antibiotics but was not able to give him pain medication because he is already receiving it from Dr Selene Baig stated pt is seeing a surgeon on Friday to drain the abscess but wanted to know if something can be prescribe to him in the meantime, Dr Reba Baig stated that the pain medication that pt is on seems to not be helping right now   Any questions Dr Reba Baig can be reached at [phone]

## 2018-06-20 NOTE — TELEPHONE ENCOUNTER
RALPH    S/w pt and advised the same  F/u ov moved up to 7/2 for med refill  Pt appreciative of call

## 2018-06-20 NOTE — TELEPHONE ENCOUNTER
S/w pt and advised will cb with recommendations from on call  Pt states on abx and warm compresses as recommended by Dr Madyson Grigsby

## 2018-06-25 NOTE — TELEPHONE ENCOUNTER
Aware  Thanks  Please follow up with patient to see how he is doing  If his pain is improved, I would have him decrease his pain medication back to how it was prescribed

## 2018-06-25 NOTE — TELEPHONE ENCOUNTER
CONNORI    S/w pt  States facial swelling has gone down some and pain is slightly improved with the increase of pain medication but is still having significant facial pain  Pt has f/u with Dr Stahl tomorrow

## 2018-07-02 ENCOUNTER — OFFICE VISIT (OUTPATIENT)
Dept: PAIN MEDICINE | Facility: CLINIC | Age: 78
End: 2018-07-02
Payer: MEDICARE

## 2018-07-02 ENCOUNTER — TELEPHONE (OUTPATIENT)
Dept: PAIN MEDICINE | Facility: CLINIC | Age: 78
End: 2018-07-02

## 2018-07-02 VITALS
WEIGHT: 259.2 LBS | DIASTOLIC BLOOD PRESSURE: 70 MMHG | RESPIRATION RATE: 20 BRPM | HEIGHT: 70 IN | SYSTOLIC BLOOD PRESSURE: 138 MMHG | HEART RATE: 76 BPM | TEMPERATURE: 98.6 F | BODY MASS INDEX: 37.11 KG/M2

## 2018-07-02 DIAGNOSIS — M51.16 INTERVERTEBRAL DISC DISORDER WITH RADICULOPATHY OF LUMBAR REGION: ICD-10-CM

## 2018-07-02 DIAGNOSIS — M54.50 PAIN OF LUMBAR SPINE: ICD-10-CM

## 2018-07-02 DIAGNOSIS — G89.4 CHRONIC PAIN SYNDROME: ICD-10-CM

## 2018-07-02 PROCEDURE — 99214 OFFICE O/P EST MOD 30 MIN: CPT | Performed by: ANESTHESIOLOGY

## 2018-07-02 RX ORDER — HYDROCODONE BITARTRATE AND ACETAMINOPHEN 10; 325 MG/1; MG/1
1 TABLET ORAL 4 TIMES DAILY PRN
Qty: 120 TABLET | Refills: 0 | Status: SHIPPED | OUTPATIENT
Start: 2018-07-03 | End: 2018-09-10 | Stop reason: SDUPTHER

## 2018-07-02 RX ORDER — HYDROCODONE BITARTRATE AND ACETAMINOPHEN 10; 325 MG/1; MG/1
1 TABLET ORAL 3 TIMES DAILY PRN
Qty: 90 TABLET | Refills: 0 | Status: SHIPPED | OUTPATIENT
Start: 2018-08-02 | End: 2018-09-10 | Stop reason: SDUPTHER

## 2018-07-02 NOTE — PROGRESS NOTES
Pain Medicine Follow-Up Note    Assessment:  1  Chronic pain syndrome    2  Pain of lumbar spine    3  Intervertebral disc disorder with radiculopathy of lumbar region        Plan:  My impressions and treatment recommendations were discussed in detail with the patient who verbalized understanding and had no further questions  In the interim between office visits, the patient did call in stating that he had to get a tooth pulled and I had increased his hydrocodone/acetaminophen 10/325 mg from 3 times a day to 4 times a day dosing  He states that this helps significantly, but he will be having another tooth pulled shortly  As such, I discussed with the patient that for the next 30 days, I would increase his hydrocodone/acetaminophen 10/325 mg to 4 times daily as needed for pain and for the subsequent next 30 days, I will continue him on hydrocodone/acetaminophen 10/325 mg 1 tablet 3 times daily as needed for pain  Since the patient did not bring in his remaining hydrocodone/acetaminophen tablets, I asked him to return home and call back into our office with a pill count so I could appropriately prescribe his medications  The patient verbalized understanding  The risks and side effects of chronic opioid treatment were discussed in detail with the patient  Side effects include but are not limited to nausea, vomiting, GI intolerance, sedation, constipation, mental clouding, opioid-induced hyperalgesia, endocrine dysfunction, addiction, dependence, and tolerance  The patient was asked to take his medications only as prescribed and directed, never in excess, and never for any other reason other than for pain control  The patient was also asked to keep his medications out of the reach of others and away from children, preferably in a locked drawer  The patient verbalized understanding and wished to use these opioid medications      [place] Prescription Drug Monitoring Program report was reviewed and was appropriate     Follow-up is planned in 2 months' time or sooner as warranted  Discharge instructions were provided  I personally saw and examined the patient and I agree with the above discussed plan of care  History of Present Illness:    Noah Melchor is a 76 y o  male who presents to [facility] for interval re-evaluation of the above stated pain complaints  The patient has a past medical and chronic pain history as outlined in the assessment section  He was last seen on May 10, 2018 at which time he was maintained on hydrocodone/acetaminophen 10/325 mg 1 tablet up to 3 times daily as needed for pain  In the interim between office visits, the patient did have to get a tooth pulled with increased amounts pain  He was increased to hydrocodone/acetaminophen 10/325 mg 1 tablet 4 times daily as needed for pain  At today's office visit, the patient's pain score is 6/10 on the verbal numerical pain rating scale  The patient continues to describe pain that is worse in the morning, evening, and night  His pain is constant in nature and he describes the quality of his pain as sharp, throbbing, and shooting    He does report that the increase in hydrocodone/acetaminophen for 1 month did give him good pain relief in terms of his tooth pain  He does report that he will have to get another tooth Gold in the near future  Last Urine Drug Screen:  May 10, 2018    Other than as stated above, the patient denies any interval changes in medications, medical condition, mental condition, symptoms, or allergies since the last office visit  Review of Systems:    Review of Systems   Respiratory: Positive for shortness of breath  Cardiovascular: Negative for chest pain  Gastrointestinal: Negative for constipation, diarrhea, nausea and vomiting  Musculoskeletal: Positive for gait problem (difficulty walking/ decreased range of motion)   Negative for arthralgias, joint swelling and myalgias  Skin: Negative for rash  Neurological: Positive for weakness (muscle weakness)  Negative for dizziness and seizures  All other systems reviewed and are negative  Patient Active Problem List   Diagnosis    Chronic pain syndrome    Pain of lumbar spine    Sacroiliitis ([address] )    Intervertebral disc disorder with radiculopathy of lumbar region    Centrilobular emphysema (HCC)    Dyspnea on exertion    Chronic atrial fibrillation ([address] )    Coronary artery disease    Chronic respiratory failure with hypoxia ([facility] [address] )    Nicotine dependence       Past Medical History:   Diagnosis Date    Acid reflux     Arthritis     BPH (benign prostatic hyperplasia)     no treatment    Calcium disorder     Chronic atrial fibrillation (HCC) 2/6/2018    Chronic pain     COPD (chronic obstructive pulmonary disease) (HCC)     Coronary artery disease 2001    PTCA with one stent    Eye redness     Poked his eye on 3/18/16 "bloody" right eye    Hiatal hernia     Hydronephrosis     Hyperlipidemia     Hypertension     Irregular heart beat     A-Fib    Nocturnal leg cramps     Osteopenia     Peripheral artery disease (HCC)     Pleural effusion     Sleep apnea     REFUSES MACHINE HS    Vocal cord polyp 10/2012    panendoscopy       Past Surgical History:   Procedure Laterality Date    ANGIOPLASTY      APPENDECTOMY  04/2007    CARDIOVERSION      x2, for A-Fib    CARDIOVERSION      CATARACT EXTRACTION      CATARACT EXTRACTION W/ INTRAOCULAR LENS IMPLANT Left 01/2014    CORONARY ANGIOPLASTY WITH STENT PLACEMENT  2001    one stent    HERNIA REPAIR Right     inquinal    [facility]INT REPLACEMENT Left     [facility]   [address] [facility] [address] [facility]    also had arthroscopy right knee 2005    PANENDOSCOPY  10/23/2012    vocal cord polyp    PILONIDAL CYST DRAINAGE      x 3    ID REMV CATARACT EXTRACAP,INSERT LENS Right 4/6/2016    Procedure: EXTRACTION EXTRACAPSULAR CATARACT PHACO INTRAOCULAR LENS (IOL);   Surgeon: Geraldine Bassett Emmett Liz MD;  Location: [place] [address] MAIN OR;  Service: Ophthalmology    CO SURG IMPLNT [address] Left 8/23/2017    Procedure: Placement of Thoracic SCS with left buttock IPG (IMPULSE); Surgeon: Nisha Alvarez MD;  Location:  MAIN OR;  Service: Neurosurgery    SPINAL CORD STIMULATOR TRIAL W/ LAMINOTOMY N/A 4/13/2017    Procedure: INSERTION DORSAL COLUMN SPINAL CORD STIMULATOR TRIAL;  Surgeon: Nayan Hair MD;  Location: [place] [address] MAIN OR;  Service:     TONSILLECTOMY         Family History   Problem Relation Age of Onset    Cancer Father 80        prostate       Social History     Occupational History    Not on file       Social History Main Topics    Smoking status: Current Every Day Smoker     Packs/day: 1 00     Years: 60 00    Smokeless tobacco: Never Used    Alcohol use Yes      Comment: occasionally    Drug use: No    Sexual activity: Not on file         Current Outpatient Prescriptions:     albuterol (PROAIR HFA) 90 mcg/act inhaler, Inhale 2 puffs every 6 (six) hours as needed for wheezing, Disp: , Rfl:     atorvastatin (LIPITOR) 40 mg tablet, Take 40 mg by mouth daily  , Disp: , Rfl:     BYSTOLIC 10 MG tablet, TAKE 1 TABLET EVERY DAY, Disp: 90 tablet, Rfl: 3    diltiazem (DILACOR XR) 240 MG 24 hr capsule, Take 240 mg by mouth daily at bedtime  , Disp: , Rfl:     fluticasone (FLONASE) 50 mcg/act nasal spray, 2 sprays into each nostril daily, Disp: , Rfl:     fluticasone furoate-vilanterol (BREO ELLIPTA), Inhale 1 puff daily for 90 days, Disp: 90 each, Rfl: 0    fluticasone furoate-vilanterol (BREO ELLIPTA), Inhale 1 puff daily, Disp: 3 each, Rfl: 3    HYDROcodone-acetaminophen (NORCO)  mg per tablet, Take 1 tablet by mouth 3 (three) times a day as needed (PAIN) for up to 30 days Max Daily Amount: 3 tablets, Disp: 90 tablet, Rfl: 0    ipratropium-albuterol (DUO-NEB) 0 5-2 5 mg/3 mL, Take 3 mL by nebulization every 6 (six) hours, Disp: 360 mL, Rfl: 2    OMEPRAZOLE PO, Take 40 mg by mouth daily  , Disp: , Rfl:     OXYGEN-HELIUM IN, Inhale 2 L daily at bedtime, Disp: , Rfl:     predniSONE 10 mg tablet, Three tabs daily x3 days  2 tabs daily x3 days 1 tab daily x3 days Extra tabs, Disp: 20 tablet, Rfl: 0    rivaroxaban (XARELTO) 20 mg tablet, Take 1 tablet (20 mg total) by mouth daily with dinner, Disp: , Rfl: 0    tamsulosin (FLOMAX) 0 4 mg, Take 0 4 mg by mouth daily with dinner, Disp: , Rfl:     traZODone (DESYREL) 50 mg tablet, , Disp: , Rfl:     HYDROcodone-acetaminophen (NORCO)  mg per tablet, Take 1 tablet by mouth 3 (three) times a day as needed for severe pain for up to 30 days Max Daily Amount: 3 tablets, Disp: 90 tablet, Rfl: 0    No Known Allergies    Physical Exam:    /70 (BP Location: Left arm, Patient Position: Sitting, Cuff Size: Standard)   Pulse 76   Temp 98 6 °F (37 °C) (Oral)   Resp 20   Ht 5' 10" (1 778 m)   Wt 118 kg (259 lb 3 2 oz)   BMI 37 19 kg/m²     Constitutional:obese  Eyes:anicteric  HEENT:grossly intact  Neck:supple, symmetric, trachea midline and no masses   Pulmonary:even and unlabored  Cardiovascular:No edema or pitting edema present  Skin:Normal without rashes or lesions and well hydrated  Psychiatric:Mood and affect appropriate  Neurologic:Cranial Nerves II-XII grossly intact  Musculoskeletal:ambulates with cane

## 2018-07-02 NOTE — TELEPHONE ENCOUNTER
Call from patient   Call back # [phone]  Dr Ludwig Cedeno  Medication     Patient stated he only has 10 tabs left on HYDROcodone-acetaminophen (NORCO)  mg per tablet

## 2018-08-01 ENCOUNTER — TRANSCRIBE ORDERS (OUTPATIENT)
Dept: ADMINISTRATIVE | Facility: HOSPITAL | Age: 78
End: 2018-08-01

## 2018-08-01 ENCOUNTER — HOSPITAL ENCOUNTER (OUTPATIENT)
Dept: RADIOLOGY | Facility: HOSPITAL | Age: 78
Discharge: HOME | End: 2018-08-01
Attending: FAMILY MEDICINE
Payer: MEDICARE

## 2018-08-01 DIAGNOSIS — M54.2 CERVICALGIA: Primary | ICD-10-CM

## 2018-08-01 PROCEDURE — 72040 X-RAY EXAM NECK SPINE 2-3 VW: CPT

## 2018-08-03 ENCOUNTER — HOSPITAL ENCOUNTER (OUTPATIENT)
Dept: RADIOLOGY | Facility: HOSPITAL | Age: 78
Discharge: HOME | End: 2018-08-03
Attending: FAMILY MEDICINE
Payer: MEDICARE

## 2018-08-03 DIAGNOSIS — M54.2 CERVICALGIA: ICD-10-CM

## 2018-08-03 PROCEDURE — 72125 CT NECK SPINE W/O DYE: CPT

## 2018-09-06 DIAGNOSIS — I48.20 CHRONIC ATRIAL FIBRILLATION (HCC): ICD-10-CM

## 2018-09-06 RX ORDER — DILTIAZEM HYDROCHLORIDE 240 MG/1
240 CAPSULE, EXTENDED RELEASE ORAL
Qty: 90 CAPSULE | Refills: 3 | Status: SHIPPED | OUTPATIENT
Start: 2018-09-06

## 2018-09-10 ENCOUNTER — OFFICE VISIT (OUTPATIENT)
Dept: PAIN MEDICINE | Facility: CLINIC | Age: 78
End: 2018-09-10
Payer: MEDICARE

## 2018-09-10 ENCOUNTER — TELEPHONE (OUTPATIENT)
Dept: PAIN MEDICINE | Facility: CLINIC | Age: 78
End: 2018-09-10

## 2018-09-10 VITALS
DIASTOLIC BLOOD PRESSURE: 68 MMHG | WEIGHT: 261.4 LBS | HEIGHT: 70 IN | RESPIRATION RATE: 20 BRPM | HEART RATE: 62 BPM | SYSTOLIC BLOOD PRESSURE: 138 MMHG | BODY MASS INDEX: 37.42 KG/M2

## 2018-09-10 DIAGNOSIS — M48.02 CERVICAL SPINAL STENOSIS: ICD-10-CM

## 2018-09-10 DIAGNOSIS — M54.2 CERVICAL PAIN: ICD-10-CM

## 2018-09-10 DIAGNOSIS — Z79.891 LONG-TERM CURRENT USE OF OPIATE ANALGESIC: ICD-10-CM

## 2018-09-10 DIAGNOSIS — M54.50 PAIN OF LUMBAR SPINE: ICD-10-CM

## 2018-09-10 DIAGNOSIS — F11.20 UNCOMPLICATED OPIOID DEPENDENCE (HCC): ICD-10-CM

## 2018-09-10 DIAGNOSIS — M51.16 INTERVERTEBRAL DISC DISORDER WITH RADICULOPATHY OF LUMBAR REGION: ICD-10-CM

## 2018-09-10 DIAGNOSIS — M54.12 CERVICAL RADICULOPATHY: ICD-10-CM

## 2018-09-10 DIAGNOSIS — G89.4 CHRONIC PAIN SYNDROME: Primary | ICD-10-CM

## 2018-09-10 PROCEDURE — 99214 OFFICE O/P EST MOD 30 MIN: CPT | Performed by: ANESTHESIOLOGY

## 2018-09-10 PROCEDURE — 80305 DRUG TEST PRSMV DIR OPT OBS: CPT | Performed by: ANESTHESIOLOGY

## 2018-09-10 RX ORDER — HYDROCODONE BITARTRATE AND ACETAMINOPHEN 10; 325 MG/1; MG/1
1 TABLET ORAL 3 TIMES DAILY PRN
Qty: 90 TABLET | Refills: 0 | Status: SHIPPED | OUTPATIENT
Start: 2018-09-10 | End: 2018-11-05 | Stop reason: SDUPTHER

## 2018-09-10 RX ORDER — HYDROCODONE BITARTRATE AND ACETAMINOPHEN 10; 325 MG/1; MG/1
1 TABLET ORAL 3 TIMES DAILY PRN
Qty: 90 TABLET | Refills: 0 | Status: SHIPPED | OUTPATIENT
Start: 2018-10-10 | End: 2018-11-05 | Stop reason: SDUPTHER

## 2018-09-10 NOTE — PROGRESS NOTES
Pain Medicine Follow-Up Note    Assessment:  1  Chronic pain syndrome    2  Pain of lumbar spine    3  Intervertebral disc disorder with radiculopathy of lumbar region    4  Cervical pain    5  Cervical spinal stenosis    6  Cervical radiculopathy        Plan:  My impressions and treatment recommendations were discussed in detail with the patient who verbalized understanding and had no further questions  Given that the patient reports overall reduced pain and improved level functioning without significant side effects, I felt a reasonable to continue the patient on hydrocodone/acetaminophen 10/325 mg 1 tablet up to 3 times daily as needed for pain  The risks and side effects of chronic opioid treatment were discussed in detail with the patient  Side effects include but are not limited to nausea, vomiting, GI intolerance, sedation, constipation, mental clouding, opioid-induced hyperalgesia, endocrine dysfunction, addiction, dependence, and tolerance  The patient was asked to take his medications only as prescribed and directed, never in excess, and never for any other reason other than for pain control  The patient was also asked to keep his medications out of the reach of others and away from children, preferably in a locked drawer  The patient verbalized understanding and wished to use these opioid medications  The patient has also been complaining of neck pain with right upper extremity radiculopathy to his right finger tips  He does have signs of cervical spinal stenosis by a recent CT of the cervical spine  I did discuss with the patient that I think he would benefit from a C6-C7 cervical epidural steroid injection  The patient would like to proceed with this  The procedures, its risks, and benefits were explained in detail to the patient   Risks include but are not limited to bleeding, infection, hematoma formation, abscess formation, weakness, headache, failure the pain to improve, nerve irritation or damage, and potential worsening of the pain  The patient verbalized understanding and wished to proceed with the procedure  A urine drug test was collected at today's office visit as part of our medication management protocol  The point of care testing results were appropriate for what was being prescribed  The specimen will be sent for confirmatory testing  The drug screen is medically necessary because the patient is either dependent on opioid medication or is being considered for opioid medication therapy and the results could impact ongoing or future treatment  The drug screen is to evaluate for the presence or absence of prescribed, non-prescribed, and/or illicit drugs and substances  [place] Prescription Drug Monitoring Program report was reviewed and was appropriate     Follow-up is planned in 2 months time or sooner as warranted  Discharge instructions were provided  I personally saw and examined the patient and I agree with the above discussed plan of care  History of Present Illness:    Valdemar Pastrana is a 76 y o  male who presents to [facility] for interval re-evaluation of the above stated pain complaints  The patient has a past medical and chronic pain history as outlined in the assessment section  He was last seen on July 2, 2018 at which time he was maintained on hydrocodone/acetaminophen 10/325 mg 1 tablet up to 3 times daily as needed for pain  At today's office visit, the patient's pain score is 8 to 9/10 on the verbal numerical pain rating scale  The patient states that his pain is primarily in his low back and bilateral lower extremities  He did report about 1 month worth of neck pain with radiation into the right upper extremity  He states that his primary care physician obtained a CT of the cervical spine which does show cervical spinal stenosis      Last Urine Drug Screen:  May 10, 2018    Other than as stated above, the patient denies any interval changes in medications, medical condition, mental condition, symptoms, or allergies since the last office visit  Review of Systems:    Review of Systems   Respiratory: Negative for shortness of breath  Cardiovascular: Negative for chest pain  Gastrointestinal: Negative for constipation, diarrhea, nausea and vomiting  Musculoskeletal: Positive for gait problem (difficulty walking/ decreased range of motiion) and joint swelling (joint stiffness)  Negative for arthralgias and myalgias  Skin: Negative for rash  Neurological: Positive for weakness (muscle weakness)  Negative for dizziness and seizures  All other systems reviewed and are negative          Patient Active Problem List   Diagnosis    Chronic pain syndrome    Pain of lumbar spine    Sacroiliitis (HCC)    Intervertebral disc disorder with radiculopathy of lumbar region    Centrilobular emphysema (HCC)    Dyspnea on exertion    Chronic atrial fibrillation (HCC)    Coronary artery disease    Chronic respiratory failure with hypoxia (HCC)    Nicotine dependence    Cervical radiculopathy    Cervical spinal stenosis    Cervical pain       Past Medical History:   Diagnosis Date    Acid reflux     Arthritis     BPH (benign prostatic hyperplasia)     no treatment    Calcium disorder     Chronic atrial fibrillation (HCC) 2/6/2018    Chronic pain     COPD (chronic obstructive pulmonary disease) (HCC)     Coronary artery disease 2001    PTCA with one stent    Eye redness     Poked his eye on 3/18/16 "bloody" right eye    Hiatal hernia     Hydronephrosis     Hyperlipidemia     Hypertension     Irregular heart beat     A-Fib    Nocturnal leg cramps     Osteopenia     Peripheral artery disease (HCC)     Pleural effusion     Sleep apnea     REFUSES MACHINE HS    Vocal cord polyp 10/2012    panendoscopy       Past Surgical History:   Procedure Laterality Date    ANGIOPLASTY      APPENDECTOMY  04/2007  CARDIOVERSION      x2, for A-Fib    CARDIOVERSION      CATARACT EXTRACTION      CATARACT EXTRACTION W/ INTRAOCULAR LENS IMPLANT Left 01/2014    CORONARY ANGIOPLASTY WITH STENT PLACEMENT  2001    one stent    HERNIA REPAIR Right     inquinal    JOINT REPLACEMENT Left     Hip    KNEE SURGERY  1994    also had arthroscopy right knee 2005    PANENDOSCOPY  10/23/2012    vocal cord polyp    PILONIDAL CYST DRAINAGE      x 3    ND REMV CATARACT EXTRACAP,INSERT LENS Right 4/6/2016    Procedure: EXTRACTION EXTRACAPSULAR CATARACT PHACO INTRAOCULAR LENS (IOL); Surgeon: Maureen Fonseca MD;  Location: [facility] MAIN OR;  Service: Ophthalmology    ND SURG IMPLNT [address] Left 8/23/2017    Procedure: Placement of Thoracic SCS with left buttock IPG (IMPULSE); Surgeon: Evie Teixeira MD;  Location:  MAIN OR;  Service: Neurosurgery    SPINAL CORD STIMULATOR TRIAL W/ LAMINOTOMY N/A 4/13/2017    Procedure: INSERTION DORSAL COLUMN SPINAL CORD STIMULATOR TRIAL;  Surgeon: Abdirahman Mclaughlin MD;  Location: [facility] MAIN OR;  Service:     TONSILLECTOMY         Family History   Problem Relation Age of Onset    Cancer Father 80        prostate       Social History     Occupational History    Not on file       Social History Main Topics    Smoking status: Current Every Day Smoker     Packs/day: 1 00     Years: 60 00    Smokeless tobacco: Never Used    Alcohol use Yes      Comment: occasionally    Drug use: No    Sexual activity: Not on file         Current Outpatient Prescriptions:     albuterol (PROAIR HFA) 90 mcg/act inhaler, Inhale 2 puffs every 6 (six) hours as needed for wheezing, Disp: , Rfl:     atorvastatin (LIPITOR) 40 mg tablet, Take 40 mg by mouth daily  , Disp: , Rfl:     BYSTOLIC 10 MG tablet, TAKE 1 TABLET EVERY DAY, Disp: 90 tablet, Rfl: 3    diltiazem (DILACOR XR) 240 MG 24 hr capsule, Take 1 capsule (240 mg total) by mouth daily at bedtime, Disp: 90 capsule, Rfl: 3    fluticasone (FLONASE) 50 mcg/act nasal spray, 2 sprays into each nostril daily, Disp: , Rfl:     fluticasone furoate-vilanterol (BREO ELLIPTA), Inhale 1 puff daily, Disp: 3 each, Rfl: 3    ipratropium-albuterol (DUO-NEB) 0 5-2 5 mg/3 mL, Take 3 mL by nebulization every 6 (six) hours, Disp: 360 mL, Rfl: 2    OMEPRAZOLE PO, Take 40 mg by mouth daily  , Disp: , Rfl:     OXYGEN-HELIUM IN, Inhale 2 L daily at bedtime, Disp: , Rfl:     predniSONE 10 mg tablet, Three tabs daily x3 days  2 tabs daily x3 days 1 tab daily x3 days Extra tabs, Disp: 20 tablet, Rfl: 0    rivaroxaban (XARELTO) 20 mg tablet, Take 1 tablet (20 mg total) by mouth daily with dinner, Disp: 90 tablet, Rfl: 3    tamsulosin (FLOMAX) 0 4 mg, Take 0 4 mg by mouth daily with dinner, Disp: , Rfl:     traZODone (DESYREL) 50 mg tablet, , Disp: , Rfl:     [START ON 10/10/2018] HYDROcodone-acetaminophen (NORCO)  mg per tablet, Take 1 tablet by mouth 3 (three) times a day as needed for severe pain for up to 30 days Max Daily Amount: 3 tablets, Disp: 90 tablet, Rfl: 0    HYDROcodone-acetaminophen (NORCO)  mg per tablet, Take 1 tablet by mouth 3 (three) times a day as needed (PAIN) for up to 30 days Max Daily Amount: 3 tablets, Disp: 90 tablet, Rfl: 0    No Known Allergies    Physical Exam:    /68 (BP Location: Right arm, Patient Position: Sitting, Cuff Size: Standard)   Pulse 62   Resp 20   Ht 5' 10" (1 778 m)   Wt 119 kg (261 lb 6 4 oz)   BMI 37 51 kg/m²     Constitutional:obese  Eyes:anicteric  HEENT:grossly intact  Neck:supple, symmetric, trachea midline and no masses   Pulmonary:even and unlabored  Cardiovascular:No edema or pitting edema present  Skin:Normal without rashes or lesions and well hydrated  Psychiatric:Mood and affect appropriate  Neurologic:Cranial Nerves II-XII grossly intact  Musculoskeletal:antalgic and ambulates with cane     Cervical Spine Exam    Appearance:  Normal lordosis  Palpation/Tenderness:  no tenderness or spasm  Sensory:  no sensory deficits noted  Range of Motion:  Flexion:  No limitation  without pain  Extension:  No limitation  without pain  Lateral Flexion - Left:  No limitation  without pain  Lateral Flexion - Right:  No limitation  with pain  Rotation - Left:  No limitation  without pain  Rotation - Right:  No limitation  with pain   Cervical facet loading is positive bilaterally  Motor Strength:  Left Arm Flexion  5/5  Left Arm Extension  5/5  Right Arm Flexion  5/5  Right Arm Extension  5/5  Left Wrist Flexion  5/5  Left Wrist Extension  5/5  Left Finger Abduction  5/5  Right Finger Abduction  5/5  Left Pincer Grasp  5/5  Right Pincer Grasp  5/5  Left    5/5  Right   5/5  Reflexes:  Left Biceps:  1+   Right Biceps:  1+   Left Brachioradialis:  1+   Right Brachioradialis:  1+   Left Triceps:  1+   Right Triceps:  1+   Special Tests:  Left Spurlings:  negative  Right Spurlings  negative

## 2018-09-10 NOTE — LETTER
September 10, 2018     Vipul Brandon DO  [address]  [address]  [address] [place] [address]    Patient: Hugo Wolf   YOB: 1940   Date of Visit: 9/10/2018       Dear Dr Afshan Gardner: Thank you for referring Mindy Grandchild to me for evaluation  Below are my notes for this consultation  If you have questions, please do not hesitate to call me  I look forward to following your patient along with you  Sincerely,        Vel Barone MD        CC: No Recipients  Vel Barone MD  9/10/2018  9:10 AM  Sign at close encounter  Pain Medicine Follow-Up Note    Assessment:  1  Chronic pain syndrome    2  Pain of lumbar spine    3  Intervertebral disc disorder with radiculopathy of lumbar region    4  Cervical pain    5  Cervical spinal stenosis    6  Cervical radiculopathy        Plan:  My impressions and treatment recommendations were discussed in detail with the patient who verbalized understanding and had no further questions  Given that the patient reports overall reduced pain and improved level functioning without significant side effects, I felt a reasonable to continue the patient on hydrocodone/acetaminophen 10/325 mg 1 tablet up to 3 times daily as needed for pain  The risks and side effects of chronic opioid treatment were discussed in detail with the patient  Side effects include but are not limited to nausea, vomiting, GI intolerance, sedation, constipation, mental clouding, opioid-induced hyperalgesia, endocrine dysfunction, addiction, dependence, and tolerance  The patient was asked to take his medications only as prescribed and directed, never in excess, and never for any other reason other than for pain control  The patient was also asked to keep his medications out of the reach of others and away from children, preferably in a locked drawer  The patient verbalized understanding and wished to use these opioid medications      The patient has also been complaining of neck pain with right upper extremity radiculopathy to his right finger tips  He does have signs of cervical spinal stenosis by a recent CT of the cervical spine  I did discuss with the patient that I think he would benefit from a C6-C7 cervical epidural steroid injection  The patient would like to proceed with this  The procedures, its risks, and benefits were explained in detail to the patient  Risks include but are not limited to bleeding, infection, hematoma formation, abscess formation, weakness, headache, failure the pain to improve, nerve irritation or damage, and potential worsening of the pain  The patient verbalized understanding and wished to proceed with the procedure  A urine drug test was collected at today's office visit as part of our medication management protocol  The point of care testing results were appropriate for what was being prescribed  The specimen will be sent for confirmatory testing  The drug screen is medically necessary because the patient is either dependent on opioid medication or is being considered for opioid medication therapy and the results could impact ongoing or future treatment  The drug screen is to evaluate for the presence or absence of prescribed, non-prescribed, and/or illicit drugs and substances  [place] Prescription Drug Monitoring Program report was reviewed and was appropriate     Follow-up is planned in 2 months time or sooner as warranted  Discharge instructions were provided  I personally saw and examined the patient and I agree with the above discussed plan of care  History of Present Illness:    Luana Schwartz is a 76 y o  male who presents to [facility] for interval re-evaluation of the above stated pain complaints  The patient has a past medical and chronic pain history as outlined in the assessment section   He was last seen on July 2, 2018 at which time he was maintained on hydrocodone/acetaminophen 10/325 mg 1 tablet up to 3 times daily as needed for pain  At today's office visit, the patient's pain score is 8 to 9/10 on the verbal numerical pain rating scale  The patient states that his pain is primarily in his low back and bilateral lower extremities  He did report about 1 month worth of neck pain with radiation into the right upper extremity  He states that his primary care physician obtained a CT of the cervical spine which does show cervical spinal stenosis  Last Urine Drug Screen:  May 10, 2018    Other than as stated above, the patient denies any interval changes in medications, medical condition, mental condition, symptoms, or allergies since the last office visit  Review of Systems:    Review of Systems   Respiratory: Negative for shortness of breath  Cardiovascular: Negative for chest pain  Gastrointestinal: Negative for constipation, diarrhea, nausea and vomiting  Musculoskeletal: Positive for gait problem (difficulty walking/ decreased range of motiion) and joint swelling (joint stiffness)  Negative for arthralgias and myalgias  Skin: Negative for rash  Neurological: Positive for weakness (muscle weakness)  Negative for dizziness and seizures  All other systems reviewed and are negative          Patient Active Problem List   Diagnosis    Chronic pain syndrome    Pain of lumbar spine    Sacroiliitis ([address] )    Intervertebral disc disorder with radiculopathy of lumbar region    Centrilobular emphysema (HCC)    Dyspnea on exertion    Chronic atrial fibrillation (HCC)    Coronary artery disease    Chronic respiratory failure with hypoxia (HCC)    Nicotine dependence    Cervical radiculopathy    Cervical spinal stenosis    Cervical pain       Past Medical History:   Diagnosis Date    Acid reflux     Arthritis     BPH (benign prostatic hyperplasia)     no treatment    Calcium disorder     Chronic atrial fibrillation (HCC) 2/6/2018    Chronic pain     COPD (chronic obstructive pulmonary disease) ([facility] [address]     Coronary artery disease 2001    PTCA with one stent    Eye redness     Poked his eye on 3/18/16 "bloody" right eye    Hiatal hernia     Hydronephrosis     Hyperlipidemia     Hypertension     Irregular heart beat     A-Fib    Nocturnal leg cramps     Osteopenia     Peripheral artery disease (HCC)     Pleural effusion     Sleep apnea     REFUSES MACHINE HS    Vocal cord polyp 10/2012    panendoscopy       Past Surgical History:   Procedure Laterality Date    ANGIOPLASTY      APPENDECTOMY  04/2007    CARDIOVERSION      x2, for A-Fib    CARDIOVERSION      CATARACT EXTRACTION      CATARACT EXTRACTION W/ INTRAOCULAR LENS IMPLANT Left 01/2014    CORONARY ANGIOPLASTY WITH STENT PLACEMENT  2001    one stent    HERNIA REPAIR Right     inquinal    JOINT REPLACEMENT Left     Hip   [address]    also had arthroscopy right knee 2005    PANENDOSCOPY  10/23/2012    vocal cord polyp    PILONIDAL CYST DRAINAGE      x 3    OR REMV CATARACT EXTRACAP,INSERT LENS Right 4/6/2016    Procedure: EXTRACTION EXTRACAPSULAR CATARACT PHACO INTRAOCULAR LENS (IOL); Surgeon: Karyna Dent MD;  Location: [facility] MAIN OR;  Service: Ophthalmology    OR SURG IMPLNT [address] Left 8/23/2017    Procedure: Placement of Thoracic SCS with left buttock IPG (IMPULSE); Surgeon: Steff Fairbanks MD;  Location:  MAIN OR;  Service: Neurosurgery    SPINAL CORD STIMULATOR TRIAL W/ LAMINOTOMY N/A 4/13/2017    Procedure: INSERTION DORSAL COLUMN SPINAL CORD STIMULATOR TRIAL;  Surgeon: Jenny Green MD;  Location: [facility] MAIN OR;  Service:     TONSILLECTOMY         Family History   Problem Relation Age of Onset    Cancer Father 80        prostate       Social History     Occupational History    Not on file       Social History Main Topics    Smoking status: Current Every Day Smoker     Packs/day: 1 00     Years: 60 00    Smokeless tobacco: Never Used    Alcohol use Yes Comment: occasionally    Drug use: No    Sexual activity: Not on file         Current Outpatient Prescriptions:     albuterol (PROAIR HFA) 90 mcg/act inhaler, Inhale 2 puffs every 6 (six) hours as needed for wheezing, Disp: , Rfl:     atorvastatin (LIPITOR) 40 mg tablet, Take 40 mg by mouth daily  , Disp: , Rfl:     BYSTOLIC 10 MG tablet, TAKE 1 TABLET EVERY DAY, Disp: 90 tablet, Rfl: 3    diltiazem (DILACOR XR) 240 MG 24 hr capsule, Take 1 capsule (240 mg total) by mouth daily at bedtime, Disp: 90 capsule, Rfl: 3    fluticasone (FLONASE) 50 mcg/act nasal spray, 2 sprays into each nostril daily, Disp: , Rfl:     fluticasone furoate-vilanterol (BREO ELLIPTA), Inhale 1 puff daily, Disp: 3 each, Rfl: 3    ipratropium-albuterol (DUO-NEB) 0 5-2 5 mg/3 mL, Take 3 mL by nebulization every 6 (six) hours, Disp: 360 mL, Rfl: 2    OMEPRAZOLE PO, Take 40 mg by mouth daily  , Disp: , Rfl:     OXYGEN-HELIUM IN, Inhale 2 L daily at bedtime, Disp: , Rfl:     predniSONE 10 mg tablet, Three tabs daily x3 days  2 tabs daily x3 days 1 tab daily x3 days Extra tabs, Disp: 20 tablet, Rfl: 0    rivaroxaban (XARELTO) 20 mg tablet, Take 1 tablet (20 mg total) by mouth daily with dinner, Disp: 90 tablet, Rfl: 3    tamsulosin (FLOMAX) 0 4 mg, Take 0 4 mg by mouth daily with dinner, Disp: , Rfl:     traZODone (DESYREL) 50 mg tablet, , Disp: , Rfl:     [START ON 10/10/2018] HYDROcodone-acetaminophen (NORCO)  mg per tablet, Take 1 tablet by mouth 3 (three) times a day as needed for severe pain for up to 30 days Max Daily Amount: 3 tablets, Disp: 90 tablet, Rfl: 0    HYDROcodone-acetaminophen (NORCO)  mg per tablet, Take 1 tablet by mouth 3 (three) times a day as needed (PAIN) for up to 30 days Max Daily Amount: 3 tablets, Disp: 90 tablet, Rfl: 0    No Known Allergies    Physical Exam:    /68 (BP Location: Right arm, Patient Position: Sitting, Cuff Size: Standard)   Pulse 62   Resp 20   Ht 5' 10" (1 778 m) Wt 119 kg (261 lb 6 4 oz)   BMI 37 51 kg/m²      Constitutional:obese  Eyes:anicteric  HEENT:grossly intact  Neck:supple, symmetric, trachea midline and no masses   Pulmonary:even and unlabored  Cardiovascular:No edema or pitting edema present  Skin:Normal without rashes or lesions and well hydrated  Psychiatric:Mood and affect appropriate  Neurologic:Cranial Nerves II-XII grossly intact  Musculoskeletal:antalgic and ambulates with cane     Cervical Spine Exam    Appearance:  Normal lordosis  Palpation/Tenderness:  no tenderness or spasm  Sensory:  no sensory deficits noted  Range of Motion:  Flexion:  No limitation  without pain  Extension:  No limitation  without pain  Lateral Flexion - Left:  No limitation  without pain  Lateral Flexion - Right:  No limitation  with pain  Rotation - Left:  No limitation  without pain  Rotation - Right:  No limitation  with pain   Cervical facet loading is positive bilaterally  Motor Strength:  Left Arm Flexion  5/5  Left Arm Extension  5/5  Right Arm Flexion  5/5  Right Arm Extension  5/5  Left Wrist Flexion  5/5  Left Wrist Extension  5/5  Left Finger Abduction  5/5  Right Finger Abduction  5/5  Left Pincer Grasp  5/5  Right Pincer Grasp  5/5  Left    5/5  Right   5/5  Reflexes:  Left Biceps:  1+   Right Biceps:  1+   Left Brachioradialis:  1+   Right Brachioradialis:  1+   Left Triceps:  1+   Right Triceps:  1+   Special Tests:  Left Spurlings:  negative  Right Spurlings  negative

## 2018-09-10 NOTE — TELEPHONE ENCOUNTER
Joseph Savage,  Pt was seen by Dr Vernell Crenshaw on 09/10/18- he is to be scheduled for C6-C7 AUDIE but takes Xarelto  He does not want to have his appt for AUDIE until mid to end of November, so I told him we'd wait until end of October to send the anti coag form over to his cardiologist since they're only good for 30 days  I left a copy of his procedure scheduling form in the "pts calling back to schedule" bin  Cardiologist is Dr Mayra Banksite at [address] [place] [address]

## 2018-10-04 ENCOUNTER — OFFICE VISIT (OUTPATIENT)
Dept: CARDIOLOGY CLINIC | Facility: CLINIC | Age: 78
End: 2018-10-04
Payer: MEDICARE

## 2018-10-04 VITALS
WEIGHT: 260.5 LBS | HEART RATE: 57 BPM | DIASTOLIC BLOOD PRESSURE: 86 MMHG | SYSTOLIC BLOOD PRESSURE: 130 MMHG | BODY MASS INDEX: 37.38 KG/M2 | OXYGEN SATURATION: 95 %

## 2018-10-04 DIAGNOSIS — I25.10 CAD S/P PERCUTANEOUS CORONARY ANGIOPLASTY: ICD-10-CM

## 2018-10-04 DIAGNOSIS — G47.33 OBSTRUCTIVE SLEEP APNEA: ICD-10-CM

## 2018-10-04 DIAGNOSIS — G89.29 CHRONIC MIDLINE LOW BACK PAIN WITHOUT SCIATICA: ICD-10-CM

## 2018-10-04 DIAGNOSIS — E78.5 DYSLIPIDEMIA: ICD-10-CM

## 2018-10-04 DIAGNOSIS — J43.2 CENTRILOBULAR EMPHYSEMA (HCC): ICD-10-CM

## 2018-10-04 DIAGNOSIS — Z98.61 CAD S/P PERCUTANEOUS CORONARY ANGIOPLASTY: ICD-10-CM

## 2018-10-04 DIAGNOSIS — M54.50 CHRONIC MIDLINE LOW BACK PAIN WITHOUT SCIATICA: ICD-10-CM

## 2018-10-04 DIAGNOSIS — I49.3 ASYMPTOMATIC PVCS: ICD-10-CM

## 2018-10-04 DIAGNOSIS — E66.9 CLASS 2 OBESITY: ICD-10-CM

## 2018-10-04 DIAGNOSIS — F17.210 CIGARETTE NICOTINE DEPENDENCE WITHOUT COMPLICATION: ICD-10-CM

## 2018-10-04 DIAGNOSIS — I10 ESSENTIAL HYPERTENSION: ICD-10-CM

## 2018-10-04 DIAGNOSIS — K44.9 HIATAL HERNIA WITH GERD WITHOUT ESOPHAGITIS: ICD-10-CM

## 2018-10-04 DIAGNOSIS — I48.20 ATRIAL FIBRILLATION, CHRONIC (HCC): Primary | ICD-10-CM

## 2018-10-04 DIAGNOSIS — K21.9 HIATAL HERNIA WITH GERD WITHOUT ESOPHAGITIS: ICD-10-CM

## 2018-10-04 PROCEDURE — 99214 OFFICE O/P EST MOD 30 MIN: CPT | Performed by: INTERNAL MEDICINE

## 2018-10-04 PROCEDURE — 93000 ELECTROCARDIOGRAM COMPLETE: CPT | Performed by: INTERNAL MEDICINE

## 2018-10-04 NOTE — PROGRESS NOTES
Cardiology Progress Note    ASSESSMENT:    1  Controlled chronic atrial fibrillation on Xarelto oral anticoagulation plus diltiazem and Bystolic for rate control  2  Frequent PVCs or aberrantly conducted complexes, asymptomatic, and pattern of bigeminy, chronic, less frequent today  Ventricular triplet noted previously  Patient refuses Holter monitor  3  Stable CAD with history of remote PCI, details unknown to me, done 8-1/2 years ago, with patient alleging last stress test 2-3 years ago  He now refuses any further stress testing  4  Significant COPD with slowly worsening but now stable chronic exertional dyspnea with hypoxemia and ongoing cigarette abuse at one pack per day and prior occasional exacerbations  5  Obstructive sleep apnea, patient refusing CPAP and using nasal oxygen 2 L/m h s  and p r n   6  Marked obesity, class II, with 10 5 pound weight gain in 1-1/2 years  7  History of right pleural effusion, small  8  History of asbestosis due to his exposure as a   5  Chronic low back and cervical spine pain and continuous opioid dependence with failure of lumbar epidural steroid injections and lumbar facet radiofrequency ablations with unsatisfactory response to permanent spinal stimulator implant on 08/23/2017  Patient seeking Lyrica from pain management  10  Long-standing essential hypertension and dyslipidemia, treated  11  History of hiatal hernia and GERD  12  History of former alcoholism, now controlled, with fall caused by alcohol abuse resulting in hospital stay 8/18/13 and last trip and fall 1-1/2 years ago  13  History of bladder outlet obstruction 8/13 and attributed to BPH  14  History of pneumonia/pleurisy March, 2012  15  Unremarkable cardiac catheterization in 2005  16  Cervical spine fracture after fall in 2009, rib fractures after fall in 2013, left total hip replacement April, 2010  16   Mall multilevel cervical spondylosis and multilevel cervical spinal stenosis      Plan Patient Instructions     1  Patient to have echocardiogram with CMP and lipids on or after December 4, 2018  2   Continue present medications  3   Cardiology follow-up 6 months with EKG  HPI    This 76 y o  male  denies new cardiopulmonary and medical symptoms  His principal complaint is persistent low back pain and also cervical spine spine pain with CT of cervical spine 2 months ago showing multiple level spondylosis and spinal stenosis  He is going to speak to Dr Love Paulino regarding the possibility of starting on Lyrica, which has helped him in the past   He has had no benefit from his permanent spinal stimulator implanted in August, 2017  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  The patient is  and has 3 children and 3 grandchildren of with contact with 1 of his sons  The patient still smokes 1 pack per day of cigarettes  He uses a nebulizer and nighttime oxygen at 2 liters/minute  He claims to be adherent to his medication  The patient refuses to have a Holter monitor because of its inconvenience  He never had the echocardiogram I  requested and has not had any updated blood work        Review of Systems    All other systems negative, except as noted in history of present illness    Historical Information   Past Medical History:   Diagnosis Date    Acid reflux     Arthritis     BPH (benign prostatic hyperplasia)     no treatment    Calcium disorder     Chronic atrial fibrillation (HCC) 2/6/2018    Chronic pain     COPD (chronic obstructive pulmonary disease) (HCC)     Coronary artery disease 2001    PTCA with one stent    Eye redness     Poked his eye on 3/18/16 "bloody" right eye    Hiatal hernia     Hydronephrosis     Hyperlipidemia     Hypertension     Irregular heart beat     A-Fib    Nocturnal leg cramps     Osteopenia     Peripheral artery disease ([facility] [address] )     Pleural effusion     Sleep apnea     REFUSES MACHINE HS    Vocal cord polyp 10/2012    panendoscopy     Past Surgical History:   Procedure Laterality Date    ANGIOPLASTY      APPENDECTOMY  04/2007    CARDIOVERSION      x2, for A-Fib    CARDIOVERSION      CATARACT EXTRACTION      CATARACT EXTRACTION W/ INTRAOCULAR LENS IMPLANT Left 01/2014    CORONARY ANGIOPLASTY WITH STENT PLACEMENT  2001    one stent    HERNIA REPAIR Right     inquinal    JOINT REPLACEMENT Left     Hip    KNEE SURGERY  1994    also had arthroscopy right knee 2005    PANENDOSCOPY  10/23/2012    vocal cord polyp    PILONIDAL CYST DRAINAGE      x 3    SD REMV CATARACT EXTRACAP,INSERT LENS Right 4/6/2016    Procedure: EXTRACTION EXTRACAPSULAR CATARACT PHACO INTRAOCULAR LENS (IOL); Surgeon: Jan Calles MD;  Location: [facility] MAIN OR;  Service: Ophthalmology    SD SURG IMPLNT [address] Left 8/23/2017    Procedure: Placement of Thoracic SCS with left buttock IPG (IMPULSE); Surgeon: Carla Sauceda MD;  Location:  MAIN OR;  Service: Neurosurgery    SPINAL CORD STIMULATOR TRIAL W/ LAMINOTOMY N/A 4/13/2017    Procedure: INSERTION DORSAL COLUMN SPINAL CORD STIMULATOR TRIAL;  Surgeon: Maritza Douglass MD;  Location[facility] MA[facility] OR;  Service:     TONSILLECTOMY       History   Alcohol Use    Yes     Comment: occasionally     History   Drug Use No     History   Smoking Status    Current Every Day Smoker    Packs/day: 1 00    Years: 60 00   Smokeless Tobacco    Never Used       Family History:  Family History   Problem Relation Age of Onset    Cancer Father 80        prostate         Meds/Allergies     Prior to Admission medications    Medication Sig Start Date End Date Taking?  Authorizing Provider   albuterol (PROAIR HFA) 90 mcg/act inhaler Inhale 2 puffs every 6 (six) hours as needed for wheezing   Yes Historical Provider, MD   atorvastatin (LIPITOR) 40 mg tablet Take 40 mg by mouth daily     Yes Historical Provider, MD   BYSTOLIC 10 MG tablet TAKE 1 TABLET EVERY DAY 3/22/18  Yes Orin Morales MD   diltiazem (DILACOR XR) 240 MG 24 hr capsule Take 1 capsule (240 mg total) by mouth daily at bedtime 9/6/18  Yes Orin Morales MD   fluticasone [facility]) 50 mcg/act nasal spray 2 sprays into each nostril daily   Yes Historical Provider, MD   fluticasone furoate-vilanterol (BREO ELLIPTA) Inhale 1 puff daily 5/9/18  Yes MITCHELL Smith   HYDROcodone-acetaminophen (NORCO)  mg per tablet Take 1 tablet by mouth 3 (three) times a day as needed for severe pain for up to 30 days Max Daily Amount: 3 tablets 10/10/18 11/9/18 Yes Cristin Trevino MD   HYDROcodone-acetaminophen ([address] [employer] [facility] [address])  mg per tablet Take 1 tablet by mouth 3 (three) times a day as needed (PAIN) for up to 30 days Max Daily Amount: 3 tablets 9/10/18 10/10/18 Yes Cristin Trevino MD   ipratropium-albuterol (DUO-NEB) 0 5-2 5 mg/3 mL Take 3 mL by nebulization every 6 (six) hours 3/27/18  Yes MITCHELL Smith   OMEPRAZOLE PO Take 40 mg by mouth daily     Yes Historical Provider, MD   OXYGEN-HELIUM IN Inhale 2 L daily at bedtime   Yes Historical Provider, MD   predniSONE 10 mg tablet Three tabs daily x3 days  2 tabs daily x3 days 1 tab daily x3 days  Extra tabs 5/9/18  Yes MITCHELL Smith   rivaroxaban (XARELTO) 20 mg tablet Take 1 tablet (20 mg total) by mouth daily with dinner 9/6/18  Yes Orin Morales MD   tamsulosin [facility]) 0 4 mg Take 0 4 mg by mouth daily with dinner   Yes Historical Provider, MD   traZODone (DESYREL) 50 mg tablet  3/20/18  Yes Historical Provider, MD       No Known Allergies      Vitals:    10/04/18 0757   BP: 130/86   BP Location: Right arm   Patient Position: Sitting   Cuff Size: Large   Pulse: 57   SpO2: 95%   Weight: 118 kg (260 lb 8 oz)       Body mass index is 37 38 kg/m²    7 5 pound weight gain in approximately 6 months    Physical Exam:    General Appearance:  Alert, cooperative, no distress, appears stated age and is markedly obese   Head:  Normocephalic, without obvious abnormality, atraumatic   Eyes:  PERRL, conjunctiva/corneas clear, EOM's intact,   both eyes   Ears:  Normal TM's and external ear canals, both ears   Nose: Nares normal, septum midline, mucosa normal, no drainage or sinus tenderness   Throat: Lips, mucosa, and tongue normal; teeth and gums normal   Neck: Supple, symmetrical, trachea midline, no adenopathy, thyroid: not enlarged, symmetric, no tenderness/mass/nodules, no carotid bruit or JVD   Back:   Symmetric, no curvature, ROM normal, no CVA tenderness   Lungs:   Clear to auscultation bilaterally, respirations unlabored   Chest Wall:  No tenderness or deformity   Heart:  Irregularly irregular cardiac rhythm, S1, S2 normal, no murmur, rub or gallop   Abdomen:   Soft, non-tender, bowel sounds active all four quadrants,  no masses, no organomegaly with marked obesity noted   Extremities: Extremities normal, atraumatic, no cyanosis or edema   Pulses: 1+ and symmetric   Skin: Skin showed normal color, texture, turgor and no rashes or lesions   Lymph nodes: Cervical, supraclavicular, and axillary nodes normal   Neurologic: Normal         Cardiographics    ECG 10/04/2018 :    Atrial fibrillation with PVCs  LAFB  Nonspecific inferolateral ST-T abnormalities  Less frequent PVCs compared to 04/19/2018    Imaging    Chest X-Ray :  No Chest XR results available for this patient      CT scan of cervical spine 08/03/2018:    Multi level degenerative spondylosis  Multiple level spinal stenosis  Suspected muscle spasm  Progressive bilateral carotid artery calcification    Lab Review       Lab Results   Component Value Date     04/26/2018    K 4 3 04/26/2018     04/26/2018    CO2 30 04/26/2018    BUN 11 04/26/2018    CREATININE 0 88 04/26/2018    GLUF 117 (H) 04/26/2018    CALCIUM 9 2 04/26/2018    AST 18 04/26/2018    ALT 26 04/26/2018    ALKPHOS 64 04/26/2018    EGFR 82 04/26/2018       No results found for: CHOL  Lab Results   Component Value Date    HDL 59 12/07/2017    HDL 58 02/07/2017     Lab Results   Component Value Date    LDLCALC 34 12/07/2017    LDLCALC 22 02/07/2017     Lab Results   Component Value Date    TRIG 113 12/07/2017    TRIG 126 02/07/2017     No components found for: CHOLHDL    Lab Results   Component Value Date    CALCIUM 9 2 04/26/2018     04/26/2018    K 4 3 04/26/2018    CO2 30 04/26/2018     04/26/2018    BUN 11 04/26/2018    CREATININE 0 88 04/26/2018           Mis Hayes MD

## 2018-10-04 NOTE — PATIENT INSTRUCTIONS
1   Patient to have echocardiogram with CMP and lipids on or after December 4, 2018  2   Continue present medications  3   Cardiology follow-up 6 months with EKG

## 2018-11-05 ENCOUNTER — OFFICE VISIT (OUTPATIENT)
Dept: PAIN MEDICINE | Facility: CLINIC | Age: 78
End: 2018-11-05
Payer: MEDICARE

## 2018-11-05 VITALS
HEIGHT: 70 IN | WEIGHT: 258.4 LBS | RESPIRATION RATE: 20 BRPM | BODY MASS INDEX: 36.99 KG/M2 | SYSTOLIC BLOOD PRESSURE: 150 MMHG | HEART RATE: 72 BPM | DIASTOLIC BLOOD PRESSURE: 80 MMHG

## 2018-11-05 DIAGNOSIS — G89.4 CHRONIC PAIN SYNDROME: Primary | ICD-10-CM

## 2018-11-05 DIAGNOSIS — M54.50 PAIN OF LUMBAR SPINE: ICD-10-CM

## 2018-11-05 DIAGNOSIS — M51.16 INTERVERTEBRAL DISC DISORDER WITH RADICULOPATHY OF LUMBAR REGION: ICD-10-CM

## 2018-11-05 PROCEDURE — 99214 OFFICE O/P EST MOD 30 MIN: CPT | Performed by: ANESTHESIOLOGY

## 2018-11-05 RX ORDER — HYDROCODONE BITARTRATE AND ACETAMINOPHEN 10; 325 MG/1; MG/1
1 TABLET ORAL 3 TIMES DAILY PRN
Qty: 90 TABLET | Refills: 0 | Status: SHIPPED | OUTPATIENT
Start: 2018-11-09 | End: 2019-01-07 | Stop reason: SDUPTHER

## 2018-11-05 RX ORDER — PREGABALIN 75 MG/1
75 CAPSULE ORAL 2 TIMES DAILY
Qty: 60 CAPSULE | Refills: 0 | Status: SHIPPED | OUTPATIENT
Start: 2018-11-05 | End: 2019-01-07 | Stop reason: ALTCHOICE

## 2018-11-05 RX ORDER — HYDROCODONE BITARTRATE AND ACETAMINOPHEN 10; 325 MG/1; MG/1
1 TABLET ORAL 3 TIMES DAILY PRN
Qty: 90 TABLET | Refills: 0 | Status: SHIPPED | OUTPATIENT
Start: 2018-12-09 | End: 2019-03-20 | Stop reason: SDUPTHER

## 2018-11-05 NOTE — PROGRESS NOTES
Pain Medicine Follow-Up Note    Assessment:  1  Chronic pain syndrome    2  Pain of lumbar spine    3  Intervertebral disc disorder with radiculopathy of lumbar region        Plan:  New Medications Ordered This Visit   Medications    HYDROcodone-acetaminophen (NORCO)  mg per tablet     Sig: Take 1 tablet by mouth 3 (three) times a day as needed (PAIN) for up to 30 days Max Daily Amount: 3 tablets     Dispense:  90 tablet     Refill:  0    HYDROcodone-acetaminophen (NORCO)  mg per tablet     Sig: Take 1 tablet by mouth 3 (three) times a day as needed for severe pain for up to 30 days Max Daily Amount: 3 tablets     Dispense:  90 tablet     Refill:  0    pregabalin (LYRICA) 75 mg capsule     Sig: Take 1 capsule (75 mg total) by mouth 2 (two) times a day     Dispense:  60 capsule     Refill:  0     My impressions and treatment recommendations were discussed in detail with the patient who verbalized understanding and had no further questions  Given that the patient reports overall reduced pain and improved level functioning without significant side effects, I felt a reasonable to continue the patient on hydrocodone/acetaminophen 10/325 mg 1 tablet up to 3 times daily as needed for pain  I sent E prescriptions to his pharmacy dated November 9, 2018 as well as December 9, 2018  The risks and side effects of chronic opioid treatment were discussed in detail with the patient  Side effects include but are not limited to nausea, vomiting, GI intolerance, sedation, constipation, mental clouding, opioid-induced hyperalgesia, endocrine dysfunction, addiction, dependence, and tolerance  The patient was asked to take his medications only as prescribed and directed, never in excess, and never for any other reason other than for pain control  The patient was also asked to keep his medications out of the reach of others and away from children, preferably in a locked drawer   The patient verbalized understanding and wished to use these opioid medications  In addition, the patient reports that he is not using the spinal cord stimulator much for his back and leg pain  He states that his leg pain has gotten worse  I asked him to reach out to the spinal cord stimulator representatives in regards to using the spinal cord stimulator for his leg pain  The patient verbalized understanding  He also wishes to trial Lyrica since he did report good benefit it with Lyrica in the past   As such, I will trial the patient on Lyrica 75 mg twice daily  Side effects were reviewed with the patient  [place] Prescription Drug Monitoring Program report was reviewed and was appropriate     Follow-up is planned in 2 months time or sooner as warranted  Discharge instructions were provided  I personally saw and examined the patient and I agree with the above discussed plan of care  History of Present Illness:    Serena Mcclelland is a 76 y o  male who presents to [facility] for interval re-evaluation of the above stated pain complaints  The patient has a past medical and chronic pain history as outlined in the assessment section  He was last seen on September 10, 2018 at which time he was maintained on hydrocodone/acetaminophen 10/325 mg 1 tablet up to 3 times daily as needed for pain  At today's office visit, the patient's pain score is 8 to 9/10 on the verbal numerical pain rating scale  The patient states that his pain is primarily in his low back and lower extremities  He describes his pain as worse in the morning, evening, and night  His pain is constant in nature and he reports the quality of his pain as dull/aching, sharp, cramping, shooting, and pins and needles    He does report significant relief of symptoms with hydrocodone/acetaminophen 10/325 mg 1 tablet 3 times daily as needed for pain    He also reports that he is not using his spinal cord stimulators since he does not feel as though it is effective  He is complaining of increasing leg pain  Other than as stated above, the patient denies any interval changes in medications, medical condition, mental condition, symptoms, or allergies since the last office visit  Review of Systems:    Review of Systems   Respiratory: Negative for shortness of breath  Cardiovascular: Negative for chest pain  Gastrointestinal: Negative for constipation, diarrhea, nausea and vomiting  Musculoskeletal: Positive for gait problem (difficulty walking/ decreased range of motion) and joint swelling (joint stiffness)  Negative for arthralgias and myalgias  Skin: Negative for rash  Neurological: Positive for weakness (muscle weakness)  Negative for dizziness and seizures  All other systems reviewed and are negative          Patient Active Problem List   Diagnosis    Chronic pain syndrome    Pain of lumbar spine    Sacroiliitis (HCC)    Intervertebral disc disorder with radiculopathy of lumbar region    Centrilobular emphysema (HCC)    Dyspnea on exertion    Chronic atrial fibrillation (HCC)    Coronary artery disease    Chronic respiratory failure with hypoxia (HCC)    Nicotine dependence    Cervical radiculopathy    Cervical spinal stenosis    Cervical pain       Past Medical History:   Diagnosis Date    Acid reflux     Arthritis     BPH (benign prostatic hyperplasia)     no treatment    Calcium disorder     Chronic atrial fibrillation (HCC) 2/6/2018    Chronic pain     COPD (chronic obstructive pulmonary disease) (HCC)     Coronary artery disease 2001    PTCA with one stent    Eye redness     Poked his eye on 3/18/16 "bloody" right eye    Hiatal hernia     Hydronephrosis     Hyperlipidemia     Hypertension     Irregular heart beat     A-Fib    Nocturnal leg cramps     Osteopenia     Peripheral artery disease (HCC)     Pleural effusion     Sleep apnea     REFUSES MACHINE HS    Vocal cord polyp 10/2012 panendoscopy       Past Surgical History:   Procedure Laterality Date    ANGIOPLASTY      APPENDECTOMY  04/2007    CARDIOVERSION      x2, for A-Fib    CARDIOVERSION      CATARACT EXTRACTION      CATARACT EXTRACTION W/ INTRAOCULAR LENS IMPLANT Left 01/2014    CORONARY ANGIOPLASTY WITH STENT PLACEMENT  2001    one stent    HERNIA REPAIR Right     inquinal    JOINT REPLACEMENT Left     Hip    KNEE SURGERY  1994    also had arthroscopy right knee 2005    PANENDOSCOPY  10/23/2012    vocal cord polyp    PILONIDAL CYST DRAINAGE      x 3    OH REMV CATARACT EXTRACAP,INSERT LENS Right 4/6/2016    Procedure: EXTRACTION EXTRACAPSULAR CATARACT PHACO INTRAOCULAR LENS (IOL); Surgeon: Mary Alice Parmar MD;  [facility] MAIN OR;  Service: Ophthalmology    OH SURG IMPLNT [address] Left 8/23/2017    Procedure: Placement of Thoracic SCS with left buttock IPG (IMPULSE); Surgeon: Avelion Ribeiro MD;  Location:  MAIN OR;  Service: Neurosurgery    SPINAL CORD STIMULATOR TRIAL W/ LAMINOTOMY N/A 4/13/2017    Procedure: INSERTION DORSAL COLUMN SPINAL CORD STIMULATOR TRIAL;  Surgeon: Reyna Rodriguez MD;  [facility] MAIN OR;  Service:     TONSILLECTOMY         Family History   Problem Relation Age of Onset    Cancer Father 80        prostate       Social History     Occupational History    Not on file       Social History Main Topics    Smoking status: Current Every Day Smoker     Packs/day: 1 00     Years: 60 00    Smokeless tobacco: Never Used    Alcohol use Yes      Comment: occasionally    Drug use: No    Sexual activity: Not on file         Current Outpatient Prescriptions:     albuterol (PROAIR HFA) 90 mcg/act inhaler, Inhale 2 puffs every 6 (six) hours as needed for wheezing, Disp: , Rfl:     atorvastatin (LIPITOR) 40 mg tablet, Take 40 mg by mouth daily  , Disp: , Rfl:     BYSTOLIC 10 MG tablet, TAKE 1 TABLET EVERY DAY, Disp: 90 tablet, Rfl: 3    diltiazem (DILACOR XR) 240 MG 24 hr capsule, Take 1 capsule (240 mg total) by mouth daily at bedtime, Disp: 90 capsule, Rfl: 3    fluticasone (FLONASE) 50 mcg/act nasal spray, 2 sprays into each nostril daily, Disp: , Rfl:     fluticasone furoate-vilanterol (BREO ELLIPTA), Inhale 1 puff daily, Disp: 3 each, Rfl: 3    [START ON 12/9/2018] HYDROcodone-acetaminophen (NORCO)  mg per tablet, Take 1 tablet by mouth 3 (three) times a day as needed for severe pain for up to 30 days Max Daily Amount: 3 tablets, Disp: 90 tablet, Rfl: 0    ipratropium-albuterol (DUO-NEB) 0 5-2 5 mg/3 mL, Take 3 mL by nebulization every 6 (six) hours, Disp: 360 mL, Rfl: 2    OMEPRAZOLE PO, Take 40 mg by mouth daily  , Disp: , Rfl:     OXYGEN-HELIUM IN, Inhale 2 L daily at bedtime, Disp: , Rfl:     predniSONE 10 mg tablet, Three tabs daily x3 days  2 tabs daily x3 days 1 tab daily x3 days Extra tabs, Disp: 20 tablet, Rfl: 0    rivaroxaban (XARELTO) 20 mg tablet, Take 1 tablet (20 mg total) by mouth daily with dinner, Disp: 90 tablet, Rfl: 3    tamsulosin (FLOMAX) 0 4 mg, Take 0 4 mg by mouth daily with dinner, Disp: , Rfl:     traZODone (DESYREL) 50 mg tablet, , Disp: , Rfl:     [START ON 11/9/2018] HYDROcodone-acetaminophen (NORCO)  mg per tablet, Take 1 tablet by mouth 3 (three) times a day as needed (PAIN) for up to 30 days Max Daily Amount: 3 tablets, Disp: 90 tablet, Rfl: 0    pregabalin (LYRICA) 75 mg capsule, Take 1 capsule (75 mg total) by mouth 2 (two) times a day, Disp: 60 capsule, Rfl: 0    No Known Allergies    Physical Exam:    /80 (BP Location: Right arm, Patient Position: Sitting, Cuff Size: Standard)   Pulse 72   Resp 20   Ht 5' 10" (1 778 m)   Wt 117 kg (258 lb 6 4 oz)   BMI 37 08 kg/m²     Constitutional:normal, well developed, well nourished, alert, in no distress and non-toxic and no overt pain behavior    Eyes:anicteric  HEENT:grossly intact  Neck:supple, symmetric, trachea midline and no masses   Pulmonary:even and unlabored  Cardiovascular:No edema or pitting edema present  Skin:Normal without rashes or lesions and well hydrated  Psychiatric:Mood and affect appropriate  Neurologic:Cranial Nerves II-XII grossly intact  Musculoskeletal:antalgic

## 2018-11-05 NOTE — LETTER
November 5, 2018     Cuong Olson, DO  [address] [facility]  [address]  [place] [address]    Patient: Earline Carreno   YOB: 1940   Date of Visit: 11/5/2018       Dear Dr Lazaro Ramirez: Thank you for referring Haleypio Godoy to me for evaluation  Below are my notes for this consultation  If you have questions, please do not hesitate to call me  I look forward to following your patient along with you  Sincerely,        Geoff Nichols MD        CC: No Recipients  Geoff Nichols MD  11/5/2018  9:14 AM  Sign at close encounter  Pain Medicine Follow-Up Note    Assessment:  1  Chronic pain syndrome    2  Pain of lumbar spine    3  Intervertebral disc disorder with radiculopathy of lumbar region        Plan:  New Medications Ordered This Visit   Medications    HYDROcodone-acetaminophen (NORCO)  mg per tablet     Sig: Take 1 tablet by mouth 3 (three) times a day as needed (PAIN) for up to 30 days Max Daily Amount: 3 tablets     Dispense:  90 tablet     Refill:  0    HYDROcodone-acetaminophen (NORCO)  mg per tablet     Sig: Take 1 tablet by mouth 3 (three) times a day as needed for severe pain for up to 30 days Max Daily Amount: 3 tablets     Dispense:  90 tablet     Refill:  0    pregabalin (LYRICA) 75 mg capsule     Sig: Take 1 capsule (75 mg total) by mouth 2 (two) times a day     Dispense:  60 capsule     Refill:  0     My impressions and treatment recommendations were discussed in detail with the patient who verbalized understanding and had no further questions  Given that the patient reports overall reduced pain and improved level functioning without significant side effects, I felt a reasonable to continue the patient on hydrocodone/acetaminophen 10/325 mg 1 tablet up to 3 times daily as needed for pain  I sent E prescriptions to his pharmacy dated November 9, 2018 as well as December 9, 2018      The risks and side effects of chronic opioid treatment were discussed in detail with the patient  Side effects include but are not limited to nausea, vomiting, GI intolerance, sedation, constipation, mental clouding, opioid-induced hyperalgesia, endocrine dysfunction, addiction, dependence, and tolerance  The patient was asked to take his medications only as prescribed and directed, never in excess, and never for any other reason other than for pain control  The patient was also asked to keep his medications out of the reach of others and away from children, preferably in a locked drawer  The patient verbalized understanding and wished to use these opioid medications  In addition, the patient reports that he is not using the spinal cord stimulator much for his back and leg pain  He states that his leg pain has gotten worse  I asked him to reach out to the spinal cord stimulator representatives in regards to using the spinal cord stimulator for his leg pain  The patient verbalized understanding  He also wishes to trial Lyrica since he did report good benefit it with Lyrica in the past   As such, I will trial the patient on Lyrica 75 mg twice daily  Side effects were reviewed with the patient  [place] Prescription Drug Monitoring Program report was reviewed and was appropriate     Follow-up is planned in 2 months time or sooner as warranted  Discharge instructions were provided  I personally saw and examined the patient and I agree with the above discussed plan of care  History of Present Illness:    Navarro Talbert is a 76 y o  male who presents to [facility] for interval re-evaluation of the above stated pain complaints  The patient has a past medical and chronic pain history as outlined in the assessment section  He was last seen on September 10, 2018 at which time he was maintained on hydrocodone/acetaminophen 10/325 mg 1 tablet up to 3 times daily as needed for pain      At today's office visit, the patient's pain score is 8 to 9/10 on the verbal numerical pain rating scale  The patient states that his pain is primarily in his low back and lower extremities  He describes his pain as worse in the morning, evening, and night  His pain is constant in nature and he reports the quality of his pain as dull/aching, sharp, cramping, shooting, and pins and needles    He does report significant relief of symptoms with hydrocodone/acetaminophen 10/325 mg 1 tablet 3 times daily as needed for pain  He also reports that he is not using his spinal cord stimulators since he does not feel as though it is effective  He is complaining of increasing leg pain  Other than as stated above, the patient denies any interval changes in medications, medical condition, mental condition, symptoms, or allergies since the last office visit  Review of Systems:    Review of Systems   Respiratory: Negative for shortness of breath  Cardiovascular: Negative for chest pain  Gastrointestinal: Negative for constipation, diarrhea, nausea and vomiting  Musculoskeletal: Positive for gait problem (difficulty walking/ decreased range of motion) and joint swelling (joint stiffness)  Negative for arthralgias and myalgias  Skin: Negative for rash  Neurological: Positive for weakness (muscle weakness)  Negative for dizziness and seizures  All other systems reviewed and are negative          Patient Active Problem List   Diagnosis    Chronic pain syndrome    Pain of lumbar spine    Sacroiliitis ([address] )    Intervertebral disc disorder with radiculopathy of lumbar region    Centrilobular emphysema (HCC)    Dyspnea on exertion    Chronic atrial fibrillation (HCC)    Coronary artery disease    Chronic respiratory failure with hypoxia (HCC)    Nicotine dependence    Cervical radiculopathy    Cervical spinal stenosis    Cervical pain       Past Medical History:   Diagnosis Date    Acid reflux     Arthritis     BPH (benign prostatic hyperplasia) no treatment    Calcium disorder     Chronic atrial fibrillation (HCC) 2/6/2018    Chronic pain     COPD (chronic obstructive pulmonary disease) (HCC)     Coronary artery disease 2001    PTCA with one stent    Eye redness     Poked his eye on 3/18/16 "bloody" right eye    Hiatal hernia     Hydronephrosis     Hyperlipidemia     Hypertension     Irregular heart beat     A-Fib    Nocturnal leg cramps     Osteopenia     Peripheral artery disease (HCC)     Pleural effusion     Sleep apnea     REFUSES MACHINE HS    Vocal cord polyp 10/2012    panendoscopy       Past Surgical History:   Procedure Laterality Date    ANGIOPLASTY      APPENDECTOMY  04/2007    CARDIOVERSION      x2, for A-Fib    CARDIOVERSION      CATARACT EXTRACTION      CATARACT EXTRACTION W/ INTRAOCULAR LENS IMPLANT Left 01/2014    CORONARY ANGIOPLASTY WITH STENT PLACEMENT  2001    one stent    HERNIA REPAIR Right     inquinal    JOINT REPLACEMENT Left     Hip   [address]    also had arthroscopy right knee 2005    PANENDOSCOPY  10/23/2012    vocal cord polyp    PILONIDAL CYST DRAINAGE      x 3    MN REMV CATARACT EXTRACAP,INSERT LENS Right 4/6/2016    Procedure: EXTRACTION EXTRACAPSULAR CATARACT PHACO INTRAOCULAR LENS (IOL); Surgeon: Elif Upton MD;  Location: [facility] MAIN OR;  Service: Ophthalmology    MN SURG IMPLNT [address] Left 8/23/2017    Procedure: Placement of Thoracic SCS with left buttock IPG (IMPULSE); Surgeon: Jeyson Haines MD;  Location:  MAIN OR;  Service: Neurosurgery    SPINAL CORD STIMULATOR TRIAL W/ LAMINOTOMY N/A 4/13/2017    Procedure: INSERTION DORSAL COLUMN SPINAL CORD STIMULATOR TRIAL;  Surgeon: Ritesh Montoya MD;  Location: [place] [address] MAIN OR;  Service:     TONSILLECTOMY         Family History   Problem Relation Age of Onset    Cancer Father 80        prostate       Social History     Occupational History    Not on file       Social History Main Topics    Smoking status: Current Every Day Smoker     Packs/day: 1 00     Years: 60 00    Smokeless tobacco: Never Used    Alcohol use Yes      Comment: occasionally    Drug use: No    Sexual activity: Not on file         Current Outpatient Prescriptions:     albuterol (PROAIR HFA) 90 mcg/act inhaler, Inhale 2 puffs every 6 (six) hours as needed for wheezing, Disp: , Rfl:     atorvastatin (LIPITOR) 40 mg tablet, Take 40 mg by mouth daily  , Disp: , Rfl:     BYSTOLIC 10 MG tablet, TAKE 1 TABLET EVERY DAY, Disp: 90 tablet, Rfl: 3    diltiazem (DILACOR XR) 240 MG 24 hr capsule, Take 1 capsule (240 mg total) by mouth daily at bedtime, Disp: 90 capsule, Rfl: 3    fluticasone (FLONASE) 50 mcg/act nasal spray, 2 sprays into each nostril daily, Disp: , Rfl:     fluticasone furoate-vilanterol (BREO ELLIPTA), Inhale 1 puff daily, Disp: 3 each, Rfl: 3    [START ON 12/9/2018] HYDROcodone-acetaminophen (NORCO)  mg per tablet, Take 1 tablet by mouth 3 (three) times a day as needed for severe pain for up to 30 days Max Daily Amount: 3 tablets, Disp: 90 tablet, Rfl: 0    ipratropium-albuterol (DUO-NEB) 0 5-2 5 mg/3 mL, Take 3 mL by nebulization every 6 (six) hours, Disp: 360 mL, Rfl: 2    OMEPRAZOLE PO, Take 40 mg by mouth daily  , Disp: , Rfl:     OXYGEN-HELIUM IN, Inhale 2 L daily at bedtime, Disp: , Rfl:     predniSONE 10 mg tablet, Three tabs daily x3 days  2 tabs daily x3 days 1 tab daily x3 days Extra tabs, Disp: 20 tablet, Rfl: 0    rivaroxaban (XARELTO) 20 mg tablet, Take 1 tablet (20 mg total) by mouth daily with dinner, Disp: 90 tablet, Rfl: 3    tamsulosin (FLOMAX) 0 4 mg, Take 0 4 mg by mouth daily with dinner, Disp: , Rfl:     traZODone (DESYREL) 50 mg tablet, , Disp: , Rfl:     [START ON 11/9/2018] HYDROcodone-acetaminophen (NORCO)  mg per tablet, Take 1 tablet by mouth 3 (three) times a day as needed (PAIN) for up to 30 days Max Daily Amount: 3 tablets, Disp: 90 tablet, Rfl: 0    pregabalin (LYRICA) 75 mg capsule, Take 1 capsule (75 mg total) by mouth 2 (two) times a day, Disp: 60 capsule, Rfl: 0    No Known Allergies    Physical Exam:    /80 (BP Location: Right arm, Patient Position: Sitting, Cuff Size: Standard)   Pulse 72   Resp 20   Ht 5' 10" (1 778 m)   Wt 117 kg (258 lb 6 4 oz)   BMI 37 08 kg/m²      Constitutional:normal, well developed, well nourished, alert, in no distress and non-toxic and no overt pain behavior    Eyes:anicteric  HEENT:grossly intact  Neck:supple, symmetric, trachea midline and no masses   Pulmonary:even and unlabored  Cardiovascular:No edema or pitting edema present  Skin:Normal without rashes or lesions and well hydrated  Psychiatric:Mood and affect appropriate  Neurologic:Cranial Nerves II-XII grossly intact  Musculoskeletal:antalgic

## 2018-11-08 ENCOUNTER — TRANSCRIBE ORDERS (OUTPATIENT)
Dept: ADMINISTRATIVE | Facility: HOSPITAL | Age: 78
End: 2018-11-08

## 2018-11-08 ENCOUNTER — APPOINTMENT (OUTPATIENT)
Dept: LAB | Facility: HOSPITAL | Age: 78
End: 2018-11-08
Attending: INTERNAL MEDICINE
Payer: MEDICARE

## 2018-11-08 ENCOUNTER — TELEPHONE (OUTPATIENT)
Dept: CARDIOLOGY CLINIC | Facility: CLINIC | Age: 78
End: 2018-11-08

## 2018-11-08 DIAGNOSIS — E78.00 PURE HYPERCHOLESTEROLEMIA: ICD-10-CM

## 2018-11-08 DIAGNOSIS — R97.20 ELEVATED PSA: ICD-10-CM

## 2018-11-08 DIAGNOSIS — E55.9 VITAMIN D DEFICIENCY: ICD-10-CM

## 2018-11-08 DIAGNOSIS — I10 ESSENTIAL HYPERTENSION, MALIGNANT: ICD-10-CM

## 2018-11-08 DIAGNOSIS — E66.9 CLASS 2 OBESITY: ICD-10-CM

## 2018-11-08 DIAGNOSIS — I49.3 PVCS (PREMATURE VENTRICULAR CONTRACTIONS): ICD-10-CM

## 2018-11-08 DIAGNOSIS — Z98.61 CAD S/P PERCUTANEOUS CORONARY ANGIOPLASTY: ICD-10-CM

## 2018-11-08 DIAGNOSIS — E78.00 PURE HYPERCHOLESTEROLEMIA: Primary | ICD-10-CM

## 2018-11-08 DIAGNOSIS — I25.10 CAD S/P PERCUTANEOUS CORONARY ANGIOPLASTY: ICD-10-CM

## 2018-11-08 DIAGNOSIS — I10 ESSENTIAL HYPERTENSION: ICD-10-CM

## 2018-11-08 DIAGNOSIS — E78.5 DYSLIPIDEMIA: ICD-10-CM

## 2018-11-08 LAB
25(OH)D3 SERPL-MCNC: 24.1 NG/ML (ref 30–100)
ALBUMIN SERPL BCP-MCNC: 3.6 G/DL (ref 3.5–5)
ALP SERPL-CCNC: 62 U/L (ref 46–116)
ALT SERPL W P-5'-P-CCNC: 25 U/L (ref 12–78)
ANION GAP SERPL CALCULATED.3IONS-SCNC: 6 MMOL/L (ref 4–13)
AST SERPL W P-5'-P-CCNC: 18 U/L (ref 5–45)
BILIRUB SERPL-MCNC: 0.8 MG/DL (ref 0.2–1)
BUN SERPL-MCNC: 13 MG/DL (ref 5–25)
CALCIUM SERPL-MCNC: 8.8 MG/DL (ref 8.3–10.1)
CHLORIDE SERPL-SCNC: 99 MMOL/L (ref 100–108)
CHOLEST SERPL-MCNC: 98 MG/DL (ref 50–200)
CO2 SERPL-SCNC: 30 MMOL/L (ref 21–32)
CREAT SERPL-MCNC: 0.95 MG/DL (ref 0.6–1.3)
GFR SERPL CREATININE-BSD FRML MDRD: 76 ML/MIN/1.73SQ M
GLUCOSE P FAST SERPL-MCNC: 105 MG/DL (ref 65–99)
HDLC SERPL-MCNC: 54 MG/DL (ref 40–60)
LDLC SERPL CALC-MCNC: 25 MG/DL (ref 0–100)
NONHDLC SERPL-MCNC: 44 MG/DL
POTASSIUM SERPL-SCNC: 4.5 MMOL/L (ref 3.5–5.3)
PROT SERPL-MCNC: 7 G/DL (ref 6.4–8.2)
PSA SERPL-MCNC: 12 NG/ML (ref 0–4)
SODIUM SERPL-SCNC: 135 MMOL/L (ref 136–145)
TRIGL SERPL-MCNC: 96 MG/DL

## 2018-11-08 PROCEDURE — 84153 ASSAY OF PSA TOTAL: CPT

## 2018-11-08 PROCEDURE — 80053 COMPREHEN METABOLIC PANEL: CPT

## 2018-11-08 PROCEDURE — 80061 LIPID PANEL: CPT

## 2018-11-08 PROCEDURE — 82306 VITAMIN D 25 HYDROXY: CPT

## 2018-11-08 PROCEDURE — 36415 COLL VENOUS BLD VENIPUNCTURE: CPT

## 2018-11-08 NOTE — TELEPHONE ENCOUNTER
----- Message from Iman Perkins MD sent at 11/8/2018 10:46 AM EST -----  Excellent chemistry survey and lipid panel with only abnormality a slightly elevated blood sugar of 105 and a minimal decrease in sodium level, just 1 point below normal

## 2018-11-09 NOTE — PROGRESS NOTES
11/13/2018    Que Briggs  1940  [id number]    Discussion and Plan    PSA trend reviewed with patient which is currently at 12 0  He has had values this high in the past   Abnormal ALICIA noted as well which remained stable overall  While it is likely harbors a localized prostate cancer, given patient's comorbidities and advanced age this will be observed closely  He will return in 6 months with PSA prior to visit  All questions answered at this time  1  Elevated PSA  - PSA Total, Diagnostic; Future    Assessment      Patient Active Problem List   Diagnosis    Chronic pain syndrome    Pain of lumbar spine    Sacroiliitis (HCC)    Intervertebral disc disorder with radiculopathy of lumbar region    Centrilobular emphysema (HCC)    Dyspnea on exertion    Chronic atrial fibrillation (HCC)    Coronary artery disease    Chronic respiratory failure with hypoxia (HCC)    Nicotine dependence    Cervical radiculopathy    Cervical spinal stenosis    Cervical pain    Elevated PSA       History of Present Illness    Ronald Grande is a 76 y o  male seen today in regards to a history of elevated PSA presenting for follow up       Patient previously had a PSA baseline in the mid 7 range  States he previously followed with Dr Faustino Lawton and has had prostate biopsies in the past that were reportedly negative, however we do not have the records to review  Patient is unsure what his PSA was at his last biopsy, however believes it was done approximately 10 years ago       Patient had a spinal surgery performed  His PSA shortly thereafter was significant elevated at 44  Since, it has decreased down to 10, 11, and now most recently 9  PSA is 9 4 (4/26/18) and 12 0 presently      Patient feels that at baseline his urination is fine  Feels like he has a fair stream, feels empty after urination, and has nocturia 2-4x nightly   Denies any dysuria, gross hematuria, flank pain, or weight loss         Urinary Symptom Assessment        Past Medical History  Past Medical History:   Diagnosis Date    Acid reflux     Arthritis     BPH (benign prostatic hyperplasia)     no treatment    Calcium disorder     Chronic atrial fibrillation (HCC) 2/6/2018    Chronic pain     COPD (chronic obstructive pulmonary disease) (HCC)     Coronary artery disease 2001    PTCA with one stent    Eye redness     Poked his eye on 3/18/16 "bloody" right eye    Hiatal hernia     Hydronephrosis     Hyperlipidemia     Hypertension     Irregular heart beat     A-Fib    Nocturnal leg cramps     Osteopenia     Peripheral artery disease (HCC)     Pleural effusion     Sleep apnea     REFUSES MACHINE HS    Vocal cord polyp 10/2012    panendoscopy       Past Social History  Past Surgical History:   Procedure Laterality Date    ANGIOPLASTY      APPENDECTOMY  04/2007    CARDIOVERSION      x2, for A-Fib    CARDIOVERSION      CATARACT EXTRACTION      CATARACT EXTRACTION W/ INTRAOCULAR LENS IMPLANT Left 01/2014    CORONARY ANGIOPLASTY WITH STENT PLACEMENT  2001    one stent    HERNIA REPAIR Right     inquinal    JOINT REPLACEMENT Left     [address]ip   [address]    also had arthroscopy right knee 2005    PANENDOSCOPY  10/23/2012    vocal cord polyp    PILONIDAL CYST DRAINAGE      x 3    [place] REMV CATARACT EXTRACAP,INSERT LENS Right 4/6/2016    Procedure: EXTRACTION EXTRACAPSULAR CATARACT PHACO INTRAOCULAR LENS (IOL); Surgeon: Eliezer Guido MD;  Location[facility] MAIN OR;  Service: Ophthalmology    IL SURG IMPLNT [address] Left 8/23/2017    Procedure: Placement of Thoracic SCS with left buttock IPG (IMPULSE);   Surgeon: Vick Coleman MD;  Location:  MAIN OR;  Service: Neurosurgery    SPINAL CORD STIMULATOR TRIAL W/ LAMINOTOMY N/A 4/13/2017    Procedure: INSERTION DORSAL COLUMN SPINAL CORD STIMULATOR TRIAL;  Surgeon: Mauri Wilcox MD;  [facility];  Service:    Marco Antonio Zimmerman TONSILLECTOMY         Past Family History  Family History   Problem Relation Age of Onset    Cancer Father 80        prostate       Past Social history  Social History     Social History    Marital status:      Spouse name: N/A    Number of children: N/A    Years of education: N/A     Occupational History    Not on file       Social History Main Topics    Smoking status: Current Every Day Smoker     Packs/day: 1 00     Years: 60 00    Smokeless tobacco: Never Used    Alcohol use Yes      Comment: occasionally    Drug use: No    Sexual activity: Not on file     Other Topics Concern    Not on file     Social History Narrative    No narrative on file       Current Medications  Current Outpatient Prescriptions   Medication Sig Dispense Refill    albuterol (PROAIR HFA) 90 mcg/act inhaler Inhale 2 puffs every 6 (six) hours as needed for wheezing      atorvastatin (LIPITOR) 40 mg tablet Take 40 mg by mouth daily        BYSTOLIC 10 MG tablet TAKE 1 TABLET EVERY DAY 90 tablet 3    diltiazem (DILACOR XR) 240 MG 24 hr capsule Take 1 capsule (240 mg total) by mouth daily at bedtime 90 capsule 3    fluticasone (FLONASE) 50 mcg/act nasal spray 2 sprays into each nostril daily      fluticasone furoate-vilanterol (BREO ELLIPTA) Inhale 1 puff daily 3 each 3    HYDROcodone-acetaminophen (NORCO)  mg per tablet Take 1 tablet by mouth 3 (three) times a day as needed (PAIN) for up to 30 days Max Daily Amount: 3 tablets 90 tablet 0    [START ON 12/9/2018] HYDROcodone-acetaminophen (NORCO)  mg per tablet Take 1 tablet by mouth 3 (three) times a day as needed for severe pain for up to 30 days Max Daily Amount: 3 tablets 90 tablet 0    ipratropium-albuterol (DUO-NEB) 0 5-2 5 mg/3 mL Take 3 mL by nebulization every 6 (six) hours 360 mL 2    OMEPRAZOLE PO Take 40 mg by mouth daily        OXYGEN-HELIUM IN Inhale 2 L daily at bedtime      predniSONE 10 mg tablet Three tabs daily x3 days  2 tabs daily x3 days 1 tab daily x3 days  Extra tabs 20 tablet 0    pregabalin (LYRICA) 75 mg capsule Take 1 capsule (75 mg total) by mouth 2 (two) times a day 60 capsule 0    rivaroxaban (XARELTO) 20 mg tablet Take 1 tablet (20 mg total) by mouth daily with dinner 90 tablet 3    tamsulosin (FLOMAX) 0 4 mg Take 0 4 mg by mouth daily with dinner      traZODone (DESYREL) 50 mg tablet        No current facility-administered medications for this visit  Allergies  No Known Allergies    Past Medical History, Social History, Family History, medications and allergies were reviewed  Review of Systems  Review of Systems   Constitutional: Negative  HENT: Negative  Eyes: Negative  Respiratory: Negative  Cardiovascular: Negative  Gastrointestinal: Negative  Endocrine: Negative  Genitourinary: Negative for decreased urine volume, difficulty urinating and hematuria  Musculoskeletal: Negative  Skin: Negative  Neurological: Negative  Hematological: Negative  Psychiatric/Behavioral: Negative  Vitals  Vitals:    11/13/18 0855   BP: 140/80   BP Location: Left arm   Patient Position: Sitting   Cuff Size: Adult   Pulse: 67   Weight: 119 kg (262 lb)   Height: 5' 10" (1 778 m)         Physical Exam    Physical Exam   Constitutional: He is oriented to person, place, and time  He appears well-developed and well-nourished  HENT:   Head: Normocephalic and atraumatic  Eyes: Pupils are equal, round, and reactive to light  Neck: Normal range of motion  Cardiovascular: Normal rate, regular rhythm and normal heart sounds  Pulmonary/Chest: Effort normal and breath sounds normal  No accessory muscle usage  No respiratory distress  Abdominal: Soft  Normal appearance and bowel sounds are normal  There is no tenderness  Genitourinary: Rectum normal and penis normal  No penile tenderness  Genitourinary Comments: Prostate greater than 50 g with distinct roughly 8 mm nodule noted toward the apex     Musculoskeletal: Normal range of motion  Neurological: He is alert and oriented to person, place, and time  Skin: Skin is warm, dry and intact  Psychiatric: He has a normal mood and affect  His speech is normal  Cognition and memory are normal    Nursing note and vitals reviewed        Results    Below listed labs, pathology results, and radiology images were personally reviewed:    Lab Results   Component Value Date/Time    PSA 12 0 (H) 11/08/2018 06:32 AM    PSA 7 8 (H) 02/16/2017 08:25 AM     Lab Results   Component Value Date    CALCIUM 8 8 11/08/2018    K 4 5 11/08/2018    CO2 30 11/08/2018    CL 99 (L) 11/08/2018    BUN 13 11/08/2018    CREATININE 0 95 11/08/2018     Lab Results   Component Value Date    WBC 8 30 04/26/2018    HGB 14 8 04/26/2018    HCT 45 0 04/26/2018     (H) 04/26/2018     04/26/2018       No results found for this or any previous visit (from the past 1 hour(s)) ]

## 2018-11-13 ENCOUNTER — OFFICE VISIT (OUTPATIENT)
Dept: UROLOGY | Facility: CLINIC | Age: 78
End: 2018-11-13
Payer: MEDICARE

## 2018-11-13 VITALS
DIASTOLIC BLOOD PRESSURE: 80 MMHG | HEART RATE: 67 BPM | SYSTOLIC BLOOD PRESSURE: 140 MMHG | WEIGHT: 262 LBS | HEIGHT: 70 IN | BODY MASS INDEX: 37.51 KG/M2

## 2018-11-13 DIAGNOSIS — R97.20 ELEVATED PSA: Primary | ICD-10-CM

## 2018-11-13 PROCEDURE — 99214 OFFICE O/P EST MOD 30 MIN: CPT | Performed by: UROLOGY

## 2018-11-26 ENCOUNTER — OFFICE VISIT (OUTPATIENT)
Dept: PULMONOLOGY | Facility: MEDICAL CENTER | Age: 78
End: 2018-11-26
Payer: MEDICARE

## 2018-11-26 VITALS
SYSTOLIC BLOOD PRESSURE: 128 MMHG | TEMPERATURE: 97.5 F | OXYGEN SATURATION: 95 % | DIASTOLIC BLOOD PRESSURE: 64 MMHG | HEART RATE: 73 BPM | BODY MASS INDEX: 37.22 KG/M2 | RESPIRATION RATE: 12 BRPM | WEIGHT: 260 LBS | HEIGHT: 70 IN

## 2018-11-26 DIAGNOSIS — J96.11 CHRONIC RESPIRATORY FAILURE WITH HYPOXIA (HCC): ICD-10-CM

## 2018-11-26 DIAGNOSIS — J43.2 CENTRILOBULAR EMPHYSEMA (HCC): Primary | ICD-10-CM

## 2018-11-26 DIAGNOSIS — F17.210 CIGARETTE NICOTINE DEPENDENCE WITHOUT COMPLICATION: ICD-10-CM

## 2018-11-26 PROCEDURE — 99214 OFFICE O/P EST MOD 30 MIN: CPT | Performed by: NURSE PRACTITIONER

## 2018-11-26 RX ORDER — FLUTICASONE FUROATE AND VILANTEROL 200; 25 UG/1; UG/1
1 POWDER RESPIRATORY (INHALATION) DAILY
Qty: 3 INHALER | Refills: 3 | Status: SHIPPED | OUTPATIENT
Start: 2018-11-26 | End: 2020-02-04 | Stop reason: ALTCHOICE

## 2018-11-26 NOTE — ASSESSMENT & PLAN NOTE
Herb Wislon uses and benefits from 2 L of supplemental oxygen at nighttime  He has history of nocturnal hypoxia  Room air oxygen at rest was 96%  He is aware that weight loss would be of benefit

## 2018-11-26 NOTE — PATIENT INSTRUCTIONS
I have ordered for you the higher dose of Breo  Will use 1 puff a day with a 200 mcg dose  Be sure to range her mouth after every use  Continue to use her nebulizer  Up to 3 times a day  I have also ordered for you something called Nicotrol inhalers  Can use these up to 10 cartridges a day  This should help you in cutting back on the amount of cigarettes that you smoke

## 2018-11-26 NOTE — ASSESSMENT & PLAN NOTE
Mahi Walls has moderately severe emphysema  He is stable at this point  He has been using Breo 200 mcg 1 puff daily  He also uses DuoNeb 3 to 4 times a day  He does prefer the Breo 200 mcg as he has had good results with this in the past   He would like me T ordered the 200 mg and I think this is a good plan of care  Additionally his PFT that was done in May of 2018 revealed forced vital capacity of 2 85 L or 70% of predicted, FEV1 was 1 48 L or 51% obstruction ratio of 52%

## 2018-11-26 NOTE — ASSESSMENT & PLAN NOTE
Bessy Laws has been smoking for 60+ years  He is currently smoking 1 pack per day  He would like to try to cut down on cigarettes but has found this difficult the past   Our plan includes using Nicotrol 10 mg inhaler  I will be ordering this from his mail order  He is aware that he should not exceed more than 10 cartridges a day or more than 1 per hour

## 2018-11-26 NOTE — PROGRESS NOTES
Assessment/Plan:     Problem List Items Addressed This Visit        Respiratory    Centrilobular emphysema ([address] ) - Primary     Kalie Harvey has moderately severe emphysema  He is stable at this point  He has been using Breo 200 mcg 1 puff daily  He also uses DuoNeb 3 to 4 times a day  He does prefer the Breo 200 mcg as he has had good results with this in the past   He would like me T ordered the 200 mg and I think this is a good plan of care  Additionally his PFT that was done in May of 2018 revealed forced vital capacity of 2 85 L or 70% of predicted, FEV1 was 1 48 L or 51% obstruction ratio of 52%  Relevant Medications    fluticasone-vilanterol (BREO ELLIPTA) 200-25 MCG/INH inhaler    Chronic respiratory failure with hypoxia ([address] )     Danielito uses and benefits from 2 L of supplemental oxygen at nighttime  He has history of nocturnal hypoxia  Room air oxygen at rest was 96%  He is aware that weight loss would be of benefit  Other    Nicotine dependence     A Kelley Hargrove has been smoking for 60+ years  He is currently smoking 1 pack per day  He would like to try to cut down on cigarettes but has found this difficult the past   Our plan includes using Nicotrol 10 mg inhaler  I will be ordering this from his mail order  He is aware that he should not exceed more than 10 cartridges a day or more than 1 per hour  Relevant Medications    nicotine (NICOTROL) 10 MG inhaler            Return in about 6 months (around 5/26/2019)  All questions are answered to the patient's satisfaction and understanding  He verbalizes understanding  He is encouraged to call with any further questions or concerns  Portions of the record may have been created with voice recognition software  Occasional wrong word or "sound a like" substitutions may have occurred due to the inherent limitations of voice recognition software    Read the chart carefully and recognize, using context, where substitutions have occurred  Electronically Signed by MITCHELL Gilliam  HPI:  Billy Perla is a 23-year-old male with moderately severe emphysema  His last visit was in May of 2018  Pulmonary function tests were done at that time that revealed forced vital capacity of 2 85 L or 70% of predicted, FEV1 was 1 48 L or 51% of predicted obstruction ratio was 52%  Flow volume loop showed moderately severe obstruction  He did have a CT of his chest done in August 2013 for which there was chronic scarring versus fibrosis  Hesham Cordero smokes approximately 1 pack of cigarettes a day  Been unable to quit     ______________________________________________________________________    Chief Complaint:   Chief Complaint   Patient presents with    Shortness of Breath     pt states its been about the same    Cough     pt states he smoked too much this weekend        Patient ID: Billy Perla is a [de-identified] y o  y o  male has a past medical history of Acid reflux; Arthritis; BPH (benign prostatic hyperplasia); Calcium disorder; Chronic atrial fibrillation ([address] ) (2/6/2018); Chronic pain; COPD (chronic obstructive pulmonary disease) ([address] ); Coronary artery disease (2001); Eye redness; Hiatal hernia; Hydronephrosis; Hyperlipidemia; Hypertension; Irregular heart beat; Nocturnal leg cramps; Osteopenia; Peripheral artery disease ([address] ); Pleural effusion; Sleep apnea; and 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)  11/26/2018  Patient presents today for follow-up visit  Shortness of Breath   This is a chronic problem  The current episode started more than 1 year ago  The problem occurs intermittently  The problem has been waxing and waning  The symptoms are aggravated by exercise  The patient has no known risk factors for DVT/PE  He has tried beta agonist inhalers, ipratropium inhalers and steroid inhalers for the symptoms  The treatment provided moderate relief  His past medical history is significant for chronic lung disease and COPD     Cough   Associated symptoms include shortness of breath  His past medical history is significant for COPD  Review of Systems   Constitutional: Negative  HENT: Negative  Respiratory: Positive for cough and shortness of breath  Gastrointestinal: Negative  Endocrine: Negative  Genitourinary: Negative  Musculoskeletal: Negative  Skin: Negative  Allergic/Immunologic: Negative  Neurological: Negative  Hematological: Negative  Psychiatric/Behavioral: Negative  Smoking history: He reports that he has been smoking  He has a 60 00 pack-year smoking history  He has never used smokeless tobacco     The following portions of the patient's history were reviewed and updated as appropriate: allergies, current medications, past family history, past medical history, past social history, past surgical history and problem list       There is no immunization history on file for this patient    Current Outpatient Prescriptions   Medication Sig Dispense Refill    albuterol (PROAIR HFA) 90 mcg/act inhaler Inhale 2 puffs every 6 (six) hours as needed for wheezing      atorvastatin (LIPITOR) 40 mg tablet Take 40 mg by mouth daily        BYSTOLIC 10 MG tablet TAKE 1 TABLET EVERY DAY 90 tablet 3    diltiazem (DILACOR XR) 240 MG 24 hr capsule Take 1 capsule (240 mg total) by mouth daily at bedtime 90 capsule 3    fluticasone (FLONASE) 50 mcg/act nasal spray 2 sprays into each nostril daily      fluticasone furoate-vilanterol (BREO ELLIPTA) Inhale 1 puff daily 3 each 3    HYDROcodone-acetaminophen (NORCO)  mg per tablet Take 1 tablet by mouth 3 (three) times a day as needed (PAIN) for up to 30 days Max Daily Amount: 3 tablets 90 tablet 0    [START ON 12/9/2018] HYDROcodone-acetaminophen (NORCO)  mg per tablet Take 1 tablet by mouth 3 (three) times a day as needed for severe pain for up to 30 days Max Daily Amount: 3 tablets 90 tablet 0    ipratropium-albuterol (DUO-NEB) 0 5-2 5 mg/3 mL Take 3 mL by nebulization every 6 (six) hours 360 mL 2    OMEPRAZOLE PO Take 40 mg by mouth daily        OXYGEN-HELIUM IN Inhale 2 L daily at bedtime      rivaroxaban (XARELTO) 20 mg tablet Take 1 tablet (20 mg total) by mouth daily with dinner 90 tablet 3    tamsulosin (FLOMAX) 0 4 mg Take 0 4 mg by mouth daily with dinner      fluticasone-vilanterol (BREO ELLIPTA) 200-25 MCG/INH inhaler Inhale 1 puff daily Rinse mouth after use  3 Inhaler 3    nicotine (NICOTROL) 10 MG inhaler Inhale 1 puff as needed for smoking cessation for up to 30 days Do not exceed more than 16 cartridges a day  For more than 1 per hour  168 each 0    predniSONE 10 mg tablet Three tabs daily x3 days  2 tabs daily x3 days 1 tab daily x3 days  Extra tabs (Patient not taking: Reported on 11/26/2018 ) 20 tablet 0    pregabalin (LYRICA) 75 mg capsule Take 1 capsule (75 mg total) by mouth 2 (two) times a day (Patient not taking: Reported on 11/26/2018 ) 60 capsule 0    traZODone (DESYREL) 50 mg tablet        No current facility-administered medications for this visit  Allergies: Patient has no known allergies  Objective:  Vitals:    11/26/18 0821   BP: 128/64   BP Location: Left arm   Patient Position: Sitting   Cuff Size: Standard   Pulse: 73   Resp: 12   Temp: 97 5 °F (36 4 °C)   TempSrc: Oral   SpO2: 95%   Weight: 118 kg (260 lb)   Height: 5' 10" (1 778 m)   Oxygen Therapy  SpO2: 95 %    Wt Readings from Last 3 Encounters:   11/26/18 118 kg (260 lb)   11/13/18 119 kg (262 lb)   11/05/18 117 kg (258 lb 6 4 oz)     Body mass index is 37 31 kg/m²  Physical Exam   Constitutional: He is oriented to person, place, and time  He appears well-developed and well-nourished  HENT:   Head: Normocephalic and atraumatic  Mallampati 3   Eyes: Pupils are equal, round, and reactive to light  Conjunctivae are normal    Neck: Normal range of motion  Neck supple  Cardiovascular:   Murmur heard    Pulmonary/Chest: Effort normal and breath sounds normal    Abdominal: Soft    Musculoskeletal: Normal range of motion  Neurological: He is alert and oriented to person, place, and time  Skin: Skin is warm and dry  Psychiatric: He has a normal mood and affect  His behavior is normal  Thought content normal        Lab Review:   Appointment on 11/08/2018   Component Date Value    PSA, Diagnostic 11/08/2018 12 0*    Vit D, 25-Hydroxy 11/08/2018 24 1*    Sodium 11/08/2018 135*    Potassium 11/08/2018 4 5     Chloride 11/08/2018 99*    CO2 11/08/2018 30     ANION GAP 11/08/2018 6     BUN 11/08/2018 13     Creatinine 11/08/2018 0 95     Glucose, Fasting 11/08/2018 105*    Calcium 11/08/2018 8 8     AST 11/08/2018 18     ALT 11/08/2018 25     Alkaline Phosphatase 11/08/2018 62     Total Protein 11/08/2018 7 0     Albumin 11/08/2018 3 6     Total Bilirubin 11/08/2018 0 80     eGFR 11/08/2018 76     Cholesterol 11/08/2018 98     Triglycerides 11/08/2018 96     HDL, Direct 11/08/2018 54     LDL Calculated 11/08/2018 25     Non-HDL-Chol (CHOL-HDL) 11/08/2018 44        Diagnostics:  I have personally reviewed pertinent reports  Office Spirometry Results:     ESS:    No results found

## 2018-12-05 ENCOUNTER — HOSPITAL ENCOUNTER (OUTPATIENT)
Dept: NON INVASIVE DIAGNOSTICS | Facility: HOSPITAL | Age: 78
Discharge: HOME | End: 2018-12-05
Attending: INTERNAL MEDICINE
Payer: MEDICARE

## 2018-12-05 DIAGNOSIS — I48.20 ATRIAL FIBRILLATION, CHRONIC (HCC): ICD-10-CM

## 2018-12-05 PROCEDURE — 93306 TTE W/DOPPLER COMPLETE: CPT

## 2018-12-06 PROCEDURE — 93306 TTE W/DOPPLER COMPLETE: CPT | Performed by: INTERNAL MEDICINE

## 2018-12-10 ENCOUNTER — TELEPHONE (OUTPATIENT)
Dept: CARDIOLOGY CLINIC | Facility: CLINIC | Age: 78
End: 2018-12-10

## 2018-12-10 NOTE — TELEPHONE ENCOUNTER
I left a message for Cristino Sage to call the office back and discuss results from recent echocardiogram

## 2018-12-10 NOTE — TELEPHONE ENCOUNTER
----- Message from Yennifer Rodriguez MD sent at 12/9/2018  3:06 PM EST -----  Please call the patient regarding his echocardiogram from 12/05/2018, which showed no serious abnormality  Will review in some detail at next appointment

## 2019-01-02 ENCOUNTER — APPOINTMENT (OUTPATIENT)
Dept: LAB | Facility: HOSPITAL | Age: 79
End: 2019-01-02
Attending: FAMILY MEDICINE
Payer: MEDICARE

## 2019-01-02 ENCOUNTER — TRANSCRIBE ORDERS (OUTPATIENT)
Dept: ADMINISTRATIVE | Facility: HOSPITAL | Age: 79
End: 2019-01-02

## 2019-01-02 DIAGNOSIS — R35.0 URINARY FREQUENCY: ICD-10-CM

## 2019-01-02 DIAGNOSIS — R35.0 URINARY FREQUENCY: Primary | ICD-10-CM

## 2019-01-02 LAB
BACTERIA UR QL AUTO: ABNORMAL /HPF
BILIRUB UR QL STRIP: NEGATIVE
CLARITY UR: ABNORMAL
COLOR UR: YELLOW
GLUCOSE UR STRIP-MCNC: NEGATIVE MG/DL
HGB UR QL STRIP.AUTO: ABNORMAL
KETONES UR STRIP-MCNC: NEGATIVE MG/DL
LEUKOCYTE ESTERASE UR QL STRIP: ABNORMAL
NITRITE UR QL STRIP: POSITIVE
NON-SQ EPI CELLS URNS QL MICRO: ABNORMAL /HPF
PH UR STRIP.AUTO: 8 [PH] (ref 5–9)
PROT UR STRIP-MCNC: NEGATIVE MG/DL
RBC #/AREA URNS AUTO: ABNORMAL /HPF
SP GR UR STRIP.AUTO: 1.01 (ref 1–1.03)
UROBILINOGEN UR QL STRIP.AUTO: 0.2 E.U./DL
WBC #/AREA URNS AUTO: ABNORMAL /HPF

## 2019-01-02 PROCEDURE — 87077 CULTURE AEROBIC IDENTIFY: CPT

## 2019-01-02 PROCEDURE — 87086 URINE CULTURE/COLONY COUNT: CPT

## 2019-01-02 PROCEDURE — 87186 SC STD MICRODIL/AGAR DIL: CPT

## 2019-01-02 PROCEDURE — 81001 URINALYSIS AUTO W/SCOPE: CPT | Performed by: FAMILY MEDICINE

## 2019-01-04 LAB — BACTERIA UR CULT: ABNORMAL

## 2019-01-07 ENCOUNTER — OFFICE VISIT (OUTPATIENT)
Dept: PAIN MEDICINE | Facility: CLINIC | Age: 79
End: 2019-01-07
Payer: MEDICARE

## 2019-01-07 VITALS
WEIGHT: 257.6 LBS | BODY MASS INDEX: 36.88 KG/M2 | HEART RATE: 60 BPM | DIASTOLIC BLOOD PRESSURE: 60 MMHG | RESPIRATION RATE: 12 BRPM | SYSTOLIC BLOOD PRESSURE: 126 MMHG | HEIGHT: 70 IN

## 2019-01-07 DIAGNOSIS — F11.20 UNCOMPLICATED OPIOID DEPENDENCE (HCC): ICD-10-CM

## 2019-01-07 DIAGNOSIS — M51.16 INTERVERTEBRAL DISC DISORDER WITH RADICULOPATHY OF LUMBAR REGION: ICD-10-CM

## 2019-01-07 DIAGNOSIS — G89.4 CHRONIC PAIN SYNDROME: Primary | ICD-10-CM

## 2019-01-07 DIAGNOSIS — M54.50 PAIN OF LUMBAR SPINE: ICD-10-CM

## 2019-01-07 DIAGNOSIS — Z79.891 LONG-TERM CURRENT USE OF OPIATE ANALGESIC: ICD-10-CM

## 2019-01-07 PROCEDURE — 99214 OFFICE O/P EST MOD 30 MIN: CPT | Performed by: ANESTHESIOLOGY

## 2019-01-07 PROCEDURE — 80305 DRUG TEST PRSMV DIR OPT OBS: CPT | Performed by: ANESTHESIOLOGY

## 2019-01-07 RX ORDER — HYDROCODONE BITARTRATE AND ACETAMINOPHEN 10; 325 MG/1; MG/1
1 TABLET ORAL 3 TIMES DAILY PRN
Qty: 90 TABLET | Refills: 0 | Status: SHIPPED | OUTPATIENT
Start: 2019-02-18 | End: 2019-03-20 | Stop reason: SDUPTHER

## 2019-01-07 RX ORDER — HYDROCODONE BITARTRATE AND ACETAMINOPHEN 10; 325 MG/1; MG/1
1 TABLET ORAL 3 TIMES DAILY PRN
Qty: 90 TABLET | Refills: 0 | Status: SHIPPED | OUTPATIENT
Start: 2019-01-19 | End: 2019-01-07 | Stop reason: SDUPTHER

## 2019-01-07 NOTE — LETTER
January 7, 2019     R[place], [address] [address]    Patient: Aisha Lopez   YOB: 1940   Date of Visit: 1/7/2019       Dear Dr Donnell Navarro: Thank you for referring Josiah Gutiérrez to me for evaluation  Below are my notes for this consultation  If you have questions, please do not hesitate to call me  I look forward to following your patient along with you  Sincerely,        Lindsay Ornelas MD        CC: No Recipients  Lindsay Ornelas MD  1/7/2019  9:28 AM  Sign at close encounter  Pain Medicine Follow-Up Note    Assessment:  1  Chronic pain syndrome    2  Pain of lumbar spine    3  Intervertebral disc disorder with radiculopathy of lumbar region        Plan:  Orders Placed This Encounter   Procedures    CT lumbar spine without contrast     Standing Status:   Future     Standing Expiration Date:   1/7/2023     Scheduling Instructions: There is no prep for this study  Please bring your insurance cards, a form of photo ID and a list of your medications with you  Arrive 15 minutes prior to your appointment time to register  On the day of your test, please bring any prior CT or MRI studies of this area with you that were not performed at a [facility]  To schedule this appointment, please contact Central Scheduling at [phone]  Order Specific Question:   What is the patient's sedation requirement?      Answer:   Unknown    Ambulatory referral to Orthopedic Surgery     Standing Status:   Future     Standing Expiration Date:   7/7/2019     Referral Priority:   Routine     Referral Type:   Consult - AMB     Referral Reason:   Specialty Services Required     Referred to Provider:   Rojas Newby MD     Requested Specialty:   Orthopedic Surgery     Number of Visits Requested:   1     Expiration Date:   1/7/2020       New Medications Ordered This Visit   Medications    HYDROcodone-acetaminophen (Marilia Goody) 10325 mg per tablet     Sig: Take 1 tablet by mouth 3 (three) times a day as needed (PAIN) for up to 30 days Earliest Fill Date: 2/18/19 Max Daily Amount: 3 tablets     Dispense:  90 tablet     Refill:  0     My impressions and treatment recommendations were discussed in detail with the patient who verbalized understanding and had no further questions  Given that the patient reports overall reduced pain and improved level functioning without significant side effects, I felt a reasonable to continue the patient on hydrocodone/acetaminophen 10/325 mg 1 tablet 3 times daily as needed for pain  I gave the patient prescriptions dated January 19, 2019 and February 18, 2019  The risks and side effects of chronic opioid treatment were discussed in detail with the patient  Side effects include but are not limited to nausea, vomiting, GI intolerance, sedation, constipation, mental clouding, opioid-induced hyperalgesia, endocrine dysfunction, addiction, dependence, and tolerance  The patient was asked to take his medications only as prescribed and directed, never in excess, and never for any other reason other than for pain control  The patient was also asked to keep his medications out of the reach of others and away from children, preferably in a locked drawer  The patient verbalized understanding and wished to use these opioid medications  A urine drug test was collected at today's office visit as part of our medication management protocol  The point of care testing results were appropriate for what was being prescribed  The specimen will be sent for confirmatory testing  The drug screen is medically necessary because the patient is either dependent on opioid medication or is being considered for opioid medication therapy and the results could impact ongoing or future treatment  The drug screen is to evaluate for the presence or absence of prescribed, non-prescribed, and/or illicit drugs and substances      [facility] Prescription Drug Monitoring Program report was reviewed and was appropriate      In addition, the patient is wondering about other options treat his pain  He has reports that the spinal cord stimulator is not working for him despite several spinal cord stimulator reprogramming attempts  He does not wish to return to the neuro surgical group  As such, I felt a reasonable to obtain a new CT of the lumbar spine and have the patient see Dr Nia Cortes for possible surgical options  Follow-up is planned in 2 months time or sooner as warranted  Discharge instructions were provided  I personally saw and examined the patient and I agree with the above discussed plan of care  History of Present Illness:    Rico Sol is a [de-identified] y o  male who presents to [facility] for interval re-evaluation of the above stated pain complaints  The patient has a past medical and chronic pain history as outlined in the assessment section  He was last seen on November 5, 2018 at which time he was maintained on hydrocodone/acetaminophen 10/325 mg 1 tablet 3 times daily as needed for pain  At today's office visit, the patient's pain score is 9 to 10/10 on the verbal numerical pain rating scale  The patient states that his pain is worse in his low back  He describes his pain as worse in the morning, evening, and night  His pain is constant in nature and he reports the quality of his pain as sharp and cramping    He reports that the Lyrica I have given him previously did not relieve his pain  He would like to continue the hydrocodone/acetaminophen 10/325 mg 1 tablet 3 times daily as needed for pain currently  He uses 3 tablets of hydrocodone/acetaminophen per day  Other than as stated above, the patient denies any interval changes in medications, medical condition, mental condition, symptoms, or allergies since the last office visit           Review of Systems:    Review of Systems   Respiratory: Positive for shortness of breath  Cardiovascular: Negative for chest pain  Gastrointestinal: Negative for constipation, diarrhea, nausea and vomiting  Musculoskeletal: Positive for gait problem (difficulty walking/ decreased range of motion) and joint swelling (joint stiffness)  Negative for arthralgias and myalgias  Skin: Negative for rash  Neurological: Positive for weakness (muscle weakness)  Negative for dizziness and seizures  All other systems reviewed and are negative          Patient Active Problem List   Diagnosis    Chronic pain syndrome    Pain of lumbar spine    Sacroiliitis (HCC)    Intervertebral disc disorder with radiculopathy of lumbar region    Centrilobular emphysema (HCC)    Dyspnea on exertion    Chronic atrial fibrillation (HCC)    Coronary artery disease    Chronic respiratory failure with hypoxia (HCC)    Nicotine dependence    Cervical radiculopathy    Cervical spinal stenosis    Cervical pain    Elevated PSA       Past Medical History:   Diagnosis Date    Acid reflux     Arthritis     BPH (benign prostatic hyperplasia)     no treatment    Calcium disorder     Chronic atrial fibrillation (HCC) 2/6/2018    Chronic pain     COPD (chronic obstructive pulmonary disease) ([facility] [address] )     Coronary artery disease 2001    PTCA with one stent    Eye redness     Poked his eye on 3/18/16 "bloody" right eye    Hiatal hernia     Hydronephrosis     Hyperlipidemia     Hypertension     Irregular heart beat     A-Fib    Nocturnal leg cramps     Osteopenia     Peripheral artery disease (HCC)     Pleural effusion     Sleep apnea     REFUSES MACHINE HS    Vocal cord polyp 10/2012    panendoscopy       Past Surgical History:   Procedure Laterality Date    ANGIOPLASTY      APPENDECTOMY  04/2007    CARDIOVERSION      x2, for A-Fib    CARDIOVERSION      CATARACT EXTRACTION      CATARACT EXTRACTION W/ INTRAOCULAR LENS IMPLANT Left 01/2014    CORONARY ANGIOPLASTY WITH STENT PLACEMENT  2001    one stent    HERNIA REPAIR Right     inquinal    JOINT REPLACEMENT Left     Hip    KNEE SURGERY  1994    also had arthroscopy right knee 2005    PANENDOSCOPY  10/23/2012    vocal cord polyp    PILONIDAL CYST DRAINAGE      x 3    WA REMV CATARACT EXTRACAP,INSERT LENS Right 4/6/2016    Procedure: EXTRACTION EXTRACAPSULAR CATARACT PHACO INTRAOCULAR LENS (IOL); Surgeon: Robyn Bryant MD;  Location: [facility] MAIN OR;  Service: Ophthalmology    WA SURG IMPLNT [address] Left 8/23/2017    Procedure: Placement of Thoracic SCS with left buttock IPG (IMPULSE); Surgeon: Guanakito Hazel MD;  Location:  MAIN OR;  Service: Neurosurgery    SPINAL CORD STIMULATOR TRIAL W/ LAMINOTOMY N/A 4/13/2017    Procedure: INSERTION DORSAL COLUMN SPINAL CORD STIMULATOR TRIAL;  Surgeon: Pauline Tanner MD;  Location: [place] [address] MAIN OR;  Service:     TONSILLECTOMY         Family History   Problem Relation Age of Onset    Cancer Father 80        prostate       Social History     Occupational History    Not on file       Social History Main Topics    Smoking status: Current Every Day Smoker     Packs/day: 1 00     Years: 60 00    Smokeless tobacco: Never Used    Alcohol use Yes      Comment: occasionally    Drug use: No    Sexual activity: Not on file         Current Outpatient Prescriptions:     albuterol (PROAIR HFA) 90 mcg/act inhaler, Inhale 2 puffs every 6 (six) hours as needed for wheezing, Disp: , Rfl:     atorvastatin (LIPITOR) 40 mg tablet, Take 40 mg by mouth daily  , Disp: , Rfl:     BYSTOLIC 10 MG tablet, TAKE 1 TABLET EVERY DAY, Disp: 90 tablet, Rfl: 3    diltiazem (DILACOR XR) 240 MG 24 hr capsule, Take 1 capsule (240 mg total) by mouth daily at bedtime, Disp: 90 capsule, Rfl: 3    fluticasone (FLONASE) 50 mcg/act nasal spray, 2 sprays into each nostril daily, Disp: , Rfl:     fluticasone furoate-vilanterol (BREO ELLIPTA), Inhale 1 puff daily, Disp: 3 each, Rfl: 3   fluticasone-vilanterol (BREO ELLIPTA) 200-25 MCG/INH inhaler, Inhale 1 puff daily Rinse mouth after use , Disp: 3 Inhaler, Rfl: 3    HYDROcodone-acetaminophen (NORCO)  mg per tablet, Take 1 tablet by mouth 3 (three) times a day as needed for severe pain for up to 30 days Max Daily Amount: 3 tablets, Disp: 90 tablet, Rfl: 0    ipratropium-albuterol (DUO-NEB) 0 5-2 5 mg/3 mL, Take 3 mL by nebulization every 6 (six) hours, Disp: 360 mL, Rfl: 2    OMEPRAZOLE PO, Take 40 mg by mouth daily  , Disp: , Rfl:     OXYGEN-HELIUM IN, Inhale 2 L daily at bedtime, Disp: , Rfl:     predniSONE 10 mg tablet, Three tabs daily x3 days  2 tabs daily x3 days 1 tab daily x3 days Extra tabs, Disp: 20 tablet, Rfl: 0    rivaroxaban (XARELTO) 20 mg tablet, Take 1 tablet (20 mg total) by mouth daily with dinner, Disp: 90 tablet, Rfl: 3    tamsulosin (FLOMAX) 0 4 mg, Take 0 4 mg by mouth daily with dinner, Disp: , Rfl:     traZODone (DESYREL) 50 mg tablet, , Disp: , Rfl:     [START ON 2/18/2019] HYDROcodone-acetaminophen (NORCO)  mg per tablet, Take 1 tablet by mouth 3 (three) times a day as needed (PAIN) for up to 30 days Earliest Fill Date: 2/18/19 Max Daily Amount: 3 tablets, Disp: 90 tablet, Rfl: 0    nicotine (NICOTROL) 10 MG inhaler, Inhale 1 puff as needed for smoking cessation for up to 30 days Do not exceed more than 16 cartridges a day    For more than 1 per hour , Disp: 168 each, Rfl: 0    No Known Allergies    Physical Exam:    /60 (BP Location: Left arm, Patient Position: Sitting, Cuff Size: Standard)   Pulse 60   Resp 12   Ht 5' 10" (1 778 m)   Wt 117 kg (257 lb 9 6 oz)   BMI 36 96 kg/m²      Constitutional:obese  Eyes:anicteric  HEENT:grossly intact  Neck:supple, symmetric, trachea midline and no masses   Pulmonary:even and unlabored  Cardiovascular:No edema or pitting edema present  Skin:Normal without rashes or lesions and well hydrated  Psychiatric:Mood and affect appropriate  Neurologic:Cranial Nerves II-XII grossly intact  Musculoskeletal:normal      Imaging  CT lumbar spine without contrast    (Results Pending)         Orders Placed This Encounter   Procedures    CT lumbar spine without contrast    Ambulatory referral to Orthopedic Surgery

## 2019-01-07 NOTE — PROGRESS NOTES
Pain Medicine Follow-Up Note    Assessment:  1  Chronic pain syndrome    2  Pain of lumbar spine    3  Intervertebral disc disorder with radiculopathy of lumbar region        Plan:  Orders Placed This Encounter   Procedures    CT lumbar spine without contrast     Standing Status:   Future     Standing Expiration Date:   1/7/2023     Scheduling Instructions: There is no prep for this study  Please bring your insurance cards, a form of photo ID and a list of your medications with you  Arrive 15 minutes prior to your appointment time to register  On the day of your test, please bring any prior CT or MRI studies of this area with you that were not performed at a [facility]  To schedule this appointment, please contact Central Scheduling at [phone]  Order Specific Question:   What is the patient's sedation requirement? Answer:   Unknown    Ambulatory referral to Orthopedic Surgery     Standing Status:   Future     Standing Expiration Date:   7/7/2019     Referral Priority:   Routine     Referral Type:   Consult - AMB     Referral Reason:   Specialty Services Required     Referred to Provider:   Betito Cain MD     Requested Specialty:   Orthopedic Surgery     Number of Visits Requested:   1     Expiration Date:   1/7/2020       New Medications Ordered This Visit   Medications    HYDROcodone-acetaminophen (NORCO)  mg per tablet     Sig: Take 1 tablet by mouth 3 (three) times a day as needed (PAIN) for up to 30 days Earliest Fill Date: 2/18/19 Max Daily Amount: 3 tablets     Dispense:  90 tablet     Refill:  0     My impressions and treatment recommendations were discussed in detail with the patient who verbalized understanding and had no further questions        Given that the patient reports overall reduced pain and improved level functioning without significant side effects, I felt a reasonable to continue the patient on hydrocodone/acetaminophen 10/325 mg 1 tablet 3 times daily as needed for pain  I gave the patient prescriptions dated January 19, 2019 and February 18, 2019  The risks and side effects of chronic opioid treatment were discussed in detail with the patient  Side effects include but are not limited to nausea, vomiting, GI intolerance, sedation, constipation, mental clouding, opioid-induced hyperalgesia, endocrine dysfunction, addiction, dependence, and tolerance  The patient was asked to take his medications only as prescribed and directed, never in excess, and never for any other reason other than for pain control  The patient was also asked to keep his medications out of the reach of others and away from children, preferably in a locked drawer  The patient verbalized understanding and wished to use these opioid medications  A urine drug test was collected at today's office visit as part of our medication management protocol  The point of care testing results were appropriate for what was being prescribed  The specimen will be sent for confirmatory testing  The drug screen is medically necessary because the patient is either dependent on opioid medication or is being considered for opioid medication therapy and the results could impact ongoing or future treatment  The drug screen is to evaluate for the presence or absence of prescribed, non-prescribed, and/or illicit drugs and substances  [place] Prescription Drug Monitoring Program report was reviewed and was appropriate      In addition, the patient is wondering about other options treat his pain  He has reports that the spinal cord stimulator is not working for him despite several spinal cord stimulator reprogramming attempts  He does not wish to return to the neuro surgical group  As such, I felt a reasonable to obtain a new CT of the lumbar spine and have the patient see Dr Darinel Grace for possible surgical options  Follow-up is planned in 2 months time or sooner as warranted  Discharge instructions were provided  I personally saw and examined the patient and I agree with the above discussed plan of care  History of Present Illness:    Giovana Stone is a [de-identified] y o  male who presents to [facility] for interval re-evaluation of the above stated pain complaints  The patient has a past medical and chronic pain history as outlined in the assessment section  He was last seen on November 5, 2018 at which time he was maintained on hydrocodone/acetaminophen 10/325 mg 1 tablet 3 times daily as needed for pain  At today's office visit, the patient's pain score is 9 to 10/10 on the verbal numerical pain rating scale  The patient states that his pain is worse in his low back  He describes his pain as worse in the morning, evening, and night  His pain is constant in nature and he reports the quality of his pain as sharp and cramping    He reports that the Lyrica I have given him previously did not relieve his pain  He would like to continue the hydrocodone/acetaminophen 10/325 mg 1 tablet 3 times daily as needed for pain currently  He uses 3 tablets of hydrocodone/acetaminophen per day  Other than as stated above, the patient denies any interval changes in medications, medical condition, mental condition, symptoms, or allergies since the last office visit  Review of Systems:    Review of Systems   Respiratory: Positive for shortness of breath  Cardiovascular: Negative for chest pain  Gastrointestinal: Negative for constipation, diarrhea, nausea and vomiting  Musculoskeletal: Positive for gait problem (difficulty walking/ decreased range of motion) and joint swelling (joint stiffness)  Negative for arthralgias and myalgias  Skin: Negative for rash  Neurological: Positive for weakness (muscle weakness)  Negative for dizziness and seizures  All other systems reviewed and are negative          Patient Active Problem List   Diagnosis    Chronic pain syndrome    Pain of lumbar spine    Sacroiliitis (HCC)    Intervertebral disc disorder with radiculopathy of lumbar region    Centrilobular emphysema (HCC)    Dyspnea on exertion    Chronic atrial fibrillation (HCC)    Coronary artery disease    Chronic respiratory failure with hypoxia (HCC)    Nicotine dependence    Cervical radiculopathy    Cervical spinal stenosis    Cervical pain    Elevated PSA       Past Medical History:   Diagnosis Date    Acid reflux     Arthritis     BPH (benign prostatic hyperplasia)     no treatment    Calcium disorder     Chronic atrial fibrillation (HCC) 2/6/2018    Chronic pain     COPD (chronic obstructive pulmonary disease) ([address] )     Coronary artery disease 2001    PTCA with one stent    Eye redness     Poked his eye on 3/18/16 "bloody" right eye    Hiatal hernia     Hydronephrosis     Hyperlipidemia     Hypertension     Irregular heart beat     A-Fib    Nocturnal leg cramps     Osteopenia     Peripheral artery disease (HCC)     Pleural effusion     Sleep apnea     REFUSES MACHINE HS    Vocal cord polyp 10/2012    panendoscopy       Past Surgical History:   Procedure Laterality Date    ANGIOPLASTY      APPENDECTOMY  04/2007    CARDIOVERSION      x2, for A-Fib    CARDIOVERSION      CATARACT EXTRACTION      CATARACT EXTRACTION W/ INTRAOCULAR LENS IMPLANT Left 01/2014    CORONARY ANGIOPLASTY WITH STENT PLACEMENT  2001    one stent    HERNIA REPAIR Right     inquinal    JOINT REPLACEMENT Left     Hip    KNEE SURGERY  1994    also had arthroscopy right knee 2005    PANENDOSCOPY  10/23/2012    vocal cord polyp    PILONIDAL CYST DRAINAGE      x 3    OH REMV CATARACT EXTRACAP,INSERT LENS Right 4/6/2016    Procedure: EXTRACTION EXTRACAPSULAR CATARACT PHACO INTRAOCULAR LENS (IOL);   Surgeon: Wyatt aPrker MD;  Location: [facility] MAIN OR;  Service: Ophthalmology    OH SURG IMPLNT [address] Left 8/23/2017    Procedure: Placement of Thoracic SCS with left buttock IPG (IMPULSE); Surgeon: Tiffany Yuan MD;  [facility] MAIN [facility];  Service: Neurosurgery    SPINAL CORD STIMULATOR TRIAL W/ LAMINOTOMY N/A 4/13/2017    Procedure: INSERTION DORSAL COLUMN SPINAL CORD STIMULATOR TRIAL;  Surgeon: Delvis Barrios MD;  Location: [facility] MAIN OR;  Service:     TONSILLECTOMY         Family History   Problem Relation Age of Onset    Cancer Father 80        prostate       Social History     Occupational History    Not on file       Social History Main Topics    Smoking status: Current Every Day Smoker     Packs/day: 1 00     Years: 60 00    Smokeless tobacco: Never Used    Alcohol use Yes      Comment: occasionally    Drug use: No    Sexual activity: Not on file         Current Outpatient Prescriptions:     albuterol (PROAIR HFA) 90 mcg/act inhaler, Inhale 2 puffs every 6 (six) hours as needed for wheezing, Disp: , Rfl:     atorvastatin (LIPITOR) 40 mg tablet, Take 40 mg by mouth daily  , Disp: , Rfl:     BYSTOLIC 10 MG tablet, TAKE 1 TABLET EVERY DAY, Disp: 90 tablet, Rfl: 3    diltiazem (DILACOR XR) 240 MG 24 hr capsule, Take 1 capsule (240 mg total) by mouth daily at bedtime, Disp: 90 capsule, Rfl: 3    fluticasone (FLONASE) 50 mcg/act nasal spray, 2 sprays into each nostril daily, Disp: , Rfl:     fluticasone furoate-vilanterol (BREO ELLIPTA), Inhale 1 puff daily, Disp: 3 each, Rfl: 3    fluticasone-vilanterol (BREO ELLIPTA) 200-25 MCG/INH inhaler, Inhale 1 puff daily Rinse mouth after use , Disp: 3 Inhaler, Rfl: 3    HYDROcodone-acetaminophen (NORCO)  mg per tablet, Take 1 tablet by mouth 3 (three) times a day as needed for severe pain for up to 30 days Max Daily Amount: 3 tablets, Disp: 90 tablet, Rfl: 0    ipratropium-albuterol (DUO-NEB) 0 5-2 5 mg/3 mL, Take 3 mL by nebulization every 6 (six) hours, Disp: 360 mL, Rfl: 2    OMEPRAZOLE PO, Take 40 mg by mouth daily  , Disp: , Rfl:     OXYGEN-HELIUM IN, Inhale 2 L daily at bedtime, Disp: , Rfl:     predniSONE 10 mg tablet, Three tabs daily x3 days  2 tabs daily x3 days 1 tab daily x3 days Extra tabs, Disp: 20 tablet, Rfl: 0    rivaroxaban (XARELTO) 20 mg tablet, Take 1 tablet (20 mg total) by mouth daily with dinner, Disp: 90 tablet, Rfl: 3    tamsulosin (FLOMAX) 0 4 mg, Take 0 4 mg by mouth daily with dinner, Disp: , Rfl:     traZODone (DESYREL) 50 mg tablet, , Disp: , Rfl:     [START ON 2/18/2019] HYDROcodone-acetaminophen (NORCO)  mg per tablet, Take 1 tablet by mouth 3 (three) times a day as needed (PAIN) for up to 30 days Earliest Fill Date: 2/18/19 Max Daily Amount: 3 tablets, Disp: 90 tablet, Rfl: 0    nicotine (NICOTROL) 10 MG inhaler, Inhale 1 puff as needed for smoking cessation for up to 30 days Do not exceed more than 16 cartridges a day    For more than 1 per hour , Disp: 168 each, Rfl: 0    No Known Allergies    Physical Exam:    /60 (BP Location: Left arm, Patient Position: Sitting, Cuff Size: Standard)   Pulse 60   Resp 12   Ht 5' 10" (1 778 m)   Wt 117 kg (257 lb 9 6 oz)   BMI 36 96 kg/m²     Constitutional:obese  Eyes:anicteric  HEENT:grossly intact  Neck:supple, symmetric, trachea midline and no masses   Pulmonary:even and unlabored  Cardiovascular:No edema or pitting edema present  Skin:Normal without rashes or lesions and well hydrated  Psychiatric:Mood and affect appropriate  Neurologic:Cranial Nerves II-XII grossly intact  Musculoskeletal:normal      Imaging  CT lumbar spine without contrast    (Results Pending)         Orders Placed This Encounter   Procedures    CT lumbar spine without contrast    Ambulatory referral to Orthopedic Surgery

## 2019-02-01 ENCOUNTER — TELEPHONE (OUTPATIENT)
Dept: CARDIOLOGY CLINIC | Facility: CLINIC | Age: 79
End: 2019-02-01

## 2019-02-01 ENCOUNTER — TELEPHONE (OUTPATIENT)
Dept: PULMONOLOGY | Facility: MEDICAL CENTER | Age: 79
End: 2019-02-01

## 2019-02-01 DIAGNOSIS — I10 ESSENTIAL HYPERTENSION: Primary | ICD-10-CM

## 2019-02-01 DIAGNOSIS — I48.20 CHRONIC ATRIAL FIBRILLATION (HCC): ICD-10-CM

## 2019-02-01 RX ORDER — NEBIVOLOL 10 MG/1
10 TABLET ORAL DAILY
Qty: 90 TABLET | Refills: 3 | Status: SHIPPED | OUTPATIENT
Start: 2019-02-01 | End: 2019-03-21 | Stop reason: SDUPTHER

## 2019-02-01 NOTE — TELEPHONE ENCOUNTER
XARELTO 20MG TAKES 1 TAB DAILY TO [facility] SEES DR CURTIS   HE ALSO WOULD LIKE TO KNOW IF IS THERE ANY OTHER MEDICATION HE CAN GET FOR BYSTOLIC PLEASE ADVISE

## 2019-02-01 NOTE — TELEPHONE ENCOUNTER
Refill of xarelto - also can pt take the generic of bystolic (nebivolol) - pt would like to pay a lower cost for his medication - send to [facility] - if so please d/c the bystolic (brand name) prescription

## 2019-03-07 ENCOUNTER — TELEPHONE (OUTPATIENT)
Dept: PAIN MEDICINE | Facility: CLINIC | Age: 79
End: 2019-03-07

## 2019-03-20 ENCOUNTER — OFFICE VISIT (OUTPATIENT)
Dept: PAIN MEDICINE | Facility: CLINIC | Age: 79
End: 2019-03-20
Payer: MEDICARE

## 2019-03-20 VITALS
SYSTOLIC BLOOD PRESSURE: 110 MMHG | DIASTOLIC BLOOD PRESSURE: 62 MMHG | BODY MASS INDEX: 35.96 KG/M2 | HEIGHT: 70 IN | RESPIRATION RATE: 12 BRPM | HEART RATE: 62 BPM | TEMPERATURE: 97.6 F | WEIGHT: 251.2 LBS

## 2019-03-20 DIAGNOSIS — M54.50 PAIN OF LUMBAR SPINE: ICD-10-CM

## 2019-03-20 DIAGNOSIS — M51.16 INTERVERTEBRAL DISC DISORDER WITH RADICULOPATHY OF LUMBAR REGION: ICD-10-CM

## 2019-03-20 DIAGNOSIS — G89.4 CHRONIC PAIN SYNDROME: ICD-10-CM

## 2019-03-20 PROCEDURE — 99214 OFFICE O/P EST MOD 30 MIN: CPT | Performed by: ANESTHESIOLOGY

## 2019-03-20 RX ORDER — HYDROCODONE BITARTRATE AND ACETAMINOPHEN 10; 325 MG/1; MG/1
1 TABLET ORAL 4 TIMES DAILY PRN
Qty: 120 TABLET | Refills: 0 | Status: SHIPPED | OUTPATIENT
Start: 2019-03-22 | End: 2019-03-21 | Stop reason: SDUPTHER

## 2019-03-20 RX ORDER — HYDROCODONE BITARTRATE AND ACETAMINOPHEN 10; 325 MG/1; MG/1
1 TABLET ORAL 3 TIMES DAILY PRN
Qty: 90 TABLET | Refills: 0 | Status: SHIPPED | OUTPATIENT
Start: 2019-04-21 | End: 2019-03-21 | Stop reason: SDUPTHER

## 2019-03-20 NOTE — PROGRESS NOTES
Pain Medicine Follow-Up Note    Assessment:  1  Chronic pain syndrome    2  Pain of lumbar spine    3  Intervertebral disc disorder with radiculopathy of lumbar region        Plan:  New Medications Ordered This Visit   Medications    HYDROcodone-acetaminophen (NORCO)  mg per tablet     Sig: Take 1 tablet by mouth 4 (four) times a day as needed for severe pain for up to 30 daysMax Daily Amount: 4 tablets     Dispense:  120 tablet     Refill:  0    HYDROcodone-acetaminophen (NORCO)  mg per tablet     Sig: Take 1 tablet by mouth 3 (three) times a day as needed for severe pain for up to 30 daysMax Daily Amount: 3 tablets     Dispense:  90 tablet     Refill:  0     My impressions and treatment recommendations were discussed in detail with the patient who verbalized understanding and had no further questions  The patient reports that he is having severe pain that has been worsening over time  He is requesting an extra dose of hydrocodone/acetaminophen 10/325 mg at today's visit  I did discuss with the patient that he has not been using his spinal cord stimulator  I asked him to start using it again to see if that would help alleviate some of his remaining pain  I will increase his hydrocodone/acetaminophen to 10/325 mg 1 tablet 4 times daily as needed for pain for 1 month and then decrease him back down to 3 times daily as needed for pain for the month after  I sent E-prescriptions to the pharmacy dated March 22, 2019 and April 21, 2019  The risks and side effects of chronic opioid treatment were discussed in detail with the patient  Side effects include but are not limited to nausea, vomiting, GI intolerance, sedation, constipation, mental clouding, opioid-induced hyperalgesia, endocrine dysfunction, addiction, dependence, and tolerance  The patient was asked to take his medications only as prescribed and directed, never in excess, and never for any other reason other than for pain control  The patient was also asked to keep his medications out of the reach of others and away from children, preferably in a locked drawer  The patient verbalized understanding and wished to use these opioid medications  [place] Prescription Drug Monitoring Program report was reviewed and was appropriate     Follow-up is planned in 4 weeks time or sooner as warranted  Discharge instructions were provided  I personally saw and examined the patient and I agree with the above discussed plan of care  History of Present Illness:    Miriam Cr is a [de-identified] y o  male who presents to [facility] for interval re-evaluation of the above stated pain complaints  The patient has a past medical and chronic pain history as outlined in the assessment section  He was last seen on January 7, 2019 at which time he was maintained on hydrocodone/acetaminophen 10/325 mg 1 tablet 3 times daily as needed for pain  At today's office visit, the patient's pain score is 10/10 on the verbal numerical pain rating scale  The patient states that his pain is primarily in his low back with radiation to the bilateral lower extremities  He states that his pain is worse in the morning, evening, and night  His pain is constant in nature  He describes the quality of his pain as throbbing and shooting    He reports 30% relief of symptoms with the combination of his medications  He is requesting more hydrocodone/acetaminophen at today's visit  Last Urine Drug Screen:  January 7, 2019    Other than as stated above, the patient denies any interval changes in medications, medical condition, mental condition, symptoms, or allergies since the last office visit  Review of Systems:    Review of Systems   Respiratory: Positive for shortness of breath  Cardiovascular: Negative for chest pain  Gastrointestinal: Negative for constipation, diarrhea, nausea and vomiting     Musculoskeletal: Positive for gait problem (difficulty walking/ decreased range of motion) and joint swelling (joint stiffness)  Negative for arthralgias and myalgias  Skin: Negative for rash  Neurological: Positive for weakness (muscle weakness)  Negative for dizziness and seizures  All other systems reviewed and are negative          Patient Active Problem List   Diagnosis    Chronic pain syndrome    Pain of lumbar spine    [facility]ro[facility]    Intervertebral disc disorder with radiculopathy of lumbar region    Centrilobular emphysema (HCC)    Dyspnea on exertion    [facility]    Coronary artery disease    Chronic respiratory failure with hypoxia (HCC)    Nicotine dependence    Cervical radiculopathy    Cervical spinal stenosis    Cervical pain    Elevated PSA       Past Medical History:   Diagnosis Date    Acid reflux     Arthritis     BPH (benign prostatic hyperplasia)     no treatment    Calcium disorder     Chronic atrial fibrillation ([facility] ) 2/6/2018    Chronic pain     COPD (chronic obstructive pulmonary disease) ([facility] )     Coronary artery disease 2001    PTCA with one stent    Eye redness     Poked his eye on 3/18/16 "bloody" right eye    Hiatal hernia     Hydronephrosis     Hyperlipidemia     Hypertension     Irregular heart beat     A-Fib    Nocturnal leg cramps     Osteopenia     Peripheral artery disease (HCC)     Pleural effusion     Sleep apnea     REFUSES MACHINE HS    Vocal cord polyp 10/2012    panendoscopy       Past Surgical History:   Procedure Laterality Date    ANGIOPLASTY      APPENDECTOMY  04/2007    CARDIOVERSION      x2, for A-Fib    CARDIOVERSION      CATARACT EXTRACTION      CATARACT EXTRACTION W/ INTRAOCULAR LENS IMPLANT Left 01/2014    CORONARY ANGIOPLASTY WITH STENT PLACEMENT  2001    one stent    HERNIA REPAIR Right     inquinal    JOINT REPLACEMENT Left     Hip    KNEE SURGERY  1994    also had arthroscopy right knee 2005    PANENDOSCOPY 10/23/2012    vocal cord polyp    PILONIDAL CYST DRAINAGE      x 3    ND REMV CATARACT EXTRACAP,INSERT LENS Right 4/6/2016    Procedure: EXTRACTION EXTRACAPSULAR CATARACT PHACO INTRAOCULAR LENS (IOL); Surgeon: Zenon Steinberg MD;  Location: [facility] MAIN OR;  Service: Ophthalmology    ND SURG IMPLNT [address] Left 8/23/2017    Procedure: Placement of Thoracic SCS with left buttock IPG (IMPULSE);   Surgeon: Tyler Melton MD;  Location:  MAIN OR;  Service: Neurosurgery    SPINAL CORD STIMULATOR TRIAL W/ LAMINOTOMY N/A 4/13/2017    Procedure: INSERTION DORSAL COLUMN SPINAL CORD STIMULATOR TRIAL;  Surgeon: Charles Medrano MD;  Location: [place] [address] MAIN OR;  Service:     TONSILLECTOMY         Family History   Problem Relation Age of Onset    Cancer Father 80        prostate       Social History     Occupational History    Not on file   Tobacco Use    Smoking status: Current Every Day Smoker     Packs/day: 1 00     Years: 60 00     Pack years: 60 00    Smokeless tobacco: Never Used   Substance and Sexual Activity    Alcohol use: Yes     Comment: occasionally    Drug use: No    Sexual activity: Not on file         Current Outpatient Medications:     albuterol (PROAIR HFA) 90 mcg/act inhaler, Inhale 2 puffs every 6 (six) hours as needed for wheezing, Disp: , Rfl:     atorvastatin (LIPITOR) 40 mg tablet, Take 40 mg by mouth daily  , Disp: , Rfl:     BYSTOLIC 10 MG tablet, TAKE 1 TABLET EVERY DAY, Disp: 90 tablet, Rfl: 3    diltiazem (DILACOR XR) 240 MG 24 hr capsule, Take 1 capsule (240 mg total) by mouth daily at bedtime, Disp: 90 capsule, Rfl: 3    fluticasone (FLONASE) 50 mcg/act nasal spray, 2 sprays into each nostril daily, Disp: , Rfl:     fluticasone furoate-vilanterol (BREO ELLIPTA), Inhale 1 puff daily, Disp: 3 each, Rfl: 3    fluticasone-vilanterol (BREO ELLIPTA) 200-25 MCG/INH inhaler, Inhale 1 puff daily Rinse mouth after use , Disp: 3 Inhaler, Rfl: 3    [START ON 4/21/2019] HYDROcodone-acetaminophen (NORCO)  mg per tablet, Take 1 tablet by mouth 3 (three) times a day as needed for severe pain for up to 30 daysMax Daily Amount: 3 tablets, Disp: 90 tablet, Rfl: 0    ipratropium-albuterol (DUO-NEB) 0 5-2 5 mg/3 mL, Take 3 mL by nebulization every 6 (six) hours, Disp: 360 mL, Rfl: 2    nebivolol (BYSTOLIC) 10 mg tablet, Take 1 tablet (10 mg total) by mouth daily, Disp: 90 tablet, Rfl: 3    OMEPRAZOLE PO, Take 40 mg by mouth daily  , Disp: , Rfl:     OXYGEN-HELIUM IN, Inhale 2 L daily at bedtime, Disp: , Rfl:     predniSONE 10 mg tablet, Three tabs daily x3 days  2 tabs daily x3 days 1 tab daily x3 days Extra tabs, Disp: 20 tablet, Rfl: 0    rivaroxaban (XARELTO) 20 mg tablet, Take 1 tablet (20 mg total) by mouth daily with dinner, Disp: 90 tablet, Rfl: 3    tamsulosin (FLOMAX) 0 4 mg, Take 0 4 mg by mouth daily with dinner, Disp: , Rfl:     traZODone (DESYREL) 50 mg tablet, , Disp: , Rfl:     [START ON 3/22/2019] HYDROcodone-acetaminophen (NORCO)  mg per tablet, Take 1 tablet by mouth 4 (four) times a day as needed for severe pain for up to 30 daysMax Daily Amount: 4 tablets, Disp: 120 tablet, Rfl: 0    nicotine (NICOTROL) 10 MG inhaler, Inhale 1 puff as needed for smoking cessation for up to 30 days Do not exceed more than 16 cartridges a day    For more than 1 per hour , Disp: 168 each, Rfl: 0    No Known Allergies    Physical Exam:    /62 (BP Location: Left arm, Patient Position: Sitting, Cuff Size: Standard)   Pulse 62   Temp 97 6 °F (36 4 °C) (Oral)   Resp 12   Ht 5' 10" (1 778 m)   Wt 114 kg (251 lb 3 2 oz)   BMI 36 04 kg/m²     Constitutional:obese  Eyes:anicteric  HEENT:grossly intact  Neck:supple, symmetric, trachea midline and no masses   Pulmonary:even and unlabored  Cardiovascular:No edema or pitting edema present  Skin:Normal without rashes or lesions and well hydrated  Psychiatric:Mood and affect appropriate  Neurologic:Cranial Nerves II-XII grossly intact  Musculoskeletal:antalgic

## 2019-03-21 DIAGNOSIS — I10 ESSENTIAL HYPERTENSION: ICD-10-CM

## 2019-03-21 RX ORDER — HYDROCODONE BITARTRATE AND ACETAMINOPHEN 10; 325 MG/1; MG/1
1 TABLET ORAL 4 TIMES DAILY PRN
Qty: 120 TABLET | Refills: 0 | Status: SHIPPED | OUTPATIENT
Start: 2019-03-22 | End: 2019-05-23 | Stop reason: SDUPTHER

## 2019-03-21 RX ORDER — NEBIVOLOL 10 MG/1
10 TABLET ORAL DAILY
Qty: 90 TABLET | Refills: 3 | Status: SHIPPED | OUTPATIENT
Start: 2019-03-21

## 2019-03-21 RX ORDER — HYDROCODONE BITARTRATE AND ACETAMINOPHEN 10; 325 MG/1; MG/1
1 TABLET ORAL 3 TIMES DAILY PRN
Qty: 90 TABLET | Refills: 0 | Status: SHIPPED | OUTPATIENT
Start: 2019-04-21 | End: 2019-05-23 | Stop reason: SDUPTHER

## 2019-03-21 NOTE — TELEPHONE ENCOUNTER
Called pt back  He states he has enough meds for today  Advised script can be filled tomorrow  Pt states he gets it delivered and may not be delivered tomorrow  States he informed office of new pharmacy and script should have been sent sooner  Apologized for confusion and advised script will be sent to correct pharmacy and it is only pharmacy in his chart at this time  Pt appreciative  AS please see below and resend hydrocodone scripts

## 2019-03-21 NOTE — TELEPHONE ENCOUNTER
Patient is calling in stating that the Hydrocdone was not called in to his pharmacy on file  He only has enough for today  Please send script to pharmacy  Patient is also requesting a call back

## 2019-03-21 NOTE — TELEPHONE ENCOUNTER
S/w pt and advised script was sent  Pt states pharmacy changed and script needs to be resent  S/w [facility] and canceled hydrocodone scripts with fill date of 3/22 and 4/21  [facility]  Please resend scripts

## 2019-03-21 NOTE — TELEPHONE ENCOUNTER
S/W patient, made aware prescription will be ready for p/u on 3/22/19  Patient verbalizes understanding

## 2019-04-04 ENCOUNTER — OFFICE VISIT (OUTPATIENT)
Dept: CARDIOLOGY CLINIC | Facility: CLINIC | Age: 79
End: 2019-04-04
Payer: MEDICARE

## 2019-04-04 ENCOUNTER — TELEPHONE (OUTPATIENT)
Dept: CARDIOLOGY CLINIC | Facility: CLINIC | Age: 79
End: 2019-04-04

## 2019-04-04 VITALS
HEART RATE: 71 BPM | SYSTOLIC BLOOD PRESSURE: 144 MMHG | OXYGEN SATURATION: 96 % | WEIGHT: 260 LBS | BODY MASS INDEX: 37.22 KG/M2 | HEIGHT: 70 IN | DIASTOLIC BLOOD PRESSURE: 78 MMHG

## 2019-04-04 DIAGNOSIS — E78.5 DYSLIPIDEMIA: ICD-10-CM

## 2019-04-04 DIAGNOSIS — J43.2 CENTRILOBULAR EMPHYSEMA (HCC): ICD-10-CM

## 2019-04-04 DIAGNOSIS — Z98.61 CAD S/P PERCUTANEOUS CORONARY ANGIOPLASTY: ICD-10-CM

## 2019-04-04 DIAGNOSIS — I48.20 CHRONIC ATRIAL FIBRILLATION (HCC): ICD-10-CM

## 2019-04-04 DIAGNOSIS — G47.33 OBSTRUCTIVE SLEEP APNEA: ICD-10-CM

## 2019-04-04 DIAGNOSIS — I10 ESSENTIAL HYPERTENSION: ICD-10-CM

## 2019-04-04 DIAGNOSIS — E66.9 CLASS 2 OBESITY: ICD-10-CM

## 2019-04-04 DIAGNOSIS — I48.20 CHRONIC ATRIAL FIBRILLATION (HCC): Primary | ICD-10-CM

## 2019-04-04 DIAGNOSIS — F17.210 CIGARETTE NICOTINE DEPENDENCE WITHOUT COMPLICATION: ICD-10-CM

## 2019-04-04 DIAGNOSIS — I25.10 CAD S/P PERCUTANEOUS CORONARY ANGIOPLASTY: ICD-10-CM

## 2019-04-04 DIAGNOSIS — I49.3 ASYMPTOMATIC PVCS: ICD-10-CM

## 2019-04-04 DIAGNOSIS — R06.00 DYSPNEA ON EXERTION: ICD-10-CM

## 2019-04-04 PROCEDURE — 99214 OFFICE O/P EST MOD 30 MIN: CPT | Performed by: INTERNAL MEDICINE

## 2019-04-04 PROCEDURE — 93000 ELECTROCARDIOGRAM COMPLETE: CPT | Performed by: INTERNAL MEDICINE

## 2019-04-04 RX ORDER — ASPIRIN 81 MG/1
81 TABLET ORAL DAILY
COMMUNITY

## 2019-05-23 ENCOUNTER — OFFICE VISIT (OUTPATIENT)
Dept: PAIN MEDICINE | Facility: CLINIC | Age: 79
End: 2019-05-23
Payer: MEDICARE

## 2019-05-23 VITALS
HEART RATE: 37 BPM | DIASTOLIC BLOOD PRESSURE: 70 MMHG | BODY MASS INDEX: 37.05 KG/M2 | RESPIRATION RATE: 22 BRPM | HEIGHT: 70 IN | WEIGHT: 258.8 LBS | SYSTOLIC BLOOD PRESSURE: 148 MMHG

## 2019-05-23 DIAGNOSIS — Z79.891 LONG-TERM CURRENT USE OF OPIATE ANALGESIC: ICD-10-CM

## 2019-05-23 DIAGNOSIS — G89.4 CHRONIC PAIN SYNDROME: Primary | ICD-10-CM

## 2019-05-23 DIAGNOSIS — M54.50 PAIN OF LUMBAR SPINE: ICD-10-CM

## 2019-05-23 DIAGNOSIS — M51.16 INTERVERTEBRAL DISC DISORDER WITH RADICULOPATHY OF LUMBAR REGION: ICD-10-CM

## 2019-05-23 DIAGNOSIS — F11.20 UNCOMPLICATED OPIOID DEPENDENCE (HCC): ICD-10-CM

## 2019-05-23 PROCEDURE — 80305 DRUG TEST PRSMV DIR OPT OBS: CPT | Performed by: ANESTHESIOLOGY

## 2019-05-23 PROCEDURE — 99214 OFFICE O/P EST MOD 30 MIN: CPT | Performed by: ANESTHESIOLOGY

## 2019-05-23 RX ORDER — HYDROCODONE BITARTRATE AND ACETAMINOPHEN 10; 325 MG/1; MG/1
1 TABLET ORAL 3 TIMES DAILY PRN
Qty: 90 TABLET | Refills: 0 | Status: SHIPPED | OUTPATIENT
Start: 2019-06-24 | End: 2019-07-24 | Stop reason: SDUPTHER

## 2019-05-23 RX ORDER — HYDROCODONE BITARTRATE AND ACETAMINOPHEN 10; 325 MG/1; MG/1
1 TABLET ORAL 3 TIMES DAILY PRN
Qty: 90 TABLET | Refills: 0 | Status: SHIPPED | OUTPATIENT
Start: 2019-05-25 | End: 2019-07-24 | Stop reason: SDUPTHER

## 2019-07-23 ENCOUNTER — OFFICE VISIT (OUTPATIENT)
Dept: PULMONOLOGY | Facility: MEDICAL CENTER | Age: 79
End: 2019-07-23
Payer: MEDICARE

## 2019-07-23 VITALS
OXYGEN SATURATION: 93 % | HEART RATE: 60 BPM | TEMPERATURE: 98.4 F | DIASTOLIC BLOOD PRESSURE: 60 MMHG | WEIGHT: 256 LBS | RESPIRATION RATE: 20 BRPM | HEIGHT: 70 IN | SYSTOLIC BLOOD PRESSURE: 138 MMHG | BODY MASS INDEX: 36.65 KG/M2

## 2019-07-23 DIAGNOSIS — F17.210 CIGARETTE NICOTINE DEPENDENCE WITHOUT COMPLICATION: ICD-10-CM

## 2019-07-23 DIAGNOSIS — J43.2 CENTRILOBULAR EMPHYSEMA (HCC): Primary | ICD-10-CM

## 2019-07-23 DIAGNOSIS — J96.11 CHRONIC RESPIRATORY FAILURE WITH HYPOXIA (HCC): ICD-10-CM

## 2019-07-23 PROCEDURE — 99214 OFFICE O/P EST MOD 30 MIN: CPT | Performed by: NURSE PRACTITIONER

## 2019-07-23 RX ORDER — MECLIZINE HCL 12.5 MG/1
TABLET ORAL
Refills: 3 | COMMUNITY
Start: 2019-07-22

## 2019-07-23 RX ORDER — BUDESONIDE AND FORMOTEROL FUMARATE DIHYDRATE 160; 4.5 UG/1; UG/1
2 AEROSOL RESPIRATORY (INHALATION) 2 TIMES DAILY
Qty: 3 INHALER | Refills: 3 | Status: SHIPPED | OUTPATIENT
Start: 2019-07-23 | End: 2020-12-01 | Stop reason: ALTCHOICE

## 2019-07-23 NOTE — ASSESSMENT & PLAN NOTE
Patient continues to smoke 1 pack of cigarettes per day  Last chest x-ray done in May of 2017 revealed calcified pleural plaques with hyperinflation  Lungs were otherwise clear  I personally reviewed this image  Read has been smoking for 60+ years  I am going to order Nicotrol he has failed both patches and gum  He is aware that he will not exceed more than 10 cartridges a day

## 2019-07-23 NOTE — ASSESSMENT & PLAN NOTE
Patient has moderately severe centrilobular emphysema  Last pulmonary function test was done in May of 2018  Will defer PFTs today  Forced vital capacity was 2 85 L or 70% of predicted, FEV1 is 1 48 L or 51% obstruction ratio 52  Flow volume loop shows  Obstructive pattern  Patient continues to use DuoNeb 4 times daily  He has deferred any inhaled corticosteroid as it is costly for him  I am going to give him a 1 month sample of Symbicort 160/4 5 mcg 2 puffs b i d  I also advised him that as per his insurance formulary, mail order would be more affordable  He continues to smoke approximately 1 pack per day and is aware that this is likely very detrimental to his respiratory status

## 2019-07-23 NOTE — PROGRESS NOTES
Assessment/Plan:     Problem List Items Addressed This Visit        Respiratory    Centrilobular emphysema ([facility] ) - Primary     Patient has moderately severe centrilobular emphysema  Last pulmonary function test was done in May of 2018  Will defer PFTs today  Forced vital capacity was 2 85 L or 70% of predicted, FEV1 is 1 48 L or 51% obstruction ratio 52  Flow volume loop shows  Obstructive pattern  Patient continues to use DuoNeb 4 times daily  He has deferred any inhaled corticosteroid as it is costly for him  I am going to give him a 1 month sample of Symbicort 160/4 5 mcg 2 puffs b i d  I also advised him that as per his insurance formulary, mail order would be more affordable  He continues to smoke approximately 1 pack per day and is aware that this is likely very detrimental to his respiratory status  Relevant Medications    budesonide-formoterol (SYMBICORT) 160-4 5 mcg/act inhaler    Chronic respiratory failure with hypoxia ([facility] )     Reagan Perea has history of hypoxia  He is using 2 L of supplemental oxygen at nighttime and benefitting  Room air oxygen at rest was 96%  He has difficulty with walking and uses this device as he has chronic back pain  He also has history of coronary artery disease that is stable and chronic AFib  Other    Nicotine dependence     Patient continues to smoke 1 pack of cigarettes per day  Last chest x-ray done in May of 2017 revealed calcified pleural plaques with hyperinflation  Lungs were otherwise clear  I personally reviewed this image  Read has been smoking for 60+ years  I am going to order Nicotrol he has failed both patches and gum  He is aware that he will not exceed more than 10 cartridges a day  Relevant Medications    nicotine (NICOTROL) 10 MG inhaler            Return in about 6 months (around 1/23/2020)  All questions are answered to the patient's satisfaction and understanding  He verbalizes understanding    He is encouraged to call with any further questions or concerns  Portions of the record may have been created with voice recognition software  Occasional wrong word or "sound a like" substitutions may have occurred due to the inherent limitations of voice recognition software  Read the chart carefully and recognize, using context, where substitutions have occurred  HPI:  Cass Russell is a 42-year-old male with history of chronic atrial fibrillation and also on stable coronary artery disease  He has moderately severe centrilobular emphysema  He has history of obstructive sleep apnea and history of asbestosis due to his exposure as a   Last pulmonary function test was done May of 2018  At that time his forced vital capacity was 2 85 L or 70% of predicted, FEV1 was 1 48 L or 51% and obstruction ratio 52  Continues to smoke 1 pack of cigarettes per day  Cass Russell has used Breo in the past   He said it is not affordable to him  He also has a nebulizer with duo nebs he is using this 4 times a day with relief  This is not used an inhaled corticosteroid in some time  Electronically Signed by MITCHELL Whitten    ______________________________________________________________________    Chief Complaint:   Chief Complaint   Patient presents with    Follow-up    Shortness of Breath       Patient ID: Lupe Jim is a [de-identified] y o  y o  male has a past medical history of Acid reflux, Arthritis, BPH (benign prostatic hyperplasia), Calcium disorder, Chronic atrial fibrillation ([address] ) (2/6/2018), Chronic pain, COPD (chronic obstructive pulmonary disease) ([address] ), Coronary artery disease (2001), Eye redness, Hiatal hernia, Hydronephrosis, Hyperlipidemia, Hypertension, Irregular heart beat, Nocturnal leg cramps, Osteopenia, Peripheral artery disease ([address] ), Pleural effusion, Sleep apnea, and 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)     7/23/2019  Patient presents today for follow-up visit  Shortness of Breath   This is a chronic problem   The current episode started more than 1 year ago  The problem occurs daily  The problem has been waxing and waning  Associated symptoms include wheezing  The symptoms are aggravated by exercise and weather changes  The patient has no known risk factors (On Xarelto for atrial fibrillation) for DVT/PE  He has tried beta agonist inhalers, rest and ipratropium inhalers for the symptoms  The treatment provided mild relief  His past medical history is significant for COPD  Review of Systems   Constitutional: Negative  HENT: Negative  Eyes: Negative  Respiratory: Positive for shortness of breath and wheezing  Cardiovascular: Negative  Gastrointestinal: Negative  Endocrine: Negative  Genitourinary: Negative  Musculoskeletal: Negative  Skin: Negative  Allergic/Immunologic: Negative  Neurological: Negative  Hematological: Negative  Psychiatric/Behavioral: Negative  Smoking history: He reports that he has been smoking  He has a 60 00 pack-year smoking history  He has never used smokeless tobacco     The following portions of the patient's history were reviewed and updated as appropriate: allergies, current medications, past family history, past medical history, past social history, past surgical history and problem list       There is no immunization history on file for this patient    Current Outpatient Medications   Medication Sig Dispense Refill    albuterol (PROAIR HFA) 90 mcg/act inhaler Inhale 2 puffs every 6 (six) hours as needed for wheezing      aspirin (ECOTRIN LOW STRENGTH) 81 mg EC tablet Take 81 mg by mouth daily      atorvastatin (LIPITOR) 40 mg tablet Take 40 mg by mouth daily        BYSTOLIC 10 MG tablet TAKE 1 TABLET EVERY DAY 90 tablet 3    diltiazem (DILACOR XR) 240 MG 24 hr capsule Take 1 capsule (240 mg total) by mouth daily at bedtime 90 capsule 3    fluticasone (FLONASE) 50 mcg/act nasal spray 2 sprays into each nostril daily      HYDROcodone-acetaminophen (NORCO)  mg per tablet Take 1 tablet by mouth 3 (three) times a day as needed for severe pain for up to 30 daysMax Daily Amount: 3 tablets 90 tablet 0    ipratropium-albuterol (DUO-NEB) 0 5-2 5 mg/3 mL Take 3 mL by nebulization every 6 (six) hours 360 mL 2    meclizine (ANTIVERT) 12 5 MG tablet TAKE 1-2 TABLETS BY MOUTH EVERY 8 HOURS AS NEEDED PRN; DIZZINESS  3    nebivolol (BYSTOLIC) 10 mg tablet Take 1 tablet (10 mg total) by mouth daily 90 tablet 3    OMEPRAZOLE PO Take 40 mg by mouth daily        OXYGEN-HELIUM IN Inhale 2 L daily at bedtime      rivaroxaban (XARELTO) 20 mg tablet Take 1 tablet (20 mg total) by mouth daily with dinner 28 tablet 0    tamsulosin (FLOMAX) 0 4 mg Take 0 4 mg by mouth daily with dinner      traZODone (DESYREL) 50 mg tablet       budesonide-formoterol (SYMBICORT) 160-4 5 mcg/act inhaler Inhale 2 puffs 2 (two) times a day Rinse mouth after use  3 Inhaler 3    fluticasone furoate-vilanterol (BREO ELLIPTA) Inhale 1 puff daily (Patient not taking: Reported on 7/23/2019) 3 each 3    fluticasone-vilanterol (BREO ELLIPTA) 200-25 MCG/INH inhaler Inhale 1 puff daily Rinse mouth after use  (Patient not taking: Reported on 7/23/2019) 3 Inhaler 3    HYDROcodone-acetaminophen (NORCO)  mg per tablet Take 1 tablet by mouth 3 (three) times a day as needed for severe pain for up to 30 daysMax Daily Amount: 3 tablets 90 tablet 0    nicotine (NICOTROL) 10 MG inhaler Inhale 1 puff as needed for smoking cessation for up to 30 days Do not exceed more than 16 cartridges a day  For more than 1 per hour  168 each 0    nicotine (NICOTROL) 10 MG inhaler Inhale 1 puff as needed for smoking cessation for up to 30 days 168 each 1    predniSONE 10 mg tablet Three tabs daily x3 days  2 tabs daily x3 days 1 tab daily x3 days  Extra tabs (Patient not taking: Reported on 7/23/2019) 20 tablet 0     No current facility-administered medications for this visit        Allergies: Patient has no known allergies  Objective:  Vitals:    07/23/19 0757   BP: 138/60   BP Location: Left arm   Patient Position: Sitting   Cuff Size: Standard   Pulse: 60   Resp: 20   Temp: 98 4 °F (36 9 °C)   TempSrc: Tympanic   SpO2: 93%   Weight: 116 kg (256 lb)   Height: 5' 10" (1 778 m)   Oxygen Therapy  SpO2: 93 %    Wt Readings from Last 3 Encounters:   07/23/19 116 kg (256 lb)   05/23/19 117 kg (258 lb 12 8 oz)   04/04/19 118 kg (260 lb)     Body mass index is 36 73 kg/m²  Physical Exam   Constitutional: He is oriented to person, place, and time  He appears well-developed and well-nourished  HENT:   Head: Normocephalic and atraumatic  Mouth/Throat: Oropharynx is clear and moist    Mallampati 3   Eyes: Pupils are equal, round, and reactive to light  EOM are normal    Neck: Normal range of motion  Neck supple  Cardiovascular: Normal rate  Irregular   Pulmonary/Chest: Effort normal and breath sounds normal    Abdominal: Soft  Bowel sounds are normal    Musculoskeletal:        Right lower leg: Normal         Left lower leg: Normal    Walks with cane  Slow steady gait  Neurological: He is alert and oriented to person, place, and time  Skin: Skin is warm and dry  Capillary refill takes less than 2 seconds  Psychiatric: He has a normal mood and affect  His behavior is normal        Lab Review:   No visits with results within 6 Month(s) from this visit  Latest known visit with results is:   Appointment on 01/02/2019   Component Date Value    Urine Culture 01/02/2019 >100,000 cfu/ml Morganella morganii*       Diagnostics:  I have personally reviewed pertinent films in PACS    Office Spirometry Results:     ESS:    No results found

## 2019-07-23 NOTE — ASSESSMENT & PLAN NOTE
Terry Walker has history of hypoxia  He is using 2 L of supplemental oxygen at nighttime and benefitting  Room air oxygen at rest was 96%  He has difficulty with walking and uses this device as he has chronic back pain  He also has history of coronary artery disease that is stable and chronic AFib

## 2019-07-23 NOTE — PATIENT INSTRUCTIONS
Cigarette Smoking and Your Health   AMBULATORY CARE:   Risks to your health if you smoke:  Nicotine and other chemicals found in tobacco damage every cell in your body  Even if you are a light smoker, you have an increased risk for cancer, heart disease, and lung disease  If you are pregnant or have diabetes, smoking increases your risk for complications  Benefits to your health if you stop smoking:   · You decrease respiratory symptoms such as coughing, wheezing, and shortness of breath  · You reduce your risk for cancers of the lung, mouth, throat, kidney, bladder, pancreas, stomach, and cervix  If you already have cancer, you increase the benefits of chemotherapy  You also reduce your risk for cancer returning or a second cancer from developing  · You reduce your risk for heart disease, blood clots, heart attack, and stroke  · You reduce your risk for lung infections, and diseases such as pneumonia, asthma, chronic bronchitis, and emphysema  · Your circulation improves  More oxygen can be delivered to your body  If you have diabetes, you lower your risk for complications, such as kidney, artery, and eye diseases  You also lower your risk for nerve damage  Nerve damage can lead to amputations, poor vision, and blindness  · You improve your body's ability to heal and to fight infections  Benefits to the health of others if you stop smoking:  Tobacco is harmful to nonsmokers who breathe in your secondhand smoke  The following are ways the health of others around you may improve when you stop smoking:  · You lower the risks for lung cancer and heart disease in nonsmoking adults  · If you are pregnant, you lower the risk for miscarriage, early delivery, low birth weight, and stillbirth  You also lower your baby's risk for SIDS, obesity, developmental delay, and neurobehavioral problems, such as ADHD       · If you have children, you lower their risk for ear infections, colds, pneumonia, bronchitis, and asthma  For more information and support to stop smoking:   · Smokefree  gov  Phone: 6- 819 - 709-5785  Web Address: www smokefree  gov  Follow up with your healthcare provider as directed:  Write down your questions so you remember to ask them during your visits  © 2017 [address] [employer]  Information is for End User's use only and may not be sold, redistributed or otherwise used for commercial purposes  All illustrations and images included in CareNotes® are the copyrighted property of [employer] , [employer]  or [employer]  The above information is an  only  It is not intended as medical advice for individual conditions or treatments  Talk to your doctor, nurse or pharmacist before following any medical regimen to see if it is safe and effective for you  I have given you 1 month sample of Symbicort  This take the place of Breo  Use 2 puffs in the morning and 2 puffs at night  Be sure to range her mouth  I have also ordered for you Nicotrol inhaler  Should be available your pharmacy

## 2019-07-24 ENCOUNTER — OFFICE VISIT (OUTPATIENT)
Dept: PAIN MEDICINE | Facility: CLINIC | Age: 79
End: 2019-07-24
Payer: MEDICARE

## 2019-07-24 VITALS
DIASTOLIC BLOOD PRESSURE: 85 MMHG | HEIGHT: 70 IN | SYSTOLIC BLOOD PRESSURE: 131 MMHG | BODY MASS INDEX: 36.88 KG/M2 | HEART RATE: 71 BPM | WEIGHT: 257.6 LBS

## 2019-07-24 DIAGNOSIS — M51.16 INTERVERTEBRAL DISC DISORDER WITH RADICULOPATHY OF LUMBAR REGION: ICD-10-CM

## 2019-07-24 DIAGNOSIS — M54.50 PAIN OF LUMBAR SPINE: ICD-10-CM

## 2019-07-24 DIAGNOSIS — G89.4 CHRONIC PAIN SYNDROME: ICD-10-CM

## 2019-07-24 PROCEDURE — 99214 OFFICE O/P EST MOD 30 MIN: CPT | Performed by: ANESTHESIOLOGY

## 2019-07-24 RX ORDER — HYDROCODONE BITARTRATE AND ACETAMINOPHEN 10; 325 MG/1; MG/1
1 TABLET ORAL 3 TIMES DAILY PRN
Qty: 90 TABLET | Refills: 0 | Status: SHIPPED | OUTPATIENT
Start: 2019-07-24 | End: 2019-08-26 | Stop reason: SDUPTHER

## 2019-07-24 RX ORDER — HYDROCODONE BITARTRATE AND ACETAMINOPHEN 10; 325 MG/1; MG/1
1 TABLET ORAL 3 TIMES DAILY PRN
Qty: 90 TABLET | Refills: 0 | Status: SHIPPED | OUTPATIENT
Start: 2019-08-23 | End: 2019-08-26 | Stop reason: SDUPTHER

## 2019-07-24 NOTE — PROGRESS NOTES
Pain Medicine Follow-Up Note    Assessment:  1  Chronic pain syndrome    2  Pain of lumbar spine    3  Intervertebral disc disorder with radiculopathy of lumbar region        Plan:  New Medications Ordered This Visit   Medications    HYDROcodone-acetaminophen (NORCO)  mg per tablet     Sig: Take 1 tablet by mouth 3 (three) times a day as needed for severe pain for up to 30 daysMax Daily Amount: 3 tablets     Dispense:  90 tablet     Refill:  0    HYDROcodone-acetaminophen (NORCO)  mg per tablet     Sig: Take 1 tablet by mouth 3 (three) times a day as needed for severe pain for up to 30 daysMax Daily Amount: 3 tablets     Dispense:  90 tablet     Refill:  0     My impressions and treatment recommendations were discussed in detail with the patient who verbalized understanding and had no further questions  Given that the patient reports overall reduced pain and improved level functioning without significant side effects, I felt a reasonable to continue the patient on hydrocodone/acetaminophen 10/325 mg 1 tablet up to 3 times daily as needed for pain  I sent E prescriptions to the pharmacy dated July 24, 2019 and August 23, 2019  The risks and side effects of chronic opioid treatment were discussed in detail with the patient  Side effects include but are not limited to nausea, vomiting, GI intolerance, sedation, constipation, mental clouding, opioid-induced hyperalgesia, endocrine dysfunction, addiction, dependence, and tolerance  The patient was asked to take his medications only as prescribed and directed, never in excess, and never for any other reason other than for pain control  The patient was also asked to keep his medications out of the reach of others and away from children, preferably in a locked drawer  The patient verbalized understanding and wished to use these opioid medications        [place] Prescription Drug Monitoring Program report was reviewed and was appropriate Follow-up is planned in 2 months time or sooner as warranted  Discharge instructions were provided  I personally saw and examined the patient and I agree with the above discussed plan of care  History of Present Illness:    Caroline Russell is a [de-identified] y o  male who presents to [facility] for interval re-evaluation of the above stated pain complaints  The patient has a past medical and chronic pain history as outlined in the assessment section  He was last seen on May 23, 2019 at which time he was maintained on hydrocodone/acetaminophen 10/325 mg 1 tablet 3 times daily as needed for pain  At today's office visit, the patient's pain score is 8/10 on the verbal numerical pain rating scale  The patient states that his pain is worse in the morning, evening, and night  His pain is constant in nature  He reports the quality of his pain as "sharp, throbbing, pressure-like    He is reporting excellent pain relief with the combination of his medications  He does admit to controlled opioid induced constipation on prune juice  Pain Contract Signed:  July 24, 2019  Last Urine Drug Screen:  May 23, 2019    Other than as stated above, the patient denies any interval changes in medications, medical condition, mental condition, symptoms, or allergies since the last office visit  Review of Systems:    Review of Systems   Respiratory: Positive for shortness of breath  Cardiovascular: Negative for chest pain  Gastrointestinal: Negative for constipation, diarrhea, nausea and vomiting  Musculoskeletal: Negative for arthralgias, gait problem, joint swelling and myalgias  Difficulty Walking   Decreased ROM   Paralysis or Muscle Weakness  Joint Stiffness   Skin: Negative for rash  Neurological: Negative for dizziness, seizures and weakness  Memory Loss   All other systems reviewed and are negative          Patient Active Problem List   Diagnosis    Chronic pain syndrome    Pain of lumbar spine    Sacroiliitis (HCC)    Intervertebral disc disorder with radiculopathy of lumbar region    Centrilobular emphysema (HCC)    Dyspnea on exertion    Chronic atrial fibrillation (HCC)    Coronary artery disease    Chronic respiratory failure with hypoxia (HCC)    Nicotine dependence    Cervical radiculopathy    Cervical spinal stenosis    Cervical pain    Elevated PSA       Past Medical History:   Diagnosis Date    Acid reflux     Arthritis     BPH (benign prostatic hyperplasia)     no treatment    Calcium disorder     Chronic atrial fibrillation (HCC) 2/6/2018    Chronic pain     COPD (chronic obstructive pulmonary disease) ([address] )     Coronary artery disease 2001    PTCA with one stent    Eye redness     Poked his eye on 3/18/16 "bloody" right eye    Hiatal hernia     Hydronephrosis     Hyperlipidemia     Hypertension     Irregular heart beat     A-Fib    Nocturnal leg cramps     Osteopenia     Peripheral artery disease (HCC)     Pleural effusion     Sleep apnea     REFUSES MACHINE HS    Vocal cord polyp 10/2012    panendoscopy       Past Surgical History:   Procedure Laterality Date    ANGIOPLASTY      APPENDECTOMY  04/2007    CARDIOVERSION      x2, for A-Fib    CARDIOVERSION      CATARACT EXTRACTION      CATARACT EXTRACTION W/ INTRAOCULAR LENS IMPLANT Left 01/2014    CORONARY ANGIOPLASTY WITH STENT PLACEMENT  2001    one stent    HERNIA REPAIR Right     inquinal    JOINT REPLACEMENT Left     Hip    KNEE SURGERY  1994    also had arthroscopy right knee 2005    PANENDOSCOPY  10/23/2012    vocal cord polyp    PILONIDAL CYST DRAINAGE      x 3    WA REMV CATARACT EXTRACAP,INSERT LENS Right 4/6/2016    Procedure: EXTRACTION EXTRACAPSULAR CATARACT PHACO INTRAOCULAR LENS (IOL);   Surgeon: Otoniel Abdalla MD;  [facility] MAIN OR;  Service: Ophthalmology    WA SURG IMPLNT Leigh Hart Left 8/23/2017    Procedure: Placement of Thoracic SCS with left buttock IPG (IMPULSE);   Surgeon: Guilherme Jara MD;  Location: [facility] MAIN OR;  Service: Neurosurgery    SPINAL CORD STIMULATOR TRIAL W/ LAMINOTOMY N/A 4/13/2017    Procedure: INSERTION DORSAL COLUMN SPINAL CORD STIMULATOR TRIAL;  Surgeon: Justin Hui MD;  [facility] MA[facility] OR;  Service:     TONSILLECTOMY         Family History   Problem Relation Age of Onset    Cancer Father 80        prostate       Social History     Occupational History    Not on file   Tobacco Use    Smoking status: Current Every Day Smoker     Packs/day: 1 00     Years: 60 00     Pack years: 60 00    Smokeless tobacco: Never Used   Substance and Sexual Activity    Alcohol use: Yes     Comment: occasionally    Drug use: No    Sexual activity: Not on file         Current Outpatient Medications:     albuterol (PROAIR HFA) 90 mcg/act inhaler, Inhale 2 puffs every 6 (six) hours as needed for wheezing, Disp: , Rfl:     aspirin (ECOTRIN LOW STRENGTH) 81 mg EC tablet, Take 81 mg by mouth daily, Disp: , Rfl:     atorvastatin (LIPITOR) 40 mg tablet, Take 40 mg by mouth daily  , Disp: , Rfl:     budesonide-formoterol (SYMBICORT) 160-4 5 mcg/act inhaler, Inhale 2 puffs 2 (two) times a day Rinse mouth after use , Disp: 3 Inhaler, Rfl: 3    BYSTOLIC 10 MG tablet, TAKE 1 TABLET EVERY DAY, Disp: 90 tablet, Rfl: 3    diltiazem (DILACOR XR) 240 MG 24 hr capsule, Take 1 capsule (240 mg total) by mouth daily at bedtime, Disp: 90 capsule, Rfl: 3    fluticasone (FLONASE) 50 mcg/act nasal spray, 2 sprays into each nostril daily, Disp: , Rfl:     fluticasone furoate-vilanterol (BREO ELLIPTA), Inhale 1 puff daily, Disp: 3 each, Rfl: 3    fluticasone-vilanterol (BREO ELLIPTA) 200-25 MCG/INH inhaler, Inhale 1 puff daily Rinse mouth after use , Disp: 3 Inhaler, Rfl: 3    [START ON 8/23/2019] HYDROcodone-acetaminophen (NORCO)  mg per tablet, Take 1 tablet by mouth 3 (three) times a day as needed for severe pain for up to 30 daysMax Daily Amount: 3 tablets, Disp: 90 tablet, Rfl: 0    ipratropium-albuterol (DUO-NEB) 0 5-2 5 mg/3 mL, Take 3 mL by nebulization every 6 (six) hours, Disp: 360 mL, Rfl: 2    meclizine (ANTIVERT) 12 5 MG tablet, TAKE 1-2 TABLETS BY MOUTH EVERY 8 HOURS AS NEEDED PRN; DIZZINESS, Disp: , Rfl: 3    nebivolol (BYSTOLIC) 10 mg tablet, Take 1 tablet (10 mg total) by mouth daily, Disp: 90 tablet, Rfl: 3    nicotine (NICOTROL) 10 MG inhaler, Inhale 1 puff as needed for smoking cessation for up to 30 days, Disp: 168 each, Rfl: 1    OMEPRAZOLE PO, Take 40 mg by mouth daily  , Disp: , Rfl:     OXYGEN-HELIUM IN, Inhale 2 L daily at bedtime, Disp: , Rfl:     predniSONE 10 mg tablet, Three tabs daily x3 days  2 tabs daily x3 days 1 tab daily x3 days Extra tabs, Disp: 20 tablet, Rfl: 0    rivaroxaban (XARELTO) 20 mg tablet, Take 1 tablet (20 mg total) by mouth daily with dinner, Disp: 28 tablet, Rfl: 0    tamsulosin (FLOMAX) 0 4 mg, Take 0 4 mg by mouth daily with dinner, Disp: , Rfl:     traZODone (DESYREL) 50 mg tablet, , Disp: , Rfl:     HYDROcodone-acetaminophen (NORCO)  mg per tablet, Take 1 tablet by mouth 3 (three) times a day as needed for severe pain for up to 30 daysMax Daily Amount: 3 tablets, Disp: 90 tablet, Rfl: 0    nicotine (NICOTROL) 10 MG inhaler, Inhale 1 puff as needed for smoking cessation for up to 30 days Do not exceed more than 16 cartridges a day    For more than 1 per hour , Disp: 168 each, Rfl: 0    No Known Allergies    Physical Exam:    /85 (BP Location: Left arm, Patient Position: Sitting, Cuff Size: Standard)   Pulse 71   Ht 5' 10" (1 778 m)   Wt 117 kg (257 lb 9 6 oz)   BMI 36 96 kg/m²     Constitutional:obese  Eyes:anicteric  HEENT:grossly intact  Neck:supple, symmetric, trachea midline and no masses   Pulmonary:even and unlabored  Cardiovascular:No edema or pitting edema present  Skin:Normal without rashes or lesions and well hydrated  Psychiatric:Mood and affect appropriate  Neurologic:Cranial Nerves II-XII grossly intact  Musculoskeletal:ambulates with cane

## 2019-08-26 ENCOUNTER — OFFICE VISIT (OUTPATIENT)
Dept: PAIN MEDICINE | Facility: CLINIC | Age: 79
End: 2019-08-26
Payer: MEDICARE

## 2019-08-26 VITALS
SYSTOLIC BLOOD PRESSURE: 136 MMHG | DIASTOLIC BLOOD PRESSURE: 69 MMHG | HEART RATE: 67 BPM | BODY MASS INDEX: 36.51 KG/M2 | WEIGHT: 255 LBS | HEIGHT: 70 IN

## 2019-08-26 DIAGNOSIS — M54.50 PAIN OF LUMBAR SPINE: ICD-10-CM

## 2019-08-26 DIAGNOSIS — G89.4 CHRONIC PAIN SYNDROME: ICD-10-CM

## 2019-08-26 DIAGNOSIS — M51.16 INTERVERTEBRAL DISC DISORDER WITH RADICULOPATHY OF LUMBAR REGION: ICD-10-CM

## 2019-08-26 PROCEDURE — 80305 DRUG TEST PRSMV DIR OPT OBS: CPT | Performed by: ANESTHESIOLOGY

## 2019-08-26 PROCEDURE — 99214 OFFICE O/P EST MOD 30 MIN: CPT | Performed by: ANESTHESIOLOGY

## 2019-08-26 RX ORDER — HYDROCODONE BITARTRATE AND ACETAMINOPHEN 10; 325 MG/1; MG/1
1 TABLET ORAL 4 TIMES DAILY PRN
Qty: 120 TABLET | Refills: 0 | Status: SHIPPED | OUTPATIENT
Start: 2019-08-26 | End: 2019-10-24 | Stop reason: SDUPTHER

## 2019-08-26 RX ORDER — HYDROCODONE BITARTRATE AND ACETAMINOPHEN 10; 325 MG/1; MG/1
1 TABLET ORAL 3 TIMES DAILY PRN
Qty: 90 TABLET | Refills: 0 | Status: SHIPPED | OUTPATIENT
Start: 2019-09-25 | End: 2019-10-24 | Stop reason: SDUPTHER

## 2019-08-26 NOTE — PROGRESS NOTES
Pain Medicine Follow-Up Note    Assessment:  1  Chronic pain syndrome    2  Pain of lumbar spine    3  Intervertebral disc disorder with radiculopathy of lumbar region        Plan:  My impressions and treatment recommendations were discussed in detail with the patient who verbalized understanding and had no further questions  The patient reports that over the last 2 weeks, he has had an increased amount of pain  He is requesting that he go to 4 tablets a day of the hydrocodone/acetaminophen for a month and then reduced back down to 3 tablets a day  I did feel reasonable to do so  I have given him prescriptions for hydrocodone/acetaminophen 4 times daily as needed for pain to start on August 26, 2019 and another prescription for hydrocodone/acetaminophen 3 times a day as needed for pain to start on September 25, 2019  The risks and side effects of chronic opioid treatment were discussed in detail with the patient  Side effects include but are not limited to nausea, vomiting, GI intolerance, sedation, constipation, mental clouding, opioid-induced hyperalgesia, endocrine dysfunction, addiction, dependence, and tolerance  The patient was asked to take his medications only as prescribed and directed, never in excess, and never for any other reason other than for pain control  The patient was also asked to keep his medications out of the reach of others and away from children, preferably in a locked drawer  The patient verbalized understanding and wished to use these opioid medications  A urine drug test was collected at today's office visit as part of our medication management protocol  The point of care testing results were appropriate for what was being prescribed  The specimen will be sent for confirmatory testing   The drug screen is medically necessary because the patient is either dependent on opioid medication or is being considered for opioid medication therapy and the results could impact ongoing or future treatment  The drug screen is to evaluate for the presence or absence of prescribed, non-prescribed, and/or illicit drugs and substances  [place] Prescription Drug Monitoring Program report was reviewed and was appropriate     Follow-up is planned in 2 months time or sooner as warranted  Discharge instructions were provided  I personally saw and examined the patient and I agree with the above discussed plan of care  History of Present Illness:    Mello Henao is a [de-identified] y o  male who presents to [facility] for interval re-evaluation of the above stated pain complaints  The patient has a past medical and chronic pain history as outlined in the assessment section  He was last seen on July 24, 2019  At today's office visit, the patient's pain score is 8/10 on the verbal numerical pain rating scale  The patient states that his pain is primarily in his low back and bilateral lower extremities  He describes his pain as worse in the morning and constant in nature  He reports the quality of his pain as a burning, dull/aching, and sharp pain  He is requesting to increase his hydrocodone/acetaminophen to 4 times a day for 1 months time before decreasing back to 3 times a day dosing  He states that his pain has increased tremendously over the past few weeks and was hoping for a higher dosage of medication to help calm down his pain  Other than as stated above, the patient denies any interval changes in medications, medical condition, mental condition, symptoms, or allergies since the last office visit  Review of Systems:    Review of Systems   Respiratory: Positive for shortness of breath  Cardiovascular: Negative for chest pain  Gastrointestinal: Negative for constipation, diarrhea, nausea and vomiting  Musculoskeletal: Negative for arthralgias, gait problem, joint swelling and myalgias          Difficulty walking   Decreased ROM   Joint Stiffness Skin: Negative for rash  Neurological: Negative for dizziness, seizures and weakness  Memory loss     All other systems reviewed and are negative          Patient Active Problem List   Diagnosis    Chronic pain syndrome    Pain of lumbar spine    [facility] [facility]    Intervertebral disc disorder with radiculopathy of lumbar region    Centrilobular emphysema (HCC)    Dyspnea on exertion    Chronic atrial fibrillation ([facility] [facility]    Coronary artery disease    Chronic respiratory failure with hypoxia (HCC)    Nicotine dependence    Cervical radiculopathy    Cervical spinal stenosis    Cervical pain    Elevated PSA       Past Medical History:   Diagnosis Date    Acid reflux     Arthritis     BPH (benign prostatic hyperplasia)     no treatment    Calcium disorder     Chronic atrial fibrillation ([facility] [address] [facility] 2/6/2018    Chronic pain     [facility]     Coronary artery disease 2001    PTCA with one stent    Eye redness     Poked his eye on 3/18/16 "bloody" right eye    Hiatal hernia     Hydronephrosis     Hyperlipidemia     Hypertension     Irregular heart beat     A-Fib    Nocturnal leg cramps     Osteopenia     Peripheral artery disease (HCC)     Pleural effusion     Sleep apnea     REFUSES MACHINE HS    Vocal cord polyp 10/2012    panendoscopy       Past Surgical History:   Procedure Laterality Date    ANGIOPLASTY      APPENDECTOMY  04/2007    CARDIOVERSION      x2, for A-Fib    CARDIOVERSION      CATARACT EXTRACTION      CATARACT EXTRACTION W/ INTRAOCULAR LENS IMPLANT Left 01/2014    CORONARY ANGIOPLASTY WITH STENT PLACEMENT  2001    one stent    HERNIA REPAIR Right     inquinal    JOINT REPLACEMENT Left     Hip    KNEE SURGERY  1994    also had arthroscopy right knee 2005    PANENDOSCOPY  10/23/2012    vocal cord polyp    PILONIDAL CYST DRAINAGE      x 3    [place] REMV CATARACT EXTRACAP,INSERT LENS Right 4/6/2016    Procedure: EXTRACTION EXTRACAPSULAR CATARACT PHACO INTRAOCULAR LENS (IOL); Surgeon: Niki Patel MD;  Location: [facility] MAIN OR;  Service: Ophthalmology    MS SURG IMPLNT [address] Left 8/23/2017    Procedure: Placement of Thoracic SCS with left buttock IPG (IMPULSE);   Surgeon: Kai Law MD;  Location:  MAIN OR;  Service: Neurosurgery    SPINAL CORD STIMULATOR TRIAL W/ LAMINOTOMY N/A 4/13/2017    Procedure: INSERTION DORSAL COLUMN SPINAL CORD STIMULATOR TRIAL;  Surgeon: Jose Isaacs MD;  Location: [place] [address] MAIN OR;  Service:     TONSILLECTOMY         Family History   Problem Relation Age of Onset    Cancer Father 80        prostate       Social History     Occupational History    Not on file   Tobacco Use    Smoking status: Current Every Day Smoker     Packs/day: 1 00     Years: 60 00     Pack years: 60 00    Smokeless tobacco: Never Used   Substance and Sexual Activity    Alcohol use: Yes     Comment: occasionally    Drug use: No    Sexual activity: Not on file         Current Outpatient Medications:     albuterol (PROAIR HFA) 90 mcg/act inhaler, Inhale 2 puffs every 6 (six) hours as needed for wheezing, Disp: , Rfl:     aspirin (ECOTRIN LOW STRENGTH) 81 mg EC tablet, Take 81 mg by mouth daily, Disp: , Rfl:     atorvastatin (LIPITOR) 40 mg tablet, Take 40 mg by mouth daily  , Disp: , Rfl:     budesonide-formoterol (SYMBICORT) 160-4 5 mcg/act inhaler, Inhale 2 puffs 2 (two) times a day Rinse mouth after use , Disp: 3 Inhaler, Rfl: 3    BYSTOLIC 10 MG tablet, TAKE 1 TABLET EVERY DAY, Disp: 90 tablet, Rfl: 3    diltiazem (DILACOR XR) 240 MG 24 hr capsule, Take 1 capsule (240 mg total) by mouth daily at bedtime, Disp: 90 capsule, Rfl: 3    fluticasone (FLONASE) 50 mcg/act nasal spray, 2 sprays into each nostril daily, Disp: , Rfl:     fluticasone furoate-vilanterol (BREO ELLIPTA), Inhale 1 puff daily, Disp: 3 each, Rfl: 3    fluticasone-vilanterol (BREO ELLIPTA) 200-25 MCG/INH inhaler, Inhale 1 puff daily Rinse mouth after use , Disp: 3 Inhaler, Rfl: 3    [START ON 9/25/2019] HYDROcodone-acetaminophen (NORCO)  mg per tablet, Take 1 tablet by mouth 3 (three) times a day as needed for severe pain for up to 30 daysMax Daily Amount: 3 tablets, Disp: 90 tablet, Rfl: 0    ipratropium-albuterol (DUO-NEB) 0 5-2 5 mg/3 mL, Take 3 mL by nebulization every 6 (six) hours, Disp: 360 mL, Rfl: 2    meclizine (ANTIVERT) 12 5 MG tablet, TAKE 1-2 TABLETS BY MOUTH EVERY 8 HOURS AS NEEDED PRN; DIZZINESS, Disp: , Rfl: 3    nebivolol (BYSTOLIC) 10 mg tablet, Take 1 tablet (10 mg total) by mouth daily, Disp: 90 tablet, Rfl: 3    OMEPRAZOLE PO, Take 40 mg by mouth daily  , Disp: , Rfl:     OXYGEN-HELIUM IN, Inhale 2 L daily at bedtime, Disp: , Rfl:     predniSONE 10 mg tablet, Three tabs daily x3 days  2 tabs daily x3 days 1 tab daily x3 days Extra tabs, Disp: 20 tablet, Rfl: 0    rivaroxaban (XARELTO) 20 mg tablet, Take 1 tablet (20 mg total) by mouth daily with dinner, Disp: 28 tablet, Rfl: 0    tamsulosin (FLOMAX) 0 4 mg, Take 0 4 mg by mouth daily with dinner, Disp: , Rfl:     traZODone (DESYREL) 50 mg tablet, , Disp: , Rfl:     HYDROcodone-acetaminophen (NORCO)  mg per tablet, Take 1 tablet by mouth 4 (four) times a day as needed for severe pain for up to 30 daysMax Daily Amount: 4 tablets, Disp: 120 tablet, Rfl: 0    nicotine (NICOTROL) 10 MG inhaler, Inhale 1 puff as needed for smoking cessation for up to 30 days Do not exceed more than 16 cartridges a day    For more than 1 per hour , Disp: 168 each, Rfl: 0    nicotine (NICOTROL) 10 MG inhaler, Inhale 1 puff as needed for smoking cessation for up to 30 days, Disp: 168 each, Rfl: 1    No Known Allergies    Physical Exam:    /69   Pulse 67   Ht 5' 10" (1 778 m)   Wt 116 kg (255 lb)   BMI 36 59 kg/m²     Constitutional:obese  Eyes:anicteric  HEENT:grossly intact  Neck:supple, symmetric, trachea midline and no masses   Pulmonary:even and unlabored  Cardiovascular:No edema or pitting edema present  Skin:Normal without rashes or lesions and well hydrated  Psychiatric:Mood and affect appropriate  Neurologic:Cranial Nerves II-XII grossly intact  Musculoskeletal:antalgic

## 2019-10-24 ENCOUNTER — OFFICE VISIT (OUTPATIENT)
Dept: PAIN MEDICINE | Facility: CLINIC | Age: 79
End: 2019-10-24
Payer: MEDICARE

## 2019-10-24 VITALS
WEIGHT: 259.6 LBS | HEIGHT: 70 IN | HEART RATE: 60 BPM | BODY MASS INDEX: 37.16 KG/M2 | DIASTOLIC BLOOD PRESSURE: 72 MMHG | SYSTOLIC BLOOD PRESSURE: 145 MMHG

## 2019-10-24 DIAGNOSIS — F11.20 UNCOMPLICATED OPIOID DEPENDENCE (HCC): ICD-10-CM

## 2019-10-24 DIAGNOSIS — Z79.891 LONG-TERM CURRENT USE OF OPIATE ANALGESIC: ICD-10-CM

## 2019-10-24 DIAGNOSIS — M51.16 INTERVERTEBRAL DISC DISORDER WITH RADICULOPATHY OF LUMBAR REGION: ICD-10-CM

## 2019-10-24 DIAGNOSIS — M54.50 PAIN OF LUMBAR SPINE: ICD-10-CM

## 2019-10-24 DIAGNOSIS — G89.4 CHRONIC PAIN SYNDROME: Primary | ICD-10-CM

## 2019-10-24 PROCEDURE — 80305 DRUG TEST PRSMV DIR OPT OBS: CPT | Performed by: ANESTHESIOLOGY

## 2019-10-24 PROCEDURE — 99214 OFFICE O/P EST MOD 30 MIN: CPT | Performed by: ANESTHESIOLOGY

## 2019-10-24 RX ORDER — HYDROCODONE BITARTRATE AND ACETAMINOPHEN 10; 325 MG/1; MG/1
1 TABLET ORAL 3 TIMES DAILY PRN
Qty: 90 TABLET | Refills: 0 | Status: SHIPPED | OUTPATIENT
Start: 2019-10-27 | End: 2019-12-30 | Stop reason: SDUPTHER

## 2019-10-24 RX ORDER — HYDROCODONE BITARTRATE AND ACETAMINOPHEN 10; 325 MG/1; MG/1
1 TABLET ORAL 3 TIMES DAILY PRN
Qty: 90 TABLET | Refills: 0 | Status: SHIPPED | OUTPATIENT
Start: 2019-11-26 | End: 2019-12-30 | Stop reason: SDUPTHER

## 2019-10-24 NOTE — PROGRESS NOTES
Pain Medicine Follow-Up Note    Assessment:  1  Chronic pain syndrome    2  Pain of lumbar spine    3  Intervertebral disc disorder with radiculopathy of lumbar region        Plan:  New Medications Ordered This Visit   Medications    HYDROcodone-acetaminophen (NORCO)  mg per tablet     Sig: Take 1 tablet by mouth 3 (three) times a day as needed for severe painMax Daily Amount: 3 tablets     Dispense:  90 tablet     Refill:  0    HYDROcodone-acetaminophen (NORCO)  mg per tablet     Sig: Take 1 tablet by mouth 3 (three) times a day as needed for severe painMax Daily Amount: 3 tablets     Dispense:  90 tablet     Refill:  0     My impressions and treatment recommendations were discussed in detail with the patient who verbalized understanding and had no further questions  Given that the patient reports overall reduced pain and improved level functioning without significant side effects, I felt a reasonable to continue the patient on hydrocodone/acetaminophen 10/325 mg 1 tablet up to 3 times daily as needed for pain  I sent E prescriptions to the pharmacy dated October 27, 2019 and November 26, 2019  The risks and side effects of chronic opioid treatment were discussed in detail with the patient  Side effects include but are not limited to nausea, vomiting, GI intolerance, sedation, constipation, mental clouding, opioid-induced hyperalgesia, endocrine dysfunction, addiction, dependence, and tolerance  The patient was asked to take his medications only as prescribed and directed, never in excess, and never for any other reason other than for pain control  The patient was also asked to keep his medications out of the reach of others and away from children, preferably in a locked drawer  The patient verbalized understanding and wished to use these opioid medications  A urine drug test was collected at today's office visit as part of our medication management protocol   The point of care testing results were appropriate for what was being prescribed  The specimen will be sent for confirmatory testing  The drug screen is medically necessary because the patient is either dependent on opioid medication or is being considered for opioid medication therapy and the results could impact ongoing or future treatment  The drug screen is to evaluate for the presence or absence of prescribed, non-prescribed, and/or illicit drugs and substances  [place] Prescription Drug Monitoring Program report was reviewed and was appropriate     Follow-up is planned in 2 months time or sooner as warranted  Discharge instructions were provided  I personally saw and examined the patient and I agree with the above discussed plan of care  History of Present Illness:    Selvin Calle is a [de-identified] y o  male who presents to [facility] for interval re-evaluation of the above stated pain complaints  The patient has a past medical and chronic pain history as outlined in the assessment section  He was last seen on August 26, 2019  At today's office visit, the patient's pain score is 10/10 on the verbal numerical pain rating scale  The patient states that his pain is primarily in his low back  He describes his pain as worse in the morning and constant in nature  He reports the quality of his pain as burning and dull/aching    He is reporting that he uses 3 tablets of hydrocodone/acetaminophen per day  He admits to controlled opioid induced constipation  Other than as stated above, the patient denies any interval changes in medications, medical condition, mental condition, symptoms, or allergies since the last office visit  Review of Systems:    Review of Systems   Constitutional: Negative for appetite change, chills and unexpected weight change  HENT: Negative for ear discharge, ear pain, hearing loss, sore throat and tinnitus  Eyes: Negative for pain, redness and visual disturbance  Respiratory: Positive for shortness of breath  Gastrointestinal: Negative for anal bleeding, blood in stool, constipation and diarrhea  Endocrine: Negative for cold intolerance and polyuria  Genitourinary: Negative for frequency, genital sores, penile pain and urgency  Musculoskeletal: Positive for gait problem  Negative for neck pain  Decreased ROM     Skin: Negative for pallor and rash  Allergic/Immunologic: Negative for environmental allergies and immunocompromised state  Neurological: Negative for dizziness, light-headedness and headaches  Psychiatric/Behavioral: Negative for behavioral problems, dysphoric mood, hallucinations and self-injury          Memory loss         Patient Active Problem List   Diagnosis    Chronic pain syndrome    Pain of lumbar spine    [facility] [address] [facility]    Intervertebral disc disorder with radiculopathy of lumbar region    Centrilobular emphysema (HCC)    Dyspnea on exertion    Chronic atrial fibrillation    Coronary artery disease    Chronic respiratory failure with hypoxia (HCC)    Nicotine dependence    Cervical radiculopathy    Cervical spinal stenosis    Cervical pain    Elevated PSA       Past Medical History:   Diagnosis Date    Acid reflux     Arthritis     BPH (benign prostatic hyperplasia)     no treatment    Calcium disorder     Chronic atrial fibrillation 2/6/2018    Chronic pain     Chronic pain disorder     [facility]     Coronary artery disease 2001    PTCA with one stent    Eye redness     Poked his eye on 3/18/16 "bloody" right eye    Hiatal hernia     Hydronephrosis     Hyperlipidemia     Hypertension     Irregular heart beat     A-Fib    Low back pain     Nocturnal leg cramps     Osteopenia     Peripheral artery disease (HCC)     Peripheral neuropathy     Pleural effusion     Sleep apnea     REFUSES MACHINE HS    Vocal cord polyp 10/2012    panendoscopy       Past Surgical History:   Procedure Laterality Date    ANGIOPLASTY      APPENDECTOMY  04/2007    CARDIOVERSION      x2, for A-Fib    CARDIOVERSION      CATARACT EXTRACTION      CATARACT EXTRACTION W/ INTRAOCULAR LENS IMPLANT Left 01/2014    CORONARY ANGIOPLASTY WITH STENT PLACEMENT  2001    one stent    HERNIA REPAIR Right     inquinal    JOINT REPLACEMENT Left     Hip    KNEE SURGERY  1994    also had arthroscopy right knee 2005    PANENDOSCOPY  10/23/2012    vocal cord polyp    PILONIDAL CYST DRAINAGE      x 3    [place] REMV CATARACT EXTRACAP,INSERT LENS Right 4/6/2016    Procedure: EXTRACTION EXTRACAPSULAR CATARACT PHACO INTRAOCULAR LENS (IOL); Surgeon: Aisha Mcmanus MD;  [facility] MAIN OR;  Service: Ophthalmology    [place] SURG IMPLNT [address] Left 8/23/2017    Procedure: Placement of Thoracic SCS with left buttock IPG (IMPULSE);   Surgeon: Dyana Morris MD;  Location:  MAIN OR;  Service: Neurosurgery    SPINAL CORD STIMULATOR TRIAL W/ LAMINOTOMY N/A 4/13/2017    Procedure: INSERTION DORSAL COLUMN SPINAL CORD STIMULATOR TRIAL;  Surgeon: Matilde Lamb MD;  [facility] MA[facility] OR;  Service:     TONSILLECTOMY         Family History   Problem Relation Age of Onset    Cancer Father 80        prostate    No Known Problems Mother     No Known Problems Sister     No Known Problems Brother     No Known Problems Daughter     No Known Problems Son     No Known Problems Maternal Aunt     No Known Problems Maternal Uncle     No Known Problems Paternal Aunt     No Known Problems Paternal Uncle     No Known Problems Maternal Grandmother     No Known Problems Maternal Grandfather     No Known Problems Paternal Grandmother     No Known Problems Paternal Grandfather        Social History     Occupational History    Not on file   Tobacco Use    Smoking status: Current Every Day Smoker     Packs/day: 1 00     Years: 60 00     Pack years: 60 00    Smokeless tobacco: Never Used Substance and Sexual Activity    Alcohol use: Yes     Comment: occasionally    Drug use: No    Sexual activity: Not on file         Current Outpatient Medications:     albuterol (PROAIR HFA) 90 mcg/act inhaler, Inhale 2 puffs every 6 (six) hours as needed for wheezing, Disp: , Rfl:     aspirin (ECOTRIN LOW STRENGTH) 81 mg EC tablet, Take 81 mg by mouth daily, Disp: , Rfl:     atorvastatin (LIPITOR) 40 mg tablet, Take 40 mg by mouth daily  , Disp: , Rfl:     budesonide-formoterol (SYMBICORT) 160-4 5 mcg/act inhaler, Inhale 2 puffs 2 (two) times a day Rinse mouth after use , Disp: 3 Inhaler, Rfl: 3    BYSTOLIC 10 MG tablet, TAKE 1 TABLET EVERY DAY, Disp: 90 tablet, Rfl: 3    diltiazem (DILACOR XR) 240 MG 24 hr capsule, Take 1 capsule (240 mg total) by mouth daily at bedtime, Disp: 90 capsule, Rfl: 3    fluticasone (FLONASE) 50 mcg/act nasal spray, 2 sprays into each nostril daily, Disp: , Rfl:     fluticasone furoate-vilanterol (BREO ELLIPTA), Inhale 1 puff daily, Disp: 3 each, Rfl: 3    fluticasone-vilanterol (BREO ELLIPTA) 200-25 MCG/INH inhaler, Inhale 1 puff daily Rinse mouth after use , Disp: 3 Inhaler, Rfl: 3    [START ON 11/26/2019] HYDROcodone-acetaminophen (NORCO)  mg per tablet, Take 1 tablet by mouth 3 (three) times a day as needed for severe painMax Daily Amount: 3 tablets, Disp: 90 tablet, Rfl: 0    ipratropium-albuterol (DUO-NEB) 0 5-2 5 mg/3 mL, Take 3 mL by nebulization every 6 (six) hours, Disp: 360 mL, Rfl: 2    meclizine (ANTIVERT) 12 5 MG tablet, TAKE 1-2 TABLETS BY MOUTH EVERY 8 HOURS AS NEEDED PRN; DIZZINESS, Disp: , Rfl: 3    nebivolol (BYSTOLIC) 10 mg tablet, Take 1 tablet (10 mg total) by mouth daily, Disp: 90 tablet, Rfl: 3    OMEPRAZOLE PO, Take 40 mg by mouth daily  , Disp: , Rfl:     OXYGEN-HELIUM IN, Inhale 2 L daily at bedtime, Disp: , Rfl:     predniSONE 10 mg tablet, Three tabs daily x3 days  2 tabs daily x3 days 1 tab daily x3 days Extra tabs, Disp: 20 tablet, Rfl: 0    rivaroxaban (XARELTO) 20 mg tablet, Take 1 tablet (20 mg total) by mouth daily with dinner, Disp: 28 tablet, Rfl: 0    tamsulosin (FLOMAX) 0 4 mg, Take 0 4 mg by mouth daily with dinner, Disp: , Rfl:     traZODone (DESYREL) 50 mg tablet, , Disp: , Rfl:     [START ON 10/27/2019] HYDROcodone-acetaminophen (NORCO)  mg per tablet, Take 1 tablet by mouth 3 (three) times a day as needed for severe painMax Daily Amount: 3 tablets, Disp: 90 tablet, Rfl: 0    nicotine (NICOTROL) 10 MG inhaler, Inhale 1 puff as needed for smoking cessation for up to 30 days Do not exceed more than 16 cartridges a day    For more than 1 per hour , Disp: 168 each, Rfl: 0    nicotine (NICOTROL) 10 MG inhaler, Inhale 1 puff as needed for smoking cessation for up to 30 days, Disp: 168 each, Rfl: 1    No Known Allergies    Physical Exam:    /72   Pulse 60   Ht 5' 10" (1 778 m)   Wt 118 kg (259 lb 9 6 oz)   BMI 37 25 kg/m²     Constitutional:obese  Eyes:anicteric  HEENT:grossly intact  Neck:supple, symmetric, trachea midline and no masses   Pulmonary:even and unlabored  Cardiovascular:No edema or pitting edema present  Skin:Normal without rashes or lesions and well hydrated  Psychiatric:Mood and affect appropriate  Neurologic:Cranial Nerves II-XII grossly intact  Musculoskeletal:antalgic

## 2019-10-25 ENCOUNTER — OFFICE VISIT (OUTPATIENT)
Dept: CARDIOLOGY CLINIC | Facility: CLINIC | Age: 79
End: 2019-10-25
Payer: MEDICARE

## 2019-10-25 VITALS
HEART RATE: 72 BPM | HEIGHT: 70 IN | OXYGEN SATURATION: 94 % | WEIGHT: 256 LBS | SYSTOLIC BLOOD PRESSURE: 118 MMHG | DIASTOLIC BLOOD PRESSURE: 80 MMHG | BODY MASS INDEX: 36.65 KG/M2

## 2019-10-25 DIAGNOSIS — R06.00 DYSPNEA ON EXERTION: ICD-10-CM

## 2019-10-25 DIAGNOSIS — J43.2 CENTRILOBULAR EMPHYSEMA (HCC): ICD-10-CM

## 2019-10-25 DIAGNOSIS — G47.33 OBSTRUCTIVE SLEEP APNEA: ICD-10-CM

## 2019-10-25 DIAGNOSIS — E78.5 DYSLIPIDEMIA: ICD-10-CM

## 2019-10-25 DIAGNOSIS — F17.210 CIGARETTE NICOTINE DEPENDENCE WITHOUT COMPLICATION: ICD-10-CM

## 2019-10-25 DIAGNOSIS — M54.50 CHRONIC MIDLINE LOW BACK PAIN WITHOUT SCIATICA: ICD-10-CM

## 2019-10-25 DIAGNOSIS — Z98.61 CAD S/P PERCUTANEOUS CORONARY ANGIOPLASTY: ICD-10-CM

## 2019-10-25 DIAGNOSIS — I10 ESSENTIAL HYPERTENSION: ICD-10-CM

## 2019-10-25 DIAGNOSIS — E66.9 CLASS 2 OBESITY: ICD-10-CM

## 2019-10-25 DIAGNOSIS — I25.10 CAD S/P PERCUTANEOUS CORONARY ANGIOPLASTY: ICD-10-CM

## 2019-10-25 DIAGNOSIS — I49.3 ASYMPTOMATIC PVCS: ICD-10-CM

## 2019-10-25 DIAGNOSIS — I48.20 CHRONIC ATRIAL FIBRILLATION (HCC): Primary | ICD-10-CM

## 2019-10-25 DIAGNOSIS — G89.29 CHRONIC MIDLINE LOW BACK PAIN WITHOUT SCIATICA: ICD-10-CM

## 2019-10-25 PROCEDURE — 93000 ELECTROCARDIOGRAM COMPLETE: CPT | Performed by: INTERNAL MEDICINE

## 2019-10-25 PROCEDURE — 99214 OFFICE O/P EST MOD 30 MIN: CPT | Performed by: INTERNAL MEDICINE

## 2019-10-25 NOTE — PROGRESS NOTES
Office Cardiology Progress Note  Selvin Calle [de-identified] y o  male MRN: [id number]  10/25/19  9:45 AM      ASSESSMENT:    1  Controlled chronic atrial fibrillation on Xarelto oral anticoagulation plus diltiazem and Bystolic for rate control  2  Currently suppressed frequent PVCs or aberrantly conducted complexes, asymptomatic, and pattern of bigeminy/trigeminy, chronic,   suppressed today   Ventricular triplet noted previously   Patient refuses Holter monitor  3  Stable CAD with history of remote PCI, details unknown to me, done 9-1/2 years ago, with patient alleging last stress test 2-3 years ago   He now refuses any further stress testing  4  Significant COPD  but now stable with chronic exertional dyspnea with hypoxemia and ongoing cigarette abuse at one pack per day and prior occasional exacerbations  Today with no audible wheezing  Uses nebulizer t i d /q i d   5  Obstructive sleep apnea, patient refusing CPAP and using nasal oxygen 2 L/m h s  and p r n   6  Marked obesity, class II, with 6 5 pound weight gain in 2-1/2 years with 4 pound weight loss in approximately seven months  7  History of right pleural effusion, small  8  History of asbestosis due to his exposure as a   9  Chronic low back, bilateral lower extremity and cervical spine pain and continuous opioid dependence with failure of lumbar epidural steroid injections and lumbar facet radiofrequency ablations with unsatisfactory response to permanent spinal stimulator implant on 08/23/2017   Now with ineffective spinal stimulator    Patient seeking Lyrica from pain management  10  Long-standing essential hypertension and dyslipidemia, treated  11  History of hiatal hernia and GERD  12  History of former alcoholism, now controlled, with fall caused by alcohol abuse resulting in hospital stay 8/18/13 and last trip and fall 2-1/2 years ago    13  History of bladder outlet obstruction 8/13 and attributed to BPH and has likely localized prostate cancer under observation by Dr Ree Armstrong with latest PSA 12 0 as of 11/08/2018  14  History of pneumonia/pleurisy March, 2012  15  Unremarkable cardiac catheterization in 2005  16  Cervical spine fracture after fall in 2009, rib fractures after fall in 2013, left total hip replacement April, 2010  17   Multilevel cervical spondylosis and multilevel cervical spinal stenosis  Plan       Patient Instructions       1  Continue current medications  2  Smoking cessation was encouraged  3  Cardiology follow-up approximately six months with lipids, CMP, EKG  HPI    This [de-identified] y o  male  denies new cardiopulmonary and medical symptoms  His main complaints center around low back pain and entire lip bilateral lower extremity aching, both worse after standing or walking  He also has burning pains in his hands off and on  Previous adjustments in his chronic pain syndrome by manipulating his spinal stimulator were unsuccessful  The patient plans to retire at the end of this year and plans to keep busy fixing lawn mowers and with limited home carpentry projects  He is seen in follow-up of the below listed diagnoses  Encounter Diagnoses   Name Primary?     Chronic atrial fibrillation Yes    Asymptomatic PVCs     Essential hypertension     CAD S/P percutaneous coronary angioplasty     Dyslipidemia     Centrilobular emphysema (HCC)     Cigarette nicotine dependence without complication     Obstructive sleep apnea     Dyspnea on exertion     Class 2 obesity     Chronic midline low back pain without sciatica         Review of Systems    All other systems negative, except as noted in history of present illness    Historical Information   Past Medical History:   Diagnosis Date    Acid reflux     Arthritis     BPH (benign prostatic hyperplasia)     no treatment    Calcium disorder     Chronic atrial fibrillation 2/6/2018    Chronic pain     Chronic pain disorder     COPD [facility]chronic obstructive pulmonary disease) ([facility] [address] [facility]     Coronary artery disease 2001    PTCA with one stent    Eye redness     Poked his eye on 3/18/16 "bloody" right eye    Hiatal hernia     Hydronephrosis     Hyperlipidemia     Hypertension     Irregular heart beat     A-Fib    Low back pain     Nocturnal leg cramps     Osteopenia     Peripheral artery disease ([facility] [address] [facility]     Peripheral neuropathy     Pleural effusion     Sleep apnea     REFUSES MACHINE HS    Vocal cord polyp 10/2012    panendoscopy     Past Surgical History:   Procedure Laterality Date    ANGIOPLASTY      APPENDECTOMY  04/2007    CARDIOVERSION      x2, for A-Fib    CARDIOVERSION      CATARACT EXTRACTION      CATARACT EXTRACTION W/ INTRAOCULAR LENS IMPLANT Left 01/2014    CORONARY ANGIOPLASTY WITH STENT PLACEMENT  2001    one stent    HERNIA REPAIR Right     inquinal    JOINT REPLACEMENT Left     Hip   [address]    also had arthroscopy right knee 2005    PANENDOSCOPY  10/23/2012    vocal cord polyp    PILONIDAL CYST DRAINAGE      x 3    GA REMV CATARACT EXTRACAP,INSERT LENS Right 4/6/2016    Procedure: EXTRACTION EXTRACAPSULAR CATARACT PHACO INTRAOCULAR LENS (IOL); Surgeon: Damon Rinne, MD;  [facility] MAIN OR;  Service: Ophthalmology    GA SURG IMPLNT [address] Left 8/23/2017    Procedure: Placement of Thoracic SCS with left buttock IPG (IMPULSE);   Surgeon: Toro Elizalde MD;  Location:  MAIN OR;  Service: Neurosurgery    SPINAL CORD STIMULATOR TRIAL W/ LAMINOTOMY N/A 4/13/2017    Procedure: INSERTION DORSAL COLUMN SPINAL CORD STIMULATOR TRIAL;  Surgeon: Blanco Romero MD;  [facility] MA[facility];  Service:     TONSILLECTOMY       Social History     Substance and Sexual Activity   Alcohol Use Yes    Comment: occasionally     Social History     Substance and Sexual Activity   Drug Use No     Social History     Tobacco Use   Smoking Status Current Every Day Smoker    Packs/day: 1 00    Years: 60 00    Pack years: 60 00   Smokeless Tobacco Never Used       Family History:  Family History   Problem Relation Age of Onset    Cancer Father 80        prostate    No Known Problems Mother     No Known Problems Sister     No Known Problems Brother     No Known Problems Daughter     No Known Problems Son     No Known Problems Maternal Aunt     No Known Problems Maternal Uncle     No Known Problems Paternal Aunt     No Known Problems Paternal Uncle     No Known Problems Maternal Grandmother     No Known Problems Maternal Grandfather     No Known Problems Paternal Grandmother     No Known Problems Paternal Grandfather          Meds/Allergies     Prior to Admission medications    Medication Sig Start Date End Date Taking?  Authorizing Provider   albuterol (PROAIR HFA) 90 mcg/act inhaler Inhale 2 puffs every 6 (six) hours as needed for wheezing   Yes Historical Provider, MD   aspirin (ECOTRIN LOW STRENGTH) 81 mg EC tablet Take 81 mg by mouth daily   Yes Historical Provider, MD   atorvastatin (LIPITOR) 40 mg tablet Take 40 mg by mouth daily     Yes Historical Provider, MD   BYSTOLIC 10 MG tablet TAKE 1 TABLET EVERY DAY 3/22/18  Yes Roderick Dhaliwal MD   diltiazem (DILACOR XR) 240 MG 24 hr capsule Take 1 capsule (240 mg total) by mouth daily at bedtime 9/6/18  Yes Roderick Dhaliwal MD   HYDROcodone-acetaminophen [facility])  mg per tablet Take 1 tablet by mouth 3 (three) times a day as needed for severe painMax Daily Amount: 3 tablets 11/26/19 12/26/19 Yes Kenan Clay MD   ipratropium-albuterol (DUO-NEB) 0 5-2 5 mg/3 mL Take 3 mL by nebulization every 6 (six) hours 3/27/18  Yes MITCHELL Cantor   OMEPRAZOLE PO Take 40 mg by mouth daily     Yes Historical Provider, MD   OXYGEN-HELIUM IN Inhale 2 L daily at bedtime   Yes Historical Provider, MD   rivaroxaban (XARELTO) 20 mg tablet Take 1 tablet (20 mg total) by mouth daily with dinner 4/4/19  Yes Roderick Dhaliwal MD   tamsulosin (FLOMAX) 0 4 mg Take 0 4 mg by mouth daily with dinner   Yes Historical Provider, MD   budesonide-formoterol (SYMBICORT) 160-4 5 mcg/act inhaler Inhale 2 puffs 2 (two) times a day Rinse mouth after use  Patient not taking: Reported on 10/25/2019 7/23/19   MITCHLEL Schmitz   fluticasone [facility]) 50 mcg/act nasal spray 2 sprays into each nostril daily    Historical Provider, MD   fluticasone furoate-vilanterol (BREO ELLIPTA) Inhale 1 puff daily  Patient not taking: Reported on 10/25/2019 5/9/18   MITCHELL Schmitz   fluticasone-vilanterol (BREO ELLIPTA) 200-25 MCG/INH inhaler Inhale 1 puff daily Rinse mouth after use  Patient not taking: Reported on 10/25/2019 11/26/18   MITCHELL Schmitz   HYDROcodone-acetaminophen [facility])  mg per tablet Take 1 tablet by mouth 3 (three) times a day as needed for severe painMax Daily Amount: 3 tablets  Patient not taking: Reported on 10/25/2019 10/27/19 11/26/19  Leatha Quinonez MD   meclizine (ANTIVERT) 12 5 MG tablet TAKE 1-2 TABLETS BY MOUTH EVERY 8 HOURS AS NEEDED PRN; DIZZINESS 7/22/19   Historical Provider, MD   nebivolol (BYSTOLIC) 10 mg tablet Take 1 tablet (10 mg total) by mouth daily  Patient not taking: Reported on 10/25/2019 3/21/19   Emmett Torres DO   nicotine (NICOTROL) 10 MG inhaler Inhale 1 puff as needed for smoking cessation for up to 30 days Do not exceed more than 16 cartridges a day  For more than 1 per hour   11/26/18 12/26/18  MITCHELL Schmitz   nicotine (NICOTROL) 10 MG inhaler Inhale 1 puff as needed for smoking cessation for up to 30 days 7/23/19 8/22/19  MITCHELL Schmitz   traZODone (DESYREL) 50 mg tablet  3/20/18   Historical Provider, MD   predniSONE 10 mg tablet Three tabs daily x3 days  2 tabs daily x3 days 1 tab daily x3 days  Extra tabs 5/9/18 10/25/19  MITCHELL Schmitz       No Known Allergies      Vitals:    10/25/19 0857   BP: 118/80   BP Location: Left arm   Patient Position: Sitting   Cuff Size: Large   Pulse: 72   SpO2: 94%   Weight: 116 kg (256 lb)   Height: 5' 10" (1 778 m)       Body mass index is 36 73 kg/m²  4 pound weight loss in approximately seven months  Physical Exam:    General Appearance:  Alert, cooperative, no distress, appears stated age and is markedly obese  Head:  Normocephalic, without obvious abnormality, atraumatic   Eyes:  PERRL, conjunctiva/corneas clear, EOM's intact,   both eyes   Ears:  Normal TM's and external ear canals, both ears   Nose: Nares normal, septum midline, mucosa normal, no drainage or sinus tenderness   Throat: Lips, mucosa, and tongue normal; teeth and gums normal and speaks with a rather hoarse voice  Neck: Supple, symmetrical, trachea midline, no adenopathy, thyroid: not enlarged, symmetric, no tenderness/mass/nodules, no carotid bruit or JVD   Back:   Symmetric, no curvature, ROM normal, no CVA tenderness   Lungs:   Clear to auscultation bilaterally, respirations unlabored   Chest Wall:  No tenderness or deformity   Heart:  Irregularly irregular cardiac rhythm, S1, S2 normal, grade 2/6 basal systolic ejection murmur, radiating along left sternal border with intact A2 and no rub or gallop   Abdomen:   Soft, non-tender, bowel sounds active all four quadrants,  no masses, no organomegaly with marked obesity noted  Extremities: Extremities normal, atraumatic, no cyanosis or edema   Pulses: Both feet are equally warm but pedal and ankle pulses are very difficult to feel  Skin: Skin showed normal color, texture, turgor and no rashes or lesions   Lymph nodes: Cervical, supraclavicular, and axillary nodes normal   Neurologic: Normal         Cardiographics    ECG 10/25/19:    Controlled atrial fibrillation  Minor ST segment abnormalities  Mild LAFB  Suppressed PVCs with no other changes compared to 04/04/2019    IMAGING:    No Chest XR results available for this patient            LAB REVIEW:      Lab Results   Component Value Date    SODIUM 135 (L) 11/08/2018    K 4 5 11/08/2018    CL 99 (L) 11/08/2018    CO2 30 11/08/2018 BUN 13 11/08/2018    CREATININE 0 95 11/08/2018    GLUF 105 (H) 11/08/2018    CALCIUM 8 8 11/08/2018    AST 18 11/08/2018    ALT 25 11/08/2018    ALKPHOS 62 11/08/2018    EGFR 76 11/08/2018     Lab Results   Component Value Date    CHOLESTEROL 98 11/08/2018    CHOLESTEROL 116 12/07/2017    CHOLESTEROL 105 02/07/2017     Lab Results   Component Value Date    HDL 54 11/08/2018    HDL 59 12/07/2017    HDL 58 02/07/2017     No results found for: LDLCHOLEST  Lab Results   Component Value Date    LDLCALC 25 11/08/2018    LDLCALC 34 12/07/2017    LDLCALC 22 02/07/2017     No components found for: [facility]  Lab Results   Component Value Date    TRIG 96 11/08/2018    TRIG 113 12/07/2017    TRIG 126 02/07/2017               Alexi Abraham MD

## 2019-10-25 NOTE — PATIENT INSTRUCTIONS
1  Continue current medications  2  Smoking cessation was encouraged  3  Cardiology follow-up approximately six months with lipids, CMP, EKG

## 2019-12-11 ENCOUNTER — TRANSCRIBE ORDERS (OUTPATIENT)
Dept: ADMINISTRATIVE | Facility: HOSPITAL | Age: 79
End: 2019-12-11

## 2019-12-11 ENCOUNTER — APPOINTMENT (OUTPATIENT)
Dept: LAB | Facility: HOSPITAL | Age: 79
End: 2019-12-11
Attending: FAMILY MEDICINE
Payer: MEDICARE

## 2019-12-11 DIAGNOSIS — R35.0 URINARY FREQUENCY: Primary | ICD-10-CM

## 2019-12-11 DIAGNOSIS — R35.0 URINARY FREQUENCY: ICD-10-CM

## 2019-12-11 LAB
BACTERIA UR QL AUTO: ABNORMAL /HPF
BILIRUB UR QL STRIP: NEGATIVE
CLARITY UR: CLEAR
COLOR UR: YELLOW
GLUCOSE UR STRIP-MCNC: NEGATIVE MG/DL
HGB UR QL STRIP.AUTO: NEGATIVE
KETONES UR STRIP-MCNC: NEGATIVE MG/DL
LEUKOCYTE ESTERASE UR QL STRIP: ABNORMAL
NITRITE UR QL STRIP: NEGATIVE
NON-SQ EPI CELLS URNS QL MICRO: ABNORMAL /HPF
OTHER STN SPEC: ABNORMAL
PH UR STRIP.AUTO: 6 [PH]
PROT UR STRIP-MCNC: ABNORMAL MG/DL
RBC #/AREA URNS AUTO: ABNORMAL /HPF
SP GR UR STRIP.AUTO: 1.01 (ref 1–1.03)
UROBILINOGEN UR QL STRIP.AUTO: 0.2 E.U./DL
WBC #/AREA URNS AUTO: ABNORMAL /HPF

## 2019-12-11 PROCEDURE — 81001 URINALYSIS AUTO W/SCOPE: CPT

## 2019-12-11 PROCEDURE — 87086 URINE CULTURE/COLONY COUNT: CPT

## 2019-12-12 LAB — BACTERIA UR CULT: NORMAL

## 2019-12-17 DIAGNOSIS — I48.20 CHRONIC ATRIAL FIBRILLATION (HCC): ICD-10-CM

## 2019-12-17 RX ORDER — RIVAROXABAN 20 MG/1
TABLET, FILM COATED ORAL
Qty: 90 TABLET | Refills: 3 | Status: SHIPPED | OUTPATIENT
Start: 2019-12-17 | End: 2020-06-26

## 2019-12-26 ENCOUNTER — TELEPHONE (OUTPATIENT)
Dept: PAIN MEDICINE | Facility: CLINIC | Age: 79
End: 2019-12-26

## 2019-12-26 NOTE — TELEPHONE ENCOUNTER
RALPH    S/w pt and advised ov required for opioid refill  Scheduled pt for 12/30 at 0830 with [facility] for med refill

## 2019-12-26 NOTE — TELEPHONE ENCOUNTER
Pt contacted Call Center requested refill of their medication  Medication Name: Hydrocodone-acetaminophen      Dosage of Med: 10 - 325 mg      Frequency of Med: Take 1 tablet by mouth 3 (three) times a day as needed for severe painMax Daily Amount: 3 tablets      Remaining Medication: 6 left      Pharmacy and Location: [facility] on file        Pt  Preferred Callback Phone Number: [phone]      Thank you

## 2019-12-27 ENCOUNTER — TRANSCRIBE ORDERS (OUTPATIENT)
Dept: ADMINISTRATIVE | Facility: HOSPITAL | Age: 79
End: 2019-12-27

## 2019-12-27 ENCOUNTER — APPOINTMENT (OUTPATIENT)
Dept: LAB | Facility: HOSPITAL | Age: 79
End: 2019-12-27
Attending: FAMILY MEDICINE
Payer: MEDICARE

## 2019-12-27 DIAGNOSIS — E78.00 PURE HYPERCHOLESTEROLEMIA: ICD-10-CM

## 2019-12-27 DIAGNOSIS — R53.83 OTHER FATIGUE: ICD-10-CM

## 2019-12-27 DIAGNOSIS — E55.9 VITAMIN D DEFICIENCY: ICD-10-CM

## 2019-12-27 DIAGNOSIS — Z13.9 SCREENING FOR UNSPECIFIED CONDITION: ICD-10-CM

## 2019-12-27 DIAGNOSIS — R53.83 OTHER FATIGUE: Primary | ICD-10-CM

## 2019-12-27 LAB
25(OH)D3 SERPL-MCNC: 43.1 NG/ML (ref 30–100)
ALBUMIN SERPL BCP-MCNC: 3.8 G/DL (ref 3.5–5)
ALP SERPL-CCNC: 61 U/L (ref 46–116)
ALT SERPL W P-5'-P-CCNC: 23 U/L (ref 12–78)
ANION GAP SERPL CALCULATED.3IONS-SCNC: 10 MMOL/L (ref 4–13)
AST SERPL W P-5'-P-CCNC: 20 U/L (ref 5–45)
BACTERIA UR QL AUTO: ABNORMAL /HPF
BASOPHILS # BLD AUTO: 0.04 THOUSANDS/ΜL (ref 0–0.1)
BASOPHILS NFR BLD AUTO: 1 % (ref 0–1)
BILIRUB SERPL-MCNC: 0.8 MG/DL (ref 0.2–1)
BILIRUB UR QL STRIP: NEGATIVE
BUN SERPL-MCNC: 16 MG/DL (ref 5–25)
CALCIUM SERPL-MCNC: 9 MG/DL (ref 8.3–10.1)
CHLORIDE SERPL-SCNC: 100 MMOL/L (ref 100–108)
CHOLEST SERPL-MCNC: 111 MG/DL (ref 50–200)
CLARITY UR: CLEAR
CO2 SERPL-SCNC: 29 MMOL/L (ref 21–32)
COLOR UR: YELLOW
CREAT SERPL-MCNC: 0.89 MG/DL (ref 0.6–1.3)
EOSINOPHIL # BLD AUTO: 0.19 THOUSAND/ΜL (ref 0–0.61)
EOSINOPHIL NFR BLD AUTO: 2 % (ref 0–6)
ERYTHROCYTE [DISTWIDTH] IN BLOOD BY AUTOMATED COUNT: 12.6 % (ref 11.6–15.1)
GFR SERPL CREATININE-BSD FRML MDRD: 80 ML/MIN/1.73SQ M
GLUCOSE P FAST SERPL-MCNC: 103 MG/DL (ref 65–99)
GLUCOSE UR STRIP-MCNC: NEGATIVE MG/DL
HCT VFR BLD AUTO: 46 % (ref 36.5–49.3)
HDLC SERPL-MCNC: 48 MG/DL
HGB BLD-MCNC: 15 G/DL (ref 12–17)
HGB UR QL STRIP.AUTO: NEGATIVE
IMM GRANULOCYTES # BLD AUTO: 0.03 THOUSAND/UL (ref 0–0.2)
IMM GRANULOCYTES NFR BLD AUTO: 0 % (ref 0–2)
KETONES UR STRIP-MCNC: NEGATIVE MG/DL
LDLC SERPL CALC-MCNC: 38 MG/DL (ref 0–100)
LEUKOCYTE ESTERASE UR QL STRIP: ABNORMAL
LYMPHOCYTES # BLD AUTO: 1.44 THOUSANDS/ΜL (ref 0.6–4.47)
LYMPHOCYTES NFR BLD AUTO: 17 % (ref 14–44)
MCH RBC QN AUTO: 32.8 PG (ref 26.8–34.3)
MCHC RBC AUTO-ENTMCNC: 32.6 G/DL (ref 31.4–37.4)
MCV RBC AUTO: 101 FL (ref 82–98)
MONOCYTES # BLD AUTO: 0.88 THOUSAND/ΜL (ref 0.17–1.22)
MONOCYTES NFR BLD AUTO: 11 % (ref 4–12)
NEUTROPHILS # BLD AUTO: 5.71 THOUSANDS/ΜL (ref 1.85–7.62)
NEUTS SEG NFR BLD AUTO: 69 % (ref 43–75)
NITRITE UR QL STRIP: NEGATIVE
NON-SQ EPI CELLS URNS QL MICRO: ABNORMAL /HPF
NONHDLC SERPL-MCNC: 63 MG/DL
NRBC BLD AUTO-RTO: 0 /100 WBCS
PH UR STRIP.AUTO: 6 [PH]
PLATELET # BLD AUTO: 248 THOUSANDS/UL (ref 149–390)
PMV BLD AUTO: 9.1 FL (ref 8.9–12.7)
POTASSIUM SERPL-SCNC: 4.1 MMOL/L (ref 3.5–5.3)
PROT SERPL-MCNC: 7.2 G/DL (ref 6.4–8.2)
PROT UR STRIP-MCNC: NEGATIVE MG/DL
RBC # BLD AUTO: 4.57 MILLION/UL (ref 3.88–5.62)
RBC #/AREA URNS AUTO: ABNORMAL /HPF
SODIUM SERPL-SCNC: 139 MMOL/L (ref 136–145)
SP GR UR STRIP.AUTO: 1.01 (ref 1–1.03)
TRIGL SERPL-MCNC: 124 MG/DL
TSH SERPL DL<=0.05 MIU/L-ACNC: 1.4 UIU/ML (ref 0.36–3.74)
UROBILINOGEN UR QL STRIP.AUTO: 0.2 E.U./DL
VIT B12 SERPL-MCNC: 512 PG/ML (ref 100–900)
WBC # BLD AUTO: 8.29 THOUSAND/UL (ref 4.31–10.16)
WBC #/AREA URNS AUTO: ABNORMAL /HPF

## 2019-12-27 PROCEDURE — 85025 COMPLETE CBC W/AUTO DIFF WBC: CPT | Performed by: FAMILY MEDICINE

## 2019-12-27 PROCEDURE — 81001 URINALYSIS AUTO W/SCOPE: CPT | Performed by: FAMILY MEDICINE

## 2019-12-27 PROCEDURE — 80053 COMPREHEN METABOLIC PANEL: CPT | Performed by: FAMILY MEDICINE

## 2019-12-27 PROCEDURE — 82607 VITAMIN B-12: CPT

## 2019-12-27 PROCEDURE — 82306 VITAMIN D 25 HYDROXY: CPT

## 2019-12-27 PROCEDURE — 36415 COLL VENOUS BLD VENIPUNCTURE: CPT | Performed by: FAMILY MEDICINE

## 2019-12-27 PROCEDURE — 80061 LIPID PANEL: CPT

## 2019-12-27 PROCEDURE — 84443 ASSAY THYROID STIM HORMONE: CPT

## 2019-12-27 PROCEDURE — 87086 URINE CULTURE/COLONY COUNT: CPT

## 2019-12-28 LAB — BACTERIA UR CULT: NORMAL

## 2019-12-30 ENCOUNTER — OFFICE VISIT (OUTPATIENT)
Dept: PAIN MEDICINE | Facility: CLINIC | Age: 79
End: 2019-12-30
Payer: MEDICARE

## 2019-12-30 VITALS
HEART RATE: 54 BPM | DIASTOLIC BLOOD PRESSURE: 90 MMHG | HEIGHT: 70 IN | WEIGHT: 259.2 LBS | SYSTOLIC BLOOD PRESSURE: 150 MMHG | BODY MASS INDEX: 37.11 KG/M2

## 2019-12-30 DIAGNOSIS — Z79.891 LONG-TERM CURRENT USE OF OPIATE ANALGESIC: Primary | ICD-10-CM

## 2019-12-30 DIAGNOSIS — M54.50 PAIN OF LUMBAR SPINE: ICD-10-CM

## 2019-12-30 DIAGNOSIS — M51.16 INTERVERTEBRAL DISC DISORDER WITH RADICULOPATHY OF LUMBAR REGION: ICD-10-CM

## 2019-12-30 DIAGNOSIS — G89.4 CHRONIC PAIN SYNDROME: ICD-10-CM

## 2019-12-30 DIAGNOSIS — F11.20 UNCOMPLICATED OPIOID DEPENDENCE (HCC): ICD-10-CM

## 2019-12-30 PROCEDURE — 99214 OFFICE O/P EST MOD 30 MIN: CPT | Performed by: ANESTHESIOLOGY

## 2019-12-30 PROCEDURE — 80305 DRUG TEST PRSMV DIR OPT OBS: CPT | Performed by: ANESTHESIOLOGY

## 2019-12-30 RX ORDER — HYDROCODONE BITARTRATE AND ACETAMINOPHEN 10; 325 MG/1; MG/1
1 TABLET ORAL 3 TIMES DAILY PRN
Qty: 90 TABLET | Refills: 0 | Status: SHIPPED | OUTPATIENT
Start: 2019-12-30 | End: 2020-02-27 | Stop reason: SDUPTHER

## 2019-12-30 RX ORDER — HYDROCODONE BITARTRATE AND ACETAMINOPHEN 10; 325 MG/1; MG/1
1 TABLET ORAL 3 TIMES DAILY PRN
Qty: 90 TABLET | Refills: 0 | Status: SHIPPED | OUTPATIENT
Start: 2020-01-29 | End: 2020-02-27 | Stop reason: SDUPTHER

## 2019-12-30 NOTE — PROGRESS NOTES
Pain Medicine Follow-Up Note    Assessment:  1  Chronic pain syndrome    2  Pain of lumbar spine    3  Intervertebral disc disorder with radiculopathy of lumbar region        Plan:  New Medications Ordered This Visit   Medications    HYDROcodone-acetaminophen (NORCO)  mg per tablet     Sig: Take 1 tablet by mouth 3 (three) times a day as needed for severe painMax Daily Amount: 3 tablets     Dispense:  90 tablet     Refill:  0    HYDROcodone-acetaminophen (NORCO)  mg per tablet     Sig: Take 1 tablet by mouth 3 (three) times a day as needed for severe painMax Daily Amount: 3 tablets     Dispense:  90 tablet     Refill:  0     My impressions and treatment recommendations were discussed in detail with the patient who verbalized understanding and had no further questions  The patient is reporting excellent pain relief with hydrocodone/acetaminophen 10/325 mg 1 tablet 3 times daily as needed for pain  He does admit to controlled opioid induced constipation with prune juice  As such, I felt a reasonable to refill his hydrocodone/acetaminophen prescription  I sent E prescriptions to his pharmacy dated December 30, 2019 and January 29, 2019  The risks and side effects of chronic opioid treatment were discussed in detail with the patient  Side effects include but are not limited to nausea, vomiting, GI intolerance, sedation, constipation, mental clouding, opioid-induced hyperalgesia, endocrine dysfunction, addiction, dependence, and tolerance  The patient was asked to take his medications only as prescribed and directed, never in excess, and never for any other reason other than for pain control  The patient was also asked to keep his medications out of the reach of others and away from children, preferably in a locked drawer  The patient verbalized understanding and wished to use these opioid medications      A urine drug test was collected at today's office visit as part of our medication management protocol  The point of care testing results were appropriate for what was being prescribed  The specimen will be sent for confirmatory testing  The drug screen is medically necessary because the patient is either dependent on opioid medication or is being considered for opioid medication therapy and the results could impact ongoing or future treatment  The drug screen is to evaluate for the presence or absence of prescribed, non-prescribed, and/or illicit drugs and substances  [place] Prescription Drug Monitoring Program report was reviewed and was appropriate     Follow-up is planned in 4 weeks time or sooner as warranted  Discharge instructions were provided  I personally saw and examined the patient and I agree with the above discussed plan of care  History of Present Illness:    Elina Encarnacion is a 78 y o  male who presents to [facility] for interval re-evaluation of the above stated pain complaints  The patient has a past medical and chronic pain history as outlined in the assessment section  He was last seen on October 24, 2019 at which time he was maintained on hydrocodone/acetaminophen 10/325 mg 1 tablet 3 times daily as needed for pain  At today's office visit, the patient's pain score is 8/10 on the verbal numerical pain rating scale  The patient states that his pain is primarily in his low back with radiation into the bilateral lower extremities  He describes his pain as worse in the morning, evening, and night  His pain is constant in nature  He reports the quality of his pain as "dull/aching, sharp, throbbing, and shooting    He is reporting excellent pain relief with using 3 tablets of hydrocodone/acetaminophen per day  Other than as stated above, the patient denies any interval changes in medications, medical condition, mental condition, symptoms, or allergies since the last office visit           Review of Systems:    Review of Systems Constitutional: Negative for activity change and appetite change  HENT: Negative for dental problem, hearing loss, mouth sores and nosebleeds  Eyes: Negative for redness  Respiratory: Positive for shortness of breath  Gastrointestinal: Negative for blood in stool and nausea  Endocrine: Negative for polyphagia  Genitourinary: Negative for flank pain and penile swelling  Musculoskeletal: Positive for back pain and gait problem  Negative for neck stiffness  Decreased ROM  Muscle weakness   Skin: Negative for rash  Allergic/Immunologic: Negative for environmental allergies  Neurological: Negative for tremors, light-headedness and headaches  Hematological: Does not bruise/bleed easily  Psychiatric/Behavioral: Negative for dysphoric mood, self-injury and sleep disturbance  The patient is not nervous/anxious           Memory Loss         Patient Active Problem List   Diagnosis    Chronic pain syndrome    Pain of lumbar spine    Sacroiliitis (HCC)    Intervertebral disc disorder with radiculopathy of lumbar region    Centrilobular emphysema (HCC)    Dyspnea on exertion    Chronic atrial fibrillation    Coronary artery disease    Chronic respiratory failure with hypoxia (HCC)    Nicotine dependence    Cervical radiculopathy    Cervical spinal stenosis    Cervical pain    Elevated PSA       Past Medical History:   Diagnosis Date    Acid reflux     Arthritis     BPH (benign prostatic hyperplasia)     no treatment    Calcium disorder     Chronic atrial fibrillation 2/6/2018    Chronic pain     Chronic pain disorder     COPD [facility]chronic obstructive pulmonary disease) ([facility] [address] )     Coronary artery disease 2001    PTCA with one stent    Eye redness     Poked his eye on 3/18/16 "bloody" right eye    Hiatal hernia     Hydronephrosis     Hyperlipidemia     Hypertension     Irregular heart beat     A-Fib    Low back pain     Neck pain     Nocturnal leg cramps     Osteopenia  Peripheral artery disease (HCC)     Peripheral neuropathy     Pleural effusion     Sleep apnea     REFUSES MACHINE HS    Spinal stenosis     Vocal cord polyp 10/2012    panendoscopy       Past Surgical History:   Procedure Laterality Date    ANGIOPLASTY      APPENDECTOMY  04/2007    CARDIOVERSION      x2, for A-Fib    CARDIOVERSION      CATARACT EXTRACTION      CATARACT EXTRACTION W/ INTRAOCULAR LENS IMPLANT Left 01/2014    CORONARY ANGIOPLASTY WITH STENT PLACEMENT  2001    one stent    HERNIA REPAIR Right     inquinal    JOINT REPLACEMENT Left     Hip    KNEE SURGERY  1994    also had arthroscopy right knee 2005    PANENDOSCOPY  10/23/2012    vocal cord polyp    PILONIDAL CYST DRAINAGE      x 3    TX REMV CATARACT EXTRACAP,INSERT LENS Right 4/6/2016    Procedure: EXTRACTION EXTRACAPSULAR CATARACT PHACO INTRAOCULAR LENS (IOL); Surgeon: Bar Lord MD;  [facility] MAIN OR;  Service: Ophthalmology    TX SURG IMPLNT [address] Left 8/23/2017    Procedure: Placement of Thoracic SCS with left buttock IPG (IMPULSE);   Surgeon: Donny Beth MD;  Location:  MAIN OR;  Service: Neurosurgery    SPINAL CORD STIMULATOR TRIAL W/ LAMINOTOMY N/A 4/13/2017    Procedure: INSERTION DORSAL COLUMN SPINAL CORD STIMULATOR TRIAL;  Surgeon: Lewis Mendez MD;  Location: [place] [id number] MAIN OR;  Service:     TONSILLECTOMY         Family History   Problem Relation Age of Onset    Cancer Father 80        prostate    No Known Problems Mother     No Known Problems Sister     No Known Problems Brother     No Known Problems Daughter     No Known Problems Son     No Known Problems Maternal Aunt     No Known Problems Maternal Uncle     No Known Problems Paternal Aunt     No Known Problems Paternal Uncle     No Known Problems Maternal Grandmother     No Known Problems Maternal Grandfather     No Known Problems Paternal Grandmother     No Known Problems Paternal Grandfather        Social History Occupational History    Not on file   Tobacco Use    Smoking status: Current Every Day Smoker     Packs/day: 1 00     Years: 60 00     Pack years: 60 00    Smokeless tobacco: Never Used   Substance and Sexual Activity    Alcohol use: Yes     Comment: occasionally    Drug use: No    Sexual activity: Not on file         Current Outpatient Medications:     albuterol (PROAIR HFA) 90 mcg/act inhaler, Inhale 2 puffs every 6 (six) hours as needed for wheezing, Disp: , Rfl:     aspirin (ECOTRIN LOW STRENGTH) 81 mg EC tablet, Take 81 mg by mouth daily, Disp: , Rfl:     atorvastatin (LIPITOR) 40 mg tablet, Take 40 mg by mouth daily  , Disp: , Rfl:     budesonide-formoterol (SYMBICORT) 160-4 5 mcg/act inhaler, Inhale 2 puffs 2 (two) times a day Rinse mouth after use  (Patient not taking: Reported on 10/25/2019), Disp: 3 Inhaler, Rfl: 3    BYSTOLIC 10 MG tablet, TAKE 1 TABLET EVERY DAY, Disp: 90 tablet, Rfl: 3    diltiazem (DILACOR XR) 240 MG 24 hr capsule, Take 1 capsule (240 mg total) by mouth daily at bedtime, Disp: 90 capsule, Rfl: 3    fluticasone (FLONASE) 50 mcg/act nasal spray, 2 sprays into each nostril daily, Disp: , Rfl:     fluticasone furoate-vilanterol (BREO ELLIPTA), Inhale 1 puff daily (Patient not taking: Reported on 10/25/2019), Disp: 3 each, Rfl: 3    fluticasone-vilanterol (BREO ELLIPTA) 200-25 MCG/INH inhaler, Inhale 1 puff daily Rinse mouth after use   (Patient not taking: Reported on 10/25/2019), Disp: 3 Inhaler, Rfl: 3    HYDROcodone-acetaminophen (NORCO)  mg per tablet, Take 1 tablet by mouth 3 (three) times a day as needed for severe painMax Daily Amount: 3 tablets, Disp: 90 tablet, Rfl: 0    [START ON 1/29/2020] HYDROcodone-acetaminophen (NORCO)  mg per tablet, Take 1 tablet by mouth 3 (three) times a day as needed for severe painMax Daily Amount: 3 tablets, Disp: 90 tablet, Rfl: 0    ipratropium-albuterol (DUO-NEB) 0 5-2 5 mg/3 mL, Take 3 mL by nebulization every 6 (six) hours, Disp: 360 mL, Rfl: 2    meclizine (ANTIVERT) 12 5 MG tablet, TAKE 1-2 TABLETS BY MOUTH EVERY 8 HOURS AS NEEDED PRN; DIZZINESS, Disp: , Rfl: 3    nebivolol (BYSTOLIC) 10 mg tablet, Take 1 tablet (10 mg total) by mouth daily (Patient not taking: Reported on 10/25/2019), Disp: 90 tablet, Rfl: 3    nicotine (NICOTROL) 10 MG inhaler, Inhale 1 puff as needed for smoking cessation for up to 30 days, Disp: 168 each, Rfl: 1    OMEPRAZOLE PO, Take 40 mg by mouth daily  , Disp: , Rfl:     OXYGEN-HELIUM IN, Inhale 2 L daily at bedtime, Disp: , Rfl:     rivaroxaban (XARELTO) 20 mg tablet, Take 1 tablet (20 mg total) by mouth daily with dinner, Disp: 28 tablet, Rfl: 0    tamsulosin (FLOMAX) 0 4 mg, Take 0 4 mg by mouth daily with dinner, Disp: , Rfl:     traZODone (DESYREL) 50 mg tablet, , Disp: , Rfl:     XARELTO 20 MG tablet, TAKE 1 TABLET (20 MG TOTAL) BY MOUTH DAILY WITH DINNER, Disp: 90 tablet, Rfl: 3    No Known Allergies    Physical Exam:    /90   Pulse (!) 54   Ht 5' 10" (1 778 m)   Wt 118 kg (259 lb 3 2 oz)   BMI 37 19 kg/m²     Constitutional:obese  Eyes:anicteric  HEENT:grossly intact  Neck:supple, symmetric, trachea midline and no masses   Pulmonary:even and unlabored  Cardiovascular:No edema or pitting edema present  Skin:Normal without rashes or lesions and well hydrated  Psychiatric:Mood and affect appropriate  Neurologic:Cranial Nerves II-XII grossly intact  Musculoskeletal:normal

## 2020-01-02 LAB
6MAM UR QL CFM: NEGATIVE NG/ML
7AMINOCLONAZEPAM UR QL CFM: NEGATIVE NG/ML
A-OH ALPRAZ UR QL CFM: NEGATIVE NG/ML
ACCEPTABLE CREAT UR QL: NORMAL MG/DL
ACCEPTIBLE SP GR UR QL: NORMAL
AMPHET UR QL CFM: NEGATIVE NG/ML
AMPHET UR QL CFM: NEGATIVE NG/ML
BUPRENORPHINE UR QL CFM: NEGATIVE NG/ML
BUTALBITAL UR QL CFM: NEGATIVE NG/ML
BZE UR QL CFM: NEGATIVE NG/ML
CODEINE UR QL CFM: NEGATIVE NG/ML
DESIPRAMINE UR QL CFM: NEGATIVE NG/ML
DESIPRAMINE UR QL CFM: NEGATIVE NG/ML
EDDP UR QL CFM: NEGATIVE NG/ML
ETHYL GLUCURONIDE UR QL CFM: NEGATIVE NG/ML
ETHYL SULFATE UR QL SCN: NEGATIVE NG/ML
FENTANYL UR QL CFM: NEGATIVE NG/ML
GLIADIN IGG SER IA-ACNC: NEGATIVE NG/ML
GLUCOSE 30M P 50 G LAC PO SERPL-MCNC: NEGATIVE NG/ML
HYDROCODONE UR CFM-MCNC: 2353.93 NG/ML
HYDROCODONE UR CFM-MCNC: 2353.93 NG/ML
HYDROMORPHONE UR CFM-MCNC: 716.86 NG/ML
IMIPRAMINE UR QL CFM: NEGATIVE NG/ML
LORAZEPAM UR QL CFM: NEGATIVE NG/ML
MDMA UR QL CFM: NEGATIVE NG/ML
ME-PHENIDATE UR QL CFM: NEGATIVE NG/ML
MEPERIDINE UR QL CFM: NEGATIVE NG/ML
MEPHEDRONE UR QL CFM: NEGATIVE NG/ML
METHADONE UR QL CFM: NEGATIVE NG/ML
METHAMPHET UR QL CFM: NEGATIVE NG/ML
MORPHINE UR QL CFM: NEGATIVE NG/ML
MORPHINE UR QL CFM: NEGATIVE NG/ML
NITRITE UR QL: NORMAL UG/ML
NORBUPRENORPHINE UR QL CFM: NEGATIVE NG/ML
NORDIAZEPAM UR QL CFM: NEGATIVE NG/ML
NORFENTANYL UR QL CFM: NEGATIVE NG/ML
NORHYDROCODONE UR CFM-MCNC: 914.18 NG/ML
NORHYDROCODONE UR CFM-MCNC: 914.18 NG/ML
NORMEPERIDINE UR QL CFM: NEGATIVE NG/ML
NOROXYCODONE UR QL CFM: NEGATIVE NG/ML
OPC-3373 QUANTIFICATION: NEGATIVE
OXAZEPAM UR QL CFM: NEGATIVE NG/ML
OXYCODONE UR QL CFM: NEGATIVE NG/ML
OXYMORPHONE UR QL CFM: NEGATIVE NG/ML
OXYMORPHONE UR QL CFM: NEGATIVE NG/ML
PCP UR QL CFM: NEGATIVE NG/ML
PHENOBARB UR QL CFM: NEGATIVE NG/ML
RESULT ALL_PRESCRIBED MEDS AND SPECIAL INSTRUCTIONS: NORMAL
SECOBARBITAL UR QL CFM: NEGATIVE NG/ML
SL AMB 3-METHYL-FENTANYL QUANTIFICATION: NORMAL NG/ML
SL AMB 4-ANPP QUANTIFICATION: NORMAL NG/ML
SL AMB 4-FIBF QUANTIFICATION: NORMAL NG/ML
SL AMB 5F-ADB-M7 METABOLITE QUANTIFICATION: NEGATIVE NG/ML
SL AMB 7-OH-MITRAGYNINE (KRATOM ALKALOID) QUANTIFICATION: NEGATIVE NG/ML
SL AMB AB-FUBINACA-M3 METABOLITE QUANTIFICATION: NEGATIVE NG/ML
SL AMB ACETYL FENTANYL QUANTIFICATION: NORMAL NG/ML
SL AMB ACETYL NORFENTANYL QUANTIFICATION: NORMAL NG/ML
SL AMB ACRYL FENTANYL QUANTIFICATION: NORMAL NG/ML
SL AMB BATH SALTS: NEGATIVE NG/ML
SL AMB BUTRYL FENTANYL QUANTIFICATION: NORMAL NG/ML
SL AMB CARFENTANIL QUANTIFICATION: NORMAL NG/ML
SL AMB CLOZAPINE QUANTIFICATION: NEGATIVE NG/ML
SL AMB CTHC (MARIJUANA METABOLITE) QUANTIFICATION: NEGATIVE NG/ML
SL AMB CYCLOPROPYL FENTANYL QUANTIFICATION: NORMAL NG/ML
SL AMB DEXTROMETHORPHAN QUANTIFICATION: NEGATIVE NG/ML
SL AMB DEXTRORPHAN (DEXTROMETHORPHAN METABOLITE) QUANT: NEGATIVE NG/ML
SL AMB DEXTRORPHAN (DEXTROMETHORPHAN METABOLITE) QUANT: NEGATIVE NG/ML
SL AMB FURANYL FENTANYL QUANTIFICATION: NORMAL NG/ML
SL AMB HALOPERIDOL  QUANTIFICATION: NEGATIVE NG/ML
SL AMB HALOPERIDOL METABOLITE QUANTIFICATION: NEGATIVE NG/ML
SL AMB JWH018 METABOLITE QUANTIFICATION: NEGATIVE NG/ML
SL AMB JWH073 METABOLITE QUANTIFICATION: NEGATIVE NG/ML
SL AMB MDMB-FUBINACA-M1 METABOLITE QUANTIFICATION: NEGATIVE NG/ML
SL AMB METHOXYACETYL FENTANYL QUANTIFICATION: NORMAL NG/ML
SL AMB METHYLONE QUANTIFICATION: NEGATIVE NG/ML
SL AMB N-DESMETHYL U-47700 QUANTIFICATION: NORMAL NG/ML
SL AMB N-DESMETHYL-TRAMADOL QUANTIFICATION: NEGATIVE NG/ML
SL AMB N-DESMETHYLCLOZAPINE QUANTIFICATION: NEGATIVE NG/ML
SL AMB PHENTERMINE QUANTIFICATION: NEGATIVE NG/ML
SL AMB RCS4 METABOLITE QUANTIFICATION: NEGATIVE NG/ML
SL AMB RITALINIC ACID QUANTIFICATION: NEGATIVE NG/ML
SL AMB U-47700 QUANTIFICATION: NORMAL NG/ML
SPECIMEN DRAWN SERPL: NEGATIVE NG/ML
SPECIMEN PH ACCEPTABLE UR: NORMAL
TAPENTADOL UR QL CFM: NEGATIVE NG/ML
TEMAZEPAM UR QL CFM: NEGATIVE NG/ML
TEMAZEPAM UR QL CFM: NEGATIVE NG/ML
TRAMADOL UR QL CFM: NEGATIVE NG/ML
URATE/CREAT 24H UR: NEGATIVE NG/ML

## 2020-01-27 DIAGNOSIS — I10 ESSENTIAL HYPERTENSION: ICD-10-CM

## 2020-01-29 RX ORDER — NEBIVOLOL HYDROCHLORIDE 10 MG/1
TABLET ORAL
Qty: 90 TABLET | Refills: 3 | Status: SHIPPED | OUTPATIENT
Start: 2020-01-29 | End: 2021-02-03 | Stop reason: SDUPTHER

## 2020-02-04 ENCOUNTER — OFFICE VISIT (OUTPATIENT)
Dept: PULMONOLOGY | Facility: MEDICAL CENTER | Age: 80
End: 2020-02-04
Payer: MEDICARE

## 2020-02-04 VITALS
OXYGEN SATURATION: 96 % | SYSTOLIC BLOOD PRESSURE: 132 MMHG | BODY MASS INDEX: 39.56 KG/M2 | HEIGHT: 68 IN | TEMPERATURE: 98.1 F | DIASTOLIC BLOOD PRESSURE: 72 MMHG | WEIGHT: 261 LBS | HEART RATE: 73 BPM | RESPIRATION RATE: 12 BRPM

## 2020-02-04 DIAGNOSIS — J43.2 CENTRILOBULAR EMPHYSEMA (HCC): Primary | ICD-10-CM

## 2020-02-04 DIAGNOSIS — J96.11 CHRONIC RESPIRATORY FAILURE WITH HYPOXIA (HCC): ICD-10-CM

## 2020-02-04 DIAGNOSIS — I48.20 CHRONIC ATRIAL FIBRILLATION (HCC): ICD-10-CM

## 2020-02-04 DIAGNOSIS — F17.210 CIGARETTE NICOTINE DEPENDENCE WITHOUT COMPLICATION: ICD-10-CM

## 2020-02-04 PROCEDURE — 99214 OFFICE O/P EST MOD 30 MIN: CPT | Performed by: NURSE PRACTITIONER

## 2020-02-04 RX ORDER — ALBUTEROL SULFATE 90 UG/1
2 AEROSOL, METERED RESPIRATORY (INHALATION) 4 TIMES DAILY
Qty: 1 INHALER | Refills: 5 | Status: SHIPPED | OUTPATIENT
Start: 2020-02-04 | End: 2020-03-05

## 2020-02-04 RX ORDER — BUDESONIDE 0.5 MG/2ML
0.5 INHALANT ORAL 2 TIMES DAILY
Qty: 60 VIAL | Refills: 5 | Status: SHIPPED | OUTPATIENT
Start: 2020-02-04 | End: 2020-02-10 | Stop reason: SDUPTHER

## 2020-02-04 RX ORDER — PHENAZOPYRIDINE HYDROCHLORIDE 200 MG/1
TABLET, FILM COATED ORAL
Refills: 0 | COMMUNITY
Start: 2019-12-10

## 2020-02-04 NOTE — ASSESSMENT & PLAN NOTE
Patient has a history of atrial fibrillation  He is on anticoagulation  I reviewed notes from his cardiologist   He has stable at this time

## 2020-02-04 NOTE — PROGRESS NOTES
Assessment/Plan:     Problem List Items Addressed This Visit        Respiratory    Centrilobular emphysema ([address] ) - Primary     Patient has centrilobular emphysema  His last pulmonary function test was done in May 2018  At that time flow volume loop showed moderately severe obstructive pattern  Patient's FEV1 was 1 48 L or  51% of predicted  Patient defers any PFT today  He has been more short of breath  He is currently using a nebulizer with DuoNeb  I am going to order for him budesonide 0 5 mg to be placed in the nebulizer twice a day  Patient has agreed with this plan of care  Relevant Medications    albuterol (PROAIR HFA) 90 mcg/act inhaler    budesonide (PULMICORT) 0 5 mg/2 mL nebulizer solution    Chronic respiratory failure with hypoxia (HCC)     Patient has a history of chronic respiratory failure with hypoxia  Patient continues to use an benefit with supplemental oxygen  Room air oxygen at rest is 94%  Although patient has difficulty with ambulating his oxygen went as low as 86% on room air  With 2 L of supplemental oxygen and ambulation oxygen was 92%  He also uses 2 L of supplemental oxygen at night  He continues to use an benefit  This is likely secondary to his diagnosis of centrilobular emphysema as well as alveolar hypoventilation obesity syndrome  He is stable at this time  Cardiovascular and Mediastinum    Chronic atrial fibrillation     Patient has a history of atrial fibrillation  He is on anticoagulation  I reviewed notes from his cardiologist   He has stable at this time  Other    Nicotine dependence     I reviewed with patient the fact that he is at high risk for multiple comorbidities as he continues to smoke  At times he is smoking up to 1-1/2 pack of cigarettes per day  He has been unable to stop  He said he is under a lot of stress and is aware of the fact that he should consider cutting down or stopping completely    He did not  Nicotrol inhalers that were ordered as they were cost prohibitive  He continues to be aware that nicotine replacement is available via patch, gum or lozenges  Return in about 6 months (around 8/4/2020)  All questions are answered to the patient's satisfaction and understanding  He verbalizes understanding  He is encouraged to call with any further questions or concerns  Portions of the record may have been created with voice recognition software  Occasional wrong word or "sound a like" substitutions may have occurred due to the inherent limitations of voice recognition software  Read the chart carefully and recognize, using context, where substitutions have occurred  HPI:  Is an 47-year-old male with history of severe centrilobular emphysema  He has not had PFTs done since May of 2018  At that time his FEV1 was 1 48 L or 51% of predicted forced vital capacity was 2 85 L or 70% of predicted obstruction ratio  52%  He continues to smoke approximately 1-1 and half packs per day  He has smoked most of his adult life  He does use supplemental oxygen at 2 L with ambulation  He also uses 2 L of oxygen at nighttime  Patient now follows with cardiologist Annette Miranda  He has chronic atrial fibrillation is on Xarelto  He also has history of CAD that is currently stable  He underwent PCI  Patient also has suppressed PVCs  And 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 a  and chronic low back pain  His chief complaint today is shortness of breath  He is also suffering from lower back pain and has difficulty with ambulation    Electronically Signed by MITCHELL Montes    ______________________________________________________________________    Chief Complaint:   Chief Complaint   Patient presents with    Shortness of Breath     pt staters occurs with exertion    Cough     occurs in the morning        Patient ID: Sammi Schilder is a 78 y o  y o  male has a past medical history of Acid reflux, Arthritis, BPH (benign prostatic hyperplasia), Calcium disorder, Chronic atrial fibrillation (2/6/2018), Chronic pain, Chronic pain disorder, COPD (chronic obstructive pulmonary disease) ([facility] [address] ), Coronary artery disease (2001), Eye redness, Hiatal hernia, Hydronephrosis, Hyperlipidemia, Hypertension, Irregular heart beat, Low back pain, Neck pain, Nocturnal leg cramps, Osteopenia, Peripheral artery disease ([address] ), Peripheral neuropathy, Pleural effusion, Sleep apnea, Spinal stenosis, and 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)  2/4/2020  Patient presents today for follow-up visit  Shortness of Breath   This is a chronic problem  The current episode started more than 1 year ago  The problem occurs daily  The problem has been unchanged  Associated symptoms include wheezing  The symptoms are aggravated by exercise  The patient has no known risk factors (On Xarelto) for DVT/PE  He has tried beta agonist inhalers, ipratropium inhalers and rest for the symptoms  The treatment provided mild relief  His past medical history is significant for COPD  Cough   Associated symptoms include shortness of breath and wheezing  His past medical history is significant for COPD  Review of Systems   Constitutional: Positive for fatigue  HENT: Positive for voice change  Eyes: Negative  Respiratory: Positive for cough, shortness of breath and wheezing  Cardiovascular: Negative  Gastrointestinal: Negative  Endocrine: Negative  Genitourinary: Negative  Musculoskeletal: Negative  Skin: Negative  Allergic/Immunologic: Negative  Neurological: Negative  Psychiatric/Behavioral: Negative  Smoking history: He reports that he has been smoking  He has a 75 00 pack-year smoking history   He has never used smokeless tobacco     The following portions of the patient's history were reviewed and updated as appropriate: allergies, current medications, past family history, past medical history, past social history, past surgical history and problem list       There is no immunization history on file for this patient  Current Outpatient Medications   Medication Sig Dispense Refill    albuterol (PROAIR HFA) 90 mcg/act inhaler Inhale 2 puffs 4 (four) times a day 1 Inhaler 5    aspirin (ECOTRIN LOW STRENGTH) 81 mg EC tablet Take 81 mg by mouth daily      atorvastatin (LIPITOR) 40 mg tablet Take 40 mg by mouth daily        budesonide-formoterol (SYMBICORT) 160-4 5 mcg/act inhaler Inhale 2 puffs 2 (two) times a day Rinse mouth after use  3 Inhaler 3    BYSTOLIC 10 MG tablet TAKE 1 TABLET BY MOUTH DAILY 90 tablet 3    diltiazem (DILACOR XR) 240 MG 24 hr capsule Take 1 capsule (240 mg total) by mouth daily at bedtime 90 capsule 3    fluticasone (FLONASE) 50 mcg/act nasal spray 2 sprays into each nostril daily      HYDROcodone-acetaminophen (NORCO)  mg per tablet Take 1 tablet by mouth 3 (three) times a day as needed for severe painMax Daily Amount: 3 tablets 90 tablet 0    meclizine (ANTIVERT) 12 5 MG tablet TAKE 1-2 TABLETS BY MOUTH EVERY 8 HOURS AS NEEDED PRN; DIZZINESS  3    OMEPRAZOLE PO Take 40 mg by mouth daily        OXYGEN-HELIUM IN Inhale 2 L daily at bedtime      phenazopyridine (PYRIDIUM) 200 mg tablet   0    rivaroxaban (XARELTO) 20 mg tablet Take 1 tablet (20 mg total) by mouth daily with dinner 28 tablet 0    tamsulosin (FLOMAX) 0 4 mg Take 0 4 mg by mouth daily with dinner      traZODone (DESYREL) 50 mg tablet       budesonide (PULMICORT) 0 5 mg/2 mL nebulizer solution Take 1 vial (0 5 mg total) by nebulization 2 (two) times a day Rinse mouth after use   60 vial 5    HYDROcodone-acetaminophen (NORCO)  mg per tablet Take 1 tablet by mouth 3 (three) times a day as needed for severe painMax Daily Amount: 3 tablets 90 tablet 0    ipratropium-albuterol (DUO-NEB) 0 5-2 5 mg/3 mL Take 3 mL by nebulization every 6 (six) hours 360 mL 2    nebivolol (BYSTOLIC) 10 mg tablet Take 1 tablet (10 mg total) by mouth daily (Patient not taking: Reported on 10/25/2019) 90 tablet 3    nicotine (NICOTROL) 10 MG inhaler Inhale 1 puff as needed for smoking cessation for up to 30 days 168 each 1    XARELTO 20 MG tablet TAKE 1 TABLET (20 MG TOTAL) BY MOUTH DAILY WITH DINNER (Patient not taking: Reported on 2/4/2020) 90 tablet 3     No current facility-administered medications for this visit  Allergies: Patient has no known allergies  Objective:  Vitals:    02/04/20 1015   BP: 132/72   BP Location: Left arm   Patient Position: Sitting   Cuff Size: Extra-Large   Pulse: 73   Resp: 12   Temp: 98 1 °F (36 7 °C)   TempSrc: Tympanic   SpO2: 96%   Weight: 118 kg (261 lb)   Height: 5' 8 25" (1 734 m)   Oxygen Therapy  SpO2: 96 %    Wt Readings from Last 3 Encounters:   02/04/20 118 kg (261 lb)   12/30/19 118 kg (259 lb 3 2 oz)   10/25/19 116 kg (256 lb)     Body mass index is 39 39 kg/m²  Physical Exam   Constitutional: He appears well-developed and well-nourished  BMI 39   HENT:   Head: Normocephalic and atraumatic  Mallampati 3  Orall posterior pharyngeal erythema   Eyes: Pupils are equal, round, and reactive to light  EOM are normal    Neck: Normal range of motion  Neck supple  Cardiovascular: Normal rate and regular rhythm  Pulmonary/Chest: Effort normal  He has decreased breath sounds  Abdominal: Soft  Musculoskeletal: Normal range of motion  Right lower leg: Normal         Left lower leg: Normal    Neurological: He is alert  Skin: Skin is warm and dry  Capillary refill takes less than 2 seconds  Psychiatric: He has a normal mood and affect   His behavior is normal        Lab Review:   Office Visit on 12/30/2019   Component Date Value    RESULT ALL_PRESC MEDS SP* 03/61/5870 Not Applicable     Alpha-Hydroxyalprazolam * 12/30/2019 negative     Amphetamine Quantificati* 12/30/2019 negative     Aripiprazole Quantificat* 12/30/2019 negative     [id number] QUANTIFICATION 12/30/2019 negative     BATH SALTS 12/30/2019 negative     Mephedrone Quantification 12/30/2019 negative     METHYLONE QUANTIFICATION 12/30/2019 negative     Buprenorphine Quantifica* 12/30/2019 negative     Norbuprenorphine Quantif* 12/30/2019 negative     Butalbital Quantification 12/30/2019 negative     7-Amino-Clonazepam Quant* 12/30/2019 negative     Clozapine Quantification 12/30/2019 negative     N-desmethylclozapine Rajan* 12/30/2019 negative     Cocaine metabolite Quant* 12/30/2019 negative     Codeine Quantification 12/30/2019 negative     Morphine Quantification 12/30/2019 negative     Hydrocodone Quantificati* 12/30/2019 2,353  931     Norhydrocodone Quantific* 12/30/2019 914  185     Hydromorphone Quantifica* 12/30/2019 716 862     Desipramine Quantificati* 12/30/2019 negative     Dextromethorphan Quantif* 12/30/2019 negative     Dextrorphan (Dextrometho* 12/30/2019 negative     Nordiazepam Quantificati* 12/30/2019 negative     Temazepam Quantification 12/30/2019 negative     Oxazepam Quantification 12/30/2019 negative     Ethyl Glucuronide Quanti* 12/30/2019 negative     Ethyl Sulfate Quantifica* 12/30/2019 negative     Fentanyl Quantification 12/30/2019 negative     Norfentanyl Quantificati* 12/30/2019 negative     3-methyl-fentanyl Quanti* 12/30/2019 Fen Neg     4-ANPP Quantification 12/30/2019 Fen Neg     4-FiBF Quantification 12/30/2019 Fen Neg     Acetyl fentanyl Quantifi* 12/30/2019 Fen Neg     Acetyl norfentanyl Quant* 12/30/2019 Fen Neg     Acryl fentanyl Quantific* 12/30/2019 Fen Neg     Butryl fentanyl Quantifi* 12/30/2019 Fen Neg     Carfentanil Quantificati* 12/30/2019 Fen Neg     Cyclopropyl fentanyl Rajan* 12/30/2019 Fen Neg     Furanyl fentanyl Quantif* 12/30/2019 Fen Neg     Methoxyacetyl fentanyl Q* 12/30/2019 Fen Neg     [id number] Quantification 12/30/2019 Fen Neg     N-desmethyl [id number] German* 12/30/2019 Fen Neg     Haloperidol  Quantificat* 12/30/2019 negative  Haloperidol metabolite Q* 12/30/2019 negative     6-TAMIKA (Heroin metabolite* 12/30/2019 negative     Hydrocodone Quantificati* 12/30/2019 2,353  931     Norhydrocodone Quantific* 12/30/2019 914  185     Hydromorphone Quantifica* 12/30/2019 716 862     Hydromorphone Quantifica* 12/30/2019 783 844     Desipramine Quantificati* 12/30/2019 negative     Imipramine Quantification 12/30/2019 negative     Mitragynine (Kratom ree* 12/30/2019 negative     1-QN-Phzphspdjqw (Kratom* 12/30/2019 negative     Dextrorphan (Dextrometho* 12/30/2019 negative     MDMA Quantification 12/30/2019 negative     Lorazepam Quantification 12/30/2019 negative     Meperidine Quantification 12/30/2019 negative     Normeperidine Quantifica* 12/30/2019 negative     Methadone Quantification 12/30/2019 negative     EDDP (Methadone metaboli* 12/30/2019 negative     Amphetamine Quantificati* 12/30/2019 negative     Methamphetamine Quantifi* 12/30/2019 negative     Methylphenidate Quantifi* 12/30/2019 negative     RITALINIC ACID QUANTIFIC* 12/30/2019 negative     Morphine Quantification 12/30/2019 negative     Hydromorphone Quantifica* 12/30/2019 716 862     Oxazepam Quantification 12/30/2019 negative     Oxycodone Quantification 12/30/2019 negative     Noroxycodone Quantificat* 12/30/2019 negative     Oxymorphone Quantificati* 12/30/2019 negative     Oxymorphone Quantificati* 12/30/2019 negative     Phencyclidine Quantifica* 12/30/2019 negative     Phenobarbital Quantifica* 12/30/2019 negative     PHENTERMINE QUANTIFICATI* 12/30/2019 negative     Secobarbital Quantificat* 12/30/2019 negative     5F-ADB-M7 12/30/2019 negative     NA-XLCWJCUG-Q4 METABOLIT* 12/30/2019 negative     VHAL-BAYUIPEG-Z5 METABOL* 12/30/2019 negative     ZPE222 metabolite Quanti* 12/30/2019 negative     [id number] metabolite Quanti* 12/30/2019 negative     RCS4 METABOLITE QUANTIFI* 12/30/2019 negative     LAA54/ METABOLITE Q* 12/30/2019 negative     Tapentadol Quantification 12/30/2019 negative     Temazepam Quantification 12/30/2019 negative     Oxazepam Quantification 12/30/2019 negative     cTHC (Marijuana metaboli* 12/30/2019 negative     Tramadol Quantification 12/30/2019 negative     O-desmethyl-tramadol Rajan* 12/30/2019 negative     N-DESMETHYL-TRAMADOL RAJAN* 12/30/2019 negative     CREATININE 12/30/2019 normal     OXIDANT 12/30/2019 normal     pH 12/30/2019 normal     SPECIFIC GRAVITY URINE 12/30/2019 normal    Appointment on 12/27/2019   Component Date Value    Cholesterol 12/27/2019 111     Triglycerides 12/27/2019 124     HDL, Direct 12/27/2019 48     LDL Calculated 12/27/2019 38     Non-HDL-Chol (CHOL-HDL) 12/27/2019 63     TSH 3RD GENERATON 12/27/2019 1 401     Vit D, 25-Hydroxy 12/27/2019 43 1     Vitamin B-12 12/27/2019 512     Urine Culture 12/27/2019 <10,000 cfu/ml     Transcribe Orders on 12/27/2019   Component Date Value    Sodium 12/27/2019 139     Potassium 12/27/2019 4 1     Chloride 12/27/2019 100     CO2 12/27/2019 29     ANION GAP 12/27/2019 10     BUN 12/27/2019 16     Creatinine 12/27/2019 0 89     Glucose, Fasting 12/27/2019 103*    Calcium 12/27/2019 9 0     AST 12/27/2019 20     ALT 12/27/2019 23     Alkaline Phosphatase 12/27/2019 61     Total Protein 12/27/2019 7 2     Albumin 12/27/2019 3 8     Total Bilirubin 12/27/2019 0 80     eGFR 12/27/2019 80     WBC 12/27/2019 8 29     RBC 12/27/2019 4 57     Hemoglobin 12/27/2019 15 0     Hematocrit 12/27/2019 46 0     MCV 12/27/2019 101*    MCH 12/27/2019 32 8     MCHC 12/27/2019 32 6     RDW 12/27/2019 12 6     MPV 12/27/2019 9 1     Platelets 75/62/4057 248     nRBC 12/27/2019 0     Neutrophils Relative 12/27/2019 69     Immat GRANS % 12/27/2019 0     Lymphocytes Relative 12/27/2019 17     Monocytes Relative 12/27/2019 11     Eosinophils Relative 12/27/2019 2     Basophils Relative 12/27/2019 1     Neutrophils Absolute 12/27/2019 5 71     Immature Grans Absolute 12/27/2019 0 03     Lymphocytes Absolute 12/27/2019 1 44     Monocytes Absolute 12/27/2019 0 88     Eosinophils Absolute 12/27/2019 0 19     Basophils Absolute 12/27/2019 0 04     Color, UA 12/27/2019 Yellow     Clarity, UA 12/27/2019 Clear     Specific Gravity, UA 12/27/2019 1 010     pH, UA 12/27/2019 6 0     Leukocytes, UA 12/27/2019 Small*    Nitrite, UA 12/27/2019 Negative     Protein, UA 12/27/2019 Negative     Glucose, UA 12/27/2019 Negative     Ketones, UA 12/27/2019 Negative     Urobilinogen, UA 12/27/2019 0 2     Bilirubin, UA 12/27/2019 Negative     Blood, UA 12/27/2019 Negative     RBC, UA 12/27/2019 0-1*    WBC, UA 12/27/2019 4-10*    Epithelial Cells 12/27/2019 None Seen     Bacteria, UA 12/27/2019 Occasional    Appointment on 12/11/2019   Component Date Value    Urine Culture 12/11/2019 No Growth <1000 cfu/mL     Color, UA 12/11/2019 Yellow     Clarity, UA 12/11/2019 Clear     Specific Gravity, UA 12/11/2019 1 015     pH, UA 12/11/2019 6 0     Leukocytes, UA 12/11/2019 Small*    Nitrite, UA 12/11/2019 Negative     Protein, UA 12/11/2019 Trace*    Glucose, UA 12/11/2019 Negative     Ketones, UA 12/11/2019 Negative     Urobilinogen, UA 12/11/2019 0 2     Bilirubin, UA 12/11/2019 Negative     Blood, UA 12/11/2019 Negative     RBC, UA 12/11/2019 1-2*    WBC, UA 12/11/2019 10-20*    Epithelial Cells 12/11/2019 Occasional     Bacteria, UA 12/11/2019 Occasional     OTHER OBSERVATIONS 12/11/2019 Sperm Present        Past Surgical History:   Procedure Laterality Date    ANGIOPLASTY      APPENDECTOMY  04/2007    CARDIOVERSION      x2, for A-Fib    CARDIOVERSION      CATARACT EXTRACTION      CATARACT EXTRACTION W/ INTRAOCULAR LENS IMPLANT Left 01/2014    CORONARY ANGIOPLASTY WITH STENT PLACEMENT  2001    one stent    HERNIA REPAIR Right     inquinal    JOINT REPLACEMENT Left     Hip    KNEE SURGERY  1994    also had arthroscopy right knee 2005    PANENDOSCOPY  10/23/2012    vocal cord polyp    PILONIDAL CYST DRAINAGE      x 3    GA SURG IMPLNT Ludie Desi Left 8/23/2017    Procedure: Placement of Thoracic SCS with left buttock IPG (IMPULSE); Surgeon: Florence Asher MD;  Location:  MAIN OR;  Service: Neurosurgery    GA XCAPSL CTRC RMVL INSJ IO LENS PROSTH W/O ECP Right 4/6/2016    Procedure: EXTRACTION EXTRACAPSULAR CATARACT PHACO INTRAOCULAR LENS (IOL); Surgeon: Justine Weiss MD;  Location: [facility] MAIN OR;  Service: Ophthalmology   [facility] TRIAL W/ LAMINOTOMY N/A 4/13/2017    Procedure: INSERTION DORSAL COLUMN SPINAL CORD STIMULATOR TRIAL;  Surgeon: Mauri Rosales MD;  [facility] MAIN OR;  Service:     TONSILLECTOMY          Family History   Problem Relation Age of Onset    Cancer Father 80        prostate    No Known Problems Mother     No Known Problems Sister     No Known Problems Brother     No Known Problems Daughter     No Known Problems Son     No Known Problems Maternal Aunt     No Known Problems Maternal Uncle     No Known Problems Paternal Aunt     No Known Problems Paternal Uncle     No Known Problems Maternal Grandmother     No Known Problems Maternal Grandfather     No Known Problems Paternal Grandmother     No Known Problems Paternal Grandfather         Diagnostics:  I have personally reviewed pertinent films in PACS  Office Spirometry Results:     ESS:    No results found

## 2020-02-04 NOTE — ASSESSMENT & PLAN NOTE
Patient has centrilobular emphysema  His last pulmonary function test was done in May 2018  At that time flow volume loop showed moderately severe obstructive pattern  Patient's FEV1 was 1 48 L or  51% of predicted  Patient defers any PFT today  He has been more short of breath  He is currently using a nebulizer with DuoNeb  I am going to order for him budesonide 0 5 mg to be placed in the nebulizer twice a day  Patient has agreed with this plan of care

## 2020-02-04 NOTE — ASSESSMENT & PLAN NOTE
I reviewed with patient the fact that he is at high risk for multiple comorbidities as he continues to smoke  At times he is smoking up to 1-1/2 pack of cigarettes per day  He has been unable to stop  He said he is under a lot of stress and is aware of the fact that he should consider cutting down or stopping completely  He did not  Nicotrol inhalers that were ordered as they were cost prohibitive  He continues to be aware that nicotine replacement is available via patch, gum or lozenges

## 2020-02-04 NOTE — PATIENT INSTRUCTIONS

## 2020-02-04 NOTE — ASSESSMENT & PLAN NOTE
Patient has a history of chronic respiratory failure with hypoxia  Patient continues to use an benefit with supplemental oxygen  Room air oxygen at rest is 94%  Although patient has difficulty with ambulating his oxygen went as low as 86% on room air  With 2 L of supplemental oxygen and ambulation oxygen was 92%  He also uses 2 L of supplemental oxygen at night  He continues to use an benefit  This is likely secondary to his diagnosis of centrilobular emphysema as well as alveolar hypoventilation obesity syndrome  He is stable at this time

## 2020-02-10 ENCOUNTER — TELEPHONE (OUTPATIENT)
Dept: PULMONOLOGY | Facility: MEDICAL CENTER | Age: 80
End: 2020-02-10

## 2020-02-10 ENCOUNTER — TELEPHONE (OUTPATIENT)
Dept: CARDIOLOGY CLINIC | Facility: CLINIC | Age: 80
End: 2020-02-10

## 2020-02-10 DIAGNOSIS — J44.9 COPD (CHRONIC OBSTRUCTIVE PULMONARY DISEASE) WITH CHRONIC BRONCHITIS (HCC): ICD-10-CM

## 2020-02-10 DIAGNOSIS — J43.2 CENTRILOBULAR EMPHYSEMA (HCC): ICD-10-CM

## 2020-02-10 RX ORDER — IPRATROPIUM BROMIDE AND ALBUTEROL SULFATE 2.5; .5 MG/3ML; MG/3ML
3 SOLUTION RESPIRATORY (INHALATION) 3 TIMES DAILY
Qty: 360 ML | Refills: 3 | Status: SHIPPED | OUTPATIENT
Start: 2020-02-10 | End: 2020-03-11

## 2020-02-10 RX ORDER — BUDESONIDE 0.5 MG/2ML
0.5 INHALANT ORAL 2 TIMES DAILY
Qty: 60 VIAL | Refills: 5 | Status: SHIPPED | OUTPATIENT
Start: 2020-02-10 | End: 2020-12-02 | Stop reason: SDUPTHER

## 2020-02-10 NOTE — TELEPHONE ENCOUNTER
Needs nebulizer meds resent to [facility]-  They were accidentally sent to the wrong pharmacy   Can you repeint them and I will send them to [address]?   It was budesonide and albuterol for the nebulizer

## 2020-02-27 ENCOUNTER — OFFICE VISIT (OUTPATIENT)
Dept: PAIN MEDICINE | Facility: CLINIC | Age: 80
End: 2020-02-27
Payer: MEDICARE

## 2020-02-27 VITALS
DIASTOLIC BLOOD PRESSURE: 77 MMHG | SYSTOLIC BLOOD PRESSURE: 128 MMHG | HEIGHT: 70 IN | HEART RATE: 83 BPM | BODY MASS INDEX: 35.93 KG/M2 | WEIGHT: 251 LBS

## 2020-02-27 DIAGNOSIS — M51.16 INTERVERTEBRAL DISC DISORDER WITH RADICULOPATHY OF LUMBAR REGION: ICD-10-CM

## 2020-02-27 DIAGNOSIS — M54.50 PAIN OF LUMBAR SPINE: ICD-10-CM

## 2020-02-27 DIAGNOSIS — G89.4 CHRONIC PAIN SYNDROME: Primary | ICD-10-CM

## 2020-02-27 PROCEDURE — 99214 OFFICE O/P EST MOD 30 MIN: CPT | Performed by: ANESTHESIOLOGY

## 2020-02-27 RX ORDER — HYDROCODONE BITARTRATE AND ACETAMINOPHEN 10; 325 MG/1; MG/1
1 TABLET ORAL 3 TIMES DAILY PRN
Qty: 90 TABLET | Refills: 0 | Status: SHIPPED | OUTPATIENT
Start: 2020-02-28 | End: 2020-04-27 | Stop reason: SDUPTHER

## 2020-02-27 RX ORDER — HYDROCODONE BITARTRATE AND ACETAMINOPHEN 10; 325 MG/1; MG/1
1 TABLET ORAL 3 TIMES DAILY PRN
Qty: 90 TABLET | Refills: 0 | Status: SHIPPED | OUTPATIENT
Start: 2020-03-29 | End: 2020-05-20 | Stop reason: SDUPTHER

## 2020-02-27 NOTE — PROGRESS NOTES
Pain Medicine Follow-Up Note    Assessment:  1  Chronic pain syndrome    2  Pain of lumbar spine    3  Intervertebral disc disorder with radiculopathy of lumbar region        Plan:  New Medications Ordered This Visit   Medications    HYDROcodone-acetaminophen (NORCO)  mg per tablet     Sig: Take 1 tablet by mouth 3 (three) times a day as needed for severe painMax Daily Amount: 3 tablets     Dispense:  90 tablet     Refill:  0    HYDROcodone-acetaminophen (NORCO)  mg per tablet     Sig: Take 1 tablet by mouth 3 (three) times a day as needed for severe painMax Daily Amount: 3 tablets     Dispense:  90 tablet     Refill:  0     My impressions and treatment recommendations were discussed in detail with the patient who verbalized understanding and had no further questions  The patient is reporting pain primarily in his low back and lower extremities  He states that his pain is worse in the morning, evening, and night  His pain is constant in nature  He does report that the hydrocodone/acetaminophen is giving him good pain relief, so I felt a reasonable to continue him on hydrocodone/acetaminophen 10/325 mg 1 tablet 3 times daily as needed for pain  I sent E prescriptions to the pharmacy dated February 28, 2020 and March 29, 2020  The risks and side effects of chronic opioid treatment were discussed in detail with the patient  Side effects include but are not limited to nausea, vomiting, GI intolerance, sedation, constipation, mental clouding, opioid-induced hyperalgesia, endocrine dysfunction, addiction, dependence, and tolerance  The patient was asked to take his medications only as prescribed and directed, never in excess, and never for any other reason other than for pain control  The patient was also asked to keep his medications out of the reach of others and away from children, preferably in a locked drawer  The patient verbalized understanding and wished to use these opioid medications  [place] Prescription Drug Monitoring Program report was reviewed and was appropriate     Follow-up is planned in 2 months time or sooner as warranted  Discharge instructions were provided  I personally saw and examined the patient and I agree with the above discussed plan of care  History of Present Illness:    María Ba is a 78 y o  male who presents to [facility] for interval re-evaluation of the above stated pain complaints  The patient has a past medical and chronic pain history as outlined in the assessment section  He was last seen on December 30, 2019 at which time he was maintained on hydrocodone/acetaminophen 10/325 mg 1 tablet 3 times daily as needed for pain  At today's office visit, the patient's pain score is 8/10 on the verbal numerical pain rating scale  The patient states that his pain is worse in the morning, evening, and night  His pain is constant in nature  He reports the quality of his pain as "burning, sharp, and shooting    He is reporting excellent pain relief with the combination of his medications  He does admit to some controlled opioid induced constipation  Pain Contract Signed:  08/26/2019  Last Urine Drug Screen: 12/30/2019    Other than as stated above, the patient denies any interval changes in medications, medical condition, mental condition, symptoms, or allergies since the last office visit  Review of Systems:    Review of Systems   Respiratory: Positive for shortness of breath  Cardiovascular: Negative for chest pain  Gastrointestinal: Negative for constipation, diarrhea, nausea and vomiting  Musculoskeletal: Positive for gait problem and myalgias  Negative for arthralgias and joint swelling  Decreased ROM   Skin: Negative for rash  Neurological: Negative for dizziness, seizures and weakness  Memory loss   All other systems reviewed and are negative          Patient Active Problem List Diagnosis    Chronic pain syndrome    Pain of lumbar spine    Sacroiliitis (HCC)    Intervertebral disc disorder with radiculopathy of lumbar region    Centrilobular emphysema (HCC)    Dyspnea on exertion    Chronic atrial fibrillation    Coronary artery disease    Chronic respiratory failure with hypoxia (HCC)    Nicotine dependence    Cervical radiculopathy    Cervical spinal stenosis    Cervical pain    Elevated PSA       Past Medical History:   Diagnosis Date    Acid reflux     Arthritis     BPH (benign prostatic hyperplasia)     no treatment    Calcium disorder     Chronic atrial fibrillation 2/6/2018    Chronic pain     Chronic pain disorder     COPD (chronic obstructive pulmonary disease) ([address] )     Coronary artery disease 2001    PTCA with one stent    Eye redness     Poked his eye on 3/18/16 "bloody" right eye    Hiatal hernia     Hydronephrosis     Hyperlipidemia     Hypertension     Irregular heart beat     A-Fib    Low back pain     Neck pain     Nocturnal leg cramps     Osteopenia     Peripheral artery disease ([facility] [address] )     Peripheral neuropathy     Pleural effusion     Sleep apnea     REFUSES MACHINE HS    Spinal stenosis     Vocal cord polyp 10/2012    panendoscopy       Past Surgical History:   Procedure Laterality Date    ANGIOPLASTY      APPENDECTOMY  04/2007    CARDIOVERSION      x2, for A-Fib    CARDIOVERSION      CATARACT EXTRACTION      CATARACT EXTRACTION W/ INTRAOCULAR LENS IMPLANT Left 01/2014    CORONARY ANGIOPLASTY WITH STENT PLACEMENT  2001    one stent    HERNIA REPAIR Right     inquinal    JOINT REPLACEMENT Left     Hip    KNEE SURGERY  1994    also had arthroscopy right knee 2005    PANENDOSCOPY  10/23/2012    vocal cord polyp    PILONIDAL CYST DRAINAGE      x 3    [place] SURG IMPLNT Fabio Shaper Left 8/23/2017    Procedure: Placement of Thoracic SCS with left buttock IPG (IMPULSE);   Surgeon: Scout Martin MD;  [facility];  Service: Neurosurgery    [place] XCAPSL CTRC RMVL INSJ IO LENS PROSTH W/O ECP Right 4/6/2016    Procedure: EXTRACTION EXTRACAPSULAR CATARACT PHACO INTRAOCULAR LENS (IOL);   Surgeon: Davida Dunn MD;  Location: [facility] MAIN OR;  Service: Ophthalmology   [address] TRIAL W/ LAMINOTOMY N/A 4/13/2017    Procedure: INSERTION DORSAL COLUMN SPINAL CORD STIMULATOR TRIAL;  Surgeon: Geneva Leger MD;  Location: [facility] MAIN OR;  Service:     TONSILLECTOMY         Family History   Problem Relation Age of Onset    Cancer Father 80        prostate    No Known Problems Mother     No Known Problems Sister     No Known Problems Brother     No Known Problems Daughter     No Known Problems Son     No Known Problems Maternal Aunt     No Known Problems Maternal Uncle     No Known Problems Paternal Aunt     No Known Problems Paternal Uncle     No Known Problems Maternal Grandmother     No Known Problems Maternal Grandfather     No Known Problems Paternal Grandmother     No Known Problems Paternal Grandfather        Social History     Occupational History    Not on file   Tobacco Use    Smoking status: Current Every Day Smoker     Packs/day: 1 25     Years: 60 00     Pack years: 75 00    Smokeless tobacco: Never Used   Substance and Sexual Activity    Alcohol use: Yes     Comment: occasionally    Drug use: No    Sexual activity: Not on file         Current Outpatient Medications:     albuterol (PROAIR HFA) 90 mcg/act inhaler, Inhale 2 puffs 4 (four) times a day, Disp: 1 Inhaler, Rfl: 5    aspirin (ECOTRIN LOW STRENGTH) 81 mg EC tablet, Take 81 mg by mouth daily, Disp: , Rfl:     atorvastatin (LIPITOR) 40 mg tablet, Take 40 mg by mouth daily  , Disp: , Rfl:     budesonide (PULMICORT) 0 5 mg/2 mL nebulizer solution, Take 1 vial (0 5 mg total) by nebulization 2 (two) times a day Rinse mouth after use , Disp: 60 vial, Rfl: 5    budesonide-formoterol (SYMBICORT) 160-4 5 mcg/act inhaler, Inhale 2 puffs 2 (two) times a day Rinse mouth after use , Disp: 3 Inhaler, Rfl: 3    BYSTOLIC 10 MG tablet, TAKE 1 TABLET BY MOUTH DAILY, Disp: 90 tablet, Rfl: 3    diltiazem (DILACOR XR) 240 MG 24 hr capsule, Take 1 capsule (240 mg total) by mouth daily at bedtime, Disp: 90 capsule, Rfl: 3    fluticasone (FLONASE) 50 mcg/act nasal spray, 2 sprays into each nostril daily, Disp: , Rfl:     [START ON 3/29/2020] HYDROcodone-acetaminophen (NORCO)  mg per tablet, Take 1 tablet by mouth 3 (three) times a day as needed for severe painMax Daily Amount: 3 tablets, Disp: 90 tablet, Rfl: 0    ipratropium-albuterol (DUO-NEB) 0 5-2 5 mg/3 mL nebulizer solution, Take 1 vial (3 mL total) by nebulization 3 (three) times a day, Disp: 360 mL, Rfl: 3    meclizine (ANTIVERT) 12 5 MG tablet, TAKE 1-2 TABLETS BY MOUTH EVERY 8 HOURS AS NEEDED PRN; DIZZINESS, Disp: , Rfl: 3    nebivolol (BYSTOLIC) 10 mg tablet, Take 1 tablet (10 mg total) by mouth daily, Disp: 90 tablet, Rfl: 3    OMEPRAZOLE PO, Take 40 mg by mouth daily  , Disp: , Rfl:     OXYGEN-HELIUM IN, Inhale 2 L daily at bedtime, Disp: , Rfl:     phenazopyridine (PYRIDIUM) 200 mg tablet, , Disp: , Rfl: 0    rivaroxaban (XARELTO) 20 mg tablet, Take 1 tablet (20 mg total) by mouth daily with dinner, Disp: 28 tablet, Rfl: 0    tamsulosin (FLOMAX) 0 4 mg, Take 0 4 mg by mouth daily with dinner, Disp: , Rfl:     traZODone (DESYREL) 50 mg tablet, , Disp: , Rfl:     XARELTO 20 MG tablet, TAKE 1 TABLET (20 MG TOTAL) BY MOUTH DAILY WITH DINNER, Disp: 90 tablet, Rfl: 3    [START ON 2/28/2020] HYDROcodone-acetaminophen (NORCO)  mg per tablet, Take 1 tablet by mouth 3 (three) times a day as needed for severe painMax Daily Amount: 3 tablets, Disp: 90 tablet, Rfl: 0    nicotine (NICOTROL) 10 MG inhaler, Inhale 1 puff as needed for smoking cessation for up to 30 days, Disp: 168 each, Rfl: 1    No Known Allergies    Physical Exam:    /77   Pulse 83   Ht 5' 10" (1 778 m)   Wt 114 kg (251 lb)   BMI 36 01 kg/m²     Constitutional:obese  Eyes:anicteric  HEENT:grossly intact  Neck:supple, symmetric, trachea midline and no masses   Pulmonary:even and unlabored  Cardiovascular:No edema or pitting edema present  Skin:Normal without rashes or lesions and well hydrated  Psychiatric:Mood and affect appropriate  Neurologic:Cranial Nerves II-XII grossly intact  Musculoskeletal:ambulates with cane

## 2020-03-18 DIAGNOSIS — J43.2 CENTRILOBULAR EMPHYSEMA (HCC): Primary | ICD-10-CM

## 2020-03-18 RX ORDER — IPRATROPIUM BROMIDE AND ALBUTEROL SULFATE 2.5; .5 MG/3ML; MG/3ML
SOLUTION RESPIRATORY (INHALATION)
Qty: 90 VIAL | Refills: 11 | Status: SHIPPED | OUTPATIENT
Start: 2020-03-18

## 2020-04-27 ENCOUNTER — TELEMEDICINE (OUTPATIENT)
Dept: PAIN MEDICINE | Facility: CLINIC | Age: 80
End: 2020-04-27
Payer: MEDICARE

## 2020-04-27 DIAGNOSIS — M54.50 PAIN OF LUMBAR SPINE: ICD-10-CM

## 2020-04-27 DIAGNOSIS — M51.16 INTERVERTEBRAL DISC DISORDER WITH RADICULOPATHY OF LUMBAR REGION: ICD-10-CM

## 2020-04-27 DIAGNOSIS — G89.4 CHRONIC PAIN SYNDROME: ICD-10-CM

## 2020-04-27 PROCEDURE — 99442 PR PHYS/QHP TELEPHONE EVALUATION 11-20 MIN: CPT | Performed by: ANESTHESIOLOGY

## 2020-04-27 RX ORDER — HYDROCODONE BITARTRATE AND ACETAMINOPHEN 10; 325 MG/1; MG/1
1 TABLET ORAL 4 TIMES DAILY PRN
Qty: 120 TABLET | Refills: 0 | Status: SHIPPED | OUTPATIENT
Start: 2020-04-29 | End: 2020-06-25 | Stop reason: SDUPTHER

## 2020-05-20 ENCOUNTER — TELEMEDICINE (OUTPATIENT)
Dept: PAIN MEDICINE | Facility: CLINIC | Age: 80
End: 2020-05-20
Payer: MEDICARE

## 2020-05-20 DIAGNOSIS — G89.4 CHRONIC PAIN SYNDROME: ICD-10-CM

## 2020-05-20 DIAGNOSIS — M51.16 INTERVERTEBRAL DISC DISORDER WITH RADICULOPATHY OF LUMBAR REGION: ICD-10-CM

## 2020-05-20 DIAGNOSIS — M54.50 PAIN OF LUMBAR SPINE: ICD-10-CM

## 2020-05-20 PROCEDURE — 99442 PR PHYS/QHP TELEPHONE EVALUATION 11-20 MIN: CPT | Performed by: ANESTHESIOLOGY

## 2020-05-20 RX ORDER — HYDROCODONE BITARTRATE AND ACETAMINOPHEN 10; 325 MG/1; MG/1
1 TABLET ORAL 4 TIMES DAILY PRN
Qty: 120 TABLET | Refills: 0 | Status: SHIPPED | OUTPATIENT
Start: 2020-05-29 | End: 2020-07-23 | Stop reason: SDUPTHER

## 2020-06-25 ENCOUNTER — TELEMEDICINE (OUTPATIENT)
Dept: PAIN MEDICINE | Facility: CLINIC | Age: 80
End: 2020-06-25
Payer: MEDICARE

## 2020-06-25 DIAGNOSIS — M51.16 INTERVERTEBRAL DISC DISORDER WITH RADICULOPATHY OF LUMBAR REGION: ICD-10-CM

## 2020-06-25 DIAGNOSIS — G89.4 CHRONIC PAIN SYNDROME: Primary | ICD-10-CM

## 2020-06-25 DIAGNOSIS — M54.50 PAIN OF LUMBAR SPINE: ICD-10-CM

## 2020-06-25 DIAGNOSIS — I48.20 CHRONIC ATRIAL FIBRILLATION (HCC): ICD-10-CM

## 2020-06-25 PROCEDURE — 99214 OFFICE O/P EST MOD 30 MIN: CPT | Performed by: ANESTHESIOLOGY

## 2020-06-25 RX ORDER — HYDROCODONE BITARTRATE AND ACETAMINOPHEN 10; 325 MG/1; MG/1
1 TABLET ORAL 4 TIMES DAILY PRN
Qty: 120 TABLET | Refills: 0 | Status: SHIPPED | OUTPATIENT
Start: 2020-06-26 | End: 2020-08-24 | Stop reason: SDUPTHER

## 2020-06-26 RX ORDER — RIVAROXABAN 20 MG/1
TABLET, FILM COATED ORAL
Qty: 90 TABLET | Refills: 3 | Status: SHIPPED | OUTPATIENT
Start: 2020-06-26 | End: 2021-02-03 | Stop reason: SDUPTHER

## 2020-07-07 ENCOUNTER — TELEPHONE (OUTPATIENT)
Dept: PULMONOLOGY | Facility: MEDICAL CENTER | Age: 80
End: 2020-07-07

## 2020-07-08 DIAGNOSIS — J43.2 CENTRILOBULAR EMPHYSEMA (HCC): Primary | ICD-10-CM

## 2020-07-08 NOTE — PROGRESS NOTES
Connee Shone has diagnosis of centrilobular emphysema and will benefit from the use of a nebulizer machine   I am prescribing DuoNeb

## 2020-07-22 ENCOUNTER — OFFICE VISIT (OUTPATIENT)
Dept: CARDIOLOGY CLINIC | Facility: CLINIC | Age: 80
End: 2020-07-22
Payer: MEDICARE

## 2020-07-22 VITALS
HEIGHT: 70 IN | DIASTOLIC BLOOD PRESSURE: 70 MMHG | WEIGHT: 255 LBS | TEMPERATURE: 95.8 F | HEART RATE: 72 BPM | OXYGEN SATURATION: 92 % | BODY MASS INDEX: 36.51 KG/M2 | SYSTOLIC BLOOD PRESSURE: 124 MMHG

## 2020-07-22 DIAGNOSIS — I10 ESSENTIAL HYPERTENSION: ICD-10-CM

## 2020-07-22 DIAGNOSIS — R06.00 DYSPNEA ON EXERTION: ICD-10-CM

## 2020-07-22 DIAGNOSIS — F17.210 CIGARETTE NICOTINE DEPENDENCE WITHOUT COMPLICATION: ICD-10-CM

## 2020-07-22 DIAGNOSIS — I49.3 ASYMPTOMATIC PVCS: ICD-10-CM

## 2020-07-22 DIAGNOSIS — J43.2 CENTRILOBULAR EMPHYSEMA (HCC): ICD-10-CM

## 2020-07-22 DIAGNOSIS — I25.10 CAD S/P PERCUTANEOUS CORONARY ANGIOPLASTY: ICD-10-CM

## 2020-07-22 DIAGNOSIS — K21.9 HIATAL HERNIA WITH GERD WITHOUT ESOPHAGITIS: ICD-10-CM

## 2020-07-22 DIAGNOSIS — Z98.61 CAD S/P PERCUTANEOUS CORONARY ANGIOPLASTY: ICD-10-CM

## 2020-07-22 DIAGNOSIS — E78.5 DYSLIPIDEMIA: ICD-10-CM

## 2020-07-22 DIAGNOSIS — G47.33 OBSTRUCTIVE SLEEP APNEA: ICD-10-CM

## 2020-07-22 DIAGNOSIS — E66.9 CLASS 2 OBESITY: ICD-10-CM

## 2020-07-22 DIAGNOSIS — I48.20 CHRONIC ATRIAL FIBRILLATION (HCC): Primary | ICD-10-CM

## 2020-07-22 DIAGNOSIS — K44.9 HIATAL HERNIA WITH GERD WITHOUT ESOPHAGITIS: ICD-10-CM

## 2020-07-22 PROCEDURE — 99214 OFFICE O/P EST MOD 30 MIN: CPT | Performed by: INTERNAL MEDICINE

## 2020-07-22 PROCEDURE — 93000 ELECTROCARDIOGRAM COMPLETE: CPT | Performed by: INTERNAL MEDICINE

## 2020-07-22 NOTE — PATIENT INSTRUCTIONS
1  Continue current medication  2  Cardiology follow-up approximately six months with lipids, CMP, EKG  3  Smoking cessation was encouraged

## 2020-07-22 NOTE — PROGRESS NOTES
Office Cardiology Progress Note  Serena Mcclelland 78 y o  male MRN: [id number]  07/22/20  10:27 AM      ASSESSMENT:    1  Controlled chronic atrial fibrillation on Xarelto oral anticoagulation plus diltiazem and Bystolic for rate control  2  Currently shows  frequent PVCs or aberrantly conducted complexes, asymptomatic, and previous pattern of bigeminy/trigeminy,  suppressed today   Ventricular triplet noted previously   Patient refuses Holter monitor  3  Stable CAD with history of remote PCI, details unknown to me, done 10 25 years ago, with patient alleging last stress test 2 75-3 25 years ago   He now refuses any further stress testing  4  Significant COPD with centrilobular emphysema  but now stable with chronic exertional dyspnea with hypoxemia and ongoing cigarette abuse at 1 to 1-1/2 packs per day and prior occasional exacerbations   Today with no audible wheezing but is tachypneic  Uses nebulizer q i d   5  Obstructive sleep apnea, patient refusing CPAP and using nasal oxygen 2 L/m h s  and p r n   6  Marked obesity, class II, with 5 5 pound weight gain in 3 25 years with 5 pound weight loss in approximately 1 33 years  7  History of right pleural effusion, small  8  History of asbestosis due to his exposure as a   9  Chronic low back, bilateral lower extremity and cervical spine pain and continuous opioid dependence with failure of lumbar epidural steroid injections and lumbar facet radiofrequency ablations with unsatisfactory response to permanent spinal stimulator implant on 08/23/2017   Now with ineffective spinal stimulator    Patient previously seeking Lyrica from pain management  10  Long-standing essential hypertension and dyslipidemia, treated and controlled  11  History of hiatal hernia and GERD  12  History of former alcoholism, now controlled, with fall caused by alcohol abuse resulting in hospital stay 8/18/13 and last trip and fall 3 25 years ago    13  History of bladder outlet obstruction 8/13 and attributed to [facility] has likely localized prostate cancer under observation by Dr Alissa Finn with latest PSA 12 0 as of 11/08/2018  14  History of pneumonia/pleurisy March, 2012  15  Unremarkable cardiac catheterization in 2005  16  Cervical spine fracture after fall in 2009, rib fractures after fall in 2013, left total hip replacement April, 2010  17   Multilevel cervical spondylosis and multilevel cervical spinal stenosis  Plan       Patient Instructions     1  Continue current medication  2  Cardiology follow-up approximately six months with lipids, CMP, EKG  3  Smoking cessation was encouraged  HPI    This 78 y o  male  denies new cardiopulmonary and medical symptoms  He continues to complain of bilateral lower extremity and low back pain as his main symptom  He has been unable to quit smoking in still averages 1 to 1-1/2 packs per day of cigarettes smoked  He has never been able to quit for more than a few days  He uses a nebulizer at home 4 times a day and nasal oxygen at 2 liters/minute at bedtime  He claims to be adherent to his medication  The patient is seen in follow-up of the below listed diagnoses  Encounter Diagnoses   Name Primary?     Chronic atrial fibrillation (HCC) Yes    Essential hypertension     Asymptomatic PVCs     CAD S/P percutaneous coronary angioplasty     Dyslipidemia     Centrilobular emphysema (HCC)     Cigarette nicotine dependence without complication     Obstructive sleep apnea     Class 2 obesity     Dyspnea on exertion     Hiatal hernia with GERD without esophagitis         Review of Systems    All other systems negative, except as noted in history of present illness    Historical Information   Past Medical History:   Diagnosis Date    Acid reflux     Arthritis     BPH (benign prostatic hyperplasia)     no treatment    Calcium disorder     Chronic atrial fibrillation (HCC) 2/6/2018    Chronic pain     Chronic pain disorder     COPD (ch[facility] obstructive [address] disease) ([facility] [address]     Coronary artery disease 2001    PTCA with one stent    Eye redness     Poked his eye on 3/18/16 "bloody" right eye    Hiatal hernia     Hydronephrosis     Hyperlipidemia     Hypertension     Irregular heart beat     A-Fib    Low back pain     Neck pain     Nocturnal leg cramps     Osteopenia     Peripheral artery disease (HCC)     Peripheral neuropathy     Pleural effusion     Sleep apnea     REFUSES MACHINE HS    Spinal stenosis     Vocal cord polyp 10/2012    panendoscopy     Past Surgical History:   Procedure Laterality Date    ANGIOPLASTY      APPENDECTOMY  04/2007    CARDIOVERSION      x2, for A-Fib    CARDIOVERSION      CATARACT EXTRACTION      CATARACT EXTRACTION W/ INTRAOCULAR LENS IMPLANT Left 01/2014    CORONARY ANGIOPLASTY WITH STENT PLACEMENT  2001    one stent    HERNIA REPAIR Right     inquinal    JOINT REPLACEMENT Left     Hip   [address]    also had arthroscopy right knee 2005    PANENDOSCOPY  10/23/2012    vocal cord polyp    PILONIDAL CYST DRAINAGE      x 3    [place] SURG IMPLNT Carolyn Hoof Left 8/23/2017    Procedure: Placement of Thoracic SCS with left buttock IPG (IMPULSE); Surgeon: George Geronimo MD;  Location:  MAIN OR;  Service: Neurosurgery    [place] XCAPSL CTRC RMVL INSJ IO LENS PROSTH W/O ECP Right 4/6/2016    Procedure: EXTRACTION EXTRACAPSULAR CATARACT PHACO INTRAOCULAR LENS (IOL);   Surgeon: Basim Plaza MD;  [facility] MAIN OR;  Service: Ophthalmology    SPINAL CORD STIMULATOR TRIAL W/ LAMINOTOMY N/A 4/13/2017    Procedure: INSERTION DORSAL COLUMN SPINAL CORD STIMULATOR TRIAL;  Surgeon: Marija Sen MD;  Location: [place] [address] MAIN OR;  Service:     TONSILLECTOMY       Social History     Substance and Sexual Activity   Alcohol Use Yes    Comment: occasionally     Social History     Substance and Sexual Activity   Drug Use No     Social History     Tobacco Use Smoking Status Current Every Day Smoker    Packs/day: 1 25    Years: 60 00    Pack years: 75 00   Smokeless Tobacco Never Used       Family History:  Family History   Problem Relation Age of Onset    Cancer Father 80        prostate    No Known Problems Mother     No Known Problems Sister     No Known Problems Brother     No Known Problems Daughter     No Known Problems Son     No Known Problems Maternal Aunt     No Known Problems Maternal Uncle     No Known Problems Paternal Aunt     No Known Problems Paternal Uncle     No Known Problems Maternal Grandmother     No Known Problems Maternal Grandfather     No Known Problems Paternal Grandmother     No Known Problems Paternal Grandfather          Meds/Allergies     Prior to Admission medications    Medication Sig Start Date End Date Taking? Authorizing Provider   aspirin (ECOTRIN LOW STRENGTH) 81 mg EC tablet Take 81 mg by mouth daily   Yes Historical Provider, MD   atorvastatin (LIPITOR) 40 mg tablet Take 40 mg by mouth daily     Yes Historical Provider, MD   budesonide (PULMICORT) 0 5 mg/2 mL nebulizer solution Take 1 vial (0 5 mg total) by nebulization 2 (two) times a day Rinse mouth after use   2/10/20  Yes MITCHELL Moy   budesonide-formoterol [facility]) 160-4 5 mcg/act inhaler Inhale 2 puffs 2 (two) times a day Rinse mouth after use  7/23/19  Yes MITCHELL Moy   BYSTOLIC 10 MG tablet TAKE 1 TABLET BY MOUTH DAILY 1/29/20  Yes Nabil Blackburn MD   diltiazem (DILACOR XR) 240 MG 24 hr capsule Take 1 capsule (240 mg total) by mouth daily at bedtime 9/6/18  Yes Nabil Blackburn MD   HYDROcodone-acetaminophen [facility])  mg per tablet Take 1 tablet by mouth 4 (four) times a day as needed for severe painMax Daily Amount: 4 tablets 6/26/20 7/26/20 Yes Cody Zapata MD   ipratropium-albuterol (DUO-NEB) 0 5-2 5 mg/3 mL nebulizer solution USE 1 VIAL IN NEBULIZER 3 TIMES DAILY 3/18/20  Yes MITCHELL oMy   nebivolol (BYSTOLIC) 10 mg tablet Take 1 tablet (10 mg total) by mouth daily 3/21/19  Yes Emmett Torres DO   OMEPRAZOLE PO Take 40 mg by mouth daily     Yes Historical Provider, MD   OXYGEN-HELIUM IN Inhale 2 L daily at bedtime   Yes Historical Provider, MD   phenazopyridine (PYRIDIUM) 200 mg tablet  12/10/19  Yes Historical Provider, MD   rivaroxaban (XARELTO) 20 mg tablet Take 1 tablet (20 mg total) by mouth daily with dinner 4/4/19  Yes Inocencio Rivera MD   traZODone (DESYREL) 50 mg tablet  3/20/18  Yes Historical Provider, MD   XARELTO 20 MG tablet TAKE 1 TABLET BY MOUTH DAILY WITH DINNER 6/26/20  Yes Inocencio Rivera MD   fluticasone (FLONASE) 50 mcg/act nasal spray 2 sprays into each nostril daily    Historical Provider, MD   HYDROcodone-acetaminophen (NORCO)  mg per tablet Take 1 tablet by mouth 4 (four) times a day as needed for severe painMax Daily Amount: 4 tablets  Patient not taking: Reported on 7/22/2020 5/29/20 6/28/20  Marion Hayes MD   meclizine (ANTIVERT) 12 5 MG tablet TAKE 1-2 TABLETS BY MOUTH EVERY 8 HOURS AS NEEDED PRN; DIZZINESS 7/22/19   Historical Provider, MD   nicotine (NICOTROL) 10 MG inhaler Inhale 1 puff as needed for smoking cessation for up to 30 days 7/23/19 8/22/19  MITCHELL Ruffin   tamsulosin (FLOMAX) 0 4 mg Take 0 4 mg by mouth daily with dinner    Historical Provider, MD       No Known Allergies      Vitals:    07/22/20 0939   BP: 124/70   BP Location: Left arm   Patient Position: Sitting   Cuff Size: Standard   Pulse: 72   Temp: (!) 95 8 °F (35 4 °C)   SpO2: 92%   Weight: 116 kg (255 lb)   Height: 5' 10" (1 778 m)       Body mass index is 36 59 kg/m²  1 pound weight loss in approximately nine months and 5 pound weight loss in approximately 1 33 years    Physical Exam:    General Appearance:  Alert, cooperative, no distress, appears stated age and is moderately obese  He is moderately tachypneic     Head:  Normocephalic, without obvious abnormality, atraumatic   Eyes:  PERRL, conjunctiva/corneas clear, EOM's intact,   both eyes   Ears:  Normal TM's and external ear canals, both ears   Nose: Nares normal, septum midline, mucosa normal, no drainage or sinus tenderness   Throat: Lips, mucosa, and tongue normal; teeth and gums normal   Neck: Supple, symmetrical, trachea midline, no adenopathy, thyroid: not enlarged, symmetric, no tenderness/mass/nodules, no carotid bruit or JVD   Back:   Symmetric, no curvature, ROM normal, no CVA tenderness   Lungs:   Clear to auscultation bilaterally, respirations somewhat rapid at 30 breaths per minute with prolonged expiratory phase of respiration   Chest Wall:  No tenderness or deformity   Heart:  Irregularly irregular cardiac rhythm, S1, S2 normal, no murmur, rub or gallop with distant heart sounds noted   Abdomen:   Soft, non-tender, bowel sounds active all four quadrants,  no masses, no organomegaly with marked obesity noted   Extremities: Extremities normal, atraumatic, no cyanosis or edema   Pulses: Nonpalpable ankle and pedal pulses bilaterally with both feet equally warm   Skin: Skin showed normal color, texture, turgor and no rashes or lesions   Lymph nodes: Cervical, supraclavicular, and axillary nodes normal   Neurologic: Normal         Cardiographics    ECG 07/22/20:    Controlled atrial fibrillation averaging 72 bpm  Frequent wide peak complex beats consistent with aberrant conduction or PVCs  LAFB  RVCD in V1  New aberrant complexes or PVCs with no other changes since 10/25/2019    IMAGING:    No Chest XR results available for this patient            LAB REVIEW:      Lab Results   Component Value Date    SODIUM 139 12/27/2019    K 4 1 12/27/2019     12/27/2019    CO2 29 12/27/2019    BUN 16 12/27/2019    CREATININE 0 89 12/27/2019    GLUF 103 (H) 12/27/2019    CALCIUM 9 0 12/27/2019    AST 20 12/27/2019    ALT 23 12/27/2019    ALKPHOS 61 12/27/2019    EGFR 80 12/27/2019     Lab Results   Component Value Date    CHOLESTEROL 111 12/27/2019    CHOLESTEROL 98 11/08/2018    CHOLESTEROL 116 12/07/2017     Lab Results   Component Value Date    HDL 48 12/27/2019    HDL 54 11/08/2018    HDL 59 12/07/2017     No results found for: LDLCHOLEST  Lab Results   Component Value Date    LDLCALC 38 12/27/2019    LDLCALC 25 11/08/2018    LDLCALC 34 12/07/2017       Lab Results   Component Value Date    TRIG 124 12/27/2019    TRIG 96 11/08/2018    TRIG 113 12/07/2017     TSH 12/27/2019:  1 401  Vitamin-D, 25-OH 12/27/2019:  43 1  Vitamin B12 12/27/2019:  512  CBC 12/27/2019:  Normal          Orin Morales MD

## 2020-07-23 ENCOUNTER — TELEMEDICINE (OUTPATIENT)
Dept: PAIN MEDICINE | Facility: CLINIC | Age: 80
End: 2020-07-23
Payer: MEDICARE

## 2020-07-23 DIAGNOSIS — M51.16 INTERVERTEBRAL DISC DISORDER WITH RADICULOPATHY OF LUMBAR REGION: ICD-10-CM

## 2020-07-23 DIAGNOSIS — M54.50 PAIN OF LUMBAR SPINE: ICD-10-CM

## 2020-07-23 DIAGNOSIS — G89.4 CHRONIC PAIN SYNDROME: ICD-10-CM

## 2020-07-23 PROCEDURE — 99442 PR PHYS/QHP TELEPHONE EVALUATION 11-20 MIN: CPT | Performed by: ANESTHESIOLOGY

## 2020-07-23 RX ORDER — HYDROCODONE BITARTRATE AND ACETAMINOPHEN 10; 325 MG/1; MG/1
1 TABLET ORAL 4 TIMES DAILY PRN
Qty: 120 TABLET | Refills: 0 | Status: SHIPPED | OUTPATIENT
Start: 2020-07-24 | End: 2020-08-24 | Stop reason: SDUPTHER

## 2020-07-23 NOTE — PROGRESS NOTES
Virtual Brief Visit    Assessment/Plan:    Problem List Items Addressed This Visit        Nervous and Auditory    Intervertebral disc disorder with radiculopathy of lumbar region    Relevant Medications    HYDROcodone-acetaminophen (NORCO)  mg per tablet (Start on 7/24/2020)       Other    Chronic pain syndrome    Relevant Medications    HYDROcodone-acetaminophen (NORCO)  mg per tablet (Start on 7/24/2020)    Pain of lumbar spine    Relevant Medications    HYDROcodone-acetaminophen (NORCO)  mg per tablet (Start on 7/24/2020)                Reason for visit is   Chief Complaint   Patient presents with    Virtual Brief Visit        Encounter provider Lydia Miramontes MD    Provider located at [address] [place] [id number]    Recent Visits  No visits were found meeting these conditions  Showing recent visits within past 7 days and meeting all other requirements     Today's Visits  Date Type Provider Dept   07/23/20 Telemedicine Lydia Miramontes MD [facility]   Showing today's visits and meeting all other requirements     Future Appointments  No visits were found meeting these conditions  Showing future appointments within next 150 days and meeting all other requirements        After connecting through telephone, the patient was identified by name and date of birth  Nisreen Reshawn was informed that this is a telemedicine visit and that the visit is being conducted through telephone  My office door was closed  No one else was in the room  He acknowledged consent and understanding of privacy and security of the platform  The patient has agreed to participate and understands he can discontinue the visit at any time  Patient is aware this is a billable service  Pain Medicine Follow-Up Note    Assessment:  1  Chronic pain syndrome    2  Pain of lumbar spine    3   Intervertebral disc disorder with radiculopathy of lumbar region        Plan:  New Medications Ordered This Visit   Medications    HYDROcodone-acetaminophen (NORCO)  mg per tablet     Sig: Take 1 tablet by mouth 4 (four) times a day as needed for severe painMax Daily Amount: 4 tablets     Dispense:  120 tablet     Refill:  0       My impressions and treatment recommendations were discussed in detail with the patient who verbalized understanding and had no further questions  The patient reports that he is currently doing well with hydrocodone/acetaminophen 10/325 mg 1 tablet 4 times daily as needed for pain  I sent in the prescription to the pharmacy dated July 24, 2020  I did discuss with the patient that he should have enough medications to get him to July 26, 2020  Unfortunately, he reports that his pharmacy is not open on Saturday and Sunday, so he requested that it be filled on July 24, 2020  The risks and side effects of chronic opioid treatment were discussed in detail with the patient  Side effects include but are not limited to nausea, vomiting, GI intolerance, sedation, constipation, mental clouding, opioid-induced hyperalgesia, endocrine dysfunction, addiction, dependence, and tolerance  The patient was asked to take his medications only as prescribed and directed, never in excess, and never for any other reason other than for pain control  The patient was also asked to keep his medications out of the reach of others and away from children, preferably in a locked drawer  The patient verbalized understanding and wished to use these opioid medications  [place] Prescription Drug Monitoring Program report was reviewed and was appropriate     Follow-up is planned in 4 weeks time or sooner as warranted  Discharge instructions were provided  I personally saw and examined the patient and I agree with the above discussed plan of care      History of Present Illness:    Deneen Smith is a 78 y o  male who presents to [place]  [facility] for interval re-evaluation of the above stated pain complaints  The patient has a past medical and chronic pain history as outlined in the assessment section  He was last seen virtually on June 25, 2020  At today's virtual office visit, the patient's pain score is 5/10 on the verbal numerical pain rating scale  The patient states that his pain is primarily in the low back region  He states that his pain as sharp and stabbing and worsens with activity  His pain bothers him in the morning, afternoon, evening, and night  His pain is constant in nature  He is not currently using a spinal cord stimulator and states that it is not helping him at all  He does use 4 tablets of the hydrocodone/acetaminophen per day  He denies opioid induced constipation  Other than as stated above, the patient denies any interval changes in medications, medical condition, mental condition, symptoms, or allergies since the last office visit  Review of Systems:    Review of Systems   Respiratory: Negative for shortness of breath  Cardiovascular: Negative for chest pain  Gastrointestinal: Negative for constipation, diarrhea, nausea and vomiting  Musculoskeletal: Positive for back pain and gait problem  Negative for arthralgias, joint swelling and myalgias  Skin: Negative for rash  Neurological: Negative for dizziness, seizures and weakness  All other systems reviewed and are negative          Patient Active Problem List   Diagnosis    Chronic pain syndrome    Pain of lumbar spine    Sacroiliitis ([address] )    Intervertebral disc disorder with radiculopathy of lumbar region    Centrilobular emphysema (HCC)    Dyspnea on exertion    Chronic atrial fibrillation (HCC)    Coronary artery disease    Chronic respiratory failure with hypoxia (HCC)    Nicotine dependence    Cervical radiculopathy    Cervical spinal stenosis    Cervical pain    Elevated PSA       Past Medical History:   Diagnosis Date    Acid reflux     Arthritis     BPH (benign prostatic hyperplasia)     no treatment    Calcium disorder     Chronic atrial fibrillation (HCC) 2/6/2018    Chronic pain     Chronic pain disorder     [facility]     Coronary artery disease 2001    PTCA with one stent    Eye redness     Poked his eye on 3/18/16 "bloody" right eye    Hiatal hernia     Hydronephrosis     Hyperlipidemia     Hypertension     Irregular heart beat     A-Fib    Low back pain     Neck pain     Nocturnal leg cramps     Osteopenia     Peripheral artery disease (HCC)     Peripheral neuropathy     Pleural effusion     Sleep apnea     REFUSES MACHINE HS    Spinal stenosis     Vocal cord polyp 10/2012    panendoscopy       Past Surgical History:   Procedure Laterality Date    ANGIOPLASTY      APPENDECTOMY  04/2007    CARDIOVERSION      x2, for A-Fib    CARDIOVERSION      CATARACT EXTRACTION      CATARACT EXTRACTION W/ INTRAOCULAR LENS IMPLANT Left 01/2014    CORONARY ANGIOPLASTY WITH STENT PLACEMENT  2001    one stent    HERNIA REPAIR Right     inquinal    JOINT REPLACEMENT Left     Hip    KNEE SURGERY  1994    also had arthroscopy right knee 2005    PANENDOSCOPY  10/23/2012    vocal cord polyp    PILONIDAL CYST DRAINAGE      x 3    MN SURG IMPLNT Jessica Rocker Left 8/23/2017    Procedure: Placement of Thoracic SCS with left buttock IPG (IMPULSE); Surgeon: Jade Smith MD;  Location:  MAIN OR;  Service: Neurosurgery    MN XCAPSL CTRC RMVL INSJ IO LENS PROSTH W/O ECP Right 4/6/2016    Procedure: EXTRACTION EXTRACAPSULAR CATARACT PHACO INTRAOCULAR LENS (IOL);   Surgeon: Makeda Villalobos MD;  Location: [facility] MAIN OR;  Service: Ophthalmology    SPINAL CORD STIMULATOR TRIAL W/ LAMINOTOMY N/A 4/13/2017    Procedure: INSERTION DORSAL COLUMN SPINAL CORD STIMULATOR TRIAL;  Surgeon: Rocio Cano MD;  Location: [place] [address] MAIN OR;  Service:    [place] TONSILLECTOMY         Family History   Problem Relation Age of Onset    Cancer Father 80        prostate    No Known Problems Mother     No Known Problems Sister     No Known Problems Brother     No Known Problems Daughter     No Known Problems Son     No Known Problems Maternal Aunt     No Known Problems Maternal Uncle     No Known Problems Paternal Aunt     No Known Problems Paternal Uncle     No Known Problems Maternal Grandmother     No Known Problems Maternal Grandfather     No Known Problems Paternal Grandmother     No Known Problems Paternal Grandfather        Social History     Occupational History    Not on file   Tobacco Use    Smoking status: Current Every Day Smoker     Packs/day: 1 25     Years: 60 00     Pack years: 75 00    Smokeless tobacco: Never Used   Substance and Sexual Activity    Alcohol use: Yes     Comment: occasionally    Drug use: No    Sexual activity: Not on file         Current Outpatient Medications:     aspirin (ECOTRIN LOW STRENGTH) 81 mg EC tablet, Take 81 mg by mouth daily, Disp: , Rfl:     atorvastatin (LIPITOR) 40 mg tablet, Take 40 mg by mouth daily  , Disp: , Rfl:     budesonide (PULMICORT) 0 5 mg/2 mL nebulizer solution, Take 1 vial (0 5 mg total) by nebulization 2 (two) times a day Rinse mouth after use , Disp: 60 vial, Rfl: 5    budesonide-formoterol (SYMBICORT) 160-4 5 mcg/act inhaler, Inhale 2 puffs 2 (two) times a day Rinse mouth after use , Disp: 3 Inhaler, Rfl: 3    BYSTOLIC 10 MG tablet, TAKE 1 TABLET BY MOUTH DAILY, Disp: 90 tablet, Rfl: 3    diltiazem (DILACOR XR) 240 MG 24 hr capsule, Take 1 capsule (240 mg total) by mouth daily at bedtime, Disp: 90 capsule, Rfl: 3    fluticasone (FLONASE) 50 mcg/act nasal spray, 2 sprays into each nostril daily, Disp: , Rfl:     HYDROcodone-acetaminophen (NORCO)  mg per tablet, Take 1 tablet by mouth 4 (four) times a day as needed for severe painMax Daily Amount: 4 tablets, Disp: 120 tablet, Rfl: 0    [START ON 7/24/2020] HYDROcodone-acetaminophen (NORCO)  mg per tablet, Take 1 tablet by mouth 4 (four) times a day as needed for severe painMax Daily Amount: 4 tablets, Disp: 120 tablet, Rfl: 0    ipratropium-albuterol (DUO-NEB) 0 5-2 5 mg/3 mL nebulizer solution, USE 1 VIAL IN NEBULIZER 3 TIMES DAILY, Disp: 90 vial, Rfl: 11    meclizine (ANTIVERT) 12 5 MG tablet, TAKE 1-2 TABLETS BY MOUTH EVERY 8 HOURS AS NEEDED PRN; DIZZINESS, Disp: , Rfl: 3    nebivolol (BYSTOLIC) 10 mg tablet, Take 1 tablet (10 mg total) by mouth daily, Disp: 90 tablet, Rfl: 3    nicotine (NICOTROL) 10 MG inhaler, Inhale 1 puff as needed for smoking cessation for up to 30 days, Disp: 168 each, Rfl: 1    OMEPRAZOLE PO, Take 40 mg by mouth daily  , Disp: , Rfl:     OXYGEN-HELIUM IN, Inhale 2 L daily at bedtime, Disp: , Rfl:     phenazopyridine (PYRIDIUM) 200 mg tablet, , Disp: , Rfl: 0    rivaroxaban (XARELTO) 20 mg tablet, Take 1 tablet (20 mg total) by mouth daily with dinner, Disp: 28 tablet, Rfl: 0    tamsulosin (FLOMAX) 0 4 mg, Take 0 4 mg by mouth daily with dinner, Disp: , Rfl:     traZODone (DESYREL) 50 mg tablet, , Disp: , Rfl:     XARELTO 20 MG tablet, TAKE 1 TABLET BY MOUTH DAILY WITH DINNER, Disp: 90 tablet, Rfl: 3    No Known Allergies    Physical Exam:    There were no vitals taken for this visit  Constitutional:  alert and does not seem to be in any distress over the telephone  HEENT:  hearing grossly intact    The remainder of the physical examination was deferred due to this being a telemedicine visit  I spent 15 minutes directly with the patient during this visit    Suggested billing CPT:  [address] [facility] acknowledges that he has consented to an online visit or consultation   He understands that the online visit is based solely on information provided by him, and that, in the absence of a face-to-face physical evaluation by the physician, the diagnosis he receives is both limited and provisional in terms of accuracy and completeness  This is not intended to replace a full medical face-to-face evaluation by the physician  Judene Gilford understands and accepts these terms

## 2020-08-24 ENCOUNTER — OFFICE VISIT (OUTPATIENT)
Dept: PAIN MEDICINE | Facility: CLINIC | Age: 80
End: 2020-08-24
Payer: MEDICARE

## 2020-08-24 VITALS
HEART RATE: 102 BPM | TEMPERATURE: 98 F | WEIGHT: 257.5 LBS | DIASTOLIC BLOOD PRESSURE: 95 MMHG | HEIGHT: 70 IN | SYSTOLIC BLOOD PRESSURE: 146 MMHG | BODY MASS INDEX: 36.86 KG/M2

## 2020-08-24 DIAGNOSIS — G89.4 CHRONIC PAIN SYNDROME: ICD-10-CM

## 2020-08-24 DIAGNOSIS — M54.50 PAIN OF LUMBAR SPINE: ICD-10-CM

## 2020-08-24 DIAGNOSIS — F11.20 UNCOMPLICATED OPIOID DEPENDENCE (HCC): ICD-10-CM

## 2020-08-24 DIAGNOSIS — F11.20 UNCOMPLICATED OPIOID DEPENDENCE (HCC): Primary | ICD-10-CM

## 2020-08-24 DIAGNOSIS — Z79.891 LONG-TERM CURRENT USE OF OPIATE ANALGESIC: Primary | ICD-10-CM

## 2020-08-24 DIAGNOSIS — M51.16 INTERVERTEBRAL DISC DISORDER WITH RADICULOPATHY OF LUMBAR REGION: ICD-10-CM

## 2020-08-24 DIAGNOSIS — Z79.891 LONG-TERM CURRENT USE OF OPIATE ANALGESIC: ICD-10-CM

## 2020-08-24 PROCEDURE — 80305 DRUG TEST PRSMV DIR OPT OBS: CPT | Performed by: ANESTHESIOLOGY

## 2020-08-24 PROCEDURE — 99214 OFFICE O/P EST MOD 30 MIN: CPT | Performed by: ANESTHESIOLOGY

## 2020-08-24 RX ORDER — HYDROCODONE BITARTRATE AND ACETAMINOPHEN 10; 325 MG/1; MG/1
1 TABLET ORAL 4 TIMES DAILY PRN
Qty: 120 TABLET | Refills: 0 | Status: SHIPPED | OUTPATIENT
Start: 2020-08-25 | End: 2020-10-22 | Stop reason: SDUPTHER

## 2020-08-24 RX ORDER — HYDROCODONE BITARTRATE AND ACETAMINOPHEN 10; 325 MG/1; MG/1
1 TABLET ORAL 4 TIMES DAILY PRN
Qty: 120 TABLET | Refills: 0 | Status: SHIPPED | OUTPATIENT
Start: 2020-09-24 | End: 2020-12-07 | Stop reason: SDUPTHER

## 2020-08-24 NOTE — PROGRESS NOTES
Pain Medicine Follow-Up Note    Assessment:  1  Chronic pain syndrome    2  Pain of lumbar spine    3  Intervertebral disc disorder with radiculopathy of lumbar region        Plan:  New Medications Ordered This Visit   Medications    HYDROcodone-acetaminophen (NORCO)  mg per tablet     Sig: Take 1 tablet by mouth 4 (four) times a day as needed for severe painMax Daily Amount: 4 tablets     Dispense:  120 tablet     Refill:  0    HYDROcodone-acetaminophen (NORCO)  mg per tablet     Sig: Take 1 tablet by mouth 4 (four) times a day as needed for severe painMax Daily Amount: 4 tablets     Dispense:  120 tablet     Refill:  0     My impressions and treatment recommendations were discussed in detail with the patient who verbalized understanding and had no further questions  Given that the patient reports overall reduced pain and improved level functioning without significant side effects, I felt a reasonable to continue the patient on hydrocodone/acetaminophen 10/325 mg 1 tablet 4 times daily as needed for pain  I sent E prescriptions to the pharmacy dated August 25, 2020 and September 24, 2020  The risks and side effects of chronic opioid treatment were discussed in detail with the patient  Side effects include but are not limited to nausea, vomiting, GI intolerance, sedation, constipation, mental clouding, opioid-induced hyperalgesia, endocrine dysfunction, addiction, dependence, and tolerance  The patient was asked to take his medications only as prescribed and directed, never in excess, and never for any other reason other than for pain control  The patient was also asked to keep his medications out of the reach of others and away from children, preferably in a locked drawer  The patient verbalized understanding and wished to use these opioid medications  A urine drug test was collected at today's office visit as part of our medication management protocol   The point of care testing results were appropriate for what was being prescribed  The specimen will be sent for confirmatory testing  The drug screen is medically necessary because the patient is either dependent on opioid medication or is being considered for opioid medication therapy and the results could impact ongoing or future treatment  The drug screen is to evaluate for the presence or absence of prescribed, non-prescribed, and/or illicit drugs and substances  [place] Prescription Drug Monitoring Program report was reviewed and was appropriate     Follow-up is planned in 2 months time or sooner as warranted  Discharge instructions were provided  I personally saw and examined the patient and I agree with the above discussed plan of care  History of Present Illness:    Earline Carreno is a 78 y o  male who presents to [facility] for interval re-evaluation of the above stated pain complaints  The patient has a past medical and chronic pain history as outlined in the assessment section  He was last seen virtually on July 23, 2020  At today's office visit, the patient's pain score is 9 to 10/10 on the verbal numerical pain rating scale  The patient states that his pain is primarily in his low back  He describes his pain as worse in the evening and night  His pain is constant in nature and he reports the quality of his pain as sharp, throbbing, and shooting  He is doing very well on 4 tablets of hydrocodone/acetaminophen per day  He does admit to controlled opioid induced constipation on prune juice  Other than as stated above, the patient denies any interval changes in medications, medical condition, mental condition, symptoms, or allergies since the last office visit  Review of Systems:    Review of Systems   Respiratory: Positive for shortness of breath  Cardiovascular: Negative for chest pain  Gastrointestinal: Negative for constipation, diarrhea, nausea and vomiting  Musculoskeletal: Positive for back pain and gait problem  Negative for arthralgias, joint swelling and myalgias  Decreased ROM  Muscle weakness   Skin: Negative for rash  Neurological: Negative for dizziness, seizures and weakness  Memory loss   All other systems reviewed and are negative          Patient Active Problem List   Diagnosis    Chronic pain syndrome    Pain of lumbar spine    Sacroiliitis (HCC)    Intervertebral disc disorder with radiculopathy of lumbar region    Centrilobular emphysema (HCC)    Dyspnea on exertion    Chronic atrial fibrillation (HCC)    Coronary artery disease    Chronic respiratory failure with hypoxia (HCC)    Nicotine dependence    Cervical radiculopathy    Cervical spinal stenosis    Cervical pain    Elevated PSA       Past Medical History:   Diagnosis Date    Acid reflux     Arthritis     BPH (benign prostatic hyperplasia)     no treatment    Calcium disorder     Chronic atrial fibrillation (HCC) 2/6/2018    Chronic pain     Chronic pain disorder     COPD (chronic obstructive pulmonary disease) ([address] )     Coronary artery disease 2001    PTCA with one stent    Eye redness     Poked his eye on 3/18/16 "bloody" right eye    Hiatal hernia     Hydronephrosis     Hyperlipidemia     Hypertension     Irregular heart beat     A-Fib    Low back pain     Neck pain     Nocturnal leg cramps     Osteopenia     Peripheral artery disease ([address] )     Peripheral neuropathy     Pleural effusion     Sleep apnea     REFUSES MACHINE HS    Spinal stenosis     Vocal cord polyp 10/2012    panendoscopy       Past Surgical History:   Procedure Laterality Date    ANGIOPLASTY      APPENDECTOMY  04/2007    CARDIOVERSION      x2, for A-Fib    CARDIOVERSION      CATARACT EXTRACTION      CATARACT EXTRACTION W/ INTRAOCULAR LENS IMPLANT Left 01/2014    CORONARY ANGIOPLASTY WITH STENT PLACEMENT  2001    one stent    HERNIA REPAIR Right     inquinal    JOINT REPLACEMENT Left     Hip    KNEE SURGERY  1994    also had arthroscopy right knee 2005    PANENDOSCOPY  10/23/2012    vocal cord polyp    PILONIDAL CYST DRAINAGE      x 3    MT SURG IMPLNT Kang Friend Left 8/23/2017    Procedure: Placement of Thoracic SCS with left buttock IPG (IMPULSE); Surgeon: Josie Martinez MD;  Location:  MAIN OR;  Service: Neurosurgery    MT XCAPSL CTRC RMVL INSJ IO LENS PROSTH W/O ECP Right 4/6/2016    Procedure: EXTRACTION EXTRACAPSULAR CATARACT PHACO INTRAOCULAR LENS (IOL);   Surgeon: Javier Ruiz MD;  Location: [facility] MAIN OR;  Service: Ophthalmology   [address] TRIAL W/ LAMINOTOMY N/A 4/13/2017    Procedure: INSERTION DORSAL COLUMN SPINAL CORD STIMULATOR TRIAL;  Surgeon: Lesly Perry MD;  Location: [facility] MAIN OR;  Service:     TONSILLECTOMY         Family History   Problem Relation Age of Onset    Cancer Father 80        prostate    No Known Problems Mother     No Known Problems Sister     No Known Problems Brother     No Known Problems Daughter     No Known Problems Son     No Known Problems Maternal Aunt     No Known Problems Maternal Uncle     No Known Problems Paternal Aunt     No Known Problems Paternal Uncle     No Known Problems Maternal Grandmother     No Known Problems Maternal Grandfather     No Known Problems Paternal Grandmother     No Known Problems Paternal Grandfather        Social History     Occupational History    Not on file   Tobacco Use    Smoking status: Current Every Day Smoker     Packs/day: 1 25     Years: 60 00     Pack years: 75 00    Smokeless tobacco: Never Used   Substance and Sexual Activity    Alcohol use: Yes     Comment: occasionally    Drug use: No    Sexual activity: Not on file         Current Outpatient Medications:     aspirin (ECOTRIN LOW STRENGTH) 81 mg EC tablet, Take 81 mg by mouth daily, Disp: , Rfl:     atorvastatin (LIPITOR) 40 mg tablet, Take 40 mg by mouth daily  , Disp: , Rfl:     budesonide (PULMICORT) 0 5 mg/2 mL nebulizer solution, Take 1 vial (0 5 mg total) by nebulization 2 (two) times a day Rinse mouth after use , Disp: 60 vial, Rfl: 5    budesonide-formoterol (SYMBICORT) 160-4 5 mcg/act inhaler, Inhale 2 puffs 2 (two) times a day Rinse mouth after use , Disp: 3 Inhaler, Rfl: 3    BYSTOLIC 10 MG tablet, TAKE 1 TABLET BY MOUTH DAILY, Disp: 90 tablet, Rfl: 3    diltiazem (DILACOR XR) 240 MG 24 hr capsule, Take 1 capsule (240 mg total) by mouth daily at bedtime, Disp: 90 capsule, Rfl: 3    fluticasone (FLONASE) 50 mcg/act nasal spray, 2 sprays into each nostril daily, Disp: , Rfl:     [START ON 8/25/2020] HYDROcodone-acetaminophen (NORCO)  mg per tablet, Take 1 tablet by mouth 4 (four) times a day as needed for severe painMax Daily Amount: 4 tablets, Disp: 120 tablet, Rfl: 0    [START ON 9/24/2020] HYDROcodone-acetaminophen (NORCO)  mg per tablet, Take 1 tablet by mouth 4 (four) times a day as needed for severe painMax Daily Amount: 4 tablets, Disp: 120 tablet, Rfl: 0    ipratropium-albuterol (DUO-NEB) 0 5-2 5 mg/3 mL nebulizer solution, USE 1 VIAL IN NEBULIZER 3 TIMES DAILY, Disp: 90 vial, Rfl: 11    meclizine (ANTIVERT) 12 5 MG tablet, TAKE 1-2 TABLETS BY MOUTH EVERY 8 HOURS AS NEEDED PRN; DIZZINESS, Disp: , Rfl: 3    nebivolol (BYSTOLIC) 10 mg tablet, Take 1 tablet (10 mg total) by mouth daily, Disp: 90 tablet, Rfl: 3    nicotine (NICOTROL) 10 MG inhaler, Inhale 1 puff as needed for smoking cessation for up to 30 days, Disp: 168 each, Rfl: 1    OMEPRAZOLE PO, Take 40 mg by mouth daily  , Disp: , Rfl:     OXYGEN-HELIUM IN, Inhale 2 L daily at bedtime, Disp: , Rfl:     phenazopyridine (PYRIDIUM) 200 mg tablet, , Disp: , Rfl: 0    rivaroxaban (XARELTO) 20 mg tablet, Take 1 tablet (20 mg total) by mouth daily with dinner, Disp: 28 tablet, Rfl: 0    tamsulosin (FLOMAX) 0 4 mg, Take 0 4 mg by mouth daily with dinner, Disp: , Rfl:     traZODone (DESYREL) 50 mg tablet, , Disp: , Rfl:     XARELTO 20 MG tablet, TAKE 1 TABLET BY MOUTH DAILY WITH DINNER, Disp: 90 tablet, Rfl: 3    No Known Allergies    Physical Exam:    /95   Pulse 102   Temp 98 °F (36 7 °C)   Ht 5' 10" (1 778 m)   Wt 117 kg (257 lb 8 oz)   BMI 36 95 kg/m²     Constitutional:obese  Eyes:anicteric  HEENT:grossly intact  Neck:supple, symmetric, trachea midline and no masses   Pulmonary:even and unlabored  Cardiovascular:No edema or pitting edema present  Skin:Normal without rashes or lesions and well hydrated  Psychiatric:Mood and affect appropriate  Neurologic:Cranial Nerves II-XII grossly intact  Musculoskeletal:antalgic

## 2020-08-27 LAB
6MAM UR QL CFM: NEGATIVE NG/ML
7AMINOCLONAZEPAM UR QL CFM: NEGATIVE NG/ML
A-OH ALPRAZ UR QL CFM: NEGATIVE NG/ML
AMPHET UR QL CFM: NEGATIVE NG/ML
AMPHET UR QL CFM: NEGATIVE NG/ML
BUPRENORPHINE UR QL CFM: NEGATIVE NG/ML
BUTALBITAL UR QL CFM: NEGATIVE NG/ML
BZE UR QL CFM: NEGATIVE NG/ML
CODEINE UR QL CFM: NEGATIVE NG/ML
DESIPRAMINE UR QL CFM: NEGATIVE NG/ML
DESIPRAMINE UR QL CFM: NEGATIVE NG/ML
EDDP UR QL CFM: NEGATIVE NG/ML
ETHYL GLUCURONIDE UR QL CFM: NEGATIVE NG/ML
ETHYL SULFATE UR QL SCN: NEGATIVE NG/ML
FENTANYL UR QL CFM: NEGATIVE NG/ML
GLIADIN IGG SER IA-ACNC: NEGATIVE NG/ML
GLUCOSE 30M P 50 G LAC PO SERPL-MCNC: NEGATIVE NG/ML
HYDROCODONE UR CFM-MCNC: ABNORMAL NG/ML
HYDROCODONE UR CFM-MCNC: ABNORMAL NG/ML
HYDROMORPHONE UR CFM-MCNC: ABNORMAL NG/ML
IMIPRAMINE UR QL CFM: NEGATIVE NG/ML
LORAZEPAM UR QL CFM: NEGATIVE NG/ML
MDMA UR QL CFM: NEGATIVE NG/ML
ME-PHENIDATE UR QL CFM: NEGATIVE NG/ML
MEPERIDINE UR QL CFM: NEGATIVE NG/ML
MEPHEDRONE UR QL CFM: NEGATIVE NG/ML
METHADONE UR QL CFM: NEGATIVE NG/ML
METHAMPHET UR QL CFM: NEGATIVE NG/ML
MORPHINE UR QL CFM: NEGATIVE NG/ML
MORPHINE UR QL CFM: NEGATIVE NG/ML
NORBUPRENORPHINE UR QL CFM: NEGATIVE NG/ML
NORDIAZEPAM UR QL CFM: NEGATIVE NG/ML
NORFENTANYL UR QL CFM: NEGATIVE NG/ML
NORHYDROCODONE UR CFM-MCNC: ABNORMAL NG/ML
NORHYDROCODONE UR CFM-MCNC: ABNORMAL NG/ML
NORMEPERIDINE UR QL CFM: NEGATIVE NG/ML
NOROXYCODONE UR QL CFM: NEGATIVE NG/ML
OPC-3373 QUANTIFICATION: NEGATIVE
OXAZEPAM UR QL CFM: NEGATIVE NG/ML
OXYCODONE UR QL CFM: NEGATIVE NG/ML
OXYMORPHONE UR QL CFM: NEGATIVE NG/ML
OXYMORPHONE UR QL CFM: NEGATIVE NG/ML
PCP UR QL CFM: NEGATIVE NG/ML
PHENOBARB UR QL CFM: NEGATIVE NG/ML
RESULT ALL_PRESCRIBED MEDS AND SPECIAL INSTRUCTIONS: NORMAL
SECOBARBITAL UR QL CFM: NEGATIVE NG/ML
SL AMB 3-METHYL-FENTANYL QUANTIFICATION: NORMAL NG/ML
SL AMB 4-ANPP QUANTIFICATION: NORMAL NG/ML
SL AMB 4-FIBF QUANTIFICATION: NORMAL NG/ML
SL AMB 5F-ADB-M7 METABOLITE QUANTIFICATION: NEGATIVE NG/ML
SL AMB 7-OH-MITRAGYNINE (KRATOM ALKALOID) QUANTIFICATION: NEGATIVE NG/ML
SL AMB AB-FUBINACA-M3 METABOLITE QUANTIFICATION: NEGATIVE NG/ML
SL AMB ACETYL FENTANYL QUANTIFICATION: NORMAL NG/ML
SL AMB ACETYL NORFENTANYL QUANTIFICATION: NORMAL NG/ML
SL AMB ACRYL FENTANYL QUANTIFICATION: NORMAL NG/ML
SL AMB BATH SALTS: NEGATIVE NG/ML
SL AMB BUTRYL FENTANYL QUANTIFICATION: NORMAL NG/ML
SL AMB CARFENTANIL QUANTIFICATION: NORMAL NG/ML
SL AMB CLOZAPINE QUANTIFICATION: NEGATIVE NG/ML
SL AMB CTHC (MARIJUANA METABOLITE) QUANTIFICATION: NEGATIVE NG/ML
SL AMB CYCLOPROPYL FENTANYL QUANTIFICATION: NORMAL NG/ML
SL AMB DEXTROMETHORPHAN QUANTIFICATION: NEGATIVE NG/ML
SL AMB DEXTRORPHAN (DEXTROMETHORPHAN METABOLITE) QUANT: NEGATIVE NG/ML
SL AMB DEXTRORPHAN (DEXTROMETHORPHAN METABOLITE) QUANT: NEGATIVE NG/ML
SL AMB FURANYL FENTANYL QUANTIFICATION: NORMAL NG/ML
SL AMB HALOPERIDOL  QUANTIFICATION: NEGATIVE NG/ML
SL AMB HALOPERIDOL METABOLITE QUANTIFICATION: NEGATIVE NG/ML
SL AMB JWH018 METABOLITE QUANTIFICATION: NEGATIVE NG/ML
SL AMB JWH073 METABOLITE QUANTIFICATION: NEGATIVE NG/ML
SL AMB MDMB-FUBINACA-M1 METABOLITE QUANTIFICATION: NEGATIVE NG/ML
SL AMB METHOXYACETYL FENTANYL QUANTIFICATION: NORMAL NG/ML
SL AMB METHYLONE QUANTIFICATION: NEGATIVE NG/ML
SL AMB N-DESMETHYL U-47700 QUANTIFICATION: NORMAL NG/ML
SL AMB N-DESMETHYL-TRAMADOL QUANTIFICATION: NEGATIVE NG/ML
SL AMB N-DESMETHYLCLOZAPINE QUANTIFICATION: NEGATIVE NG/ML
SL AMB PHENTERMINE QUANTIFICATION: NEGATIVE NG/ML
SL AMB RCS4 METABOLITE QUANTIFICATION: NEGATIVE NG/ML
SL AMB RITALINIC ACID QUANTIFICATION: NEGATIVE NG/ML
SL AMB U-47700 QUANTIFICATION: NORMAL NG/ML
SPECIMEN DRAWN SERPL: NEGATIVE NG/ML
TAPENTADOL UR QL CFM: NEGATIVE NG/ML
TEMAZEPAM UR QL CFM: NEGATIVE NG/ML
TEMAZEPAM UR QL CFM: NEGATIVE NG/ML
TRAMADOL UR QL CFM: NEGATIVE NG/ML
URATE/CREAT 24H UR: NEGATIVE NG/ML

## 2020-10-22 ENCOUNTER — TELEPHONE (OUTPATIENT)
Dept: PAIN MEDICINE | Facility: MEDICAL CENTER | Age: 80
End: 2020-10-22

## 2020-10-22 DIAGNOSIS — M51.16 INTERVERTEBRAL DISC DISORDER WITH RADICULOPATHY OF LUMBAR REGION: ICD-10-CM

## 2020-10-22 DIAGNOSIS — G89.4 CHRONIC PAIN SYNDROME: ICD-10-CM

## 2020-10-22 DIAGNOSIS — M54.50 PAIN OF LUMBAR SPINE: ICD-10-CM

## 2020-10-22 RX ORDER — HYDROCODONE BITARTRATE AND ACETAMINOPHEN 10; 325 MG/1; MG/1
1 TABLET ORAL 4 TIMES DAILY PRN
Qty: 120 TABLET | Refills: 0 | Status: SHIPPED | OUTPATIENT
Start: 2020-10-25 | End: 2020-12-07 | Stop reason: SDUPTHER

## 2020-11-30 ENCOUNTER — TELEPHONE (OUTPATIENT)
Dept: PAIN MEDICINE | Facility: CLINIC | Age: 80
End: 2020-11-30

## 2020-12-01 ENCOUNTER — TELEMEDICINE (OUTPATIENT)
Dept: PULMONOLOGY | Facility: MEDICAL CENTER | Age: 80
End: 2020-12-01
Payer: MEDICARE

## 2020-12-01 DIAGNOSIS — F17.200 NICOTINE DEPENDENCE, UNCOMPLICATED, UNSPECIFIED NICOTINE PRODUCT TYPE: ICD-10-CM

## 2020-12-01 DIAGNOSIS — J43.2 CENTRILOBULAR EMPHYSEMA (HCC): Primary | ICD-10-CM

## 2020-12-01 DIAGNOSIS — R06.00 DYSPNEA ON EXERTION: ICD-10-CM

## 2020-12-01 DIAGNOSIS — J96.11 CHRONIC RESPIRATORY FAILURE WITH HYPOXIA (HCC): ICD-10-CM

## 2020-12-01 PROCEDURE — 99214 OFFICE O/P EST MOD 30 MIN: CPT | Performed by: PHYSICIAN ASSISTANT

## 2020-12-02 RX ORDER — POLYETHYLENE GLYCOL 3350 17 G
4 POWDER IN PACKET (EA) ORAL AS NEEDED
Qty: 100 EACH | Refills: 0 | Status: SHIPPED | OUTPATIENT
Start: 2020-12-02 | End: 2021-12-01 | Stop reason: ALTCHOICE

## 2020-12-02 RX ORDER — NICOTINE 21 MG/24HR
1 PATCH, TRANSDERMAL 24 HOURS TRANSDERMAL EVERY 24 HOURS
Qty: 30 PATCH | Refills: 1 | Status: SHIPPED | OUTPATIENT
Start: 2020-12-02 | End: 2021-12-01 | Stop reason: ALTCHOICE

## 2020-12-02 RX ORDER — FORMOTEROL FUMARATE 20 UG/2ML
20 SOLUTION RESPIRATORY (INHALATION) 2 TIMES DAILY
Qty: 60 VIAL | Refills: 11 | Status: SHIPPED | OUTPATIENT
Start: 2020-12-01 | End: 2021-01-19

## 2020-12-02 RX ORDER — BUDESONIDE 0.5 MG/2ML
0.5 INHALANT ORAL 2 TIMES DAILY
Qty: 60 VIAL | Refills: 5 | Status: SHIPPED | OUTPATIENT
Start: 2020-12-02 | End: 2020-12-23

## 2020-12-07 ENCOUNTER — OFFICE VISIT (OUTPATIENT)
Dept: PAIN MEDICINE | Facility: CLINIC | Age: 80
End: 2020-12-07
Payer: MEDICARE

## 2020-12-07 VITALS
HEART RATE: 90 BPM | HEIGHT: 70 IN | TEMPERATURE: 98.6 F | DIASTOLIC BLOOD PRESSURE: 83 MMHG | WEIGHT: 269 LBS | BODY MASS INDEX: 38.51 KG/M2 | SYSTOLIC BLOOD PRESSURE: 148 MMHG

## 2020-12-07 DIAGNOSIS — F11.20 UNCOMPLICATED OPIOID DEPENDENCE (HCC): ICD-10-CM

## 2020-12-07 DIAGNOSIS — M54.50 PAIN OF LUMBAR SPINE: ICD-10-CM

## 2020-12-07 DIAGNOSIS — G89.4 CHRONIC PAIN SYNDROME: ICD-10-CM

## 2020-12-07 DIAGNOSIS — Z79.891 LONG-TERM CURRENT USE OF OPIATE ANALGESIC: Primary | ICD-10-CM

## 2020-12-07 DIAGNOSIS — M51.16 INTERVERTEBRAL DISC DISORDER WITH RADICULOPATHY OF LUMBAR REGION: ICD-10-CM

## 2020-12-07 PROCEDURE — 80305 DRUG TEST PRSMV DIR OPT OBS: CPT | Performed by: ANESTHESIOLOGY

## 2020-12-07 PROCEDURE — 99214 OFFICE O/P EST MOD 30 MIN: CPT | Performed by: ANESTHESIOLOGY

## 2020-12-07 RX ORDER — HYDROCODONE BITARTRATE AND ACETAMINOPHEN 10; 325 MG/1; MG/1
1 TABLET ORAL 4 TIMES DAILY PRN
Qty: 120 TABLET | Refills: 0 | Status: SHIPPED | OUTPATIENT
Start: 2020-12-07 | End: 2021-02-01 | Stop reason: SDUPTHER

## 2020-12-07 RX ORDER — HYDROCODONE BITARTRATE AND ACETAMINOPHEN 10; 325 MG/1; MG/1
1 TABLET ORAL 4 TIMES DAILY PRN
Qty: 120 TABLET | OUTPATIENT
Start: 2020-12-07 | End: 2021-01-06

## 2020-12-07 RX ORDER — HYDROCODONE BITARTRATE AND ACETAMINOPHEN 10; 325 MG/1; MG/1
1 TABLET ORAL 4 TIMES DAILY PRN
Qty: 120 TABLET | Refills: 0 | Status: SHIPPED | OUTPATIENT
Start: 2021-01-06 | End: 2021-01-05 | Stop reason: SDUPTHER

## 2020-12-07 NOTE — TELEPHONE ENCOUNTER
Last script Norco 10/25  S/w pt, explained that per phone task from 10/22 pt would need office visit in order to receive any further refills  Per epic documentation pt's appt for 11/30 was cancelled by office d/t doctor being out of office on that date  Pt is completely out of medication at this time  Offered pt appointment at 11 am today however, unfortunately it is too short of notice for pt to get to office in time for appointment  Pt states he is moving very slowly because he is sore without his medication, and he lives alone with nobody to help him  Pt is also on oxygen per epic documentation  Next available appointment in [facility] office is 12/4, pt cannot wait this long for medication  Asked if pt would be open to being seen in [facility], pt declined as it is too far for him to drive himself and he does not have anyone available to assist him  Please advise, thank you

## 2020-12-07 NOTE — TELEPHONE ENCOUNTER
Patient calling to refill -       -HYDROcodone-acetaminophen [facility])     - patient has no more medication     - use [facility] pharmacy on file      [phone]

## 2020-12-10 LAB
6MAM UR QL CFM: NEGATIVE NG/ML
7AMINOCLONAZEPAM UR QL CFM: NEGATIVE NG/ML
A-OH ALPRAZ UR QL CFM: NEGATIVE NG/ML
ACCEPTABLE CREAT UR QL: NORMAL MG/DL
ACCEPTIBLE SP GR UR QL: NORMAL
AMPHET UR QL CFM: NEGATIVE NG/ML
AMPHET UR QL CFM: NEGATIVE NG/ML
BUPRENORPHINE UR QL CFM: NEGATIVE NG/ML
BUTALBITAL UR QL CFM: NEGATIVE NG/ML
BZE UR QL CFM: NEGATIVE NG/ML
CODEINE UR QL CFM: NEGATIVE NG/ML
DESIPRAMINE UR QL CFM: NEGATIVE NG/ML
DESIPRAMINE UR QL CFM: NEGATIVE NG/ML
EDDP UR QL CFM: NEGATIVE NG/ML
ETHYL GLUCURONIDE UR QL CFM: NEGATIVE NG/ML
ETHYL SULFATE UR QL SCN: NEGATIVE NG/ML
FENTANYL UR QL CFM: NEGATIVE NG/ML
GLIADIN IGG SER IA-ACNC: NEGATIVE NG/ML
GLUCOSE 30M P 50 G LAC PO SERPL-MCNC: NEGATIVE NG/ML
HYDROCODONE UR QL CFM: NORMAL NG/ML
HYDROCODONE UR QL CFM: NORMAL NG/ML
HYDROMORPHONE UR QL CFM: NORMAL NG/ML
IMIPRAMINE UR QL CFM: NEGATIVE NG/ML
LORAZEPAM UR QL CFM: NEGATIVE NG/ML
MDMA UR QL CFM: NEGATIVE NG/ML
ME-PHENIDATE UR QL CFM: NEGATIVE NG/ML
MEPERIDINE UR QL CFM: NEGATIVE NG/ML
MEPHEDRONE UR QL CFM: NEGATIVE NG/ML
METHADONE UR QL CFM: NEGATIVE NG/ML
METHAMPHET UR QL CFM: NEGATIVE NG/ML
MORPHINE UR QL CFM: NEGATIVE NG/ML
MORPHINE UR QL CFM: NEGATIVE NG/ML
NITRITE UR QL: NORMAL UG/ML
NORBUPRENORPHINE UR QL CFM: NEGATIVE NG/ML
NORDIAZEPAM UR QL CFM: NEGATIVE NG/ML
NORFENTANYL UR QL CFM: NEGATIVE NG/ML
NORHYDROCODONE UR QL CFM: NORMAL NG/ML
NORHYDROCODONE UR QL CFM: NORMAL NG/ML
NORMEPERIDINE UR QL CFM: NEGATIVE NG/ML
NOROXYCODONE UR QL CFM: NEGATIVE NG/ML
OPC-3373 QUANTIFICATION: NEGATIVE
OXAZEPAM UR QL CFM: NEGATIVE NG/ML
OXYCODONE UR QL CFM: NEGATIVE NG/ML
OXYMORPHONE UR QL CFM: NEGATIVE NG/ML
OXYMORPHONE UR QL CFM: NEGATIVE NG/ML
PCP UR QL CFM: NEGATIVE NG/ML
PHENOBARB UR QL CFM: NEGATIVE NG/ML
RESULT ALL_PRESCRIBED MEDS AND SPECIAL INSTRUCTIONS: NORMAL
SECOBARBITAL UR QL CFM: NEGATIVE NG/ML
SL AMB 3-METHYL-FENTANYL QUANTIFICATION: NORMAL NG/ML
SL AMB 4-ANPP QUANTIFICATION: NORMAL NG/ML
SL AMB 4-FIBF QUANTIFICATION: NORMAL NG/ML
SL AMB 5F-ADB-M7 METABOLITE QUANTIFICATION: NEGATIVE NG/ML
SL AMB 7-OH-MITRAGYNINE (KRATOM ALKALOID) QUANTIFICATION: NEGATIVE NG/ML
SL AMB AB-FUBINACA-M3 METABOLITE QUANTIFICATION: NEGATIVE NG/ML
SL AMB ACETYL FENTANYL QUANTIFICATION: NORMAL NG/ML
SL AMB ACETYL NORFENTANYL QUANTIFICATION: NORMAL NG/ML
SL AMB ACRYL FENTANYL QUANTIFICATION: NORMAL NG/ML
SL AMB BATH SALTS: NEGATIVE NG/ML
SL AMB BUTRYL FENTANYL QUANTIFICATION: NORMAL NG/ML
SL AMB CARFENTANIL QUANTIFICATION: NORMAL NG/ML
SL AMB CLOZAPINE QUANTIFICATION: NEGATIVE NG/ML
SL AMB CTHC (MARIJUANA METABOLITE) QUANTIFICATION: NEGATIVE NG/ML
SL AMB CYCLOPROPYL FENTANYL QUANTIFICATION: NORMAL NG/ML
SL AMB DEXTROMETHORPHAN QUANTIFICATION: NEGATIVE NG/ML
SL AMB DEXTRORPHAN (DEXTROMETHORPHAN METABOLITE) QUANT: NEGATIVE NG/ML
SL AMB DEXTRORPHAN (DEXTROMETHORPHAN METABOLITE) QUANT: NEGATIVE NG/ML
SL AMB FURANYL FENTANYL QUANTIFICATION: NORMAL NG/ML
SL AMB HALOPERIDOL  QUANTIFICATION: NEGATIVE NG/ML
SL AMB HALOPERIDOL METABOLITE QUANTIFICATION: NEGATIVE NG/ML
SL AMB JWH018 METABOLITE QUANTIFICATION: NEGATIVE NG/ML
SL AMB JWH073 METABOLITE QUANTIFICATION: NEGATIVE NG/ML
SL AMB MDMB-FUBINACA-M1 METABOLITE QUANTIFICATION: NEGATIVE NG/ML
SL AMB METHOXYACETYL FENTANYL QUANTIFICATION: NORMAL NG/ML
SL AMB METHYLONE QUANTIFICATION: NEGATIVE NG/ML
SL AMB N-DESMETHYL U-47700 QUANTIFICATION: NORMAL NG/ML
SL AMB N-DESMETHYL-TRAMADOL QUANTIFICATION: NEGATIVE NG/ML
SL AMB N-DESMETHYLCLOZAPINE QUANTIFICATION: NEGATIVE NG/ML
SL AMB PHENTERMINE QUANTIFICATION: NEGATIVE NG/ML
SL AMB RCS4 METABOLITE QUANTIFICATION: NEGATIVE NG/ML
SL AMB RITALINIC ACID QUANTIFICATION: NEGATIVE NG/ML
SL AMB U-47700 QUANTIFICATION: NORMAL NG/ML
SPECIMEN DRAWN SERPL: NEGATIVE NG/ML
SPECIMEN PH ACCEPTABLE UR: NORMAL
TAPENTADOL UR QL CFM: NEGATIVE NG/ML
TEMAZEPAM UR QL CFM: NEGATIVE NG/ML
TEMAZEPAM UR QL CFM: NEGATIVE NG/ML
TRAMADOL UR QL CFM: NEGATIVE NG/ML
URATE/CREAT 24H UR: NEGATIVE NG/ML

## 2020-12-11 DIAGNOSIS — J43.2 CENTRILOBULAR EMPHYSEMA (HCC): ICD-10-CM

## 2020-12-23 RX ORDER — BUDESONIDE 0.5 MG/2ML
INHALANT ORAL
Qty: 60 VIAL | Refills: 11 | Status: SHIPPED | OUTPATIENT
Start: 2020-12-23 | End: 2021-10-27

## 2021-01-05 ENCOUNTER — TELEPHONE (OUTPATIENT)
Dept: PAIN MEDICINE | Facility: CLINIC | Age: 81
End: 2021-01-05

## 2021-01-05 DIAGNOSIS — G89.4 CHRONIC PAIN SYNDROME: ICD-10-CM

## 2021-01-05 DIAGNOSIS — M54.50 PAIN OF LUMBAR SPINE: ICD-10-CM

## 2021-01-05 DIAGNOSIS — M51.16 INTERVERTEBRAL DISC DISORDER WITH RADICULOPATHY OF LUMBAR REGION: ICD-10-CM

## 2021-01-05 RX ORDER — HYDROCODONE BITARTRATE AND ACETAMINOPHEN 10; 325 MG/1; MG/1
1 TABLET ORAL 4 TIMES DAILY PRN
Qty: 120 TABLET | Refills: 0 | Status: SHIPPED | OUTPATIENT
Start: 2021-01-06 | End: 2021-01-11 | Stop reason: SDUPTHER

## 2021-01-05 NOTE — TELEPHONE ENCOUNTER
S/W pt who states because he has new Rx plan, he will need tomorrows' Rx sent to [facility] on [address]  Can Rx be sent there and RX to [facility] will be cancelled

## 2021-01-05 NOTE — TELEPHONE ENCOUNTER
Pt has new rx plan that is a mail in plan      [address]  [phone]   ID # [id number]  28 day prescriptions    Pt # [phone]

## 2021-01-05 NOTE — TELEPHONE ENCOUNTER
Sent to [facility]  Please cancel [facility] prescription

## 2021-01-05 NOTE — TELEPHONE ENCOUNTER
S/W [facility]    Already inactivated due to Rx plan change  Called pt and advised new Rx sent to [facility] [address]  Phone number given for pt to call and confirm  CB with issues

## 2021-01-08 NOTE — TELEPHONE ENCOUNTER
RN contacted [facility] and spoke to [facility]  Per [facility] [facility] considers the pt opioid neive and therefore only allowed a 5 day supply of Norco to be filled  (20 tabs)  Per pharmacist pt is new to this pharmacy and it is a new year so this happens a lot  Per pharmacist the pt stated this happens to him every year  Pharmacist advised to have a new script for full amount of medication sent on Monday 1/11  Please advise- OK for RN to keep in clinical bin and request refill on 1/11?

## 2021-01-08 NOTE — TELEPHONE ENCOUNTER
Please contact patient's insurance and show them the PDMP (fax it to them if needed)  He is not opioid naive

## 2021-01-08 NOTE — TELEPHONE ENCOUNTER
I am confused -- please elaborate  Why cannot [facility] pharmacy fill the prescription?

## 2021-01-08 NOTE — TELEPHONE ENCOUNTER
I have tried 4 different offices and no one that is there has access to print the PDMP  RN will have MA print and fax on Monday      Pt's insurance is [facility] [email or web address] - Phone number is [phone]

## 2021-01-08 NOTE — TELEPHONE ENCOUNTER
Spoke with [address] [place] [address] they stated that they only have enough NORCO to fill a  5 day supply  This medication is not something that will be able to be ordered for this pt       Pt only got 20 tabs of this script and a new script need to be sent to a different pharmacy for the reminder of the script     Pt  script today

## 2021-01-11 RX ORDER — HYDROCODONE BITARTRATE AND ACETAMINOPHEN 10; 325 MG/1; MG/1
1 TABLET ORAL 4 TIMES DAILY PRN
Qty: 120 TABLET | Refills: 0 | Status: SHIPPED | OUTPATIENT
Start: 2021-01-13 | End: 2021-03-08 | Stop reason: SDUPTHER

## 2021-01-11 NOTE — TELEPHONE ENCOUNTER
Aneudy Martinez MA in [facility] was able to print PDMP report for RN  S/w rep at [facility] who provided fax number for PDMP record  Fax [phone]  RN faxed PDMP report at this time  S/w Frank Roland at [facility] was notified that RN faxed [place] to [facility]  Frank Asuncion states that [facility] will still need a new Norco script regardless sent to their pharmacy to be filled for full amount since previous Norco rx was filled for just a 5 day supply (20 tabs) and the rest of that rx is null and void  Please advise regarding sending in new Norco script  Thank you

## 2021-01-18 DIAGNOSIS — J43.2 CENTRILOBULAR EMPHYSEMA (HCC): ICD-10-CM

## 2021-01-19 RX ORDER — FORMOTEROL FUMARATE DIHYDRATE 20 UG/2ML
SOLUTION RESPIRATORY (INHALATION)
Qty: 60 VIAL | Refills: 11 | Status: SHIPPED | OUTPATIENT
Start: 2021-01-19 | End: 2021-10-27

## 2021-01-19 RX ORDER — REVEFENACIN 175 UG/3ML
SOLUTION RESPIRATORY (INHALATION)
Qty: 30 VIAL | Refills: 11 | Status: SHIPPED | OUTPATIENT
Start: 2021-01-19 | End: 2021-10-27

## 2021-01-27 ENCOUNTER — TELEPHONE (OUTPATIENT)
Dept: PULMONOLOGY | Facility: CLINIC | Age: 81
End: 2021-01-27

## 2021-01-27 NOTE — TELEPHONE ENCOUNTER
Patient called to cancel smoking cessation appointment for tomorrow   Can you contact patient to reschedule

## 2021-02-01 ENCOUNTER — TELEMEDICINE (OUTPATIENT)
Dept: PAIN MEDICINE | Facility: CLINIC | Age: 81
End: 2021-02-01
Payer: MEDICARE

## 2021-02-01 DIAGNOSIS — M51.16 INTERVERTEBRAL DISC DISORDER WITH RADICULOPATHY OF LUMBAR REGION: ICD-10-CM

## 2021-02-01 DIAGNOSIS — M54.50 PAIN OF LUMBAR SPINE: ICD-10-CM

## 2021-02-01 DIAGNOSIS — G89.4 CHRONIC PAIN SYNDROME: Primary | ICD-10-CM

## 2021-02-01 PROCEDURE — 99442 PR PHYS/QHP TELEPHONE EVALUATION 11-20 MIN: CPT | Performed by: ANESTHESIOLOGY

## 2021-02-01 RX ORDER — NALOXONE HYDROCHLORIDE 4 MG/.1ML
SPRAY NASAL
Qty: 1 EACH | Refills: 1 | Status: SHIPPED | OUTPATIENT
Start: 2021-02-01

## 2021-02-01 RX ORDER — HYDROCODONE BITARTRATE AND ACETAMINOPHEN 10; 325 MG/1; MG/1
1 TABLET ORAL 4 TIMES DAILY PRN
Qty: 120 TABLET | Refills: 0 | Status: SHIPPED | OUTPATIENT
Start: 2021-02-11 | End: 2021-02-12 | Stop reason: SDUPTHER

## 2021-02-01 NOTE — PROGRESS NOTES
Virtual Brief Visit    Assessment/Plan:    Problem List Items Addressed This Visit        Nervous and Auditory    Intervertebral disc disorder with radiculopathy of lumbar region    Relevant Medications    HYDROcodone-acetaminophen (NORCO)  mg per tablet (Start on 2/11/2021)    naloxone (NARCAN) 4 mg/0 1 mL nasal spray       Other    Chronic pain syndrome - Primary    Relevant Medications    HYDROcodone-acetaminophen (NORCO)  mg per tablet (Start on 2/11/2021)    naloxone (NARCAN) 4 mg/0 1 mL nasal spray    Pain of lumbar spine    Relevant Medications    HYDROcodone-acetaminophen (NORCO)  mg per tablet (Start on 2/11/2021)    naloxone (NARCAN) 4 mg/0 1 mL nasal spray                Reason for visit is   Chief Complaint   Patient presents with    Virtual Brief Visit        Encounter provider Tamy Zaragoza MD    Provider located at [address] [place] [id number]    Recent Visits  No visits were found meeting these conditions  Showing recent visits within past 7 days and meeting all other requirements     Today's Visits  Date Type Provider Dept   02/01/21 Telemedicine Tamy Zaragoza MD [facility]   Showing today's visits and meeting all other requirements     Future Appointments  No visits were found meeting these conditions  Showing future appointments within next 150 days and meeting all other requirements        After connecting through telephone, the patient was identified by name and date of birth  Harshil Nip was informed that this is a telemedicine visit and that the visit is being conducted through telephone  My office door was closed  No one else was in the room  He acknowledged consent and understanding of privacy and security of the platform  The patient has agreed to participate and understands he can discontinue the visit at any time      Patient is aware this is a billable service  Pain Medicine Follow-Up Note    Assessment:  1  Chronic pain syndrome    2  Pain of lumbar spine    3  Intervertebral disc disorder with radiculopathy of lumbar region        Plan:  New Medications Ordered This Visit   Medications    HYDROcodone-acetaminophen (NORCO)  mg per tablet     Sig: Take 1 tablet by mouth 4 (four) times a day as needed for severe painMax Daily Amount: 4 tablets     Dispense:  120 tablet     Refill:  0    naloxone (NARCAN) 4 mg/0 1 mL nasal spray     Sig: Administer 1 spray into a nostril  If no response after 2-3 minutes, give another dose in the other nostril using a new spray  Dispense:  1 each     Refill:  1       My impressions and treatment recommendations were discussed in detail with the patient who verbalized understanding and had no further questions  Given that the patient reports overall reduced pain and improved level of functioning without significant side effects, I felt a reasonable to continue the patient on hydrocodone/acetaminophen 10/325 mg 1 tablet up to 4 times daily as needed for pain  I sent in the prescription to his pharmacy dated February 11, 2021  I did mention to the patient that he will need a follow-up appointment with myself before March 13, 2021  The risks and side effects of chronic opioid treatment were discussed in detail with the patient  Side effects include but are not limited to nausea, vomiting, GI intolerance, sedation, constipation, mental clouding, opioid-induced hyperalgesia, endocrine dysfunction, addiction, dependence, and tolerance  The patient was asked to take his medications only as prescribed and directed, never in excess, and never for any other reason other than for pain control  The patient was also asked to keep his medications out of the reach of others and away from children, preferably in a locked drawer  The patient verbalized understanding and wished to use these opioid medications      [facility] Prescription Drug Monitoring Program report was reviewed and was appropriate     Follow-up is planned in 4 weeks time or sooner as warranted  Discharge instructions were provided  I personally saw and examined the patient and I agree with the above discussed plan of care  History of Present Illness:    Luanne Martinez is a 78 y o  male who presents to [facility] for interval re-evaluation of the above stated pain complaints  The patient has a past medical and chronic pain history as outlined in the assessment section  He was last seen on December 7, 2020  At today's office visit, the patient's pain score is 7/10 on the verbal numerical pain rating scale  The patient states that his pain is primarily in his low back  He states that his pain is worse throughout the day  His pain is nearly constant in nature and he describes his pain as dull/aching and sharp  He does report that his pain worsens with activity and is alleviated with rest   He has been using 4 tablets of the hydrocodone/acetaminophen per day  He denies any side effects of this medication, other than controlled opioid induced constipation on on over-the-counter anti constipating medication  Other than as stated above, the patient denies any interval changes in medications, medical condition, mental condition, symptoms, or allergies since the last office visit  Review of Systems:    Review of Systems   Respiratory: Negative for shortness of breath  Cardiovascular: Negative for chest pain  Gastrointestinal: Negative for constipation, diarrhea, nausea and vomiting  Musculoskeletal: Positive for back pain and gait problem  Negative for arthralgias, joint swelling and myalgias  Skin: Negative for rash  Neurological: Negative for dizziness, seizures and weakness  All other systems reviewed and are negative          Patient Active Problem List   Diagnosis    Chronic pain syndrome    Pain of lumbar spine    Sacroiliitis (HCC)    Intervertebral disc disorder with radiculopathy of lumbar region    Centrilobular emphysema (HCC)    Dyspnea on exertion    Chronic atrial fibrillation (HCC)    Coronary artery disease    Chronic respiratory failure with hypoxia (HCC)    Nicotine dependence    Cervical radiculopathy    Cervical spinal stenosis    Cervical pain    Elevated PSA       Past Medical History:   Diagnosis Date    Acid reflux     Arthritis     BPH (benign prostatic hyperplasia)     no treatment    Calcium disorder     Chronic atrial fibrillation (HCC) 2/6/2018    Chronic pain     Chronic pain disorder     COPD (chronic obstructive pulmonary disease) ([facility] [address] [facility]     Coronary artery disease 2001    PTCA with one stent    Eye redness     Poked his eye on 3/18/16 "bloody" right eye    Hiatal hernia     Hydronephrosis     Hyperlipidemia     Hypertension     Irregular heart beat     A-Fib    Low back pain     Neck pain     Nocturnal leg cramps     Osteopenia     Peripheral artery disease ([facility] [address] )     Peripheral neuropathy     Pleural effusion     Sleep apnea     REFUSES MACHINE HS    Spinal stenosis     Vocal cord polyp 10/2012    panendoscopy       Past Surgical History:   Procedure Laterality Date    ANGIOPLASTY      APPENDECTOMY  04/2007    CARDIOVERSION      x2, for A-Fib    CARDIOVERSION      CATARACT EXTRACTION      CATARACT EXTRACTION W/ INTRAOCULAR LENS IMPLANT Left 01/2014    CORONARY ANGIOPLASTY WITH STENT PLACEMENT  2001    one stent    HERNIA REPAIR Right     inquinal    JOINT REPLACEMENT Left     Hip    KNEE SURGERY  1994    also had arthroscopy right knee 2005    PANENDOSCOPY  10/23/2012    vocal cord polyp    PILONIDAL CYST DRAINAGE      x 3    GA SURG IMPLNT Geni Tamayo Left 8/23/2017    Procedure: Placement of Thoracic SCS with left buttock IPG (IMPULSE);   Surgeon: Bartolome Nuno MD;  [facility] MAIN [facility];  Service: Neurosurgery    CARLOS Tirado CTRC RMVL INSJ IO LENS PROSTH W/O ECP Right 4/6/2016    Procedure: EXTRACTION EXTRACAPSULAR CATARACT PHACO INTRAOCULAR LENS (IOL);   Surgeon: Catie Vela MD;  Location: [facility] MAIN OR;  Service: Ophthalmology   [facility] TRIAL W/ LAMINOTOMY N/A 4/13/2017    Procedure: INSERTION DORSAL COLUMN SPINAL CORD STIMULATOR TRIAL;  Surgeon: Tiny Romberg, MD;  Location: [place] [address] MAIN OR;  Service:     TONSILLECTOMY         Family History   Problem Relation Age of Onset    Cancer Father 80        prostate    No Known Problems Mother     No Known Problems Sister     No Known Problems Brother     No Known Problems Daughter     No Known Problems Son     No Known Problems Maternal Aunt     No Known Problems Maternal Uncle     No Known Problems Paternal Aunt     No Known Problems Paternal Uncle     No Known Problems Maternal Grandmother     No Known Problems Maternal Grandfather     No Known Problems Paternal Grandmother     No Known Problems Paternal Grandfather        Social History     Occupational History    Not on file   Tobacco Use    Smoking status: Current Every Day Smoker     Packs/day: 1 25     Years: 60 00     Pack years: 75 00    Smokeless tobacco: Never Used   Substance and Sexual Activity    Alcohol use: Yes     Comment: occasionally    Drug use: No    Sexual activity: Not on file         Current Outpatient Medications:     aspirin (ECOTRIN LOW STRENGTH) 81 mg EC tablet, Take 81 mg by mouth daily, Disp: , Rfl:     atorvastatin (LIPITOR) 40 mg tablet, Take 40 mg by mouth daily  , Disp: , Rfl:     budesonide (PULMICORT) 0 5 mg/2 mL nebulizer solution, USE 1 VIAL  IN  NEBULIZER TWICE  DAILY - (Rinse Mouth After Each Treatment), Disp: 60 vial, Rfl: 11    BYSTOLIC 10 MG tablet, TAKE 1 TABLET BY MOUTH DAILY, Disp: 90 tablet, Rfl: 3    diltiazem (DILACOR XR) 240 MG 24 hr capsule, Take 1 capsule (240 mg total) by mouth daily at bedtime, Disp: 90 capsule, Rfl: 3    fluticasone (FLONASE) 50 mcg/act nasal spray, 2 sprays into each nostril daily, Disp: , Rfl:     HYDROcodone-acetaminophen (NORCO)  mg per tablet, Take 1 tablet by mouth 4 (four) times a day as needed for severe painMax Daily Amount: 4 tablets, Disp: 120 tablet, Rfl: 0    [START ON 2/11/2021] HYDROcodone-acetaminophen (NORCO)  mg per tablet, Take 1 tablet by mouth 4 (four) times a day as needed for severe painMax Daily Amount: 4 tablets, Disp: 120 tablet, Rfl: 0    ipratropium-albuterol (DUO-NEB) 0 5-2 5 mg/3 mL nebulizer solution, USE 1 VIAL IN NEBULIZER 3 TIMES DAILY, Disp: 90 vial, Rfl: 11    meclizine (ANTIVERT) 12 5 MG tablet, TAKE 1-2 TABLETS BY MOUTH EVERY 8 HOURS AS NEEDED PRN; DIZZINESS, Disp: , Rfl: 3    naloxone (NARCAN) 4 mg/0 1 mL nasal spray, Administer 1 spray into a nostril   If no response after 2-3 minutes, give another dose in the other nostril using a new spray , Disp: 1 each, Rfl: 1    nebivolol (BYSTOLIC) 10 mg tablet, Take 1 tablet (10 mg total) by mouth daily, Disp: 90 tablet, Rfl: 3    nicotine (NICODERM CQ) 21 mg/24 hr TD 24 hr patch, Place 1 patch on the skin every 24 hours, Disp: 30 patch, Rfl: 1    nicotine polacrilex (COMMIT) 4 MG lozenge, Apply 1 lozenge (4 mg total) to the mouth or throat as needed for smoking cessation, Disp: 100 each, Rfl: 0    OMEPRAZOLE PO, Take 40 mg by mouth daily  , Disp: , Rfl:     OXYGEN-HELIUM IN, Inhale 2 L daily at bedtime, Disp: , Rfl:     Perforomist 20 MCG/2ML nebulizer solution, USE 1 VIAL  IN  NEBULIZER TWICE  DAILY - Morning and Evening, Disp: 60 vial, Rfl: 11    phenazopyridine (PYRIDIUM) 200 mg tablet, , Disp: , Rfl: 0    rivaroxaban (XARELTO) 20 mg tablet, Take 1 tablet (20 mg total) by mouth daily with dinner (Patient not taking: Reported on 11/13/2020), Disp: 28 tablet, Rfl: 0    tamsulosin (FLOMAX) 0 4 mg, Take 0 4 mg by mouth daily with dinner, Disp: , Rfl:     traZODone (DESYREL) 50 mg tablet, , Disp: , Rfl:     XARELTO 20 MG tablet, TAKE 1 TABLET BY MOUTH DAILY WITH DINNER, Disp: 90 tablet, Rfl: 3    Yupelri 175 MCG/3ML SOLN, USE 1 VIAL IN NEBULIZER DAILY - DO NOT MIX WITH OTHER NEB MEDS,USE BEFORE OR AFTER, Disp: 30 vial, Rfl: 11    No Known Allergies    Physical Exam:    There were no vitals taken for this visit  Constitutional:  alert and does not seem to be in any distress over the telephone  HEENT:  hearing grossly intact    The remainder of the physical examination was deferred due to this being a telemedicine visit  I spent 15 minutes directly with the patient during this visit    [address] [place] [address] acknowledges that he has consented to an online visit or consultation  He understands that the online visit is based solely on information provided by him, and that, in the absence of a face-to-face physical evaluation by the physician, the diagnosis he receives is both limited and provisional in terms of accuracy and completeness  This is not intended to replace a full medical face-to-face evaluation by the physician  Yue Deal understands and accepts these terms

## 2021-02-03 DIAGNOSIS — I10 ESSENTIAL HYPERTENSION: ICD-10-CM

## 2021-02-03 DIAGNOSIS — I48.20 CHRONIC ATRIAL FIBRILLATION (HCC): ICD-10-CM

## 2021-02-04 RX ORDER — NEBIVOLOL 10 MG/1
10 TABLET ORAL DAILY
Qty: 90 TABLET | Refills: 3 | Status: SHIPPED | OUTPATIENT
Start: 2021-02-04 | End: 2021-08-31 | Stop reason: SDUPTHER

## 2021-02-12 ENCOUNTER — TELEPHONE (OUTPATIENT)
Dept: PAIN MEDICINE | Facility: CLINIC | Age: 81
End: 2021-02-12

## 2021-02-12 DIAGNOSIS — M51.16 INTERVERTEBRAL DISC DISORDER WITH RADICULOPATHY OF LUMBAR REGION: ICD-10-CM

## 2021-02-12 DIAGNOSIS — G89.4 CHRONIC PAIN SYNDROME: ICD-10-CM

## 2021-02-12 DIAGNOSIS — M54.50 PAIN OF LUMBAR SPINE: ICD-10-CM

## 2021-02-12 RX ORDER — HYDROCODONE BITARTRATE AND ACETAMINOPHEN 10; 325 MG/1; MG/1
1 TABLET ORAL 4 TIMES DAILY PRN
Qty: 120 TABLET | Refills: 0 | Status: SHIPPED | OUTPATIENT
Start: 2021-02-12 | End: 2021-03-08 | Stop reason: SDUPTHER

## 2021-02-12 NOTE — TELEPHONE ENCOUNTER
S/w pt and advised same  Removed [facility] pharmacy from pt's chart since he states he does not use anymore  Pt appreciative

## 2021-02-12 NOTE — TELEPHONE ENCOUNTER
[facility] from [address] called stating she only has 100 tablets to dispense for Norco medication  & patient will need another script for remaining quantity of 20   Please advise, ed    Call back# [phone]

## 2021-02-12 NOTE — TELEPHONE ENCOUNTER
The hydrocodone script with fill date of 2/11 went to [address]  Can you resend to [facility]?

## 2021-02-12 NOTE — TELEPHONE ENCOUNTER
See below  Pharmacy did not have enough stock of hydrocodone  Pt given 100 of 120 tabs  Pharmacy needs script for 20 remaining tabs

## 2021-02-12 NOTE — TELEPHONE ENCOUNTER
The prescription for Norco 10-325mg was to be sent to [facility] instead of [facility]'s pharmacy  Please resend   Call back#  [phone]

## 2021-02-18 ENCOUNTER — TELEMEDICINE (OUTPATIENT)
Dept: PULMONOLOGY | Facility: MEDICAL CENTER | Age: 81
End: 2021-02-18
Payer: MEDICARE

## 2021-02-18 DIAGNOSIS — F17.210 CIGARETTE NICOTINE DEPENDENCE WITHOUT COMPLICATION: ICD-10-CM

## 2021-02-18 DIAGNOSIS — J43.2 CENTRILOBULAR EMPHYSEMA (HCC): Primary | ICD-10-CM

## 2021-02-18 DIAGNOSIS — R06.00 DYSPNEA ON EXERTION: ICD-10-CM

## 2021-02-18 DIAGNOSIS — J96.11 CHRONIC RESPIRATORY FAILURE WITH HYPOXIA (HCC): ICD-10-CM

## 2021-02-18 PROCEDURE — 99213 OFFICE O/P EST LOW 20 MIN: CPT | Performed by: INTERNAL MEDICINE

## 2021-02-18 NOTE — ASSESSMENT & PLAN NOTE
Moderate to severe COPD as per last spirometry done 2018  FEV1 at that time was 1 48 L or 51% of predicted  With his continued cigarette smoking I am sure his lung function has worsened  Complete PFT had been ordered already but patient has not gone for  He states he will go for once the weather gets better  I advised him to get this before his next office visit in May     I did give him phone number to register to get COVID vaccine  I also gave him the phone number to schedule pulmonary function test      He will continue his nebulizer regimen with Perforomist 20 mcg twice a day, budesonide 0 5 mg once a day and Yuperli  Once a day  He does not have any rescue albuterol inhaler  I offered to order him 1  He states he will just use vials of albuterol -ipratropium he has left over from before and use them intermittently when needed for any shortness of breath

## 2021-02-18 NOTE — ASSESSMENT & PLAN NOTE
We discussed smoking cessation  He was interested in trying Nicotrol inhaler  I explained to him how to use it place prescription for this

## 2021-02-18 NOTE — PROGRESS NOTES
Virtual Brief Visit    Assessment/Plan:    Problem List Items Addressed This Visit        Respiratory    Centrilobular emphysema ([address] ) - Primary       Moderate to severe COPD as per last spirometry done 2018  FEV1 at that time was 1 48 L or 51% of predicted  With his continued cigarette smoking I am sure his lung function has worsened  Complete PFT had been ordered already but patient has not gone for  He states he will go for once the weather gets better  I advised him to get this before his next office visit in May     I did give him phone number to register to get COVID vaccine  I also gave him the phone number to schedule pulmonary function test      He will continue his nebulizer regimen with Perforomist 20 mcg twice a day, budesonide 0 5 mg once a day and Yuperli  Once a day  He does not have any rescue albuterol inhaler  I offered to order him 1  He states he will just use vials of albuterol -ipratropium he has left over from before and use them intermittently when needed for any shortness of breath  Relevant Orders    XR chest pa & lateral    Chronic respiratory failure with hypoxia (HCC)       He is using 2 L of oxygen on continuous basis  Other    Dyspnea on exertion       He does get short of breath with activity  He has chronic cough and has an extensive smoking history of anywhere from 3/4 to 1 5 packs of cigarettes per day all his adult life  Has not had any type of chest radiograph in sometime  Will order chest x-ray  Relevant Orders    XR chest pa & lateral    Nicotine dependence       We discussed smoking cessation  He was interested in trying Nicotrol inhaler  I explained to him how to use it place prescription for this           Relevant Medications    nicotine (NICOTROL) 10 MG inhaler                Reason for visit is   Chief Complaint   Patient presents with    Virtual Brief Visit        Encounter provider Cristino Nice DO    Provider located at [address] [facility] [place] [id number] [facility] [id number]    Recent Visits  No visits were found meeting these conditions  Showing recent visits within past 7 days and meeting all other requirements     Today's Visits  Date Type Provider Dept   02/18/21 Telemedicine Denise Campos DO [facility]   Showing today's visits and meeting all other requirements     Future Appointments  No visits were found meeting these conditions  Showing future appointments within next 150 days and meeting all other requirements        After connecting through telephone, the patient was identified by name and date of birth  Virginie Rivera was informed that this is a telemedicine visit and that the visit is being conducted through telephone  My office door was closed  No one else was in the room  He acknowledged consent and understanding of privacy and security of the platform  The patient has agreed to participate and understands he can discontinue the visit at any time  Patient is aware this is a billable service  Subjective    Virginie Rivera is a 78 y o  male   Presents for telemedicinevisit for follow-up of COPD and shortness of breath    HPI      Bhavesh Poole has moderate to severe COPD  Last spirometry done 2018 showed FEV1 of 1 48 L or 51% of predicted  He continues to smoke about 3/4 of pack of cigarettes per day  He does want "quit smoking but is having difficulty  He  does not want to try Chantix  He has tried nicotine patch without success     he does have a chronic cough mostly morning and sometimes expectorates white mucus  He gets short of breath walking  feet  He does use oxygen 2 liters/minute most of the time  He is on nebulizer treatments with Perforomist 20 mcg twice a day, Yuperli  Once a day and he has been using budesonide 0 5 mg once a day        He does have chronic atrial fibrillation and is on anticoagulation with Xarelto 20 mg daily  He also has chronic pain and does see a pain specialist and takes pain medication  He has chronic lower back and neck pain    Past Medical History:   Diagnosis Date    Acid reflux     Arthritis     BPH (benign prostatic hyperplasia)     no treatment    Calcium disorder     Chronic atrial fibrillation (HCC) 2/6/2018    Chronic pain     Chronic pain disorder     COPD (chronic obstructive pulmonary disease) ([address] )     Coronary artery disease 2001    PTCA with one stent    Eye redness     Poked his eye on 3/18/16 "bloody" right eye    Hiatal hernia     Hydronephrosis     Hyperlipidemia     Hypertension     Irregular heart beat     A-Fib    Low back pain     Neck pain     Nocturnal leg cramps     Osteopenia     Peripheral artery disease (HCC)     Peripheral neuropathy     Pleural effusion     Sleep apnea     REFUSES MACHINE HS    Spinal stenosis     Vocal cord polyp 10/2012    panendoscopy       Past Surgical History:   Procedure Laterality Date    ANGIOPLASTY      APPENDECTOMY  04/2007    CARDIOVERSION      x2, for A-Fib    CARDIOVERSION      CATARACT EXTRACTION      CATARACT EXTRACTION W/ INTRAOCULAR LENS IMPLANT Left 01/2014    CORONARY ANGIOPLASTY WITH STENT PLACEMENT  2001    one stent    HERNIA REPAIR Right     inquinal    JOINT REPLACEMENT Left     Hip    KNEE SURGERY  1994    also had arthroscopy right knee 2005    PANENDOSCOPY  10/23/2012    vocal cord polyp    PILONIDAL CYST DRAINAGE      x 3    IA SURG IMPLNT [address] Left 8/23/2017    Procedure: Placement of Thoracic SCS with left buttock IPG (IMPULSE); Surgeon: Patricia Pate MD;  Location:  MAIN OR;  Service: Neurosurgery    IA XCAPSL CTRC RMVL INSJ IO LENS PROSTH W/O ECP Right 4/6/2016    Procedure: EXTRACTION EXTRACAPSULAR CATARACT PHACO INTRAOCULAR LENS (IOL);   Surgeon: Angie Alejandro MD;  Location[facility] MAIN OR;  Service: Ophthalmology    SPINAL CORD STIMULATOR TRIAL W/ LAMINOTOMY N/A 4/13/2017    Procedure: INSERTION DORSAL COLUMN SPINAL CORD STIMULATOR TRIAL;  Surgeon: Yonas Villa MD;  Location[facility] MA[facility] OR;  Service:    Rain Slipper TONSILLECTOMY         Current Outpatient Medications   Medication Sig Dispense Refill    aspirin (ECOTRIN LOW STRENGTH) 81 mg EC tablet Take 81 mg by mouth daily      atorvastatin (LIPITOR) 40 mg tablet Take 40 mg by mouth daily        budesonide (PULMICORT) 0 5 mg/2 mL nebulizer solution USE 1 VIAL  IN  NEBULIZER TWICE  DAILY - (Rinse Mouth After Each Treatment) 60 vial 11    diltiazem (DILACOR XR) 240 MG 24 hr capsule Take 1 capsule (240 mg total) by mouth daily at bedtime 90 capsule 3    fluticasone (FLONASE) 50 mcg/act nasal spray 2 sprays into each nostril daily      HYDROcodone-acetaminophen (NORCO)  mg per tablet Take 1 tablet by mouth 4 (four) times a day as needed for severe painMax Daily Amount: 4 tablets 120 tablet 0    HYDROcodone-acetaminophen (NORCO)  mg per tablet Take 1 tablet by mouth 4 (four) times a day as needed for severe painMax Daily Amount: 4 tablets 120 tablet 0    ipratropium-albuterol (DUO-NEB) 0 5-2 5 mg/3 mL nebulizer solution USE 1 VIAL IN NEBULIZER 3 TIMES DAILY 90 vial 11    meclizine (ANTIVERT) 12 5 MG tablet TAKE 1-2 TABLETS BY MOUTH EVERY 8 HOURS AS NEEDED PRN; DIZZINESS  3    naloxone (NARCAN) 4 mg/0 1 mL nasal spray Administer 1 spray into a nostril  If no response after 2-3 minutes, give another dose in the other nostril using a new spray  1 each 1    nebivolol (BYSTOLIC) 10 mg tablet Take 1 tablet (10 mg total) by mouth daily 90 tablet 3    nebivolol (Bystolic) 10 mg tablet Take 1 tablet (10 mg total) by mouth daily 90 tablet 3    nicotine (NICODERM CQ) 21 mg/24 hr TD 24 hr patch Place 1 patch on the skin every 24 hours 30 patch 1    nicotine (NICOTROL) 10 MG inhaler Use 1  Cartridge per hour as needed    Do not exceed 10 cartridges per day 168 each 6    nicotine polacrilex (COMMIT) 4 MG lozenge Apply 1 lozenge (4 mg total) to the mouth or throat as needed for smoking cessation 100 each 0    OMEPRAZOLE PO Take 40 mg by mouth daily        OXYGEN-HELIUM IN Inhale 2 L daily at bedtime      Perforomist 20 MCG/2ML nebulizer solution USE 1 VIAL  IN  NEBULIZER TWICE  DAILY - Morning and Evening 60 vial 11    phenazopyridine (PYRIDIUM) 200 mg tablet   0    rivaroxaban (XARELTO) 20 mg tablet Take 1 tablet (20 mg total) by mouth daily with dinner (Patient not taking: Reported on 11/13/2020) 28 tablet 0    rivaroxaban (Xarelto) 20 mg tablet Take 1 tablet (20 mg total) by mouth daily with dinner 90 tablet 3    tamsulosin (FLOMAX) 0 4 mg Take 0 4 mg by mouth daily with dinner      traZODone (DESYREL) 50 mg tablet       Yupelri 175 MCG/3ML SOLN USE 1 VIAL IN NEBULIZER DAILY - DO NOT MIX WITH OTHER NEB MEDS,USE BEFORE OR AFTER 30 vial 11     No current facility-administered medications for this visit  No Known Allergies    Review of Systems   Constitutional: Negative for chills, fever and unexpected weight change  HENT: Negative for congestion, rhinorrhea and sore throat  Eyes: Negative for discharge and redness  Respiratory: Positive for cough, shortness of breath and wheezing  Cardiovascular: Negative for chest pain, palpitations and leg swelling  Gastrointestinal: Negative for abdominal distention, abdominal pain and nausea  Endocrine: Negative for polydipsia and polyphagia  Genitourinary: Negative for hematuria  Musculoskeletal: Positive for back pain  Negative for joint swelling and myalgias  Skin: Negative for rash  Neurological: Negative for light-headedness  Psychiatric/Behavioral: Negative for decreased concentration  There were no vitals filed for this visit  has some mild hoarseness to his voice  Not having any conversational dyspnea        I spent 15 minutes directly with the patient during this visit    [facility] Uvaldo Vela acknowledges that he has consented to an online visit or consultation  He understands that the online visit is based solely on information provided by him, and that, in the absence of a face-to-face physical evaluation by the physician, the diagnosis he receives is both limited and provisional in terms of accuracy and completeness  This is not intended to replace a full medical face-to-face evaluation by the physician  Godwin Bowden understands and accepts these terms

## 2021-02-18 NOTE — ASSESSMENT & PLAN NOTE
He does get short of breath with activity  He has chronic cough and has an extensive smoking history of anywhere from 3/4 to 1 5 packs of cigarettes per day all his adult life  Has not had any type of chest radiograph in sometime  Will order chest x-ray

## 2021-03-01 ENCOUNTER — OFFICE VISIT (OUTPATIENT)
Dept: CARDIOLOGY CLINIC | Facility: CLINIC | Age: 81
End: 2021-03-01
Payer: MEDICARE

## 2021-03-01 VITALS
DIASTOLIC BLOOD PRESSURE: 80 MMHG | WEIGHT: 267 LBS | SYSTOLIC BLOOD PRESSURE: 122 MMHG | RESPIRATION RATE: 18 BRPM | OXYGEN SATURATION: 91 % | BODY MASS INDEX: 38.22 KG/M2 | HEIGHT: 70 IN | TEMPERATURE: 98.4 F | HEART RATE: 77 BPM

## 2021-03-01 DIAGNOSIS — I25.10 CAD S/P PERCUTANEOUS CORONARY ANGIOPLASTY: ICD-10-CM

## 2021-03-01 DIAGNOSIS — F17.210 CIGARETTE NICOTINE DEPENDENCE WITHOUT COMPLICATION: ICD-10-CM

## 2021-03-01 DIAGNOSIS — Z98.61 CAD S/P PERCUTANEOUS CORONARY ANGIOPLASTY: ICD-10-CM

## 2021-03-01 DIAGNOSIS — I48.20 CHRONIC ATRIAL FIBRILLATION (HCC): Primary | ICD-10-CM

## 2021-03-01 DIAGNOSIS — I35.0 MILD AORTIC STENOSIS: ICD-10-CM

## 2021-03-01 DIAGNOSIS — E66.9 CLASS 2 OBESITY: ICD-10-CM

## 2021-03-01 DIAGNOSIS — I49.3 ASYMPTOMATIC PVCS: ICD-10-CM

## 2021-03-01 DIAGNOSIS — J43.2 CENTRILOBULAR EMPHYSEMA (HCC): ICD-10-CM

## 2021-03-01 DIAGNOSIS — I10 ESSENTIAL HYPERTENSION: ICD-10-CM

## 2021-03-01 DIAGNOSIS — R06.00 DYSPNEA ON EXERTION: ICD-10-CM

## 2021-03-01 DIAGNOSIS — G47.33 OBSTRUCTIVE SLEEP APNEA: ICD-10-CM

## 2021-03-01 DIAGNOSIS — E78.5 DYSLIPIDEMIA: ICD-10-CM

## 2021-03-01 PROCEDURE — 93000 ELECTROCARDIOGRAM COMPLETE: CPT | Performed by: INTERNAL MEDICINE

## 2021-03-01 PROCEDURE — 99214 OFFICE O/P EST MOD 30 MIN: CPT | Performed by: INTERNAL MEDICINE

## 2021-03-01 NOTE — PATIENT INSTRUCTIONS
1  Since you have gained 12 lbs in the past seven months, we recommend a concentrated effort to reduce carbohydrates and calorie intake  2  Continue with current medication  3  Continue to make efforts to reduce cigarette smoking  4  Go to the [facility] for blood work ordered by Dr Larry Mullen and Dr Natalio Young  5  Cardiology follow-up approximately six months with EKG

## 2021-03-01 NOTE — PROGRESS NOTES
Office Cardiology Progress Note  Luanne Martinez 78 y o  male MRN: [id number]  03/01/21  10:04 AM      ASSESSMENT:    1  Controlled chronic atrial fibrillation on Xarelto oral anticoagulation plus diltiazem and Bystolic for rate control  2  Frequent PVCs or aberrantly conducted complexes, asymptomatic, and previous pattern of bigeminy/trigeminy,  suppressed today   Ventricular triplet noted previously   Patient refuses Holter monitor  3  Stable CAD with history of remote PCI, details unknown to me, done 10 8 years ago, with patient alleging last stress test 3 25-3 75+ years ago   He now refuses any further stress testing  4  Murmur of mild aortic valve stenosis, previously confirmed on echocardiography  5  Significant COPD with centrilobular emphysema  but now stable with chronic exertional dyspnea with hypoxemia and ongoing cigarette abuse at 3/4 packs per day and prior occasional exacerbations    Formerly on one-1-1/2 packs per day  Today with no audible wheezing but is tachypneic   Uses nebulizer q i d   6  Obstructive sleep apnea, patient refusing CPAP and using nasal oxygen 2 L/m h s  and p r n   7  Marked obesity, class II, with 17 5 pound weight gain in 3 8 years with  12 pound weight  gain in approximately 7+ months  8  History of right pleural effusion, small  9  History of asbestosis due to his exposure as a   10  Chronic low back, bilateral lower extremity and cervical spine pain and continuous opioid dependence with failure of lumbar epidural steroid injections and lumbar facet radiofrequency ablations with unsatisfactory response to permanent spinal stimulator implant on 08/23/2017   Now with ineffective spinal stimulator    Patient previously seeking Lyrica from pain management  Follows with Dr Tab Tavares  11  Long-standing essential hypertension and dyslipidemia, treated and controlled  12  History of hiatal hernia and GERD    13  History of former alcoholism, now controlled, with fall caused by alcohol abuse resulting in hospital stay 8/18/13 and last trip and fall 3 75 years ago  14  History of bladder outlet obstruction 8/13 and attributed to [facility] has likely localized prostate cancer under observation by Dr Darrian Sherman with latest PSA 12 0 as of 11/08/2018  15  History of pneumonia/pleurisy March, 2012  16  Unremarkable cardiac catheterization in 2005  17  Cervical spine fracture after fall in 2009, rib fractures after fall in 2013, left total hip replacement April, 2010  18   Multilevel cervical spondylosis and multilevel cervical spinal stenosis  Plan       Patient Instructions     1  Since you have gained 12 lbs in the past seven months, we recommend a concentrated effort to reduce carbohydrates and calorie intake  2  Continue with current medication  3  Continue to make efforts to reduce cigarette smoking  4  Go to the [address]  for blood work ordered by Dr Essie Bailey and Dr Yadira Grey  5  Cardiology follow-up approximately six months with EKG  HPI    This 78 y o  male  denies new cardiopulmonary and medical symptoms  He does complain of worsened dyspnea with activity, that he attributes to the increase in mask wearing during the pandemic  He admits to knowledge of weight gain  He has reduced his cigarette smoking to 3/4 pack per day over the last several months  He has not yet registered for COVID vaccination but plans to  He plans to go to the [address]  laboratory for blood work and for some other studies that her pending  He is being seen in follow-up of the below listed diagnoses  Encounter Diagnoses   Name Primary?     Chronic atrial fibrillation (HCC) Yes    CAD S/P percutaneous coronary angioplasty     Essential hypertension     Asymptomatic PVCs     Mild aortic stenosis     Dyslipidemia     Centrilobular emphysema (HCC)     Cigarette nicotine dependence without complication     Obstructive sleep apnea  Class 2 obesity     Dyspnea on exertion         Review of Systems    All other systems negative, except as noted in history of present illness    Historical Information   Past Medical History:   Diagnosis Date    Acid reflux     Arthritis     BPH (benign prostatic hyperplasia)     no treatment    Calcium disorder     Chronic atrial fibrillation (HCC) 2/6/2018    Chronic pain     Chronic pain disorder     COPD (chronic obstructive pulmonary disease) (HCC)     Coronary artery disease 2001    PTCA with one stent    Eye redness     Poked his eye on 3/18/16 "bloody" right eye    Hiatal hernia     Hydronephrosis     Hyperlipidemia     Hypertension     Irregular heart beat     A-Fib    Low back pain     Neck pain     Nocturnal leg cramps     Osteopenia     Peripheral artery disease (HCC)     Peripheral neuropathy     Pleural effusion     Sleep apnea     REFUSES MACHINE HS    Spinal stenosis     Vocal cord polyp 10/2012    panendoscopy     Past Surgical History:   Procedure Laterality Date    ANGIOPLASTY      APPENDECTOMY  04/2007    CARDIOVERSION      x2, for A-Fib    CARDIOVERSION      CATARACT EXTRACTION      CATARACT EXTRACTION W/ INTRAOCULAR LENS IMPLANT Left 01/2014    CORONARY ANGIOPLASTY WITH STENT PLACEMENT  2001    one stent    HERNIA REPAIR Right     inquinal    JOINT REPLACEMENT Left     Hip    KNEE SURGERY  1994    also had arthroscopy right knee 2005    PANENDOSCOPY  10/23/2012    vocal cord polyp    PILONIDAL CYST DRAINAGE      x 3    DC SURG IMPLNT Modesto Albino Left 8/23/2017    Procedure: Placement of Thoracic SCS with left buttock IPG (IMPULSE); Surgeon: Zell Lanes, MD;  Location:  MAIN OR;  Service: Neurosurgery    DC XCAPSL CTRC RMVL INSJ IO LENS PROSTH W/O ECP Right 4/6/2016    Procedure: EXTRACTION EXTRACAPSULAR CATARACT PHACO INTRAOCULAR LENS (IOL);   Surgeon: Grace Kumar MD;  Location: [facility] MAIN OR;  Service: Ophthalmology   [facility] STIMULATOR TRIAL W/ LAMINOTOMY N/A 4/13/2017    Procedure: INSERTION DORSAL COLUMN SPINAL CORD STIMULATOR TRIAL;  Surgeon: Lewis Mendez MD;  Location: [place] [address] MAIN OR;  Service:     TONSILLECTOMY       Social History     Substance and Sexual Activity   Alcohol Use Yes    Comment: occasionally     Social History     Substance and Sexual Activity   Drug Use No     Social History     Tobacco Use   Smoking Status Current Every Day Smoker    Packs/day: 1 25    Years: 60 00    Pack years: 75 00   Smokeless Tobacco Never Used       Family History:  Family History   Problem Relation Age of Onset    Cancer Father 80        prostate    No Known Problems Mother     No Known Problems Sister     No Known Problems Brother     No Known Problems Daughter     No Known Problems Son     No Known Problems Maternal Aunt     No Known Problems Maternal Uncle     No Known Problems Paternal Aunt     No Known Problems Paternal Uncle     No Known Problems Maternal Grandmother     No Known Problems Maternal Grandfather     No Known Problems Paternal Grandmother     No Known Problems Paternal Grandfather          Meds/Allergies     Prior to Admission medications    Medication Sig Start Date End Date Taking?  Authorizing Provider   aspirin (ECOTRIN LOW STRENGTH) 81 mg EC tablet Take 81 mg by mouth daily   Yes Historical Provider, MD   atorvastatin (LIPITOR) 40 mg tablet Take 40 mg by mouth daily     Yes Historical Provider, MD   budesonide (PULMICORT) 0 5 mg/2 mL nebulizer solution USE 1 VIAL  IN  NEBULIZER TWICE  DAILY - (Rinse Mouth After Each Treatment) 12/23/20  Yes Alfonso Banks PA-C   diltiazem (DILACOR XR) 240 MG 24 hr capsule Take 1 capsule (240 mg total) by mouth daily at bedtime 9/6/18  Yes Malachi Gayle MD   fluticasone (FLONASE) 50 mcg/act nasal spray 2 sprays into each nostril daily   Yes Historical Provider, MD   HYDROcodone-acetaminophen (NORCO)  mg per tablet Take 1 tablet by mouth 4 (four) times a day as needed for severe painMax Daily Amount: 4 tablets 2/12/21 3/14/21 Yes Leatha Quinonez MD   ipratropium-albuterol (DUO-NEB) 0 5-2 5 mg/3 mL nebulizer solution USE 1 VIAL IN NEBULIZER 3 TIMES DAILY 3/18/20  Yes MITCHELL Schmitz   meclizine (ANTIVERT) 12 5 MG tablet TAKE 1-2 TABLETS BY MOUTH EVERY 8 HOURS AS NEEDED PRN; DIZZINESS 7/22/19  Yes Historical Provider, MD   naloxone (NARCAN) 4 mg/0 1 mL nasal spray Administer 1 spray into a nostril  If no response after 2-3 minutes, give another dose in the other nostril using a new spray  2/1/21  Yes Leatha Quinonez MD   nebivolol (BYSTOLIC) 10 mg tablet Take 1 tablet (10 mg total) by mouth daily 3/21/19  Yes Emmett Torres,    nebivolol (Bystolic) 10 mg tablet Take 1 tablet (10 mg total) by mouth daily 2/4/21  Yes Paulo Woodard MD   nicotine (NICOTROL) 10 MG inhaler Use 1  Cartridge per hour as needed    Do not exceed 10 cartridges per day 2/18/21  Yes Minesh Urban, DO   nicotine polacrilex (COMMIT) 4 MG lozenge Apply 1 lozenge (4 mg total) to the mouth or throat as needed for smoking cessation 12/2/20  Yes Rafy Kimbrough PA-C   OMEPRAZOLE PO Take 40 mg by mouth daily     Yes Historical Provider, MD   OXYGEN-HELIUM IN Inhale 2 L daily at bedtime   Yes Historical Provider, MD   Perforomist 20 MCG/2ML nebulizer solution USE 1 VIAL  IN  NEBULIZER TWICE  DAILY - Morning and Evening 1/19/21  Yes Rafy Kimbrough PA-C   phenazopyridine (PYRIDIUM) 200 mg tablet  12/10/19  Yes Historical Provider, MD   rivaroxaban (Xarelto) 20 mg tablet Take 1 tablet (20 mg total) by mouth daily with dinner 2/4/21  Yes Paulo Woodard MD   tamsulosin (FLOMAX) 0 4 mg Take 0 4 mg by mouth daily with dinner   Yes Historical Provider, MD   traZODone (DESYREL) 50 mg tablet  3/20/18  Yes Historical Provider, MD Jessie Campos 175 MCG/3ML SOLN USE 1 VIAL IN NEBULIZER DAILY - DO NOT MIX WITH OTHER NEB MEDS,USE BEFORE OR AFTER 1/19/21  Yes Rafy Kimbrough PA-C   HYDROcodone-acetaminophen [facility])  mg per tablet Take 1 tablet by mouth 4 (four) times a day as needed for severe painMax Daily Amount: 4 tablets 1/13/21 2/12/21  Kenan Clay MD   nicotine (NICODERM CQ) 21 mg/24 hr TD 24 hr patch Place 1 patch on the skin every 24 hours 12/2/20 1/1/21  Sheryle Bolder, PA-C   rivaroxaban (XARELTO) 20 mg tablet Take 1 tablet (20 mg total) by mouth daily with dinner  Patient not taking: Reported on 11/13/2020 4/4/19   Roderick Dhaliwal MD       No Known Allergies      Vitals:    03/01/21 0920   BP: 122/80   BP Location: Left arm   Patient Position: Sitting   Cuff Size: Large   Pulse: 77   Resp: 18   Temp: 98 4 °F (36 9 °C)   TempSrc: Temporal   SpO2: 91%   Weight: 121 kg (267 lb)   Height: 5' 10" (1 778 m)       Body mass index is 38 31 kg/m²  12 pound weight gain compared to 7+ months ago    Physical Exam:    General Appearance:  Alert, cooperative, no distress, appears stated age and is markedly obese  Head:  Normocephalic, without obvious abnormality, atraumatic   Eyes:  PERRL, conjunctiva/corneas clear, EOM's intact,   both eyes   Ears:  Normal TM's and external ear canals, both ears   Nose: Nares normal, septum midline, mucosa normal, no drainage or sinus tenderness   Throat: Lips, mucosa, and tongue normal; teeth and gums normal   Neck: Supple, symmetrical, trachea midline, no adenopathy, thyroid: not enlarged, symmetric, no tenderness/mass/nodules, no carotid bruit or JVD with transmitted systolic murmur in right carotid artery     Back:   Symmetric, no curvature, ROM normal, no CVA tenderness   Lungs:   Clear to auscultation bilaterally, respirations unlabored   Chest Wall:  No tenderness or deformity   Heart:  Irregularly irregular cardiac rhythm, S1, S2 normal,  Grade 2/6 aortic systolic ejection murmur, heard best along left sternal border with intact but slightly muffled A2 and no rub or gallop   Abdomen:   Soft, non-tender, bowel sounds active all four quadrants,  no masses, no organomegaly with marked obesity noted  Extremities: Extremities normal, atraumatic, no cyanosis or edema   Pulses: Trace-1++ and symmetric with both feet equally warm   Skin: Skin showed normal color, texture, turgor and no rashes or lesions   Lymph nodes: Cervical, supraclavicular, and axillary nodes normal   Neurologic: Normal         Cardiographics    ECG 03/01/21:    Atrial fibrillation with average ventricular rate of 60 bpm  Frequent unifocal right ventricular PVCs  LAFB   Unchanged from 07/22/2020    IMAGING:    No Chest XR results available for this patient            LAB REVIEW:      Lab Results   Component Value Date    SODIUM 139 12/27/2019    K 4 1 12/27/2019     12/27/2019    CO2 29 12/27/2019    BUN 16 12/27/2019    CREATININE 0 89 12/27/2019    GLUF 103 (H) 12/27/2019    CALCIUM 9 0 12/27/2019    AST 20 12/27/2019    ALT 23 12/27/2019    ALKPHOS 61 12/27/2019    EGFR 80 12/27/2019     Lab Results   Component Value Date    CHOLESTEROL 111 12/27/2019    CHOLESTEROL 98 11/08/2018    CHOLESTEROL 116 12/07/2017     Lab Results   Component Value Date    HDL 48 12/27/2019    HDL 54 11/08/2018    HDL 59 12/07/2017       Lab Results   Component Value Date    LDLCALC 38 12/27/2019    LDLCALC 25 11/08/2018    LDLCALC 34 12/07/2017     No components found for: [facility]  Lab Results   Component Value Date    TRIG 124 12/27/2019    TRIG 96 11/08/2018    TRIG 113 12/07/2017               Anay Mendez MD

## 2021-03-08 ENCOUNTER — OFFICE VISIT (OUTPATIENT)
Dept: PAIN MEDICINE | Facility: CLINIC | Age: 81
End: 2021-03-08
Payer: MEDICARE

## 2021-03-08 VITALS
WEIGHT: 267 LBS | SYSTOLIC BLOOD PRESSURE: 132 MMHG | HEART RATE: 84 BPM | HEIGHT: 70 IN | BODY MASS INDEX: 38.22 KG/M2 | DIASTOLIC BLOOD PRESSURE: 73 MMHG

## 2021-03-08 DIAGNOSIS — F11.20 UNCOMPLICATED OPIOID DEPENDENCE (HCC): ICD-10-CM

## 2021-03-08 DIAGNOSIS — Z79.891 LONG-TERM CURRENT USE OF OPIATE ANALGESIC: Primary | ICD-10-CM

## 2021-03-08 DIAGNOSIS — M51.16 INTERVERTEBRAL DISC DISORDER WITH RADICULOPATHY OF LUMBAR REGION: ICD-10-CM

## 2021-03-08 DIAGNOSIS — M54.50 PAIN OF LUMBAR SPINE: ICD-10-CM

## 2021-03-08 DIAGNOSIS — G89.4 CHRONIC PAIN SYNDROME: ICD-10-CM

## 2021-03-08 PROCEDURE — 99214 OFFICE O/P EST MOD 30 MIN: CPT | Performed by: ANESTHESIOLOGY

## 2021-03-08 RX ORDER — HYDROCODONE BITARTRATE AND ACETAMINOPHEN 10; 325 MG/1; MG/1
1 TABLET ORAL 4 TIMES DAILY PRN
Qty: 120 TABLET | Refills: 0 | Status: SHIPPED | OUTPATIENT
Start: 2021-03-14 | End: 2021-03-30 | Stop reason: SDUPTHER

## 2021-03-08 RX ORDER — HYDROCODONE BITARTRATE AND ACETAMINOPHEN 10; 325 MG/1; MG/1
1 TABLET ORAL 4 TIMES DAILY PRN
Qty: 120 TABLET | Refills: 0 | Status: SHIPPED | OUTPATIENT
Start: 2021-04-13 | End: 2021-06-10 | Stop reason: SDUPTHER

## 2021-03-08 NOTE — PROGRESS NOTES
Pain Medicine Follow-Up Note    Assessment:  1  Chronic pain syndrome    2  Pain of lumbar spine    3  Intervertebral disc disorder with radiculopathy of lumbar region        Plan:  New Medications Ordered This Visit   Medications    HYDROcodone-acetaminophen (NORCO)  mg per tablet     Sig: Take 1 tablet by mouth 4 (four) times a day as needed for severe painMax Daily Amount: 4 tablets     Dispense:  120 tablet     Refill:  0    HYDROcodone-acetaminophen (NORCO)  mg per tablet     Sig: Take 1 tablet by mouth 4 (four) times a day as needed for severe painMax Daily Amount: 4 tablets     Dispense:  120 tablet     Refill:  0     My impressions and treatment recommendations were discussed in detail with the patient who verbalized understanding and had no further questions  Given that the patient reports overall reduced pain and improved level functioning without significant side effects, I felt a reasonable to continue the patient on hydrocodone /acetaminophen 10/325 mg 1 tablet 4 times daily as needed for pain  I sent E prescriptions to the pharmacy dated March 14, 2021 and April 13, 2021  The risks and side effects of chronic opioid treatment were discussed in detail with the patient  Side effects include but are not limited to nausea, vomiting, GI intolerance, sedation, constipation, mental clouding, opioid-induced hyperalgesia, endocrine dysfunction, addiction, dependence, and tolerance  The patient was asked to take his medications only as prescribed and directed, never in excess, and never for any other reason other than for pain control  The patient was also asked to keep his medications out of the reach of others and away from children, preferably in a locked drawer  The patient verbalized understanding and wished to use these opioid medications      [place] Prescription Drug Monitoring Program report was reviewed and was appropriate     Follow-up is planned in 2 months time or sooner as warranted  Discharge instructions were provided  I personally saw and examined the patient and I agree with the above discussed plan of care  History of Present Illness:    Rosa Maria Gomez is a 78 y o  male who presents to [facility] for interval re-evaluation of the above stated pain complaints  The patient has a past medical and chronic pain history as outlined in the assessment section  He was last seen on  February 1, 2021  At today's office visit, the patient's pain score is 6/10 on the verbal numerical pain rating scale  The patient states that his pain is worse in the morning, evening, and night  His pain is constant in nature  He reports the quality of his pain as dull/aching, sharp, cramping, and shooting  He is reporting excellent pain relief with the hydrocodone/acetaminophen 10/325 mg 1 tablet 4 times daily as needed for pain  He does admit to controlled opioid induced constipation on over-the-counter anti constipating medications  Other than as stated above, the patient denies any interval changes in medications, medical condition, mental condition, symptoms, or allergies since the last office visit  Review of Systems:    Review of Systems   Respiratory: Positive for shortness of breath  Cardiovascular: Negative for chest pain  Gastrointestinal: Negative for constipation, diarrhea, nausea and vomiting  Musculoskeletal: Positive for gait problem and myalgias  Negative for arthralgias and joint swelling  Skin: Negative for rash  Neurological: Negative for dizziness, seizures and weakness  Memory loss   All other systems reviewed and are negative          Patient Active Problem List   Diagnosis    Chronic pain syndrome    Pain of lumbar spine    Sacroiliitis ([facility] [address] )    Intervertebral disc disorder with radiculopathy of lumbar region    Centrilobular emphysema (HCC)    Dyspnea on exertion    Chronic atrial fibrillation ([address] )    Coronary artery disease    Chronic respiratory failure with hypoxia (HCC)    Nicotine dependence    Cervical radiculopathy    Cervical spinal stenosis    Cervical pain    Elevated PSA       Past Medical History:   Diagnosis Date    Acid reflux     Arthritis     BPH (benign prostatic hyperplasia)     no treatment    Calcium disorder     Chronic atrial fibrillation (HCC) 2/6/2018    Chronic pain     Chronic pain disorder     COPD (chronic obstructive pulmonary disease) ([facility] [address] )     Coronary artery disease 2001    PTCA with one stent    Eye redness     Poked his eye on 3/18/16 "bloody" right eye    Hiatal hernia     Hydronephrosis     Hyperlipidemia     Hypertension     Irregular heart beat     A-Fib    Low back pain     Neck pain     Nocturnal leg cramps     Osteopenia     Peripheral artery disease (HCC)     Peripheral neuropathy     Pleural effusion     Sleep apnea     REFUSES MACHINE HS    Spinal stenosis     Vocal cord polyp 10/2012    panendoscopy       Past Surgical History:   Procedure Laterality Date    ANGIOPLASTY      APPENDECTOMY  04/2007    CARDIOVERSION      x2, for A-Fib    CARDIOVERSION      CATARACT EXTRACTION      CATARACT EXTRACTION W/ INTRAOCULAR LENS IMPLANT Left 01/2014    CORONARY ANGIOPLASTY WITH STENT PLACEMENT  2001    one stent    HERNIA REPAIR Right     inquinal    JOINT REPLACEMENT Left     Hip    KNEE SURGERY  1994    also had arthroscopy right knee 2005    PANENDOSCOPY  10/23/2012    vocal cord polyp    PILONIDAL CYST DRAINAGE      x 3    MD SURG IMPLNT Geroge Beagle Left 8/23/2017    Procedure: Placement of Thoracic SCS with left buttock IPG (IMPULSE); Surgeon: Leyla Weiner MD;  Location:  MAIN OR;  Service: Neurosurgery    MD XCAPSL CTRC RMVL INSJ IO LENS PROSTH W/O ECP Right 4/6/2016    Procedure: EXTRACTION EXTRACAPSULAR CATARACT PHACO INTRAOCULAR LENS (IOL);   Surgeon: Heide Danielle MD;  Location: [facility] MAIN OR;  Service: Ophthalmology    SPINAL CORD STIMULATOR TRIAL W/ LAMINOTOMY N/A 4/13/2017    Procedure: INSERTION DORSAL COLUMN SPINAL CORD STIMULATOR TRIAL;  Surgeon: Doug Moise MD;  Location: [facility] MAIN OR;  Service:     TONSILLECTOMY         Family History   Problem Relation Age of Onset    Cancer Father 80        prostate    No Known Problems Mother     No Known Problems Sister     No Known Problems Brother     No Known Problems Daughter     No Known Problems Son     No Known Problems Maternal Aunt     No Known Problems Maternal Uncle     No Known Problems Paternal Aunt     No Known Problems Paternal Uncle     No Known Problems Maternal Grandmother     No Known Problems Maternal Grandfather     No Known Problems Paternal Grandmother     No Known Problems Paternal Grandfather        Social History     Occupational History    Not on file   Tobacco Use    Smoking status: Current Every Day Smoker     Packs/day: 1 25     Years: 60 00     Pack years: 75 00    Smokeless tobacco: Never Used   Substance and Sexual Activity    Alcohol use: Yes     Comment: occasionally    Drug use: No    Sexual activity: Not on file         Current Outpatient Medications:     aspirin (ECOTRIN LOW STRENGTH) 81 mg EC tablet, Take 81 mg by mouth daily, Disp: , Rfl:     atorvastatin (LIPITOR) 40 mg tablet, Take 40 mg by mouth daily  , Disp: , Rfl:     budesonide (PULMICORT) 0 5 mg/2 mL nebulizer solution, USE 1 VIAL  IN  NEBULIZER TWICE  DAILY - (Rinse Mouth After Each Treatment), Disp: 60 vial, Rfl: 11    diltiazem (DILACOR XR) 240 MG 24 hr capsule, Take 1 capsule (240 mg total) by mouth daily at bedtime, Disp: 90 capsule, Rfl: 3    fluticasone (FLONASE) 50 mcg/act nasal spray, 2 sprays into each nostril daily, Disp: , Rfl:     [START ON 4/13/2021] HYDROcodone-acetaminophen (NORCO)  mg per tablet, Take 1 tablet by mouth 4 (four) times a day as needed for severe painMax Daily Amount: 4 tablets, Disp: 120 tablet, Rfl: 0    ipratropium-albuterol (DUO-NEB) 0 5-2 5 mg/3 mL nebulizer solution, USE 1 VIAL IN NEBULIZER 3 TIMES DAILY, Disp: 90 vial, Rfl: 11    meclizine (ANTIVERT) 12 5 MG tablet, TAKE 1-2 TABLETS BY MOUTH EVERY 8 HOURS AS NEEDED PRN; DIZZINESS, Disp: , Rfl: 3    naloxone (NARCAN) 4 mg/0 1 mL nasal spray, Administer 1 spray into a nostril  If no response after 2-3 minutes, give another dose in the other nostril using a new spray , Disp: 1 each, Rfl: 1    nebivolol (BYSTOLIC) 10 mg tablet, Take 1 tablet (10 mg total) by mouth daily, Disp: 90 tablet, Rfl: 3    nebivolol (Bystolic) 10 mg tablet, Take 1 tablet (10 mg total) by mouth daily, Disp: 90 tablet, Rfl: 3    nicotine (NICOTROL) 10 MG inhaler, Use 1  Cartridge per hour as needed    Do not exceed 10 cartridges per day, Disp: 168 each, Rfl: 6    nicotine polacrilex (COMMIT) 4 MG lozenge, Apply 1 lozenge (4 mg total) to the mouth or throat as needed for smoking cessation, Disp: 100 each, Rfl: 0    OMEPRAZOLE PO, Take 40 mg by mouth daily  , Disp: , Rfl:     OXYGEN-HELIUM IN, Inhale 2 L daily at bedtime, Disp: , Rfl:     Perforomist 20 MCG/2ML nebulizer solution, USE 1 VIAL  IN  NEBULIZER TWICE  DAILY - Morning and Evening, Disp: 60 vial, Rfl: 11    phenazopyridine (PYRIDIUM) 200 mg tablet, , Disp: , Rfl: 0    rivaroxaban (XARELTO) 20 mg tablet, Take 1 tablet (20 mg total) by mouth daily with dinner, Disp: 28 tablet, Rfl: 0    rivaroxaban (Xarelto) 20 mg tablet, Take 1 tablet (20 mg total) by mouth daily with dinner, Disp: 90 tablet, Rfl: 3    tamsulosin (FLOMAX) 0 4 mg, Take 0 4 mg by mouth daily with dinner, Disp: , Rfl:     traZODone (DESYREL) 50 mg tablet, , Disp: , Rfl:     Yupelri 175 MCG/3ML SOLN, USE 1 VIAL IN NEBULIZER DAILY - DO NOT MIX WITH OTHER NEB MEDS,USE BEFORE OR AFTER, Disp: 30 vial, Rfl: 11    [START ON 3/14/2021] HYDROcodone-acetaminophen (NORCO)  mg per tablet, Take 1 tablet by mouth 4 (four) times a day as needed for severe painMax Daily Amount: 4 tablets, Disp: 120 tablet, Rfl: 0    nicotine (NICODERM CQ) 21 mg/24 hr TD 24 hr patch, Place 1 patch on the skin every 24 hours, Disp: 30 patch, Rfl: 1    No Known Allergies    Physical Exam:    /73   Pulse 84   Ht 5' 10" (1 778 m)   Wt 121 kg (267 lb)   BMI 38 31 kg/m²     Constitutional:obese  Eyes:anicteric  HEENT:grossly intact  Neck:supple, symmetric, trachea midline and no masses   Pulmonary:even and unlabored  Cardiovascular:No edema or pitting edema present  Skin:Normal without rashes or lesions and well hydrated  Psychiatric:Mood and affect appropriate  Neurologic:Cranial Nerves II-XII grossly intact  Musculoskeletal:antalgic, ambulates with cane and in wheelchair

## 2021-03-12 ENCOUNTER — TELEPHONE (OUTPATIENT)
Dept: PAIN MEDICINE | Facility: CLINIC | Age: 81
End: 2021-03-12

## 2021-03-12 DIAGNOSIS — G89.4 CHRONIC PAIN SYNDROME: ICD-10-CM

## 2021-03-12 DIAGNOSIS — M51.16 INTERVERTEBRAL DISC DISORDER WITH RADICULOPATHY OF LUMBAR REGION: ICD-10-CM

## 2021-03-12 DIAGNOSIS — M54.50 PAIN OF LUMBAR SPINE: ICD-10-CM

## 2021-03-12 NOTE — TELEPHONE ENCOUNTER
Patient states pharmacy doesn't have his med refill please advise,    HYDROcodone-acetaminophen (NORCO)  mg per tablet     Thank you    [phone]

## 2021-03-12 NOTE — TELEPHONE ENCOUNTER
S/w pharmacist at [facility], states pt last filled [address] [facility] on 2/12 but the pharmacy did not have the full supply of medication to fill entire script  Pt was given #100 pills at the time  States the pharmacy does not have full supply of medication again, and can only fill #100 pills  States script can be filled today  States new script will need to be sent when refill is due for remaining balance of script that was not in stock  S/w pt, advised of the same  Informed pt he will need to call [facility] for new script for remaining balance of script before he runs out of medication  Pt verbalized understanding and appreciation

## 2021-03-30 RX ORDER — HYDROCODONE BITARTRATE AND ACETAMINOPHEN 10; 325 MG/1; MG/1
1 TABLET ORAL 4 TIMES DAILY PRN
Qty: 20 TABLET | Refills: 0 | Status: SHIPPED | OUTPATIENT
Start: 2021-03-30 | End: 2021-05-11 | Stop reason: SDUPTHER

## 2021-03-30 NOTE — TELEPHONE ENCOUNTER
S/W pt who states that he can only go to [facility] with his [facility] through [place], or [place], but the closest is in [place], and pt would prefer not to travel there to get his meds when all his other meds are at [facility]  S/W [facility] who states they can order more and advised pt to call in one week prior to refill so that they know they have enough  Advised pt of the same  Can refill for #20 be sent?

## 2021-03-30 NOTE — TELEPHONE ENCOUNTER
Can he choose a new pharmacy? He is filled every month for 120 pills, so I do not understand why they are short pills twice

## 2021-03-30 NOTE — TELEPHONE ENCOUNTER
[facility] will notify pt of refill

## 2021-03-30 NOTE — TELEPHONE ENCOUNTER
Pt states he has a few days left and needs the remainder of his script sent       Pt can be reached at [phone]

## 2021-05-03 ENCOUNTER — TELEPHONE (OUTPATIENT)
Dept: PAIN MEDICINE | Facility: CLINIC | Age: 81
End: 2021-05-03

## 2021-05-03 NOTE — TELEPHONE ENCOUNTER
Pt was scheduled for virtual in [address] and then 4 week f/u was scheduled in [facility]

## 2021-05-03 NOTE — TELEPHONE ENCOUNTER
Patient needed a refill on Oxycodone that would need to be refill on 5/12  Patient had a 5/13 appointment that needed to be rescheduled  Patient asking for a refill until his appointment  Correct pharmacy on file

## 2021-05-11 ENCOUNTER — TELEMEDICINE (OUTPATIENT)
Dept: PAIN MEDICINE | Facility: CLINIC | Age: 81
End: 2021-05-11
Payer: MEDICARE

## 2021-05-11 DIAGNOSIS — M51.16 INTERVERTEBRAL DISC DISORDER WITH RADICULOPATHY OF LUMBAR REGION: ICD-10-CM

## 2021-05-11 DIAGNOSIS — G89.4 CHRONIC PAIN SYNDROME: ICD-10-CM

## 2021-05-11 DIAGNOSIS — M54.50 PAIN OF LUMBAR SPINE: ICD-10-CM

## 2021-05-11 PROCEDURE — 99442 PR PHYS/QHP TELEPHONE EVALUATION 11-20 MIN: CPT | Performed by: ANESTHESIOLOGY

## 2021-05-11 RX ORDER — HYDROCODONE BITARTRATE AND ACETAMINOPHEN 10; 325 MG/1; MG/1
1 TABLET ORAL 4 TIMES DAILY PRN
Qty: 120 TABLET | Refills: 0 | Status: SHIPPED | OUTPATIENT
Start: 2021-05-14 | End: 2021-06-10 | Stop reason: SDUPTHER

## 2021-05-11 NOTE — PROGRESS NOTES
Virtual Brief Visit    Assessment/Plan:    Problem List Items Addressed This Visit        Nervous and Auditory    Intervertebral disc disorder with radiculopathy of lumbar region    Relevant Medications    HYDROcodone-acetaminophen (NORCO)  mg per tablet (Start on 5/14/2021)       Other    Chronic pain syndrome    Relevant Medications    HYDROcodone-acetaminophen (NORCO)  mg per tablet (Start on 5/14/2021)    Pain of lumbar spine    Relevant Medications    HYDROcodone-acetaminophen (NORCO)  mg per tablet (Start on 5/14/2021)                Reason for visit is   Chief Complaint   Patient presents with    Virtual Brief Visit        Encounter provider Trini Greene MD    Provider located at [address] [place] [id number]    Recent Visits  No visits were found meeting these conditions  Showing recent visits within past 7 days and meeting all other requirements     Today's Visits  Date Type Provider Dept   05/11/21 Telemedicine Tirni Greene MD [facility]   Showing today's visits and meeting all other requirements     Future Appointments  No visits were found meeting these conditions  Showing future appointments within next 150 days and meeting all other requirements        After connecting through telephone, the patient was identified by name and date of birth  Herb Tony was informed that this is a telemedicine visit and that the visit is being conducted through telephone  My office door was closed  No one else was in the room  He acknowledged consent and understanding of privacy and security of the platform  The patient has agreed to participate and understands he can discontinue the visit at any time  Patient is aware this is a billable service  Pain Medicine Follow-Up Note    Assessment:  1  Chronic pain syndrome    2  Pain of lumbar spine    3  Intervertebral disc disorder with radiculopathy of lumbar region        Plan:  New Medications Ordered This Visit   Medications    HYDROcodone-acetaminophen (NORCO)  mg per tablet     Sig: Take 1 tablet by mouth 4 (four) times a day as needed for severe painMax Daily Amount: 4 tablets     Dispense:  120 tablet     Refill:  0     My impressions and treatment recommendations were discussed in detail with the patient who verbalized understanding and had no further questions  Given that the patient reports overall reduced pain and improved level of functioning without significant side effects, I felt it reasonable to continue the patient on hydrocodone/acetaminophen 10/325 mg 1 tablet 4 times daily as needed for pain  I sent E prescriptions to the pharmacy dated May 14, 2021  The risks and side effects of chronic opioid treatment were discussed in detail with the patient  Side effects include but are not limited to nausea, vomiting, GI intolerance, sedation, constipation, mental clouding, opioid-induced hyperalgesia, endocrine dysfunction, addiction, dependence, and tolerance  The patient was asked to take his medications only as prescribed and directed, never in excess, and never for any other reason other than for pain control  The patient was also asked to keep his medications out of the reach of others and away from children, preferably in a locked drawer  The patient verbalized understanding and wished to use these opioid medications  [place] Prescription Drug Monitoring Program report was reviewed and was appropriate     Follow-up is planned in 4 weeks time or sooner as warranted  Discharge instructions were provided  I personally saw and examined the patient and I agree with the above discussed plan of care      History of Present Illness:    Sunshine Meneses is a 78 y o  male who presents to [facility] for interval re-evaluation of the above stated pain complaints  The patient has a past medical and chronic pain history as outlined in the assessment section  He was last seen on  March 8, 2021  At today's office visit, the patient's pain score is 5/10 on the verbal numerical pain rating scale  The patient states that his pain is primarily in his low back and nearly constant in nature  He describes his pain as sharp and stabbing and worsens with activity  He reports that the hydrocodone/acetaminophen 10/325 mg 1 tablet 4 times daily as needed for pain is giving him excellent pain relief  He does admit to controlled opioid induced constipation with prune juice  He denies any other side effects of the hydrocodone /acetaminophen  Other than as stated above, the patient denies any interval changes in medications, medical condition, mental condition, symptoms, or allergies since the last office visit  Review of Systems:    Review of Systems   Respiratory: Negative for shortness of breath  Cardiovascular: Negative for chest pain  Gastrointestinal: Negative for constipation, diarrhea, nausea and vomiting  Musculoskeletal: Positive for back pain and gait problem  Negative for arthralgias, joint swelling and myalgias  Skin: Negative for rash  Neurological: Negative for dizziness, seizures and weakness  All other systems reviewed and are negative          Patient Active Problem List   Diagnosis    Chronic pain syndrome    Pain of lumbar spine    Sacroiliitis ([address] )    Intervertebral disc disorder with radiculopathy of lumbar region    Centrilobular emphysema (HCC)    Dyspnea on exertion    Chronic atrial fibrillation (HCC)    Coronary artery disease    Chronic respiratory failure with hypoxia (HCC)    Nicotine dependence    Cervical radiculopathy    Cervical spinal stenosis    Cervical pain    Elevated PSA       Past Medical History:   Diagnosis Date    Acid reflux     Arthritis     BPH (benign prostatic hyperplasia)     no treatment    Calcium disorder     Chronic atrial fibrillation (HCC) 2/6/2018    Chronic pain     Chronic pain disorder     COPD (chronic obstructive pulmonary disease) ([facility] [address] [address]     Coronary artery disease 2001    PTCA with one stent    Eye redness     Poked his eye on 3/18/16 "bloody" right eye    Hiatal hernia     Hydronephrosis     Hyperlipidemia     Hypertension     Irregular heart beat     A-Fib    Low back pain     Neck pain     Nocturnal leg cramps     Osteopenia     Peripheral artery disease (HCC)     Peripheral neuropathy     Pleural effusion     Sleep apnea     REFUSES MACHINE HS    Spinal stenosis     Vocal cord polyp 10/2012    panendoscopy       Past Surgical History:   Procedure Laterality Date    ANGIOPLASTY      APPENDECTOMY  04/2007    CARDIOVERSION      x2, for A-Fib    CARDIOVERSION      CATARACT EXTRACTION      CATARACT EXTRACTION W/ INTRAOCULAR LENS IMPLANT Left 01/2014    CORONARY ANGIOPLASTY WITH STENT PLACEMENT  2001    one stent    HERNIA REPAIR Right     inquinal    JOINT REPLACEMENT Left     Hip   [address]    also had arthroscopy right knee 2005    PANENDOSCOPY  10/23/2012    vocal cord polyp    PILONIDAL CYST DRAINAGE      x 3    AK SURG IMPLNT Jay Merline Left 8/23/2017    Procedure: Placement of Thoracic SCS with left buttock IPG (IMPULSE); Surgeon: Luis Sandhu MD;  Location:  MAIN OR;  Service: Neurosurgery    AK XCAPSL CTRC RMVL INSJ IO LENS PROSTH W/O ECP Right 4/6/2016    Procedure: EXTRACTION EXTRACAPSULAR CATARACT PHACO INTRAOCULAR LENS (IOL);   Surgeon: Herbert Barr MD;  Location[facility] MAIN OR;  Service: Ophthalmology    SPINAL CORD STIMULATOR TRIAL W/ LAMINOTOMY N/A 4/13/2017    Procedure: INSERTION DORSAL COLUMN SPINAL CORD STIMULATOR TRIAL;  Surgeon: Vincent Klinefelter, MD;  Location: [facility] MAIN OR;  Service:     TONSILLECTOMY         Family History   Problem Relation Age of Onset    Cancer Father 80        prostate  No Known Problems Mother     No Known Problems Sister     No Known Problems Brother     No Known Problems Daughter     No Known Problems Son     No Known Problems Maternal Aunt     No Known Problems Maternal Uncle     No Known Problems Paternal Aunt     No Known Problems Paternal Uncle     No Known Problems Maternal Grandmother     No Known Problems Maternal Grandfather     No Known Problems Paternal Grandmother     No Known Problems Paternal Grandfather        Social History     Occupational History    Not on file   Tobacco Use    Smoking status: Current Every Day Smoker     Packs/day: 1 25     Years: 60 00     Pack years: 75 00    Smokeless tobacco: Never Used   Substance and Sexual Activity    Alcohol use: Yes     Comment: occasionally    Drug use: No    Sexual activity: Not on file         Current Outpatient Medications:     aspirin (ECOTRIN LOW STRENGTH) 81 mg EC tablet, Take 81 mg by mouth daily, Disp: , Rfl:     atorvastatin (LIPITOR) 40 mg tablet, Take 40 mg by mouth daily  , Disp: , Rfl:     budesonide (PULMICORT) 0 5 mg/2 mL nebulizer solution, USE 1 VIAL  IN  NEBULIZER TWICE  DAILY - (Rinse Mouth After Each Treatment), Disp: 60 vial, Rfl: 11    diltiazem (DILACOR XR) 240 MG 24 hr capsule, Take 1 capsule (240 mg total) by mouth daily at bedtime, Disp: 90 capsule, Rfl: 3    fluticasone (FLONASE) 50 mcg/act nasal spray, 2 sprays into each nostril daily, Disp: , Rfl:     HYDROcodone-acetaminophen (NORCO)  mg per tablet, Take 1 tablet by mouth 4 (four) times a day as needed for severe painMax Daily Amount: 4 tablets, Disp: 120 tablet, Rfl: 0    [START ON 5/14/2021] HYDROcodone-acetaminophen (NORCO)  mg per tablet, Take 1 tablet by mouth 4 (four) times a day as needed for severe painMax Daily Amount: 4 tablets, Disp: 120 tablet, Rfl: 0    ipratropium-albuterol (DUO-NEB) 0 5-2 5 mg/3 mL nebulizer solution, USE 1 VIAL IN NEBULIZER 3 TIMES DAILY, Disp: 90 vial, Rfl: 11   meclizine (ANTIVERT) 12 5 MG tablet, TAKE 1-2 TABLETS BY MOUTH EVERY 8 HOURS AS NEEDED PRN; DIZZINESS, Disp: , Rfl: 3    naloxone (NARCAN) 4 mg/0 1 mL nasal spray, Administer 1 spray into a nostril  If no response after 2-3 minutes, give another dose in the other nostril using a new spray , Disp: 1 each, Rfl: 1    nebivolol (BYSTOLIC) 10 mg tablet, Take 1 tablet (10 mg total) by mouth daily, Disp: 90 tablet, Rfl: 3    nebivolol (Bystolic) 10 mg tablet, Take 1 tablet (10 mg total) by mouth daily, Disp: 90 tablet, Rfl: 3    nicotine (NICODERM CQ) 21 mg/24 hr TD 24 hr patch, Place 1 patch on the skin every 24 hours, Disp: 30 patch, Rfl: 1    nicotine (NICOTROL) 10 MG inhaler, Use 1  Cartridge per hour as needed  Do not exceed 10 cartridges per day, Disp: 168 each, Rfl: 6    nicotine polacrilex (COMMIT) 4 MG lozenge, Apply 1 lozenge (4 mg total) to the mouth or throat as needed for smoking cessation, Disp: 100 each, Rfl: 0    OMEPRAZOLE PO, Take 40 mg by mouth daily  , Disp: , Rfl:     OXYGEN-HELIUM IN, Inhale 2 L daily at bedtime, Disp: , Rfl:     Perforomist 20 MCG/2ML nebulizer solution, USE 1 VIAL  IN  NEBULIZER TWICE  DAILY - Morning and Evening, Disp: 60 vial, Rfl: 11    phenazopyridine (PYRIDIUM) 200 mg tablet, , Disp: , Rfl: 0    rivaroxaban (XARELTO) 20 mg tablet, Take 1 tablet (20 mg total) by mouth daily with dinner, Disp: 28 tablet, Rfl: 0    rivaroxaban (Xarelto) 20 mg tablet, Take 1 tablet (20 mg total) by mouth daily with dinner, Disp: 90 tablet, Rfl: 3    tamsulosin (FLOMAX) 0 4 mg, Take 0 4 mg by mouth daily with dinner, Disp: , Rfl:     traZODone (DESYREL) 50 mg tablet, , Disp: , Rfl:     Yupelri 175 MCG/3ML SOLN, USE 1 VIAL IN NEBULIZER DAILY - DO NOT MIX WITH OTHER NEB MEDS,USE BEFORE OR AFTER, Disp: 30 vial, Rfl: 11    No Known Allergies    Physical Exam:    There were no vitals taken for this visit       Constitutional:  alert and does not seem to be in any distress over the telephone  HEENT:  hearing grossly intact    The remainder of the physical examination was deferred due to this being a telemedicine visit  I spent 15 minutes directly with the patient during this visit    [facility] acknowledges that he has consented to an online visit or consultation  He understands that the online visit is based solely on information provided by him, and that, in the absence of a face-to-face physical evaluation by the physician, the diagnosis he receives is both limited and provisional in terms of accuracy and completeness  This is not intended to replace a full medical face-to-face evaluation by the physician  [place] understands and accepts these terms

## 2021-06-10 ENCOUNTER — OFFICE VISIT (OUTPATIENT)
Dept: PAIN MEDICINE | Facility: CLINIC | Age: 81
End: 2021-06-10
Payer: MEDICARE

## 2021-06-10 VITALS
WEIGHT: 267 LBS | DIASTOLIC BLOOD PRESSURE: 74 MMHG | HEART RATE: 84 BPM | BODY MASS INDEX: 38.22 KG/M2 | SYSTOLIC BLOOD PRESSURE: 103 MMHG | HEIGHT: 70 IN

## 2021-06-10 DIAGNOSIS — M54.50 PAIN OF LUMBAR SPINE: ICD-10-CM

## 2021-06-10 DIAGNOSIS — M51.16 INTERVERTEBRAL DISC DISORDER WITH RADICULOPATHY OF LUMBAR REGION: ICD-10-CM

## 2021-06-10 DIAGNOSIS — G89.4 CHRONIC PAIN SYNDROME: Primary | ICD-10-CM

## 2021-06-10 PROCEDURE — 99214 OFFICE O/P EST MOD 30 MIN: CPT | Performed by: NURSE PRACTITIONER

## 2021-06-10 RX ORDER — HYDROCODONE BITARTRATE AND ACETAMINOPHEN 10; 325 MG/1; MG/1
1 TABLET ORAL 4 TIMES DAILY PRN
Qty: 120 TABLET | Refills: 0 | Status: CANCELLED | OUTPATIENT
Start: 2021-07-13 | End: 2021-08-12

## 2021-06-10 RX ORDER — GABAPENTIN 300 MG/1
300 CAPSULE ORAL 2 TIMES DAILY
Qty: 60 CAPSULE | Refills: 1 | Status: SHIPPED | OUTPATIENT
Start: 2021-06-10 | End: 2021-08-12 | Stop reason: SDUPTHER

## 2021-06-10 RX ORDER — HYDROCODONE BITARTRATE AND ACETAMINOPHEN 10; 325 MG/1; MG/1
1 TABLET ORAL 4 TIMES DAILY PRN
Qty: 120 TABLET | Refills: 0 | Status: SHIPPED | OUTPATIENT
Start: 2021-07-23 | End: 2021-08-12 | Stop reason: SDUPTHER

## 2021-06-10 RX ORDER — HYDROCODONE BITARTRATE AND ACETAMINOPHEN 10; 325 MG/1; MG/1
1 TABLET ORAL 4 TIMES DAILY PRN
Qty: 120 TABLET | Refills: 0 | Status: SHIPPED | OUTPATIENT
Start: 2021-06-13 | End: 2021-07-16 | Stop reason: SDUPTHER

## 2021-06-10 NOTE — PROGRESS NOTES
Pain Medicine Follow-Up Note    Assessment:  1  Chronic pain syndrome    2  Intervertebral disc disorder with radiculopathy of lumbar region    3  Pain of lumbar spine        Plan:  No orders of the defined types were placed in this encounter  New Medications Ordered This Visit   Medications    gabapentin (NEURONTIN) 300 mg capsule     Sig: Take 1 capsule (300 mg total) by mouth 2 (two) times a day     Dispense:  60 capsule     Refill:  1    HYDROcodone-acetaminophen (NORCO)  mg per tablet     Sig: Take 1 tablet by mouth 4 (four) times a day as needed for severe painMax Daily Amount: 4 tablets     Dispense:  120 tablet     Refill:  0    HYDROcodone-acetaminophen (NORCO)  mg per tablet     Sig: Take 1 tablet by mouth 4 (four) times a day as needed for severe painMax Daily Amount: 4 tablets     Dispense:  120 tablet     Refill:  0       My impressions and treatment recommendations were discussed in detail with the patient who verbalized understanding and had no further questions  Given that the patient reports overall reduced pain and improved level of functioning without significant side effects, I feel that it is reasonable to continue him on hydrocodone/acetaminophen 10/325 mg 1 tablet 4 times daily as needed for pain  Prescriptions were sent to the pharmacy on file with fill dates of 06/13/2021 and 07/13/2021  Patient is also complaining of right-sided pain radiating down his leg, therefore I feel it is appropriate to trial him on gabapentin 300 mg at bedtime increasing to 300 mg twice a day  Side effects were reviewed with the patient and he verbalized understanding, prescription was sent to the pharmacy on file  [place] Prescription Drug Monitoring Program report was reviewed and was appropriate     There are risks associated with opioid medications, including dependence, addiction and tolerance  The patient understands and agrees to use these medications only as prescribed  Potential side effects of the medications include, but are not limited to, constipation, drowsiness, addiction, impaired judgment and risk of fatal overdose if not taken as prescribed  The patient was warned against driving while taking sedation medications  Sharing medications is a felony  At this point in time, the patient is showing no signs of addiction, abuse, diversion or suicidal ideation  Follow-up is planned in 8 weeks time or sooner as warranted  Discharge instructions were provided  I personally saw and examined the patient and I agree with the above discussed plan of care  History of Present Illness:    Luis Melton is a 78 y o  male who presents to [facility] for interval re-evaluation of the above stated pain complaints  The patient has a past medical and chronic pain history as outlined in the assessment section  He was last seen on 05/11/2021 via telemedicine  He reports no change in his pain today however he cannot put a number on it  He states that things are status quo  He states that his medications make his pain bearable  Other than as stated above, the patient denies any interval changes in medications, medical condition, mental condition, symptoms, or allergies since the last office visit  Review of Systems:    Review of Systems   Respiratory: Positive for shortness of breath  Cardiovascular: Negative for chest pain  Gastrointestinal: Negative for constipation, diarrhea, nausea and vomiting  Musculoskeletal: Negative for arthralgias, gait problem, joint swelling and myalgias  Difficulty Walking  Decreased range of motion  Paralysis or muscle weakness    Skin: Negative for rash  Neurological: Negative for dizziness, seizures and weakness  Memory loss    All other systems reviewed and are negative          Patient Active Problem List   Diagnosis    Chronic pain syndrome    Pain of lumbar spine    [facility]    Intervertebral disc disorder with radiculopathy of lumbar region    Centrilobular emphysema (HCC)    Dyspnea on exertion    Chronic atrial fibrillation (HCC)    Coronary artery disease    Chronic respiratory failure with hypoxia (HCC)    Nicotine dependence    Cervical radiculopathy    Cervical spinal stenosis    Cervical pain    Elevated PSA       Past Medical History:   Diagnosis Date    Acid reflux     Arthritis     BPH (benign prostatic hyperplasia)     no treatment    Calcium disorder     Chronic atrial fibrillation (HCC) 2/6/2018    Chronic pain     Chronic pain disorder     COPD [facility] obstructive pulmonary disease) [facility]     Coronary artery disease 2001    PTCA with one stent    Eye redness     Poked his eye on 3/18/16 "bloody" right eye    Hiatal hernia     Hydronephrosis     Hyperlipidemia     Hypertension     Irregular heart beat     A-Fib    Low back pain     Neck pain     Nocturnal leg cramps     Osteopenia     Peripheral artery disease (HCC)     Peripheral neuropathy     Pleural effusion     Sleep apnea     REFUSES MACHINE HS    Spinal stenosis     Vocal cord polyp 10/2012    panendoscopy       Past Surgical History:   Procedure Laterality Date    ANGIOPLASTY      APPENDECTOMY  04/2007    CARDIOVERSION      x2, for A-Fib    CARDIOVERSION      CATARACT EXTRACTION      CATARACT EXTRACTION W/ INTRAOCULAR LENS IMPLANT Left 01/2014    CORONARY ANGIOPLASTY WITH STENT PLACEMENT  2001    one stent    HERNIA REPAIR Right     inquinal    JOINT REPLACEMENT Left     Hip    KNEE SURGERY  1994    also had arthroscopy right knee 2005    PANENDOSCOPY  10/23/2012    vocal cord polyp    PILONIDAL CYST DRAINAGE      x 3    NH SURG IMPLNT Shirley Sveta Left 8/23/2017    Procedure: Placement of Thoracic SCS with left buttock IPG (IMPULSE);   Surgeon: Gadiel Conrad MD;  [facility] MA[facility];  Service: Neurosurgery    NH Shamika MUHAMMAD IO LENS PROSTH W/O ECP Right 4/6/2016    Procedure: EXTRACTION EXTRACAPSULAR CATARACT PHACO INTRAOCULAR LENS (IOL);   Surgeon: Erica Mejia MD;  [facility] MAIN OR;  Service: Ophthalmology   [address] TRIAL W/ LAMINOTOMY N/A 4/13/2017    Procedure: INSERTION DORSAL COLUMN SPINAL CORD STIMULATOR TRIAL;  Surgeon: Lindsay Ornelas MD;  Location: [place] [address] MAIN OR;  Service:     TONSILLECTOMY         Family History   Problem Relation Age of Onset    Cancer Father 80        prostate    No Known Problems Mother     No Known Problems Sister     No Known Problems Brother     No Known Problems Daughter     No Known Problems Son     No Known Problems Maternal Aunt     No Known Problems Maternal Uncle     No Known Problems Paternal Aunt     No Known Problems Paternal Uncle     No Known Problems Maternal Grandmother     No Known Problems Maternal Grandfather     No Known Problems Paternal Grandmother     No Known Problems Paternal Grandfather        Social History     Occupational History    Not on file   Tobacco Use    Smoking status: Current Every Day Smoker     Packs/day: 1 25     Years: 60 00     Pack years: 75 00    Smokeless tobacco: Never Used   Substance and Sexual Activity    Alcohol use: Yes     Comment: occasionally    Drug use: No    Sexual activity: Not on file         Current Outpatient Medications:     aspirin (ECOTRIN LOW STRENGTH) 81 mg EC tablet, Take 81 mg by mouth daily, Disp: , Rfl:     atorvastatin (LIPITOR) 40 mg tablet, Take 40 mg by mouth daily  , Disp: , Rfl:     budesonide (PULMICORT) 0 5 mg/2 mL nebulizer solution, USE 1 VIAL  IN  NEBULIZER TWICE  DAILY - (Rinse Mouth After Each Treatment), Disp: 60 vial, Rfl: 11    diltiazem (DILACOR XR) 240 MG 24 hr capsule, Take 1 capsule (240 mg total) by mouth daily at bedtime, Disp: 90 capsule, Rfl: 3    fluticasone (FLONASE) 50 mcg/act nasal spray, 2 sprays into each nostril daily, Disp: , Rfl:     gabapentin (NEURONTIN) 300 mg capsule, Take 1 capsule (300 mg total) by mouth 2 (two) times a day, Disp: 60 capsule, Rfl: 1    [START ON 6/13/2021] HYDROcodone-acetaminophen (NORCO)  mg per tablet, Take 1 tablet by mouth 4 (four) times a day as needed for severe painMax Daily Amount: 4 tablets, Disp: 120 tablet, Rfl: 0    [START ON 7/23/2021] HYDROcodone-acetaminophen (NORCO)  mg per tablet, Take 1 tablet by mouth 4 (four) times a day as needed for severe painMax Daily Amount: 4 tablets, Disp: 120 tablet, Rfl: 0    ipratropium-albuterol (DUO-NEB) 0 5-2 5 mg/3 mL nebulizer solution, USE 1 VIAL IN NEBULIZER 3 TIMES DAILY, Disp: 90 vial, Rfl: 11    meclizine (ANTIVERT) 12 5 MG tablet, TAKE 1-2 TABLETS BY MOUTH EVERY 8 HOURS AS NEEDED PRN; DIZZINESS, Disp: , Rfl: 3    naloxone (NARCAN) 4 mg/0 1 mL nasal spray, Administer 1 spray into a nostril  If no response after 2-3 minutes, give another dose in the other nostril using a new spray , Disp: 1 each, Rfl: 1    nebivolol (BYSTOLIC) 10 mg tablet, Take 1 tablet (10 mg total) by mouth daily, Disp: 90 tablet, Rfl: 3    nebivolol (Bystolic) 10 mg tablet, Take 1 tablet (10 mg total) by mouth daily, Disp: 90 tablet, Rfl: 3    nicotine (NICODERM CQ) 21 mg/24 hr TD 24 hr patch, Place 1 patch on the skin every 24 hours, Disp: 30 patch, Rfl: 1    nicotine (NICOTROL) 10 MG inhaler, Use 1  Cartridge per hour as needed    Do not exceed 10 cartridges per day, Disp: 168 each, Rfl: 6    nicotine polacrilex (COMMIT) 4 MG lozenge, Apply 1 lozenge (4 mg total) to the mouth or throat as needed for smoking cessation, Disp: 100 each, Rfl: 0    OMEPRAZOLE PO, Take 40 mg by mouth daily  , Disp: , Rfl:     OXYGEN-HELIUM IN, Inhale 2 L daily at bedtime, Disp: , Rfl:     Perforomist 20 MCG/2ML nebulizer solution, USE 1 VIAL  IN  NEBULIZER TWICE  DAILY - Morning and Evening, Disp: 60 vial, Rfl: 11    phenazopyridine (PYRIDIUM) 200 mg tablet, , Disp: , Rfl: 0    rivaroxaban ([facility]) 20 mg tablet, Take 1 tablet (20 mg total) by mouth daily with dinner, Disp: 28 tablet, Rfl: 0    rivaroxaban (Xarelto) 20 mg tablet, Take 1 tablet (20 mg total) by mouth daily with dinner, Disp: 90 tablet, Rfl: 3    tamsulosin (FLOMAX) 0 4 mg, Take 0 4 mg by mouth daily with dinner, Disp: , Rfl:     traZODone (DESYREL) 50 mg tablet, , Disp: , Rfl:     Yupelri 175 MCG/3ML SOLN, USE 1 VIAL IN NEBULIZER DAILY - DO NOT MIX WITH OTHER NEB MEDS,USE BEFORE OR AFTER, Disp: 30 vial, Rfl: 11    No Known Allergies    Physical Exam:    /74   Pulse 84   Ht 5' 10" (1 778 m)   Wt 121 kg (267 lb)   BMI 38 31 kg/m²     Constitutional:obese  Eyes:anicteric  HEENT:grossly intact  Neck:supple, symmetric, trachea midline and no masses   Pulmonary:even and unlabored  Cardiovascular:No edema or pitting edema present  Skin:Normal without rashes or lesions and well hydrated  Psychiatric:Mood and affect appropriate  Neurologic:Cranial Nerves II-XII grossly intact  Musculoskeletal:ambulates with cane and in wheelchair      Imaging  No orders to display         No orders of the defined types were placed in this encounter

## 2021-06-10 NOTE — PATIENT INSTRUCTIONS
Opioid Safety   AMBULATORY CARE:   Opioid safety  includes the correct use, storage, and disposal of opioids  Examples of opioid medicines to treat pain include oxycodone, morphine, fentanyl, and codeine  Call your local emergency number (911 in the [address]), or have someone else call if:   · You have a seizure  · You cannot be woken  · You have trouble staying awake and your breathing is slow or shallow  · Your speech is slurred, or you are confused  · You are dizzy or stumble when you walk  Call your doctor, or have someone close to you call if:   · You are extremely drowsy, or you have trouble staying awake or speaking  · You have pale or clammy skin  · You have blue fingernails or lips  · Your heartbeat is slower than normal     · You cannot stop vomiting  · You have questions or concerns about your condition or care  Use opioids safely:   · Take prescribed opioids exactly as directed  Opioids come with directions based on the kind of opioid and how it is given  Do not take more than the recommended amount, or for longer than needed  · Do not give opioids to others or take opioids that belong to someone else  Misuse of opioids can lead to an addiction or overdose  · Do not mix opioids with other medicines or alcohol  The combination can cause an overdose, or lead to a coma  · Do not drive or operate heavy machinery after you take the opioid  Your provider or pharmacist can tell you how long to wait after a dose before you do these activities  · Talk to your healthcare provider if you have any side effects  He or she can help you prevent or relieve side effects  Side effects include nausea, sleepiness, itching, and trouble thinking clearly  Manage constipation:  Constipation is the most common side effect of opioid medicine  Constipation is when you have hard, dry bowel movements, or you go longer than usual between bowel movements   Tell your healthcare provider about all changes in your bowel movements while you are taking opioids  He or she may recommend laxative medicine to help you have a bowel movement  He or she may also change the kind of opioid you are taking, or change when you take it  The following are more ways you can prevent or relieve constipation:  · Drink liquids as directed  You may need to drink extra liquids to help soften and move your bowels  Ask how much liquid to drink each day and which liquids are best for you  · Eat high-fiber foods  This may help decrease constipation by adding bulk to your bowel movements  High-fiber foods include fruits, vegetables, whole-grain breads and cereals, and beans  Your healthcare provider or dietitian can help you create a high-fiber meal plan  Your provider may also recommend a fiber supplement if you cannot get enough fiber from food  · Exercise regularly  Regular physical activity can help stimulate your intestines  Walking is a good exercise to prevent or relieve constipation  Ask which exercises are best for you  · Schedule a time each day to have a bowel movement  This may help train your body to have regular bowel movements  Bend forward while you are on the toilet to help move the bowel movement out  Sit on the toilet for at least 10 minutes, even if you do not have a bowel movement  Store opioids safely:   · Store opioids where others cannot easily get them  Keep them in a locked cabinet or secure area  Do not  keep them in a purse or other bag you carry with you  A person may be looking for something else and find the opioids  · Make sure opioids are stored out of the reach of children  A child can easily overdose on opioids  Opioids may look like candy to a small child  The best way to dispose of opioids: The laws vary by country and area   In the [place], the best way is to return the opioids through a take-back program  This program is offered by the [employer] ([address])  The following are options for using the program:  · Take the opioids to a [place] collection site  The site is often a law enforcement center  Call your local law enforcement center for scheduled take-back days in your area  You will be given information on where to go if the collection site is in a different location  · Take the opioids to an approved pharmacy or hospital   A pharmacy or hospital may be set up as a collection site  You will need to ask if it is a JOHNATHAN collection site if you were not directed there  A pharmacy or doctor's office may not be able to take back opioids unless it is a JOHNATHAN site  · Use a mail-back system  This means you are given containers to put the opioids into  You will then mail them in the containers  · Use a take-back drop box  This is a place to leave the opioids at any time  People and animals will not be able to get into the box  Your local law enforcement agency can tell you where to find a drop box in your area  Other safe ways to dispose of opioids: The medicine may come with disposal instructions  The instructions may vary depending on the brand of medicine you are using  Instructions may come in a Medication Guide, but not every medicine has one  You may instead get instructions from your pharmacy or doctor  Follow instructions carefully  The following are general guidelines to follow:  · Find out if you can flush the opioid  Some opioids can be flushed down the toilet or poured into the sink  You will need to contact authorities in your area to see if this is an option for you  The FDA also offers a list of medicines that are safe to flush down the toilet  You can check the list if you cannot get the information for your local area  · Ask your waste management company about rules for putting opioids in the trash  The company will be able to give you specific directions   Scratch out personal information on the original medicine label so it cannot be read  Then put it in the trash  Do not label the trash or put any information on it about the opioids  It should look like regular household trash so no one is tempted to look for the opioids  Keep the trash out of the reach of children and animals  Always make sure trash is secure  · Talk to officials if you live in a facility  If you live in a nursing home or assisted living center, talk to an official  The person will know the rules for your area  Other ways to manage pain:   · Ask your healthcare provider about non-opioid medicines to control pain  Nonprescription medicines include NSAIDs (such as ibuprofen) and acetaminophen  Prescription medicines include muscle relaxers, antidepressants, and steroids  · Pain may be managed without any medicines  Some ways to relieve pain include massage, aromatherapy, or meditation  Physical or occupational therapy may also help  For more information:   · Drug Enforcement Administration  [facility] [address]  Phone: 3- 309 - 244-2809  Web Address: [facility]/drug_disposal/    · [address]  and Drug Administration  [facility] [address] [facility]  Phone: 3- 327 - 618-0270  Web Address: http://Violet/  Follow up with your doctor or pain specialist as directed: You may need to have your dose adjusted  Your doctor or pain specialist can also help you find ways to manage pain without opioids  Write down your questions so you remember to ask them during your visits  © Copyright [address] Information is for End User's use only and may not be sold, redistributed or otherwise used for commercial purposes  All illustrations and images included in CareNotes® are the copyrighted property of A D A M , Inc  or [facility] [place]   The above information is an  only  It is not intended as medical advice for individual conditions or treatments   Talk to your doctor, nurse or pharmacist before following any medical regimen to see if it is safe and effective for you

## 2021-07-10 ENCOUNTER — HOSPITAL ENCOUNTER (OUTPATIENT)
Dept: RADIOLOGY | Facility: HOSPITAL | Age: 81
Discharge: HOME | End: 2021-07-10
Attending: INTERNAL MEDICINE
Payer: MEDICARE

## 2021-07-10 DIAGNOSIS — J43.2 CENTRILOBULAR EMPHYSEMA (HCC): ICD-10-CM

## 2021-07-10 DIAGNOSIS — R06.00 DYSPNEA ON EXERTION: ICD-10-CM

## 2021-07-10 PROCEDURE — 71046 X-RAY EXAM CHEST 2 VIEWS: CPT

## 2021-07-13 ENCOUNTER — OFFICE VISIT (OUTPATIENT)
Dept: PULMONOLOGY | Facility: MEDICAL CENTER | Age: 81
End: 2021-07-13
Payer: MEDICARE

## 2021-07-13 VITALS
DIASTOLIC BLOOD PRESSURE: 82 MMHG | TEMPERATURE: 97.2 F | HEART RATE: 77 BPM | RESPIRATION RATE: 12 BRPM | HEIGHT: 70 IN | OXYGEN SATURATION: 97 % | WEIGHT: 266 LBS | SYSTOLIC BLOOD PRESSURE: 138 MMHG | BODY MASS INDEX: 38.08 KG/M2

## 2021-07-13 DIAGNOSIS — J43.2 CENTRILOBULAR EMPHYSEMA (HCC): Primary | ICD-10-CM

## 2021-07-13 DIAGNOSIS — J96.11 CHRONIC RESPIRATORY FAILURE WITH HYPOXIA (HCC): ICD-10-CM

## 2021-07-13 DIAGNOSIS — F17.210 TOBACCO SMOKER, 20 CIGARETTES OR FEWER PER DAY: ICD-10-CM

## 2021-07-13 DIAGNOSIS — E66.09 CLASS 2 OBESITY DUE TO EXCESS CALORIES WITHOUT SERIOUS COMORBIDITY WITH BODY MASS INDEX (BMI) OF 38.0 TO 38.9 IN ADULT: ICD-10-CM

## 2021-07-13 PROBLEM — E66.812 CLASS 2 OBESITY DUE TO EXCESS CALORIES WITHOUT SERIOUS COMORBIDITY IN ADULT: Status: ACTIVE | Noted: 2021-07-13

## 2021-07-13 PROCEDURE — 94010 BREATHING CAPACITY TEST: CPT | Performed by: INTERNAL MEDICINE

## 2021-07-13 PROCEDURE — 99214 OFFICE O/P EST MOD 30 MIN: CPT | Performed by: INTERNAL MEDICINE

## 2021-07-13 RX ORDER — PREDNISONE 10 MG/1
TABLET ORAL
Qty: 30 TABLET | Refills: 0 | Status: SHIPPED | OUTPATIENT
Start: 2021-07-13 | End: 2021-12-01 | Stop reason: ALTCHOICE

## 2021-07-13 NOTE — ASSESSMENT & PLAN NOTE
Fariba Garrison  Is still smoking 3/4 pack of cigarettes per day  I did discuss smoking cessation with him  He did try Nicotrol inhaler but was not particularly fond of it  He will states he will try to reduce smoking on his own

## 2021-07-13 NOTE — PATIENT INSTRUCTIONS
Tomorrow Wednesday morning start prednisone 3 tablets x4 days then 2 tablets x4 days then 1 tablet x4 days    Take with food in morning      Try to decrease cigarette smoking    Try to lose weight

## 2021-07-13 NOTE — ASSESSMENT & PLAN NOTE
Patient continues to smoke about 3/4 packs of cigarettes per day  Spirometry today shows FEV1 to be moderately severely decreased at 1 39 L or 50% of predicted    He did not want to go for completes PFT at this time  I suspect he has significant air trapping causing restrictive impairment noted on spirometry today    He does prefer a nebulizer medication will continue nebulized perfoormist and budesonide 0 5 mg  Twice a day  He does have also DuoNeb he can use when needed  Also uses nebulized Yuperli once a day      Does have some slight increased shortness of breath recently so I gave him a short course of prednisone 30 mg for 4 days 20 mg for 4 days then 10 mg for 4 days      I did review chest x-ray he had done on July 10th of this year  There is some haziness right lower chest due to pleural plaque  He does have bilateral partially calcified pleural plaques  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

## 2021-07-13 NOTE — PROGRESS NOTES
Assessment/Plan        Problem List Items Addressed This Visit        Respiratory    Centrilobular emphysema ([address] ) - Primary      Patient continues to smoke about 3/4 packs of cigarettes per day  Spirometry today shows FEV1 to be moderately severely decreased at 1 39 L or 50% of predicted    He did not want to go for completes PFT at this time  I suspect he has significant air trapping causing restrictive impairment noted on spirometry today    He does prefer a nebulizer medication will continue nebulized perfoormist and budesonide 0 5 mg  Twice a day  He does have also DuoNeb he can use when needed  Also uses nebulized Yuperli once a day      Does have some slight increased shortness of breath recently so I gave him a short course of prednisone 30 mg for 4 days 20 mg for 4 days then 10 mg for 4 days      I did review chest x-ray he had done on July 10th of this year  There is some haziness right lower chest due to pleural plaque  He does have bilateral partially calcified pleural plaques  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  Relevant Medications    predniSONE 10 mg tablet    Other Relevant Orders    POCT spirometry (Completed)    Chronic respiratory failure with hypoxia (HCC)       Continue oxygen 2 liters/minute at bedtime and with activity as needed            Other    Tobacco smoker, 20 cigarettes or fewer per day     Saba Maya  Is still smoking 3/4 pack of cigarettes per day  I did discuss smoking cessation with him  He did try Nicotrol inhaler but was not particularly fond of it  He will states he will try to reduce smoking on his own  Class 2 obesity due to excess calories without serious comorbidity in adult       I did discuss weight loss with Saba Maya  Told him this would help with his breathing                   CC:    Short of breath with activity      HPI    Saba Both  Has moderate to severe COPD and last spirometry in 2018 showed FEV1 of 1 48 L or 51% predicted  He used to smoke up to 1 5 packs of cigarettes per day but states now he is smoking 3/4 packs of cigarettes per day  He still has not had COVID vaccine yet states he did register to get the vaccine later this week at [address]  Does have periodic cough that is nonproductive  Does get occasional wheezing  He prefers the nebulizer medication as this works best for him  He is using nebulized Perforomist 20 mcg twice a day and budesonide 0 5 mg twice a day and Yuperli once a day  He does have  DuoNeb at home also that he will use occasionally has a rescue inhaler  He does not generally like to use inhalers as he feels the nebulizer works best for him  He does have chronic atrial fibrillation and is on Xarelto 20 mg daily  Also has chronic lower back pain he says this often lungs is activity level  He does use 2 L of oxygen with activity and will use it at nighttime  His medical supply company is [employer]  He also has history of coronary disease and remote PCI done several years ago  He does have history of asbestos exposure as he was a             Past Medical History:   Diagnosis Date    Acid reflux     Arthritis     BPH (benign prostatic hyperplasia)     no treatment    Calcium disorder     Chronic atrial fibrillation (HCC) 2/6/2018    Chronic pain     Chronic pain disorder     COPD (chronic obstructive pulmonary disease) (HCC)     Coronary artery disease 2001    PTCA with one stent    Eye redness     Poked his eye on 3/18/16 "bloody" right eye    Hiatal hernia     Hydronephrosis     Hyperlipidemia     Hypertension     Irregular heart beat     A-Fib    Low back pain     Neck pain     Nocturnal leg cramps     Osteopenia     Peripheral artery disease (HCC)     Peripheral neuropathy     Pleural effusion     Sleep apnea     REFUSES MACHINE HS    Spinal stenosis     Vocal cord polyp 10/2012    panendoscopy Past Surgical History:   Procedure Laterality Date    ANGIOPLASTY      APPENDECTOMY  04/2007    CARDIOVERSION      x2, for A-Fib    CARDIOVERSION      CATARACT EXTRACTION      CATARACT EXTRACTION W/ INTRAOCULAR LENS IMPLANT Left 01/2014    CORONARY ANGIOPLASTY WITH STENT PLACEMENT  2001    one stent    HERNIA REPAIR Right     inquinal    JOINT REPLACEMENT Left     Hip    KNEE SURGERY  1994    also had arthroscopy right knee 2005    PANENDOSCOPY  10/23/2012    vocal cord polyp    PILONIDAL CYST DRAINAGE      x 3    MO SURG IMPLNT [address] Left 8/23/2017    Procedure: Placement of Thoracic SCS with left buttock IPG (IMPULSE); Surgeon: Steff Fairbanks MD;  Location:  MAIN OR;  Service: Neurosurgery    MO XCAPSL CTRC RMVL INSJ IO LENS PROSTH W/O ECP Right 4/6/2016    Procedure: EXTRACTION EXTRACAPSULAR CATARACT PHACO INTRAOCULAR LENS (IOL);   Surgeon: Karyna Dent MD;  Location: [facility] MAIN OR;  Service: Ophthalmology    SPINAL CORD STIMULATOR TRIAL W/ LAMINOTOMY N/A 4/13/2017    Procedure: INSERTION DORSAL COLUMN SPINAL CORD STIMULATOR TRIAL;  Surgeon: Jenny Green MD;  Location: [place] [address] MAIN OR;  Service:    [facility] TONSILLECTOMY           Current Outpatient Medications:     aspirin (ECOTRIN LOW STRENGTH) 81 mg EC tablet, Take 81 mg by mouth daily, Disp: , Rfl:     atorvastatin (LIPITOR) 40 mg tablet, Take 40 mg by mouth daily  , Disp: , Rfl:     budesonide (PULMICORT) 0 5 mg/2 mL nebulizer solution, USE 1 VIAL  IN  NEBULIZER TWICE  DAILY - (Rinse Mouth After Each Treatment), Disp: 60 vial, Rfl: 11    diltiazem (DILACOR XR) 240 MG 24 hr capsule, Take 1 capsule (240 mg total) by mouth daily at bedtime, Disp: 90 capsule, Rfl: 3    fluticasone (FLONASE) 50 mcg/act nasal spray, 2 sprays into each nostril daily, Disp: , Rfl:     gabapentin (NEURONTIN) 300 mg capsule, Take 1 capsule (300 mg total) by mouth 2 (two) times a day, Disp: 60 capsule, Rfl: 1    HYDROcodone-acetaminophen (NORCO)  mg per tablet, Take 1 tablet by mouth 4 (four) times a day as needed for severe painMax Daily Amount: 4 tablets, Disp: 120 tablet, Rfl: 0    [START ON 7/23/2021] HYDROcodone-acetaminophen (NORCO)  mg per tablet, Take 1 tablet by mouth 4 (four) times a day as needed for severe painMax Daily Amount: 4 tablets, Disp: 120 tablet, Rfl: 0    ipratropium-albuterol (DUO-NEB) 0 5-2 5 mg/3 mL nebulizer solution, USE 1 VIAL IN NEBULIZER 3 TIMES DAILY, Disp: 90 vial, Rfl: 11    meclizine (ANTIVERT) 12 5 MG tablet, TAKE 1-2 TABLETS BY MOUTH EVERY 8 HOURS AS NEEDED PRN; DIZZINESS, Disp: , Rfl: 3    naloxone (NARCAN) 4 mg/0 1 mL nasal spray, Administer 1 spray into a nostril   If no response after 2-3 minutes, give another dose in the other nostril using a new spray , Disp: 1 each, Rfl: 1    nebivolol (BYSTOLIC) 10 mg tablet, Take 1 tablet (10 mg total) by mouth daily (Patient taking differently: Take 20 mg by mouth daily ), Disp: 90 tablet, Rfl: 3    OMEPRAZOLE PO, Take 40 mg by mouth daily  , Disp: , Rfl:     OXYGEN-HELIUM IN, Inhale 2 L daily at bedtime, Disp: , Rfl:     Perforomist 20 MCG/2ML nebulizer solution, USE 1 VIAL  IN  NEBULIZER TWICE  DAILY - Morning and Evening, Disp: 60 vial, Rfl: 11    phenazopyridine (PYRIDIUM) 200 mg tablet, , Disp: , Rfl: 0    rivaroxaban (Xarelto) 20 mg tablet, Take 1 tablet (20 mg total) by mouth daily with dinner, Disp: 90 tablet, Rfl: 3    tamsulosin (FLOMAX) 0 4 mg, Take 0 4 mg by mouth daily with dinner, Disp: , Rfl:     traZODone (DESYREL) 50 mg tablet, , Disp: , Rfl:     Yupelri 175 MCG/3ML SOLN, USE 1 VIAL IN NEBULIZER DAILY - DO NOT MIX WITH OTHER NEB MEDS,USE BEFORE OR AFTER, Disp: 30 vial, Rfl: 11    nebivolol (Bystolic) 10 mg tablet, Take 1 tablet (10 mg total) by mouth daily (Patient not taking: Reported on 7/13/2021), Disp: 90 tablet, Rfl: 3    nicotine (NICODERM CQ) 21 mg/24 hr TD 24 hr patch, Place 1 patch on the skin every 24 hours, Disp: 30 patch, Rfl: 1    nicotine (NICOTROL) 10 MG inhaler, Use 1  Cartridge per hour as needed  Do not exceed 10 cartridges per day (Patient not taking: Reported on 7/13/2021), Disp: 168 each, Rfl: 6    nicotine polacrilex (COMMIT) 4 MG lozenge, Apply 1 lozenge (4 mg total) to the mouth or throat as needed for smoking cessation (Patient not taking: Reported on 7/13/2021), Disp: 100 each, Rfl: 0    predniSONE 10 mg tablet, Take 3 tablets x4 days then 2 tablets x4 days then 1 tablet x4 days, Disp: 30 tablet, Rfl: 0    rivaroxaban (XARELTO) 20 mg tablet, Take 1 tablet (20 mg total) by mouth daily with dinner (Patient not taking: Reported on 7/13/2021), Disp: 28 tablet, Rfl: 0    No Known Allergies    Social History     Tobacco Use    Smoking status: Current Every Day Smoker     Packs/day: 0 75     Years: 60 00     Pack years: 45 00    Smokeless tobacco: Never Used   Substance Use Topics    Alcohol use: Yes     Comment: occasionally         Family History   Problem Relation Age of Onset    Cancer Father 80        prostate    No Known Problems Mother     No Known Problems Sister     No Known Problems Brother     No Known Problems Daughter     No Known Problems Son     No Known Problems Maternal Aunt     No Known Problems Maternal Uncle     No Known Problems Paternal Aunt     No Known Problems Paternal Uncle     No Known Problems Maternal Grandmother     No Known Problems Maternal Grandfather     No Known Problems Paternal Grandmother     No Known Problems Paternal Grandfather        Review of Systems        Vitals:    07/13/21 1114   BP: 138/82   Pulse: 77   Resp: 12   Temp: (!) 97 2 °F (36 2 °C)   SpO2: 97%           Physical Exam  Constitutional:       General: He is not in acute distress  Appearance: He is well-developed  HENT:      Head: Normocephalic  Nose: Nose normal       Mouth/Throat:      Pharynx: No oropharyngeal exudate     Eyes:      Conjunctiva/sclera: Conjunctivae normal  Pupils: Pupils are equal, round, and reactive to light  Cardiovascular:      Rate and Rhythm: Normal rate and regular rhythm  Heart sounds: Normal heart sounds  Pulmonary:      Effort: Pulmonary effort is normal       Comments: Decreased breath sounds with faint expiratory wheezes in right lower lobe  Abdominal:      General: There is no distension  Palpations: Abdomen is soft  Tenderness: There is no abdominal tenderness  Musculoskeletal:      Comments: No edema, cyanosid, or clubbing   Skin:     General: Skin is warm and dry  Neurological:      Mental Status: He is alert and oriented to person, place, and time  Psychiatric:         Mood and Affect: Mood normal          Behavior: Behavior normal          Thought Content: Thought content normal          Office Spirometry Results: done today    FVC - 2 07  L   52%  FEV1 - 1 39 L    50%  FEV1/FVC% - 67%     Moderate to severe restrictive impairment likely due to air trapping  There is at least mild probably moderate to severe airflow obstruction

## 2021-07-15 ENCOUNTER — TELEPHONE (OUTPATIENT)
Dept: PAIN MEDICINE | Facility: CLINIC | Age: 81
End: 2021-07-15

## 2021-07-15 DIAGNOSIS — G89.4 CHRONIC PAIN SYNDROME: ICD-10-CM

## 2021-07-15 DIAGNOSIS — M51.16 INTERVERTEBRAL DISC DISORDER WITH RADICULOPATHY OF LUMBAR REGION: ICD-10-CM

## 2021-07-15 DIAGNOSIS — M54.50 PAIN OF LUMBAR SPINE: ICD-10-CM

## 2021-07-15 NOTE — TELEPHONE ENCOUNTER
Please send a new script for today since 7/13 is past   She wrote the script for 7/23, not 7/13  Please advise

## 2021-07-15 NOTE — TELEPHONE ENCOUNTER
Patient   [phone]  Sarah HEREDIA    Patient is calling in about his Hydrocodone  He said that the pharmacy said that he needs a new script   Please follow up thank you     [address]22 - [facility], [address] [place] [address]

## 2021-07-16 RX ORDER — HYDROCODONE BITARTRATE AND ACETAMINOPHEN 10; 325 MG/1; MG/1
1 TABLET ORAL 4 TIMES DAILY PRN
Qty: 120 TABLET | Refills: 0 | Status: SHIPPED | OUTPATIENT
Start: 2021-07-16 | End: 2021-08-12 | Stop reason: SDUPTHER

## 2021-08-12 ENCOUNTER — OFFICE VISIT (OUTPATIENT)
Dept: PAIN MEDICINE | Facility: CLINIC | Age: 81
End: 2021-08-12
Payer: MEDICARE

## 2021-08-12 VITALS — HEART RATE: 88 BPM | DIASTOLIC BLOOD PRESSURE: 83 MMHG | SYSTOLIC BLOOD PRESSURE: 135 MMHG

## 2021-08-12 DIAGNOSIS — M51.16 INTERVERTEBRAL DISC DISORDER WITH RADICULOPATHY OF LUMBAR REGION: ICD-10-CM

## 2021-08-12 DIAGNOSIS — F11.20 UNCOMPLICATED OPIOID DEPENDENCE (HCC): Primary | ICD-10-CM

## 2021-08-12 DIAGNOSIS — M54.50 PAIN OF LUMBAR SPINE: ICD-10-CM

## 2021-08-12 DIAGNOSIS — G89.4 CHRONIC PAIN SYNDROME: ICD-10-CM

## 2021-08-12 PROCEDURE — 99214 OFFICE O/P EST MOD 30 MIN: CPT | Performed by: NURSE PRACTITIONER

## 2021-08-12 RX ORDER — GABAPENTIN 300 MG/1
600 CAPSULE ORAL 2 TIMES DAILY
Qty: 120 CAPSULE | Refills: 1 | Status: SHIPPED | OUTPATIENT
Start: 2021-08-12 | End: 2021-10-04 | Stop reason: SDUPTHER

## 2021-08-12 RX ORDER — HYDROCODONE BITARTRATE AND ACETAMINOPHEN 10; 325 MG/1; MG/1
1 TABLET ORAL 4 TIMES DAILY PRN
Qty: 120 TABLET | Refills: 0 | Status: SHIPPED | OUTPATIENT
Start: 2021-08-19 | End: 2021-10-04 | Stop reason: SDUPTHER

## 2021-08-12 RX ORDER — HYDROCODONE BITARTRATE AND ACETAMINOPHEN 10; 325 MG/1; MG/1
1 TABLET ORAL 4 TIMES DAILY PRN
Qty: 120 TABLET | Refills: 0 | Status: SHIPPED | OUTPATIENT
Start: 2021-09-18 | End: 2021-10-04 | Stop reason: SDUPTHER

## 2021-08-12 NOTE — PROGRESS NOTES
Pain Medicine Follow-Up Note    Assessment:  1  Uncomplicated opioid dependence ([facility] [address] )    2  Chronic pain syndrome    3  Intervertebral disc disorder with radiculopathy of lumbar region    4  Pain of lumbar spine        Plan:  No orders of the defined types were placed in this encounter  New Medications Ordered This Visit   Medications    gabapentin (NEURONTIN) 300 mg capsule     Sig: Take 2 capsules (600 mg total) by mouth 2 (two) times a day     Dispense:  120 capsule     Refill:  1    HYDROcodone-acetaminophen (NORCO)  mg per tablet     Sig: Take 1 tablet by mouth 4 (four) times a day as needed for severe painMax Daily Amount: 4 tablets     Dispense:  120 tablet     Refill:  0    HYDROcodone-acetaminophen (NORCO)  mg per tablet     Sig: Take 1 tablet by mouth 4 (four) times a day as needed for severe painMax Daily Amount: 4 tablets     Dispense:  120 tablet     Refill:  0       My impressions and treatment recommendations were discussed in detail with the patient who verbalized understanding and had no further questions  Given that the patient reports overall reduced pain and improved level of functioning without significant side effects, I feel that it is reasonable to continue him on hydrocodone /acetaminophen 10/325 mg 1 tablet 4 times daily as needed for pain  Prescriptions were sent to the pharmacy on file with fill dates of 08/19/2021 and 09/18/2021  He states that the only side effect that he has is occasional constipation that he treats with prune juice  At this time, we will also increase his gabapentin from 300 mg twice a day to 600 mg twice a day  He states he does not wish to add a 3rd dose because he would not remember to take it  He reports no side effects from this medication and states when he 1st started the 300 mg twice a day he did notice a decrease in his radicular pain symptoms    However, he states that it did not last and now he is not sure if the medication is doing anything  Prescription was sent to the pharmacy on file  [place] Prescription Drug Monitoring Program report was reviewed and was appropriate     There are risks associated with opioid medications, including dependence, addiction and tolerance  The patient understands and agrees to use these medications only as prescribed  Potential side effects of the medications include, but are not limited to, constipation, drowsiness, addiction, impaired judgment and risk of fatal overdose if not taken as prescribed  The patient was warned against driving while taking sedation medications  Sharing medications is a felony  At this point in time, the patient is showing no signs of addiction, abuse, diversion or suicidal ideation  Follow-up is planned in 8 weeks time or sooner as warranted  Discharge instructions were provided  I personally saw and examined the patient and I agree with the above discussed plan of care  History of Present Illness:    Selene Wharton is a 78 y o  male who presents to [facility] for interval re-evaluation of the above stated pain complaints  The patient has a past medical and chronic pain history as outlined in the assessment section  He was last seen on  06/10/2021  the patient reports the pain score today is 7/10 that he states is constant  He describes the quality as sharp, pressure like and shooting  He states that his pain is in his low back and radiates down the back of his right leg to his knee and wraps around to the front of his leg too  He states that since starting gabapentin, he initially noticed relief from the radicular pain however he is unsure how effective it is at this point  He states that recently normally his low back hurts but also his neck and upper back as well as his hands      Pain Contract Signed:    08/26/2019  Last Urine Drug Screen:   03/08/2021    Other than as stated above, the patient denies any interval changes in medications, medical condition, mental condition, symptoms, or allergies since the last office visit  Review of Systems:    Review of Systems   Respiratory: Positive for shortness of breath  Cardiovascular: Negative for chest pain  Gastrointestinal: Negative for constipation, diarrhea, nausea and vomiting  Musculoskeletal: Positive for gait problem  Negative for arthralgias, joint swelling and myalgias  Decreased ROM  Muscle weakness     Skin: Negative for rash  Neurological: Negative for dizziness, seizures and weakness  Memory loss   All other systems reviewed and are negative          Patient Active Problem List   Diagnosis    Chronic pain syndrome    Pain of lumbar spine    Sacroiliitis (HCC)    Intervertebral disc disorder with radiculopathy of lumbar region    Centrilobular emphysema (HCC)    Dyspnea on exertion    Chronic atrial fibrillation (HCC)    Coronary artery disease    [facility] failure with hypoxia ([facility])    Tobacco smoker, 20 cigarettes or fewer per day    Cervical radiculopathy    Cervical spinal stenosis    Cervical pain    Elevated PSA    Class 2 obesity due to excess calories without serious comorbidity in adult    Uncomplicated opioid dependence ([facility] [address] )       Past Medical History:   Diagnosis Date    Acid reflux     Arthritis     BPH (benign prostatic hyperplasia)     no treatment    Calcium disorder     Chronic atrial fibrillation (HCC) 2/6/2018    Chronic pain     Chronic pain disorder     COPD (chronic obstructive pulmonary disease) ([facility] [address] )     Coronary artery disease 2001    PTCA with one stent    Eye redness     Poked his eye on 3/18/16 "bloody" right eye    Hiatal hernia     Hydronephrosis     Hyperlipidemia     Hypertension     Irregular heart beat     A-Fib    Low back pain     Neck pain     Nocturnal leg cramps     Osteopenia     Peripheral artery disease (HCC)     Peripheral neuropathy     Pleural effusion  Sleep apnea     REFUSES MACHINE HS    Spinal stenosis     Vocal cord polyp 10/2012    panendoscopy       Past Surgical History:   Procedure Laterality Date    ANGIOPLASTY      APPENDECTOMY  04/2007    CARDIOVERSION      x2, for A-Fib    CARDIOVERSION      CATARACT EXTRACTION      CATARACT EXTRACTION W/ INTRAOCULAR LENS IMPLANT Left 01/2014    CORONARY ANGIOPLASTY WITH STENT PLACEMENT  2001    one stent    HERNIA REPAIR Right     inquinal    JOINT REPLACEMENT Left     Hip    KNEE SURGERY  1994    also had arthroscopy right knee 2005    PANENDOSCOPY  10/23/2012    vocal cord polyp    PILONIDAL CYST DRAINAGE      x 3    MA SURG IMPLNT [address] Left 8/23/2017    Procedure: Placement of Thoracic SCS with left buttock IPG (IMPULSE); Surgeon: Matt Shipley MD;  Location:  MAIN OR;  Service: Neurosurgery    MA XCAPSL CTRC RMVL INSJ IO LENS PROSTH W/O ECP Right 4/6/2016    Procedure: EXTRACTION EXTRACAPSULAR CATARACT PHACO INTRAOCULAR LENS (IOL);   Surgeon: Elina Magana MD;  Location: [facility] MAIN OR;  Service: Ophthalmology   [address] TRIAL W/ LAMINOTOMY N/A 4/13/2017    Procedure: INSERTION DORSAL COLUMN SPINAL CORD STIMULATOR TRIAL;  Surgeon: Jenaro Lucero MD;  Location: [facility] MAIN OR;  Service:     TONSILLECTOMY         Family History   Problem Relation Age of Onset    Cancer Father 80        prostate    No Known Problems Mother     No Known Problems Sister     No Known Problems Brother     No Known Problems Daughter     No Known Problems Son     No Known Problems Maternal Aunt     No Known Problems Maternal Uncle     No Known Problems Paternal Aunt     No Known Problems Paternal Uncle     No Known Problems Maternal Grandmother     No Known Problems Maternal Grandfather     No Known Problems Paternal Grandmother     No Known Problems Paternal Grandfather        Social History     Occupational History    Not on file   Tobacco Use    Smoking status: Current Every Day Smoker     Packs/day: 0 75     Years: 60 00     Pack years: 45 00    Smokeless tobacco: Never Used   Vaping Use    Vaping Use: Never used   Substance and Sexual Activity    Alcohol use: Yes     Comment: occasionally    Drug use: No    Sexual activity: Not on file         Current Outpatient Medications:     aspirin (ECOTRIN LOW STRENGTH) 81 mg EC tablet, Take 81 mg by mouth daily, Disp: , Rfl:     atorvastatin (LIPITOR) 40 mg tablet, Take 40 mg by mouth daily  , Disp: , Rfl:     budesonide (PULMICORT) 0 5 mg/2 mL nebulizer solution, USE 1 VIAL  IN  NEBULIZER TWICE  DAILY - (Rinse Mouth After Each Treatment), Disp: 60 vial, Rfl: 11    diltiazem (DILACOR XR) 240 MG 24 hr capsule, Take 1 capsule (240 mg total) by mouth daily at bedtime, Disp: 90 capsule, Rfl: 3    fluticasone (FLONASE) 50 mcg/act nasal spray, 2 sprays into each nostril daily, Disp: , Rfl:     gabapentin (NEURONTIN) 300 mg capsule, Take 2 capsules (600 mg total) by mouth 2 (two) times a day, Disp: 120 capsule, Rfl: 1    [START ON 8/19/2021] HYDROcodone-acetaminophen (NORCO)  mg per tablet, Take 1 tablet by mouth 4 (four) times a day as needed for severe painMax Daily Amount: 4 tablets, Disp: 120 tablet, Rfl: 0    [START ON 9/18/2021] HYDROcodone-acetaminophen (NORCO)  mg per tablet, Take 1 tablet by mouth 4 (four) times a day as needed for severe painMax Daily Amount: 4 tablets, Disp: 120 tablet, Rfl: 0    ipratropium-albuterol (DUO-NEB) 0 5-2 5 mg/3 mL nebulizer solution, USE 1 VIAL IN NEBULIZER 3 TIMES DAILY, Disp: 90 vial, Rfl: 11    meclizine (ANTIVERT) 12 5 MG tablet, TAKE 1-2 TABLETS BY MOUTH EVERY 8 HOURS AS NEEDED PRN; DIZZINESS, Disp: , Rfl: 3    naloxone (NARCAN) 4 mg/0 1 mL nasal spray, Administer 1 spray into a nostril   If no response after 2-3 minutes, give another dose in the other nostril using a new spray , Disp: 1 each, Rfl: 1    nebivolol (BYSTOLIC) 10 mg tablet, Take 1 tablet (10 mg total) by mouth daily (Patient taking differently: Take 20 mg by mouth daily ), Disp: 90 tablet, Rfl: 3    nebivolol (Bystolic) 10 mg tablet, Take 1 tablet (10 mg total) by mouth daily (Patient not taking: Reported on 7/13/2021), Disp: 90 tablet, Rfl: 3    nicotine (NICODERM CQ) 21 mg/24 hr TD 24 hr patch, Place 1 patch on the skin every 24 hours, Disp: 30 patch, Rfl: 1    nicotine (NICOTROL) 10 MG inhaler, Use 1  Cartridge per hour as needed    Do not exceed 10 cartridges per day (Patient not taking: Reported on 7/13/2021), Disp: 168 each, Rfl: 6    nicotine polacrilex (COMMIT) 4 MG lozenge, Apply 1 lozenge (4 mg total) to the mouth or throat as needed for smoking cessation (Patient not taking: Reported on 7/13/2021), Disp: 100 each, Rfl: 0    OMEPRAZOLE PO, Take 40 mg by mouth daily  , Disp: , Rfl:     OXYGEN-HELIUM IN, Inhale 2 L daily at bedtime, Disp: , Rfl:     Perforomist 20 MCG/2ML nebulizer solution, USE 1 VIAL  IN  NEBULIZER TWICE  DAILY - Morning and Evening, Disp: 60 vial, Rfl: 11    phenazopyridine (PYRIDIUM) 200 mg tablet, , Disp: , Rfl: 0    predniSONE 10 mg tablet, Take 3 tablets x4 days then 2 tablets x4 days then 1 tablet x4 days, Disp: 30 tablet, Rfl: 0    rivaroxaban (XARELTO) 20 mg tablet, Take 1 tablet (20 mg total) by mouth daily with dinner (Patient not taking: Reported on 7/13/2021), Disp: 28 tablet, Rfl: 0    rivaroxaban (Xarelto) 20 mg tablet, Take 1 tablet (20 mg total) by mouth daily with dinner, Disp: 90 tablet, Rfl: 3    tamsulosin (FLOMAX) 0 4 mg, Take 0 4 mg by mouth daily with dinner, Disp: , Rfl:     traZODone (DESYREL) 50 mg tablet, , Disp: , Rfl:     Yupelri 175 MCG/3ML SOLN, USE 1 VIAL IN NEBULIZER DAILY - DO NOT MIX WITH OTHER NEB MEDS,USE BEFORE OR AFTER, Disp: 30 vial, Rfl: 11    No Known Allergies    Physical Exam:    /83   Pulse 88     Constitutional:obese  Eyes:anicteric  HEENT:grossly intact  Neck:supple, symmetric, trachea midline and no masses Pulmonary:even and unlabored uses portable oxygen  Cardiovascular:No edema or pitting edema present  Skin:Normal without rashes or lesions and well hydrated  Psychiatric:Mood and affect appropriate  Neurologic:Cranial Nerves II-XII grossly intact  Musculoskeletal:in wheelchair      Imaging  No orders to display         No orders of the defined types were placed in this encounter

## 2021-08-12 NOTE — PATIENT INSTRUCTIONS
Opioid Safety   AMBULATORY CARE:   Opioid safety  includes the correct use, storage, and disposal of opioids  Examples of opioid medicines to treat pain include oxycodone, morphine, fentanyl, and codeine  Call your local emergency number (911 in the [address]), or have someone else call if:   · You have a seizure  · You cannot be woken  · You have trouble staying awake and your breathing is slow or shallow  · Your speech is slurred, or you are confused  · You are dizzy or stumble when you walk  Call your doctor, or have someone close to you call if:   · You are extremely drowsy, or you have trouble staying awake or speaking  · You have pale or clammy skin  · You have blue fingernails or lips  · Your heartbeat is slower than normal     · You cannot stop vomiting  · You have questions or concerns about your condition or care  Use opioids safely:   · Take prescribed opioids exactly as directed  Opioids come with directions based on the kind of opioid and how it is given  Do not take more than the recommended amount, or for longer than needed  · Do not give opioids to others or take opioids that belong to someone else  Misuse of opioids can lead to an addiction or overdose  · Do not mix opioids with other medicines or alcohol  The combination can cause an overdose, or lead to a coma  · Do not drive or operate heavy machinery after you take the opioid  Your provider or pharmacist can tell you how long to wait after a dose before you do these activities  · Talk to your healthcare provider if you have any side effects  He or she can help you prevent or relieve side effects  Side effects include nausea, sleepiness, itching, and trouble thinking clearly  Manage constipation:  Constipation is the most common side effect of opioid medicine  Constipation is when you have hard, dry bowel movements, or you go longer than usual between bowel movements   Tell your healthcare provider about all changes in your bowel movements while you are taking opioids  He or she may recommend laxative medicine to help you have a bowel movement  He or she may also change the kind of opioid you are taking, or change when you take it  The following are more ways you can prevent or relieve constipation:  · Drink liquids as directed  You may need to drink extra liquids to help soften and move your bowels  Ask how much liquid to drink each day and which liquids are best for you  · Eat high-fiber foods  This may help decrease constipation by adding bulk to your bowel movements  High-fiber foods include fruits, vegetables, whole-grain breads and cereals, and beans  Your healthcare provider or dietitian can help you create a high-fiber meal plan  Your provider may also recommend a fiber supplement if you cannot get enough fiber from food  · Exercise regularly  Regular physical activity can help stimulate your intestines  Walking is a good exercise to prevent or relieve constipation  Ask which exercises are best for you  · Schedule a time each day to have a bowel movement  This may help train your body to have regular bowel movements  Bend forward while you are on the toilet to help move the bowel movement out  Sit on the toilet for at least 10 minutes, even if you do not have a bowel movement  Store opioids safely:   · Store opioids where others cannot easily get them  Keep them in a locked cabinet or secure area  Do not  keep them in a purse or other bag you carry with you  A person may be looking for something else and find the opioids  · Make sure opioids are stored out of the reach of children  A child can easily overdose on opioids  Opioids may look like candy to a small child  The best way to dispose of opioids: The laws vary by country and area   In the [place], the best way is to return the opioids through a take-back program  This program is offered by the [employer] ([address])  The following are options for using the program:  · Take the opioids to a JOHNATHAN collection site  The site is often a law enforcement center  Call your local law enforcement center for scheduled take-back days in your area  You will be given information on where to go if the collection site is in a different location  · Take the opioids to an approved pharmacy or hospital   A pharmacy or hospital may be set up as a collection site  You will need to ask if it is a JOHNATHAN collection site if you were not directed there  A pharmacy or doctor's office may not be able to take back opioids unless it is a JOHNATHAN site  · Use a mail-back system  This means you are given containers to put the opioids into  You will then mail them in the containers  · Use a take-back drop box  This is a place to leave the opioids at any time  People and animals will not be able to get into the box  Your local law enforcement agency can tell you where to find a drop box in your area  Other safe ways to dispose of opioids: The medicine may come with disposal instructions  The instructions may vary depending on the brand of medicine you are using  Instructions may come in a Medication Guide, but not every medicine has one  You may instead get instructions from your pharmacy or doctor  Follow instructions carefully  The following are general guidelines to follow:  · Find out if you can flush the opioid  Some opioids can be flushed down the toilet or poured into the sink  You will need to contact authorities in your area to see if this is an option for you  The FDA also offers a list of medicines that are safe to flush down the toilet  You can check the list if you cannot get the information for your local area  · Ask your waste management company about rules for putting opioids in the trash  The company will be able to give you specific directions   Scratch out personal information on the original medicine label so it cannot be read  Then put it in the trash  Do not label the trash or put any information on it about the opioids  It should look like regular household trash so no one is tempted to look for the opioids  Keep the trash out of the reach of children and animals  Always make sure trash is secure  · Talk to officials if you live in a facility  If you live in a nursing home or assisted living center, talk to an official  The person will know the rules for your area  Other ways to manage pain:   · Ask your healthcare provider about non-opioid medicines to control pain  Nonprescription medicines include NSAIDs (such as ibuprofen) and acetaminophen  Prescription medicines include muscle relaxers, antidepressants, and steroids  · Pain may be managed without any medicines  Some ways to relieve pain include massage, aromatherapy, or meditation  Physical or occupational therapy may also help  For more information:   · Drug Enforcement Administration  [facility] [place]note 2][address] [facility] [address]  Phone: 8- 997 - [phone]  Web Address: [facility]/drug_disposal/    · [address]  and Drug Administration  [facility] , [address] [place] [address]  Phone: 6- 300 - 116-1031  Web Address: http://[employer]/  Follow up with your doctor or pain specialist as directed: You may need to have your dose adjusted  Your doctor or pain specialist can also help you find ways to manage pain without opioids  Write down your questions so you remember to ask them during your visits  © Copyright [address] 2021 Information is for End User's use only and may not be sold, redistributed or otherwise used for commercial purposes  All illustrations and images included in CareNotes® are the copyrighted property of [employer]  or Justice Mcghee  The above information is an  only  It is not intended as medical advice for individual conditions or treatments   Talk to your doctor, nurse or pharmacist before following any medical regimen to see if it is safe and effective for you

## 2021-08-17 NOTE — MISCELLANEOUS
Chief Complaint   Patient presents with   • Follow-up      Denies known Latex allergy or symptoms of Latex sensitivity.  Medications reviewed and updated.  Social History     Tobacco Use   Smoking Status Never Smoker   Smokeless Tobacco Never Used     Patient's current [facility] status: Reviewed  Advanced directives: Reviewed and discussed  Care Teams Verified: Reviewed/Updated  Depression Screen: Completed     Patient would like communication of their results via:      Home Phone: [phone] (home)  Okay to leave a message containing results? Yes  Health Maintenance Due   Topic Date Due   • Medicare Wellness Visit  07/23/2021   • Pneumococcal Vaccine 65+ (2 of 2 - PPSV23) 07/23/2021     Patient is due for the topics as listed above. Orders placed for Immunization(s) Pneumococcal..       Message   Recorded as Task   Date: 01/11/2018 09:48 AM, Created By: Sharon Turk   Task Name: Miscellaneous   Assigned To: [facility],Team   Regarding Patient: Liz Pagan, Status: In Progress   Comment:    Cara Rosen - 11 Jan 2018 9:48 AM     TASK CREATED  Pt called for a refill on Hydrocodone  He has enough for today, maybe tomorrow  Pt would like a call back to let him know when he can come  the script  He can be reached at [phone]  Renuka Dozier - 11 Jan 2018 10:04 AM     TASK IN PROGRESS   Renuka Dozier - 11 Jan 2018 10:05 AM     TASK EDITED  Please advise  No f/u scheduled with you  Last seen 11/8/17  Sunshine Dodge - 11 Jan 2018 10:52 AM     TASK REPLIED TO: Previously Assigned To Sunshine Dodge  Needs appointment prior to refill  Piedad Domingo - 11 Jan 2018 11:00 AM     TASK IN PROGRESS   Piedad Domingo - 11 Jan 2018 11:00 AM     TASK EDITED  lm to cb to schedule Matt Pandater - 11 Jan 2018 1:10 PM     TASK EDITED  Pt is scheduled for his sovs on Monday, 1/15 at 9am  He wants to know what he is supposed to do when he runs out of meds in the meantime  Please call [phone]   Renuka Dozier - 11 Jan 2018 1:23 PM     TASK EDITED  Tried to call pt, phone is busy   Renuka Dozier - 11 Jan 2018 1:31 PM     TASK IN PROGRESS   Renuka Dozier - 11 Jan 2018 1:38 PM     TASK EDITED  S/W patient  States he has 2 pills left and took 2 already today  Pt upset he was not told prior to today that he needed an appt before he could get a refill  States pain is so bad at times he cannot stand up  Does not take anything else for pain  Advised to try ice to which pt stated 'if I stay in bed all day with ice on it then it is ok "  Any recommedations until ov on 1/15? Sunshine Dodge - 11 Jan 2018 2:01 PM     TASK REPLIED TO: Previously Assigned To Sunshine Dodge  He was told plenty of times before   That's why I ask patient's to schedule a follow up appointment for medications prior to them leaving their last appointment  Tylenol/Advil/Aleve are his best options for pain control  NelerRenuka - 11 Jan 2018 2:08 PM     TASK EDITED  S/W patient and advised of same  Active Problems    1  Allergic rhinitis due to pollen, unspecified rhinitis seasonality   2  Analgesic use (V58 69) (Z79 899)   3  Arteriosclerotic coronary artery disease (414 00) (I25 10)   4  Centrilobular emphysema (492 8) (J43 2)   5  Chronic atrial fibrillation (427 31) (I48 2)   6  Chronic low back pain (724 2,338 29) (M54 5,G89 29)   7  Chronic obstructive pulmonary disease (496) (J44 9)   8  Chronic pain syndrome (338 4) (G89 4)   9  Cigarette nicotine dependence with nicotine-induced disorder (292 9) (F17 219)   10  Continuous opioid dependence (304 01) (F11 20)   11  Dyspnea on exertion (786 09) (R06 09)   12  Elevated PSA (790 93) (R97 20)   13  Encounter for removal of staples (V58 32) (Z48 02)   14  Hiatal hernia (553 3) (K44 9)   15  Hyperlipidemia (272 4) (E78 5)   16  Hypertension (401 9) (I10)   17  Hypoxemia requiring supplemental oxygen (799 02) (R09 02,Z99 81)   18  Long term prescription opiate use (V58 69) (Z79 891)   19  Lumbar degenerative disc disease (722 52) (M51 36)   20  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)   21  Lumbar facet arthropathy (721 3) (M46 96)   22  Lumbar radiculopathy (724 4) (M54 16)   23  Lumbar spinal stenosis (724 02) (M48 061)   24  Morbid or severe obesity due to excess calories (278 01) (E66 01)   25  Nocturnal hypoxemia (327 24)   26  Obstructive sleep apnea (327 23) (G47 33)   27  PAD (peripheral artery disease) (443 9) (I73 9)   28  Preop examination (V72 84) (Z01 818)   29  Pre-procedure lab exam (V72 63) (Z01 812)   30  Sacroiliitis (720 2) (M46 1)   31  Status post surgery (V45 89) (X37 461)    Current Meds   1  Aspirin Low Dose 81 MG TABS; TAKE 1 TABLET DAILY;    Therapy: (Sharon Forman) to Recorded   2  Breo Ellipta 100-25 MCG/INH Inhalation Aerosol Powder Breath Activated; INHALE 1   PUFFS Daily; Therapy: 94ZYS8470 to (Evaluate:15May2018); Last Rx:16Nov2017 Ordered   3  Bystolic 10 MG Oral Tablet; TAKE 1 TABLET DAILY  Requested for: 96RFR0890; Last   Rx:28Mar2017 Ordered   4  DilTIAZem HCl  MG Oral Capsule Extended Release 24 Hour; take 1 capsule   daily  Requested for: 79OIS6335; Last Rx:23May2017 Ordered   5  Hydrocodone-Acetaminophen  MG Oral Tablet (Norco); TAKE 1 TABLET 3 times   daily PRN pain; Therapy: 62SGB1564 to (Evaluate:11Jan2018); Last Rx:08Nov2017 Ordered   6  Ipratropium-Albuterol 0 5-2 5 (3) MG/3ML Inhalation Solution; USE 1 UNIT DOSE IN   NEBULIZER 4 TIMES DAILY; Therapy: 17XWU0456 to (Evaluate:05Jun2018)  Requested for: 00OBU4680; Last   Rx:[id number]; Status: ACTIVE - Retrospective By Protocol Authorization Ordered   7  Ipratropium-Albuterol 0 5-2 5 (3) MG/3ML Inhalation Solution; USE 1 UNIT DOSE IN   NEBULIZER 4 TIMES DAILY; Therapy: 81ACG3648 to (Evaluate:16Jun2018)  Requested for: 14CMX0001; Last   Rx:[id number] Ordered   8  Lipitor 40 MG Oral Tablet (Atorvastatin Calcium); 1 DAILY; Therapy: (Pierre Hernandez) to Recorded   9  Omeprazole 20 MG Oral Capsule Delayed Release; TAKE 2 CAPSULES DAILY EVERY   MORNING BEFORE BREAKFAST; Therapy: 23LUY0942 to Recorded   10  ProAir  (90 Base) MCG/ACT Inhalation Aerosol Solution; 2 PUFFS EVERY 6    HOURS  Requested for: 71YNP7480; Last Rx:[id number] Ordered   11  Tamsulosin HCl - 0 4 MG Oral Capsule; Therapy: 37Ers1884 to Recorded   12  Xarelto 20 MG Oral Tablet; 1 DAILY; Therapy: 40PSE2082 to (Last Rx:17Oct2017)  Requested for: 17Oct2017 Ordered    Allergies    1   No Known Drug Allergies    Signatures   Electronically signed by : Benjamin Barbosa RN; Jan 11 2018  2:09PM EST                       (Author)

## 2021-08-30 DIAGNOSIS — I10 ESSENTIAL HYPERTENSION: ICD-10-CM

## 2021-08-31 RX ORDER — NEBIVOLOL 10 MG/1
10 TABLET ORAL DAILY
Qty: 90 TABLET | Refills: 3 | Status: SHIPPED | OUTPATIENT
Start: 2021-08-31 | End: 2021-12-01 | Stop reason: SDUPTHER

## 2021-09-07 ENCOUNTER — TELEPHONE (OUTPATIENT)
Dept: OBGYN CLINIC | Facility: CLINIC | Age: 81
End: 2021-09-07

## 2021-09-19 DIAGNOSIS — G89.4 CHRONIC PAIN SYNDROME: ICD-10-CM

## 2021-09-19 DIAGNOSIS — M54.50 PAIN OF LUMBAR SPINE: ICD-10-CM

## 2021-09-19 DIAGNOSIS — M51.16 INTERVERTEBRAL DISC DISORDER WITH RADICULOPATHY OF LUMBAR REGION: ICD-10-CM

## 2021-09-19 RX ORDER — HYDROCODONE BITARTRATE AND ACETAMINOPHEN 10; 325 MG/1; MG/1
1 TABLET ORAL 4 TIMES DAILY PRN
Qty: 120 TABLET | Refills: 0 | Status: CANCELLED | OUTPATIENT
Start: 2021-09-19 | End: 2021-10-19

## 2021-09-19 NOTE — TELEPHONE ENCOUNTER
The patient is requesting a refill for the following medication: HYDROcodone-acetaminophen (NORCO)  mg per tablet  The patient stated that he did not get 6 pills last month and so he had less pills  The patient stated the prescription can be sent to the pharmacy below

## 2021-09-20 NOTE — TELEPHONE ENCOUNTER
RN attempted to call pt  VMMLOM  Provided with CB# and [place]    Script filled by [facility] 9/18

## 2021-09-20 NOTE — TELEPHONE ENCOUNTER
RN s/w pt and advised that script with DNF date of 9/18 should be available at his pharmacy  Pt will contact pharmacy and CB with any concerns  Provided with CB# and [place]

## 2021-10-04 ENCOUNTER — OFFICE VISIT (OUTPATIENT)
Dept: PAIN MEDICINE | Facility: CLINIC | Age: 81
End: 2021-10-04
Payer: MEDICARE

## 2021-10-04 VITALS
DIASTOLIC BLOOD PRESSURE: 81 MMHG | BODY MASS INDEX: 38.17 KG/M2 | SYSTOLIC BLOOD PRESSURE: 156 MMHG | HEART RATE: 79 BPM | TEMPERATURE: 98.4 F | WEIGHT: 266 LBS

## 2021-10-04 DIAGNOSIS — G89.4 CHRONIC PAIN SYNDROME: Primary | ICD-10-CM

## 2021-10-04 DIAGNOSIS — Z79.891 ENCOUNTER FOR LONG-TERM USE OF OPIATE ANALGESIC: ICD-10-CM

## 2021-10-04 DIAGNOSIS — F11.20 UNCOMPLICATED OPIOID DEPENDENCE (HCC): ICD-10-CM

## 2021-10-04 DIAGNOSIS — M54.50 PAIN OF LUMBAR SPINE: ICD-10-CM

## 2021-10-04 DIAGNOSIS — M51.16 INTERVERTEBRAL DISC DISORDER WITH RADICULOPATHY OF LUMBAR REGION: ICD-10-CM

## 2021-10-04 PROCEDURE — 80305 DRUG TEST PRSMV DIR OPT OBS: CPT | Performed by: NURSE PRACTITIONER

## 2021-10-04 PROCEDURE — 99214 OFFICE O/P EST MOD 30 MIN: CPT | Performed by: NURSE PRACTITIONER

## 2021-10-04 RX ORDER — OMEPRAZOLE 20 MG/1
1 CAPSULE, DELAYED RELEASE ORAL AS NEEDED
COMMUNITY
Start: 2021-08-20

## 2021-10-04 RX ORDER — HYDROCODONE BITARTRATE AND ACETAMINOPHEN 10; 325 MG/1; MG/1
1 TABLET ORAL 4 TIMES DAILY PRN
Qty: 120 TABLET | Refills: 0 | Status: SHIPPED | OUTPATIENT
Start: 2021-10-20 | End: 2021-12-13

## 2021-10-04 RX ORDER — HYDROCODONE BITARTRATE AND ACETAMINOPHEN 10; 325 MG/1; MG/1
1 TABLET ORAL 4 TIMES DAILY PRN
Qty: 120 TABLET | Refills: 0 | Status: SHIPPED | OUTPATIENT
Start: 2021-11-19 | End: 2021-12-13

## 2021-10-04 RX ORDER — DILTIAZEM HYDROCHLORIDE 240 MG/1
CAPSULE, COATED, EXTENDED RELEASE ORAL
COMMUNITY
Start: 2021-08-20 | End: 2021-12-01 | Stop reason: SDUPTHER

## 2021-10-04 RX ORDER — AMOXICILLIN AND CLAVULANATE POTASSIUM 875; 125 MG/1; MG/1
1 TABLET, FILM COATED ORAL EVERY 12 HOURS
COMMUNITY
Start: 2021-09-28 | End: 2021-12-01 | Stop reason: ALTCHOICE

## 2021-10-04 RX ORDER — GABAPENTIN 300 MG/1
600 CAPSULE ORAL 2 TIMES DAILY
Qty: 120 CAPSULE | Refills: 1 | Status: SHIPPED | OUTPATIENT
Start: 2021-10-04 | End: 2021-12-21 | Stop reason: SDUPTHER

## 2021-10-06 LAB
6MAM UR QL CFM: NEGATIVE NG/ML
7AMINOCLONAZEPAM UR QL CFM: NEGATIVE NG/ML
A-OH ALPRAZ UR QL CFM: NEGATIVE NG/ML
AMPHET UR QL CFM: NEGATIVE NG/ML
AMPHET UR QL CFM: NEGATIVE NG/ML
BUPRENORPHINE UR QL CFM: NEGATIVE NG/ML
BUTALBITAL UR QL CFM: NEGATIVE NG/ML
BZE UR QL CFM: NEGATIVE NG/ML
CODEINE UR QL CFM: NEGATIVE NG/ML
DESIPRAMINE UR QL CFM: NEGATIVE NG/ML
DESIPRAMINE UR QL CFM: NEGATIVE NG/ML
EDDP UR QL CFM: NEGATIVE NG/ML
ETHYL GLUCURONIDE UR QL CFM: NEGATIVE NG/ML
ETHYL SULFATE UR QL SCN: NEGATIVE NG/ML
FENTANYL UR QL CFM: NEGATIVE NG/ML
GLUCOSE 30M P 50 G LAC PO SERPL-MCNC: NEGATIVE NG/ML
HYDROCODONE UR QL CFM: ABNORMAL NG/ML
HYDROCODONE UR QL CFM: ABNORMAL NG/ML
HYDROMORPHONE UR QL CFM: ABNORMAL NG/ML
IMIPRAMINE UR QL CFM: NEGATIVE NG/ML
LORAZEPAM UR QL CFM: NEGATIVE NG/ML
MDMA UR QL CFM: NEGATIVE NG/ML
ME-PHENIDATE UR QL CFM: NEGATIVE NG/ML
MEPERIDINE UR QL CFM: NEGATIVE NG/ML
MEPHEDRONE UR QL CFM: NEGATIVE NG/ML
METHADONE UR QL CFM: NEGATIVE NG/ML
METHAMPHET UR QL CFM: NEGATIVE NG/ML
MORPHINE UR QL CFM: NEGATIVE NG/ML
MORPHINE UR QL CFM: NEGATIVE NG/ML
NORBUPRENORPHINE UR QL CFM: NEGATIVE NG/ML
NORDIAZEPAM UR QL CFM: NEGATIVE NG/ML
NORFENTANYL UR QL CFM: NEGATIVE NG/ML
NORHYDROCODONE UR QL CFM: ABNORMAL NG/ML
NORHYDROCODONE UR QL CFM: ABNORMAL NG/ML
NORMEPERIDINE UR QL CFM: NEGATIVE NG/ML
NOROXYCODONE UR QL CFM: NEGATIVE NG/ML
OPC-3373 QUANTIFICATION: NEGATIVE
OXAZEPAM UR QL CFM: NEGATIVE NG/ML
OXYCODONE UR QL CFM: NEGATIVE NG/ML
OXYMORPHONE UR QL CFM: NEGATIVE NG/ML
OXYMORPHONE UR QL CFM: NEGATIVE NG/ML
PCP UR QL CFM: NEGATIVE NG/ML
PHENOBARB UR QL CFM: NEGATIVE NG/ML
SECOBARBITAL UR QL CFM: NEGATIVE NG/ML
SL AMB 3-METHYL-FENTANYL QUANTIFICATION: NORMAL NG/ML
SL AMB 4-ANPP QUANTIFICATION: NORMAL NG/ML
SL AMB 4-FIBF QUANTIFICATION: NORMAL NG/ML
SL AMB 5F-ADB-M7 METABOLITE QUANTIFICATION: NEGATIVE NG/ML
SL AMB AB-FUBINACA-M3 METABOLITE QUANTIFICATION: NEGATIVE NG/ML
SL AMB ACETYL FENTANYL QUANTIFICATION: NORMAL NG/ML
SL AMB ACETYL NORFENTANYL QUANTIFICATION: NORMAL NG/ML
SL AMB ACRYL FENTANYL QUANTIFICATION: NORMAL NG/ML
SL AMB BATH SALTS: NEGATIVE NG/ML
SL AMB BUTRYL FENTANYL QUANTIFICATION: NORMAL NG/ML
SL AMB CARFENTANIL QUANTIFICATION: NORMAL NG/ML
SL AMB CLOZAPINE QUANTIFICATION: NEGATIVE NG/ML
SL AMB CTHC (MARIJUANA METABOLITE) QUANTIFICATION: NEGATIVE NG/ML
SL AMB CYCLOPROPYL FENTANYL QUANTIFICATION: NORMAL NG/ML
SL AMB DEXTROMETHORPHAN QUANTIFICATION: NEGATIVE NG/ML
SL AMB DEXTRORPHAN (DEXTROMETHORPHAN METABOLITE) QUANT: NEGATIVE NG/ML
SL AMB FURANYL FENTANYL QUANTIFICATION: NORMAL NG/ML
SL AMB HALOPERIDOL  QUANTIFICATION: NEGATIVE NG/ML
SL AMB HALOPERIDOL METABOLITE QUANTIFICATION: NEGATIVE NG/ML
SL AMB JWH018 METABOLITE QUANTIFICATION: NEGATIVE NG/ML
SL AMB JWH073 METABOLITE QUANTIFICATION: NEGATIVE NG/ML
SL AMB MDMB-FUBINACA-M1 METABOLITE QUANTIFICATION: NEGATIVE NG/ML
SL AMB METHOXYACETYL FENTANYL QUANTIFICATION: NORMAL NG/ML
SL AMB METHYLONE QUANTIFICATION: NEGATIVE NG/ML
SL AMB N-DESMETHYL U-47700 QUANTIFICATION: NORMAL NG/ML
SL AMB N-DESMETHYL-TRAMADOL QUANTIFICATION: NEGATIVE NG/ML
SL AMB N-DESMETHYLCLOZAPINE QUANTIFICATION: NEGATIVE NG/ML
SL AMB PHENTERMINE QUANTIFICATION: NEGATIVE NG/ML
SL AMB RCS4 METABOLITE QUANTIFICATION: NEGATIVE NG/ML
SL AMB RITALINIC ACID QUANTIFICATION: NEGATIVE NG/ML
SL AMB U-47700 QUANTIFICATION: NORMAL NG/ML
SPECIMEN DRAWN SERPL: NEGATIVE NG/ML
TAPENTADOL UR QL CFM: NEGATIVE NG/ML
TEMAZEPAM UR QL CFM: NEGATIVE NG/ML
TEMAZEPAM UR QL CFM: NEGATIVE NG/ML
TRAMADOL UR QL CFM: NEGATIVE NG/ML
URATE/CREAT 24H UR: NEGATIVE NG/ML

## 2021-10-14 ENCOUNTER — TELEPHONE (OUTPATIENT)
Dept: CARDIOLOGY CLINIC | Facility: CLINIC | Age: 81
End: 2021-10-14

## 2021-10-25 DIAGNOSIS — J43.2 CENTRILOBULAR EMPHYSEMA (HCC): ICD-10-CM

## 2021-10-27 RX ORDER — REVEFENACIN 175 UG/3ML
175 SOLUTION RESPIRATORY (INHALATION) DAILY
Qty: 90 ML | Refills: 5 | Status: SHIPPED | OUTPATIENT
Start: 2021-10-27 | End: 2022-07-22 | Stop reason: SDUPTHER

## 2021-10-27 RX ORDER — BUDESONIDE 0.5 MG/2ML
0.5 INHALANT ORAL 2 TIMES DAILY
Qty: 120 ML | Refills: 5 | Status: SHIPPED | OUTPATIENT
Start: 2021-10-27 | End: 2022-07-22 | Stop reason: SDUPTHER

## 2021-10-27 RX ORDER — FORMOTEROL FUMARATE 20 UG/2ML
20 SOLUTION RESPIRATORY (INHALATION) 2 TIMES DAILY
Qty: 120 ML | Refills: 5 | Status: SHIPPED | OUTPATIENT
Start: 2021-10-27 | End: 2022-07-22 | Stop reason: SDUPTHER

## 2021-11-16 ENCOUNTER — OFFICE VISIT (OUTPATIENT)
Dept: PULMONOLOGY | Facility: MEDICAL CENTER | Age: 81
End: 2021-11-16
Payer: MEDICARE

## 2021-11-16 VITALS
WEIGHT: 282.4 LBS | HEIGHT: 70 IN | HEART RATE: 91 BPM | RESPIRATION RATE: 12 BRPM | DIASTOLIC BLOOD PRESSURE: 82 MMHG | SYSTOLIC BLOOD PRESSURE: 142 MMHG | OXYGEN SATURATION: 90 % | TEMPERATURE: 97.2 F | BODY MASS INDEX: 40.43 KG/M2

## 2021-11-16 DIAGNOSIS — J96.11 CHRONIC RESPIRATORY FAILURE WITH HYPOXIA (HCC): ICD-10-CM

## 2021-11-16 DIAGNOSIS — F17.210 TOBACCO SMOKER, 20 CIGARETTES OR FEWER PER DAY: ICD-10-CM

## 2021-11-16 DIAGNOSIS — I48.20 CHRONIC ATRIAL FIBRILLATION (HCC): ICD-10-CM

## 2021-11-16 DIAGNOSIS — J43.2 CENTRILOBULAR EMPHYSEMA (HCC): Primary | ICD-10-CM

## 2021-11-16 PROCEDURE — 99214 OFFICE O/P EST MOD 30 MIN: CPT | Performed by: INTERNAL MEDICINE

## 2021-11-19 ENCOUNTER — TELEPHONE (OUTPATIENT)
Dept: PAIN MEDICINE | Facility: CLINIC | Age: 81
End: 2021-11-19

## 2021-12-01 ENCOUNTER — OFFICE VISIT (OUTPATIENT)
Dept: CARDIOLOGY CLINIC | Facility: CLINIC | Age: 81
End: 2021-12-01
Payer: MEDICARE

## 2021-12-01 VITALS
HEIGHT: 70 IN | TEMPERATURE: 98 F | WEIGHT: 280 LBS | DIASTOLIC BLOOD PRESSURE: 80 MMHG | SYSTOLIC BLOOD PRESSURE: 142 MMHG | OXYGEN SATURATION: 96 % | HEART RATE: 69 BPM | BODY MASS INDEX: 40.09 KG/M2

## 2021-12-01 DIAGNOSIS — I35.0 MILD AORTIC STENOSIS: ICD-10-CM

## 2021-12-01 DIAGNOSIS — I49.3 ASYMPTOMATIC PVCS: ICD-10-CM

## 2021-12-01 DIAGNOSIS — R06.00 DYSPNEA ON EXERTION: ICD-10-CM

## 2021-12-01 DIAGNOSIS — E78.5 DYSLIPIDEMIA: ICD-10-CM

## 2021-12-01 DIAGNOSIS — I25.10 CAD S/P PERCUTANEOUS CORONARY ANGIOPLASTY: ICD-10-CM

## 2021-12-01 DIAGNOSIS — F17.210 CONTINUOUS DEPENDENCE ON CIGARETTE SMOKING: ICD-10-CM

## 2021-12-01 DIAGNOSIS — I48.20 CHRONIC ATRIAL FIBRILLATION (HCC): Primary | ICD-10-CM

## 2021-12-01 DIAGNOSIS — J96.11 CHRONIC RESPIRATORY FAILURE WITH HYPOXIA (HCC): ICD-10-CM

## 2021-12-01 DIAGNOSIS — E66.01 OBESITY, MORBID (HCC): ICD-10-CM

## 2021-12-01 DIAGNOSIS — G47.33 OBSTRUCTIVE SLEEP APNEA: ICD-10-CM

## 2021-12-01 DIAGNOSIS — J43.2 CENTRILOBULAR EMPHYSEMA (HCC): ICD-10-CM

## 2021-12-01 DIAGNOSIS — I10 ESSENTIAL HYPERTENSION: ICD-10-CM

## 2021-12-01 DIAGNOSIS — Z98.61 CAD S/P PERCUTANEOUS CORONARY ANGIOPLASTY: ICD-10-CM

## 2021-12-01 PROCEDURE — 99214 OFFICE O/P EST MOD 30 MIN: CPT | Performed by: INTERNAL MEDICINE

## 2021-12-01 PROCEDURE — 93000 ELECTROCARDIOGRAM COMPLETE: CPT | Performed by: INTERNAL MEDICINE

## 2021-12-13 ENCOUNTER — OFFICE VISIT (OUTPATIENT)
Dept: PAIN MEDICINE | Facility: CLINIC | Age: 81
End: 2021-12-13
Payer: MEDICARE

## 2021-12-13 VITALS — SYSTOLIC BLOOD PRESSURE: 142 MMHG | HEART RATE: 69 BPM | DIASTOLIC BLOOD PRESSURE: 80 MMHG | TEMPERATURE: 98.2 F

## 2021-12-13 DIAGNOSIS — M51.16 INTERVERTEBRAL DISC DISORDER WITH RADICULOPATHY OF LUMBAR REGION: ICD-10-CM

## 2021-12-13 DIAGNOSIS — M54.50 PAIN OF LUMBAR SPINE: ICD-10-CM

## 2021-12-13 DIAGNOSIS — G89.4 CHRONIC PAIN SYNDROME: ICD-10-CM

## 2021-12-13 PROCEDURE — 99214 OFFICE O/P EST MOD 30 MIN: CPT | Performed by: NURSE PRACTITIONER

## 2021-12-13 RX ORDER — HYDROCODONE BITARTRATE AND ACETAMINOPHEN 10; 325 MG/1; MG/1
1 TABLET ORAL 4 TIMES DAILY PRN
Qty: 120 TABLET | Refills: 0 | Status: SHIPPED | OUTPATIENT
Start: 2022-01-19 | End: 2022-02-07 | Stop reason: ALTCHOICE

## 2021-12-13 RX ORDER — HYDROCODONE BITARTRATE AND ACETAMINOPHEN 10; 325 MG/1; MG/1
1 TABLET ORAL 4 TIMES DAILY PRN
Qty: 120 TABLET | Refills: 0 | Status: SHIPPED | OUTPATIENT
Start: 2021-12-20 | End: 2022-01-20 | Stop reason: SDUPTHER

## 2021-12-21 ENCOUNTER — TELEPHONE (OUTPATIENT)
Dept: PAIN MEDICINE | Facility: CLINIC | Age: 81
End: 2021-12-21

## 2021-12-21 DIAGNOSIS — G89.4 CHRONIC PAIN SYNDROME: ICD-10-CM

## 2021-12-21 DIAGNOSIS — M51.16 INTERVERTEBRAL DISC DISORDER WITH RADICULOPATHY OF LUMBAR REGION: ICD-10-CM

## 2021-12-21 RX ORDER — GABAPENTIN 300 MG/1
900 CAPSULE ORAL 2 TIMES DAILY
Qty: 180 CAPSULE | Refills: 1 | Status: SHIPPED | OUTPATIENT
Start: 2021-12-21 | End: 2022-01-19 | Stop reason: SDUPTHER

## 2022-01-19 DIAGNOSIS — G89.4 CHRONIC PAIN SYNDROME: ICD-10-CM

## 2022-01-19 DIAGNOSIS — M51.16 INTERVERTEBRAL DISC DISORDER WITH RADICULOPATHY OF LUMBAR REGION: ICD-10-CM

## 2022-01-19 RX ORDER — GABAPENTIN 300 MG/1
900 CAPSULE ORAL 2 TIMES DAILY
Qty: 180 CAPSULE | Refills: 1 | Status: SHIPPED | OUTPATIENT
Start: 2022-01-19 | End: 2022-02-07 | Stop reason: SDUPTHER

## 2022-01-20 DIAGNOSIS — M54.50 PAIN OF LUMBAR SPINE: ICD-10-CM

## 2022-01-20 DIAGNOSIS — M51.16 INTERVERTEBRAL DISC DISORDER WITH RADICULOPATHY OF LUMBAR REGION: ICD-10-CM

## 2022-01-20 DIAGNOSIS — G89.4 CHRONIC PAIN SYNDROME: ICD-10-CM

## 2022-01-20 RX ORDER — HYDROCODONE BITARTRATE AND ACETAMINOPHEN 10; 325 MG/1; MG/1
1 TABLET ORAL 4 TIMES DAILY PRN
Qty: 120 TABLET | Refills: 0 | Status: SHIPPED | OUTPATIENT
Start: 2022-01-20 | End: 2022-02-07 | Stop reason: ALTCHOICE

## 2022-02-07 ENCOUNTER — OFFICE VISIT (OUTPATIENT)
Dept: PAIN MEDICINE | Facility: CLINIC | Age: 82
End: 2022-02-07
Payer: MEDICARE

## 2022-02-07 VITALS — DIASTOLIC BLOOD PRESSURE: 80 MMHG | SYSTOLIC BLOOD PRESSURE: 142 MMHG | TEMPERATURE: 98.4 F

## 2022-02-07 DIAGNOSIS — Z79.891 ENCOUNTER FOR LONG-TERM USE OF OPIATE ANALGESIC: ICD-10-CM

## 2022-02-07 DIAGNOSIS — F11.20 UNCOMPLICATED OPIOID DEPENDENCE (HCC): ICD-10-CM

## 2022-02-07 DIAGNOSIS — G89.4 CHRONIC PAIN SYNDROME: Primary | ICD-10-CM

## 2022-02-07 DIAGNOSIS — M51.16 INTERVERTEBRAL DISC DISORDER WITH RADICULOPATHY OF LUMBAR REGION: ICD-10-CM

## 2022-02-07 PROCEDURE — 80305 DRUG TEST PRSMV DIR OPT OBS: CPT | Performed by: NURSE PRACTITIONER

## 2022-02-07 PROCEDURE — 99214 OFFICE O/P EST MOD 30 MIN: CPT | Performed by: NURSE PRACTITIONER

## 2022-02-07 RX ORDER — HYDROCODONE BITARTRATE AND ACETAMINOPHEN 10; 325 MG/1; MG/1
1 TABLET ORAL EVERY 6 HOURS PRN
Qty: 30 TABLET | Refills: 0 | Status: SHIPPED | OUTPATIENT
Start: 2022-03-21 | End: 2022-02-22 | Stop reason: SDUPTHER

## 2022-02-07 RX ORDER — GABAPENTIN 300 MG/1
900 CAPSULE ORAL 3 TIMES DAILY
Qty: 270 CAPSULE | Refills: 1 | Status: SHIPPED | OUTPATIENT
Start: 2022-02-07 | End: 2022-04-08

## 2022-02-07 RX ORDER — DILTIAZEM HYDROCHLORIDE 240 MG/1
CAPSULE, COATED, EXTENDED RELEASE ORAL
COMMUNITY
Start: 2022-01-17

## 2022-02-07 RX ORDER — HYDROCODONE BITARTRATE AND ACETAMINOPHEN 10; 325 MG/1; MG/1
1 TABLET ORAL EVERY 6 HOURS PRN
Qty: 30 TABLET | Refills: 0 | Status: SHIPPED | OUTPATIENT
Start: 2022-02-19 | End: 2022-02-22 | Stop reason: SDUPTHER

## 2022-02-07 NOTE — PATIENT INSTRUCTIONS
Increase gabapentin to 900 mg three times a day by taking a third dose of 300 mg for 3 days, then increase to 600 mg for 3 days, then 900 mg 3 times a day  Opioid Safety   WHAT YOU NEED TO KNOW:   What do I need to know about opioid safety? Safety includes the correct use, storage, and disposal of opioids  Examples of opioid pain medicines are oxycodone, morphine, fentanyl, and codeine  How are opioids given? Opioids can be given as a pill, patch, or suppository  They can also be given as an injection into a vein, near a nerve, or into a joint  Your prescription may include one or both of the following:  · Short-acting opioids  work fast and relieve pain for about 3 to 6 hours  They are often used for acute or breakthrough pain  · Long-acting opioids  usually last at least 8 hours  You can take them less often and they may be used for chronic pain  How do I use opioids safely? · Take prescribed opioids exactly as directed  Opioids come with directions based on the kind and how it is given  Talk to your healthcare provider or a pharmacist if you have any questions  Do not take more than the recommended amount  Too much can cause a life-threatening overdose  Do not continue to take it after your pain stops  You may develop tolerance  This means you keep needing higher doses to get the same effect  You may also develop opioid use disorder  This means you are not able to control your opioid use  · Do not give opioids to others or take opioids that belong to someone else  The kind or amount one person takes may not be right for another  The person you share them with may also be taking medicines that do not mix with opioids  He or she may drink alcohol or use other drugs that can cause life-threatening problems when mixed with opioids  · Do not mix opioids with other medicines or alcohol  The combination can cause an overdose, or cause you to stop breathing   Alcohol, sleeping pills, and medicines such as antihistamines can make you sleepy  A combination with opioids can lead to a coma  · Do not drive or operate heavy machinery after you use an opioid  You may feel drowsy or have trouble concentrating  You can injure yourself or others if you drive or use heavy machinery when you are not alert  Your provider or pharmacist can tell you how long to wait after a dose before you do these activities  · Talk to your healthcare provider if you have any side effects  Side effects include nausea, sleepiness, itching, and trouble thinking clearly  Your provider may need to make changes to the kind or amount of opioid you are taking  He or she can also help you find ways to prevent or relieve side effects  What can I do to manage constipation? Constipation is the most common side effect of opioid medicine  Constipation is when you have hard, dry bowel movements, or you go longer than usual between bowel movements  Tell your healthcare provider about all changes in your bowel movements while you are taking opioids  He or she may recommend laxative medicine to help you have a bowel movement  He or she may also change the kind of opioid you are taking, or change when you take it  The following are more ways you can prevent or relieve constipation:  · Drink liquids as directed  You may need to drink extra liquids to help soften and move your bowels  Ask how much liquid to drink each day and which liquids are best for you  · Eat high-fiber foods  This may help decrease constipation by adding bulk to your bowel movements  High-fiber foods include fruits, vegetables, whole-grain breads and cereals, and beans  Your healthcare provider or dietitian can help you create a high-fiber meal plan  Your provider may also recommend a fiber supplement if you cannot get enough fiber from food  · Exercise regularly  Regular physical activity can help stimulate your intestines   Walking is a good exercise to prevent or relieve constipation  Ask which exercises are best for you  · Schedule a time each day to have a bowel movement  This may help train your body to have regular bowel movements  Bend forward while you are on the toilet to help move the bowel movement out  Sit on the toilet for at least 10 minutes, even if you do not have a bowel movement  How do I store opioids safely? · Store opioids where others cannot easily get them  Keep them in a locked cabinet or secure area  Do not  keep them in a purse or other bag you carry with you  A person may be looking for something else and find the opioids  · Make sure opioids are stored out of the reach of children  A child can easily overdose on opioids  Opioids may look like candy to a small child  What is the best way to dispose of opioids? The laws vary by country and area  In the [place], the best way is to return the opioids through a take-back program  This program is offered by the [employer])  The following are options for using the program:  · Take the opioids to a [facility] collection site  The site is often a law enforcement center  Call your local law enforcement center for scheduled take-back days in your area  You will be given information on where to go if the collection site is in a different location  · Take the opioids to an approved pharmacy or hospital   A pharmacy or hospital may be set up as a collection site  You will need to ask if it is a [facility] collection site if you were not directed there  A pharmacy or doctor's office may not be able to take back opioids unless it is a [facility] site  · Use a mail-back system  This means you are given containers to put the opioids into  You will then mail them in the containers  · Use a take-back drop box  This is a place to leave the opioids at any time  People and animals will not be able to get into the box   Your local law enforcement agency can tell you where to find a drop box in your area  What are some other safe ways to dispose of opioids? The medicine may come with disposal instructions  The instructions may vary depending on the brand of medicine you are using  Instructions may come in a Medication Guide, but not every medicine has one  You may instead get instructions from your pharmacy or doctor  Follow instructions carefully  The following are general guidelines to follow:  · Find out if you can flush the opioid  Some opioids can be flushed down the toilet or poured into the sink  You will need to contact authorities in your area to see if this is an option for you  The FDA also offers a list of medicines that are safe to flush down the toilet  You can check the list if you cannot get the information for your local area  · Ask your waste management company about rules for putting opioids in the trash  The company will be able to give you specific directions  Scratch out personal information on the original medicine label so it cannot be read  Then put it in the trash  Do not label the trash or put any information on it about the opioids  It should look like regular household trash so no one is tempted to look for the opioids  Keep the trash out of the reach of children and animals  Always make sure trash is secure  · Talk to officials if you live in a facility  If you live in a nursing home or assisted living center, talk to an official  The person will know the rules for your area  What are some other ways to manage pain? · Ask your healthcare provider about non-opioid medicines to control pain  Some medicines may even work better than opioids, depending on the cause of your pain  Nonprescription medicines include NSAIDs (such as ibuprofen) and acetaminophen  Prescription medicines include muscle relaxers, antidepressants, and steroids  · Pain may be managed without any medicines    Some ways to relieve pain include massage, aromatherapy, or meditation  Physical or occupational therapy may also help  Where can I find more information? · Drug Enforcement Administration  [address] [facility] , [address]  Phone: 8- 121 - [phone]  Web Address: [facility] [email or web address]_[email or web address]    · [address] and Drug Administration  [place] , [address]  Phone: 3- 214 - [phone]  Web Address: http://[employer]/    Call your local emergency number (911 in the [address]), or have someone else call if:   · You have a seizure  · You cannot be woken  · You have trouble staying awake and your breathing is slow or shallow  · Your speech is slurred, or you are confused  · You are dizzy or stumble when you walk  When should I or someone close to me call my doctor? · You are extremely drowsy, or you have trouble staying awake or speaking  · You have pale or clammy skin  · You have blue fingernails or lips  · Your heartbeat is slower than normal     · You cannot stop vomiting  · You have questions or concerns about your condition or care  CARE AGREEMENT:   You have the right to help plan your care  Learn about your health condition and how it may be treated  Discuss treatment options with your healthcare providers to decide what care you want to receive  You always have the right to refuse treatment  The above information is an  only  It is not intended as medical advice for individual conditions or treatments  Talk to your doctor, nurse or pharmacist before following any medical regimen to see if it is safe and effective for you  © Copyright [employer] 2021 Information is for End User's use only and may not be sold, redistributed or otherwise used for commercial purposes   All illustrations and images included in CareNotes® are the copyrighted property of [employer]  or [address] [place], [place] [address]

## 2022-02-07 NOTE — PROGRESS NOTES
[facility] Follow-Up Note    Assessment:  1  Chronic pain syndrome    2  Encounter for long-term use of opiate analgesic    3  Uncomplicated opioid dependence ([address] )    4   Intervertebral disc disorder with radiculopathy of lumbar region        Plan:  Orders Placed This Encounter   Procedures    MM ALL_Prescribed Meds and Special Instructions    MM DT_Alprazolam Definitive Test    MM DT_Amphetamine Definitive Test    MM DT_Aripiprazole Definitive Test    MM DT_Bath Salts Definitive Test    MM DT_Buprenorphine Definitive Test    MM DT_Butalbital Definitive Test    MM DT_Clozapine Definitive Test    MM DT_Clonazepam Definitive Test    MM DT_Cocaine Definitive Test    MM DT_Codeine Definitive Test    MM DT_Desipramine Definitive Test    MM DT_Dextromethorphan Definitive Test    MM Diazepam Definitive Test    MM DT_Ethyl Glucuronide/Ethyl Sulfate Definitive Test    MM DT_Fentanyl Definitive Test    MM DT_Haloperidol Definitive Test    MM DT_Heroin Definitive Test    MM DT_Hydrocodone Definitive Test    MM DT_Hydromorphone Definitive Test    MM DT_Imipramine Definitive Test    MM DT_Kratom Definitive Test    MM DT_Levorphanol Definitive Test    MM Lorazepam Definitive Test    MM DT_MDMA Definitive Test    MM DT_Meperidine Definitive Test    MM DT_Methadone Definitive Test    MM DT_Methamphetamine Definitive Test    MM DT_Methylphenidate Definitive Test    MM DT_Morphine Definitive Test    MM DT_Oxazepam Definitive Test    MM DT_Oxycodone Definitive Test    MM DT_Oxymorphone Definitive Test    MM DT_Phenobarbital Definitive Test    MM DT_Phencyclidine Definitive Test    MM DT_Phentermine Definitive Test    MM DT_Secobarbital Definitive Test    MM DT_Spice Definitive Test    MM DT_Tapentadol Definitive Test    MM DT_Temazapam Definitive Test    MM DT_THC Definitive Test    MM DT_Tramadol Definitive Test       New Medications Ordered This Visit   Medications    diltiazem (CARDIZEM CD) 240 mg 24 hr capsule    HYDROcodone-acetaminophen (NORCO)  mg per tablet     Sig: Take 1 tablet by mouth every 6 (six) hours as needed for moderate pain Max Daily Amount: 4 tablets     Dispense:  30 tablet     Refill:  0    HYDROcodone-acetaminophen (NORCO)  mg per tablet     Sig: Take 1 tablet by mouth every 6 (six) hours as needed for moderate pain Max Daily Amount: 4 tablets     Dispense:  30 tablet     Refill:  0    gabapentin (NEURONTIN) 300 mg capsule     Sig: Take 3 capsules (900 mg total) by mouth 3 (three) times a day     Dispense:  270 capsule     Refill:  1       My impressions and treatment recommendations were discussed in detail with the patient who verbalized understanding and had no further questions  Given that the patient continues to report overall reduced pain with improved level of functioning by greater than 50%, we will continue him on Norco 10/325 mg up to 4 times daily as needed for pain  Reports no side effects from this medication and prescriptions were sent to the pharmacy on file with fill dates of 02/19/2022 and 03/21/2022  [place] Prescription Drug Monitoring Program report was reviewed and was appropriate     A urine drug screen was collected at today's office visit as part of our medication management protocol  The point of care testing results were appropriate for what was being prescribed  The specimen will be sent for confirmatory testing  The drug screen is medically necessary because the patient is either dependent on opioid medication or is being considered for opioid medication therapy and the results could impact ongoing or future treatment  The drug screen is to evaluate for the presences or absence of prescribed, non-prescribed, and/or illicit drugs/substances  There are risks associated with opioid medications, including dependence, addiction and tolerance   The patient understands and agrees to use these medications only as prescribed  Potential side effects of the medications include, but are not limited to, constipation, drowsiness, addiction, impaired judgment and risk of fatal overdose if not taken as prescribed  The patient was warned against driving while taking sedation medications  Sharing medications is a felony  At this point in time, the patient is showing no signs of addiction, abuse, diversion or suicidal ideation  Follow-up is planned in 2 months time or sooner as warranted  Discharge instructions were provided  I personally saw and examined the patient and I agree with the above discussed plan of care  History of Present Illness:    Judene Gilford is a 78 y o  male who presents to [facility] for interval re-evaluation of the above stated pain complaints  The patient has a past medical and chronic pain history as outlined in the assessment section  He was last seen on 12/13/2021  He reports a pain score right now of 0/10 but states it can be quite severe at times  He states that his pain is intermittent and worse in the morning  He also reports numbness and tingling in his fingers on his left hand  Pain Contract Signed:  2/7/2022  Last Urine Drug Screen:  2/7/2022    Other than as stated above, the patient denies any interval changes in medications, medical condition, mental condition, symptoms, or allergies since the last office visit  Review of Systems:    Review of Systems   Respiratory: Negative for shortness of breath  Cardiovascular: Negative for chest pain  Gastrointestinal: Negative for constipation, diarrhea, nausea and vomiting  Musculoskeletal: Negative for arthralgias, gait problem, joint swelling and myalgias  Difficulty walking  Decreased range of motion  Muscle weakness   Skin: Negative for rash  Neurological: Negative for dizziness, seizures and weakness          Memory loss   All other systems reviewed and are negative          Patient Active Problem List   Diagnosis    Chronic pain syndrome    Pain of lumbar spine    Sacroiliitis ([address] )    Intervertebral disc disorder with radiculopathy of lumbar region    Centrilobular emphysema (HCC)    Dyspnea on exertion    Chronic atrial fibrillation (HCC)    CAD S/P percutaneous coronary angioplasty    Chronic respiratory failure with hypoxia (HCC)    Continuous dependence on cigarette smoking    Cervical radiculopathy    Cervical spinal stenosis    Cervical pain    Elevated PSA    Obesity, morbid ([facility] )    Uncomplicated opioid dependence ([facility] )    Essential hypertension    Dyslipidemia    Asymptomatic PVCs    Mild aortic stenosis    Obstructive sleep apnea       Past Medical History:   Diagnosis Date    Acid reflux     Arthritis     BPH (benign prostatic hyperplasia)     no treatment    Calcium disorder     Chronic atrial fibrillation (HCC) 2/6/2018    Chronic pain     Chronic pain disorder     COPD (chronic obstructive pulmonary disease) ([facility] )     Coronary artery disease 2001    PTCA with one stent    Eye redness     Poked his eye on 3/18/16 "bloody" right eye    Hiatal hernia     Hydronephrosis     Hyperlipidemia     Hypertension     Irregular heart beat     A-Fib    Low back pain     Neck pain     Nocturnal leg cramps     Osteopenia     Peripheral artery disease (HCC)     Peripheral neuropathy     Pleural effusion     Sleep apnea     REFUSES MACHINE HS    Spinal stenosis     Vocal cord polyp 10/2012    panendoscopy       Past Surgical History:   Procedure Laterality Date    ANGIOPLASTY      APPENDECTOMY  04/2007    CARDIOVERSION      x2, for A-Fib    CARDIOVERSION      CATARACT EXTRACTION      CATARACT EXTRACTION W/ INTRAOCULAR LENS IMPLANT Left 01/2014    CORONARY ANGIOPLASTY WITH STENT PLACEMENT  2001    one stent    HERNIA REPAIR Right     inquinal    JOINT REPLACEMENT Left     Hip    KNEE SURGERY  1994    also had arthroscopy right knee 2005    PANENDOSCOPY  10/23/2012    vocal cord polyp    PILONIDAL CYST DRAINAGE      x 3    HI SURG IMPLNT Sarbjit Nigh Left 8/23/2017    Procedure: Placement of Thoracic SCS with left buttock IPG (IMPULSE); Surgeon: Tito Rivera MD;  Location:  MAIN OR;  Service: Neurosurgery    HI XCAPSL CTRC RMVL INSJ IO LENS PROSTH W/O ECP Right 4/6/2016    Procedure: EXTRACTION EXTRACAPSULAR CATARACT PHACO INTRAOCULAR LENS (IOL);   Surgeon: Yumiko Gu MD;  Location: [facility] MAIN OR;  Service: Ophthalmology   [address] TRIAL W/ LAMINOTOMY N/A 4/13/2017    Procedure: INSERTION DORSAL COLUMN SPINAL CORD STIMULATOR TRIAL;  Surgeon: Valentina Jerome MD;  Location: [facility] MAIN OR;  Service:    [address] [place] [address] INJECTION FOR RESEARCH STUDY  5/28/2015       Family History   Problem Relation Age of Onset    Cancer Father 80        prostate    No Known Problems Mother     No Known Problems Sister     No Known Problems Brother     No Known Problems Daughter     No Known Problems Son     No Known Problems Maternal Aunt     No Known Problems Maternal Uncle     No Known Problems Paternal Aunt     No Known Problems Paternal Uncle     No Known Problems Maternal Grandmother     No Known Problems Maternal Grandfather     No Known Problems Paternal Grandmother     No Known Problems Paternal Grandfather        Social History     Occupational History    Not on file   Tobacco Use    Smoking status: Current Every Day Smoker     Packs/day: 0 75     Years: 60 00     Pack years: 45 00    Smokeless tobacco: Never Used   Vaping Use    Vaping Use: Never used   Substance and Sexual Activity    Alcohol use: Yes     Comment: occasionally    Drug use: No    Sexual activity: Not on file         Current Outpatient Medications:     apixaban (Eliquis) 5 mg, Take 1 tablet (5 mg total) by mouth 2 (two) times a day, Disp: 60 tablet, Rfl: 11    aspirin (ECOTRIN LOW STRENGTH) 81 mg EC tablet, Take 81 mg by mouth daily, Disp: , Rfl:     atorvastatin (LIPITOR) 40 mg tablet, Take 40 mg by mouth daily  , Disp: , Rfl:     budesonide (PULMICORT) 0 5 mg/2 mL nebulizer solution, Take 2 mL (0 5 mg total) by nebulization 2 (two) times a day, Disp: 120 mL, Rfl: 5    diltiazem (CARDIZEM CD) 240 mg 24 hr capsule, , Disp: , Rfl:     diltiazem (DILACOR XR) 240 MG 24 hr capsule, Take 1 capsule (240 mg total) by mouth daily at bedtime, Disp: 90 capsule, Rfl: 3    fluticasone (FLONASE) 50 mcg/act nasal spray, 2 sprays into each nostril daily, Disp: , Rfl:     formoterol (Perforomist) 20 MCG/2ML nebulizer solution, Take 2 mL (20 mcg total) by nebulization 2 (two) times a day, Disp: 120 mL, Rfl: 5    gabapentin (NEURONTIN) 300 mg capsule, Take 3 capsules (900 mg total) by mouth 3 (three) times a day, Disp: 270 capsule, Rfl: 1    [START ON 2/19/2022] HYDROcodone-acetaminophen (NORCO)  mg per tablet, Take 1 tablet by mouth every 6 (six) hours as needed for moderate pain Max Daily Amount: 4 tablets, Disp: 30 tablet, Rfl: 0    [START ON 3/21/2022] HYDROcodone-acetaminophen (NORCO)  mg per tablet, Take 1 tablet by mouth every 6 (six) hours as needed for moderate pain Max Daily Amount: 4 tablets, Disp: 30 tablet, Rfl: 0    ipratropium-albuterol (DUO-NEB) 0 5-2 5 mg/3 mL nebulizer solution, USE 1 VIAL IN NEBULIZER 3 TIMES DAILY, Disp: 90 vial, Rfl: 11    meclizine (ANTIVERT) 12 5 MG tablet, TAKE 1-2 TABLETS BY MOUTH EVERY 8 HOURS AS NEEDED PRN; DIZZINESS, Disp: , Rfl: 3    naloxone (NARCAN) 4 mg/0 1 mL nasal spray, Administer 1 spray into a nostril   If no response after 2-3 minutes, give another dose in the other nostril using a new spray , Disp: 1 each, Rfl: 1    nebivolol (BYSTOLIC) 10 mg tablet, Take 1 tablet (10 mg total) by mouth daily (Patient taking differently: Take 20 mg by mouth daily ), Disp: 90 tablet, Rfl: 3    nicotine (NICOTROL) 10 MG inhaler, Use 1  Cartridge per hour as needed    Do not exceed 10 cartridges per day (Patient not taking: Reported on 7/13/2021), Disp: 168 each, Rfl: 6    omeprazole (PriLOSEC) 20 mg delayed release capsule, Take 1 capsule by mouth as needed for heartburn , Disp: , Rfl:     OXYGEN-HELIUM IN, Inhale 2 L daily at bedtime, Disp: , Rfl:     phenazopyridine (PYRIDIUM) 200 mg tablet, , Disp: , Rfl: 0    Revefenacin (Yupelri) 175 MCG/3ML SOLN, Take 3 mL (175 mcg total) by nebulization daily, Disp: 90 mL, Rfl: 5    tamsulosin (FLOMAX) 0 4 mg, Take 0 4 mg by mouth daily with dinner, Disp: , Rfl:     traZODone (DESYREL) 50 mg tablet, , Disp: , Rfl:     No Known Allergies    Physical Exam:    /80   Temp 98 4 °F (36 9 °C)     Constitutional:obese  Eyes:anicteric  HEENT:grossly intact  Neck:supple, symmetric, trachea midline and no masses   Pulmonary:even and unlabored and He uses portable oxygen via nasal cannula  Cardiovascular:No edema or pitting edema present  Skin:Normal without rashes or lesions and well hydrated  Psychiatric:Mood and affect appropriate  Neurologic:Cranial Nerves II-XII grossly intact  Musculoskeletal:in wheelchair      Imaging  No orders to display         Orders Placed This Encounter   Procedures    MM ALL_Prescribed Meds and Special Instructions    MM DT_Alprazolam Definitive Test    MM DT_Amphetamine Definitive Test    MM DT_Aripiprazole Definitive Test    MM DT_Bath Salts Definitive Test    MM DT_Buprenorphine Definitive Test    MM DT_Butalbital Definitive Test    MM DT_Clozapine Definitive Test    MM DT_Clonazepam Definitive Test    MM DT_Cocaine Definitive Test    MM DT_Codeine Definitive Test    MM DT_Desipramine Definitive Test    MM DT_Dextromethorphan Definitive Test    MM Diazepam Definitive Test    MM DT_Ethyl Glucuronide/Ethyl Sulfate Definitive Test    MM DT_Fentanyl Definitive Test    MM DT_Haloperidol Definitive Test    MM DT_Heroin Definitive Test    MM DT_Hydrocodone Definitive Test    MM DT_Hydromorphone Definitive Test    MM DT_Imipramine Definitive Test    MM DT_Kratom Definitive Test    MM DT_Levorphanol Definitive Test    MM Lorazepam Definitive Test    MM DT_MDMA Definitive Test    MM DT_Meperidine Definitive Test    MM DT_Methadone Definitive Test    MM DT_Methamphetamine Definitive Test    MM DT_Methylphenidate Definitive Test    MM DT_Morphine Definitive Test    MM DT_Oxazepam Definitive Test    MM DT_Oxycodone Definitive Test    MM DT_Oxymorphone Definitive Test    MM DT_Phenobarbital Definitive Test    MM DT_Phencyclidine Definitive Test    MM DT_Phentermine Definitive Test    MM DT_Secobarbital Definitive Test    MM DT_Spice Definitive Test    MM DT_Tapentadol Definitive Test    MM DT_Temazapam Definitive Test    MM DT_THC Definitive Test    MM DT_Tramadol Definitive Test

## 2022-02-09 LAB
6MAM UR QL CFM: NEGATIVE NG/ML
7AMINOCLONAZEPAM UR QL CFM: NEGATIVE NG/ML
A-OH ALPRAZ UR QL CFM: NEGATIVE NG/ML
AMPHET UR QL CFM: NEGATIVE NG/ML
AMPHET UR QL CFM: NEGATIVE NG/ML
BUPRENORPHINE UR QL CFM: NEGATIVE NG/ML
BUTALBITAL UR QL CFM: NEGATIVE NG/ML
BZE UR QL CFM: NEGATIVE NG/ML
CODEINE UR QL CFM: NEGATIVE NG/ML
DESIPRAMINE UR QL CFM: NEGATIVE NG/ML
DESIPRAMINE UR QL CFM: NEGATIVE NG/ML
EDDP UR QL CFM: NEGATIVE NG/ML
ETHYL GLUCURONIDE UR QL CFM: NEGATIVE NG/ML
ETHYL SULFATE UR QL SCN: NEGATIVE NG/ML
EUTYLONE UR QL: NEGATIVE NG/ML
FENTANYL UR QL CFM: NEGATIVE NG/ML
GLIADIN IGG SER IA-ACNC: NEGATIVE NG/ML
GLUCOSE 30M P 50 G LAC PO SERPL-MCNC: NEGATIVE NG/ML
HYDROCODONE UR QL CFM: ABNORMAL NG/ML
HYDROCODONE UR QL CFM: ABNORMAL NG/ML
HYDROMORPHONE UR QL CFM: ABNORMAL NG/ML
IMIPRAMINE UR QL CFM: NEGATIVE NG/ML
LORAZEPAM UR QL CFM: NEGATIVE NG/ML
MDMA UR QL CFM: NEGATIVE NG/ML
ME-PHENIDATE UR QL CFM: NEGATIVE NG/ML
MEPERIDINE UR QL CFM: NEGATIVE NG/ML
METHADONE UR QL CFM: NEGATIVE NG/ML
METHAMPHET UR QL CFM: NEGATIVE NG/ML
MORPHINE UR QL CFM: NEGATIVE NG/ML
MORPHINE UR QL CFM: NEGATIVE NG/ML
NORBUPRENORPHINE UR QL CFM: NEGATIVE NG/ML
NORDIAZEPAM UR QL CFM: NEGATIVE NG/ML
NORFENTANYL UR QL CFM: NEGATIVE NG/ML
NORHYDROCODONE UR QL CFM: ABNORMAL NG/ML
NORHYDROCODONE UR QL CFM: ABNORMAL NG/ML
NORMEPERIDINE UR QL CFM: NEGATIVE NG/ML
NOROXYCODONE UR QL CFM: NEGATIVE NG/ML
OPC-3373 QUANTIFICATION: NEGATIVE
OXAZEPAM UR QL CFM: NEGATIVE NG/ML
OXYCODONE UR QL CFM: NEGATIVE NG/ML
OXYMORPHONE UR QL CFM: NEGATIVE NG/ML
OXYMORPHONE UR QL CFM: NEGATIVE NG/ML
PCP UR QL CFM: NEGATIVE NG/ML
PHENOBARB UR QL CFM: NEGATIVE NG/ML
RESULT ALL_PRESCRIBED MEDS AND SPECIAL INSTRUCTIONS: NORMAL
SECOBARBITAL UR QL CFM: NEGATIVE NG/ML
SL AMB 3-METHYL-FENTANYL QUANTIFICATION: NORMAL NG/ML
SL AMB 4-ANPP QUANTIFICATION: NORMAL NG/ML
SL AMB 4-FIBF QUANTIFICATION: NORMAL NG/ML
SL AMB 5F-ADB-M7 METABOLITE QUANTIFICATION: NEGATIVE NG/ML
SL AMB 7-OH-MITRAGYNINE (KRATOM ALKALOID) QUANTIFICATION: NEGATIVE NG/ML
SL AMB AB-FUBINACA-M3 METABOLITE QUANTIFICATION: NEGATIVE NG/ML
SL AMB ACETYL FENTANYL QUANTIFICATION: NORMAL NG/ML
SL AMB ACETYL NORFENTANYL QUANTIFICATION: NORMAL NG/ML
SL AMB ACRYL FENTANYL QUANTIFICATION: NORMAL NG/ML
SL AMB BUTRYL FENTANYL QUANTIFICATION: NORMAL NG/ML
SL AMB CARFENTANIL QUANTIFICATION: NORMAL NG/ML
SL AMB CLOZAPINE QUANTIFICATION: NEGATIVE NG/ML
SL AMB CTHC (MARIJUANA METABOLITE) QUANTIFICATION: NEGATIVE NG/ML
SL AMB CYCLOPROPYL FENTANYL QUANTIFICATION: NORMAL NG/ML
SL AMB DEXTROMETHORPHAN QUANTIFICATION: NEGATIVE NG/ML
SL AMB DEXTRORPHAN (DEXTROMETHORPHAN METABOLITE) QUANT: NEGATIVE NG/ML
SL AMB DEXTRORPHAN (DEXTROMETHORPHAN METABOLITE) QUANT: NEGATIVE NG/ML
SL AMB FURANYL FENTANYL QUANTIFICATION: NORMAL NG/ML
SL AMB HALOPERIDOL  QUANTIFICATION: NEGATIVE NG/ML
SL AMB HALOPERIDOL METABOLITE QUANTIFICATION: NEGATIVE NG/ML
SL AMB JWH018 METABOLITE QUANTIFICATION: NEGATIVE NG/ML
SL AMB JWH073 METABOLITE QUANTIFICATION: NEGATIVE NG/ML
SL AMB MDMB-FUBINACA-M1 METABOLITE QUANTIFICATION: NEGATIVE NG/ML
SL AMB METHOXYACETYL FENTANYL QUANTIFICATION: NORMAL NG/ML
SL AMB METHYLONE QUANTIFICATION: NEGATIVE NG/ML
SL AMB N-DESMETHYL U-47700 QUANTIFICATION: NORMAL NG/ML
SL AMB N-DESMETHYL-TRAMADOL QUANTIFICATION: NEGATIVE NG/ML
SL AMB N-DESMETHYLCLOZAPINE QUANTIFICATION: NEGATIVE NG/ML
SL AMB PHENTERMINE QUANTIFICATION: NEGATIVE NG/ML
SL AMB RCS4 METABOLITE QUANTIFICATION: NEGATIVE NG/ML
SL AMB RITALINIC ACID QUANTIFICATION: NEGATIVE NG/ML
SL AMB U-47700 QUANTIFICATION: NORMAL NG/ML
SPECIMEN DRAWN SERPL: NEGATIVE NG/ML
TAPENTADOL UR QL CFM: NEGATIVE NG/ML
TEMAZEPAM UR QL CFM: NEGATIVE NG/ML
TEMAZEPAM UR QL CFM: NEGATIVE NG/ML
TRAMADOL UR QL CFM: NEGATIVE NG/ML
URATE/CREAT 24H UR: NEGATIVE NG/ML

## 2022-02-22 DIAGNOSIS — G89.4 CHRONIC PAIN SYNDROME: ICD-10-CM

## 2022-02-22 RX ORDER — HYDROCODONE BITARTRATE AND ACETAMINOPHEN 10; 325 MG/1; MG/1
1 TABLET ORAL EVERY 6 HOURS PRN
Qty: 120 TABLET | Refills: 0 | Status: SHIPPED | OUTPATIENT
Start: 2022-03-21 | End: 2022-04-11 | Stop reason: ALTCHOICE

## 2022-02-22 RX ORDER — HYDROCODONE BITARTRATE AND ACETAMINOPHEN 10; 325 MG/1; MG/1
1 TABLET ORAL EVERY 6 HOURS PRN
Qty: 90 TABLET | Refills: 0 | Status: SHIPPED | OUTPATIENT
Start: 2022-02-22 | End: 2022-04-11 | Stop reason: ALTCHOICE

## 2022-04-07 NOTE — TELEPHONE ENCOUNTER
Pt [facility] on 2/7

## 2022-04-07 NOTE — TELEPHONE ENCOUNTER
Pt contacted Call Center requested refill of their medication  Medication Name:gabapentin (NEURONTIN          Dosage of Med:300 mg        Frequency of Med:Take 3 capsules (900 mg total) by mouth 3 (three) times a day      Remaining Medication:A few      Pharmacy and Location:  [facility] [address] CON[facility], [address] [address]   Phone:  [phone]        Pt  Preferred Callback Phone Number: [phone]    Thank you

## 2022-04-07 NOTE — TELEPHONE ENCOUNTER
S/w pt  Pt is requesting a refill of Gabapentin  He is taking 900 mg tid, it is helping his pain and he fells slightly tired taking it  LP 2/7 with 1 refill  Pt has OVS with [facility] on Monday   Advised will notify [address] and CB

## 2022-04-08 DIAGNOSIS — M51.16 INTERVERTEBRAL DISC DISORDER WITH RADICULOPATHY OF LUMBAR REGION: ICD-10-CM

## 2022-04-08 DIAGNOSIS — G89.4 CHRONIC PAIN SYNDROME: ICD-10-CM

## 2022-04-08 RX ORDER — GABAPENTIN 300 MG/1
CAPSULE ORAL
Qty: 270 CAPSULE | Refills: 0 | Status: SHIPPED | OUTPATIENT
Start: 2022-04-08 | End: 2022-07-18 | Stop reason: SDUPTHER

## 2022-04-08 NOTE — TELEPHONE ENCOUNTER
S/w the patient and reviewed that the medication was reordered today by [facility]

## 2022-04-11 ENCOUNTER — OFFICE VISIT (OUTPATIENT)
Dept: PAIN MEDICINE | Facility: CLINIC | Age: 82
End: 2022-04-11
Payer: MEDICARE

## 2022-04-11 VITALS
HEART RATE: 81 BPM | SYSTOLIC BLOOD PRESSURE: 147 MMHG | WEIGHT: 275 LBS | HEIGHT: 70 IN | DIASTOLIC BLOOD PRESSURE: 85 MMHG | BODY MASS INDEX: 39.37 KG/M2

## 2022-04-11 DIAGNOSIS — E66.01 OBESITY, MORBID (HCC): ICD-10-CM

## 2022-04-11 DIAGNOSIS — M46.1 SACROILIITIS (HCC): ICD-10-CM

## 2022-04-11 DIAGNOSIS — G89.4 CHRONIC PAIN SYNDROME: Primary | ICD-10-CM

## 2022-04-11 DIAGNOSIS — M51.16 INTERVERTEBRAL DISC DISORDER WITH RADICULOPATHY OF LUMBAR REGION: ICD-10-CM

## 2022-04-11 PROCEDURE — 99214 OFFICE O/P EST MOD 30 MIN: CPT | Performed by: NURSE PRACTITIONER

## 2022-04-11 RX ORDER — HYDROCODONE BITARTRATE AND ACETAMINOPHEN 10; 325 MG/1; MG/1
1 TABLET ORAL EVERY 6 HOURS PRN
Qty: 120 TABLET | Refills: 0 | Status: SHIPPED | OUTPATIENT
Start: 2022-04-11 | End: 2022-06-16 | Stop reason: SDUPTHER

## 2022-04-11 RX ORDER — HYDROCODONE BITARTRATE AND ACETAMINOPHEN 10; 325 MG/1; MG/1
1 TABLET ORAL EVERY 6 HOURS PRN
Qty: 120 TABLET | Refills: 0 | Status: SHIPPED | OUTPATIENT
Start: 2022-05-11 | End: 2022-07-18 | Stop reason: SDUPTHER

## 2022-04-11 NOTE — PROGRESS NOTES
Pain Medicine Follow-Up Note    Assessment:  1  Chronic pain syndrome    2  Intervertebral disc disorder with radiculopathy of lumbar region    3  Sacroiliitis ([facility] [address] )    4  Ob[facility], morbid ([facility] )        Plan:  No orders of the defined types were placed in this encounter  New Medications Ordered This Visit   Medications    HYDROcodone-acetaminophen (NORCO)  mg per tablet     Sig: Take 1 tablet by mouth every 6 (six) hours as needed for moderate pain Max Daily Amount: 4 tablets     Dispense:  120 tablet     Refill:  0    HYDROcodone-acetaminophen (NORCO)  mg per tablet     Sig: Take 1 tablet by mouth every 6 (six) hours as needed for moderate pain Max Daily Amount: 4 tablets     Dispense:  120 tablet     Refill:  0       My impressions and treatment recommendations were discussed in detail with the patient who verbalized understanding and had no further questions  Given that the patient continues to report overall reduced pain with improved level of functioning by 40-50%, will continue him on Percocet 10/325 mg up to 4 times daily as needed  He will also continue gabapentin 300 mg 3 times a day  Patient reports drowsiness as a side effect from these medications  Percocet was sent to the pharmacy on file with fill dates of 04/11/2022 and 05/11/2022  His last fill date was 02/26/2022  No refills of gabapentin were needed at this time  [place] Prescription Drug Monitoring Program report was reviewed and was appropriate       There are risks associated with opioid medications, including dependence, addiction and tolerance  The patient understands and agrees to use these medications only as prescribed  Potential side effects of the medications include, but are not limited to, constipation, drowsiness, addiction, impaired judgment and risk of fatal overdose if not taken as prescribed  The patient was warned against driving while taking sedation medications  Sharing medications is a felony  At this point in time, the patient is showing no signs of addiction, abuse, diversion or suicidal ideation  Follow-up is planned in 8 weeks time or sooner as warranted  Discharge instructions were provided  I personally saw and examined the patient and I agree with the above discussed plan of care  History of Present Illness:    Leah Gutierrez is a 78 y o  male who presents to [facility] for interval re-evaluation of the above stated pain complaints  The patient has a past medical and chronic pain history as outlined in the assessment section  He was last seen on 02/07/2022  He reports a pain score of 8/10 that is constant  He describes the quality as burning, sharp and shooting  States that the pain goes across his low back bilaterally and radiates down his right leg to the knee  Pain Contract Signed:  02/07/2022   Last Urine Drug Screen:  02/07/2022    Other than as stated above, the patient denies any interval changes in medications, medical condition, mental condition, symptoms, or allergies since the last office visit  Review of Systems:    Review of Systems   Respiratory: Negative for shortness of breath  Cardiovascular: Negative for chest pain  Gastrointestinal: Negative for constipation, diarrhea, nausea and vomiting  Musculoskeletal: Positive for gait problem  Negative for arthralgias, joint swelling and myalgias  Decreased ROM, Muscle Weakness, Joint Stiffness  Skin: Negative for rash  Neurological: Negative for dizziness, seizures and weakness  Psychiatric/Behavioral:        Memory Loss  All other systems reviewed and are negative          Patient Active Problem List   Diagnosis    Chronic pain syndrome    Pain of lumbar spine    Sacroiliitis ([address]    Intervertebral disc disorder with radiculopathy of lumbar region    Centrilobular emphysema (HCC)    Dyspnea on exertion    Chronic atrial fibrillation (HCC)    CAD S/P percutaneous coronary angioplasty    Chronic respiratory failure with hypoxia (HCC)    Continuous dependence on cigarette smoking    Cervical radiculopathy    Cervical spinal stenosis    Cervical pain    Elevated PSA    Obesity, morbid (HCC)    Uncomplicated opioid dependence ([facility] )    Essential hypertension    Dyslipidemia    Asymptomatic PVCs    Mild aortic stenosis    Obstructive sleep apnea       Past Medical History:   Diagnosis Date    Acid reflux     Arthritis     BPH (benign prostatic hyperplasia)     no treatment    Calcium disorder     Chronic atrial fibrillation (HCC) 2/6/2018    Chronic pain     Chronic pain disorder     COPD (chronic obstructive pulmonary disease) ([facility] )     Coronary artery disease 2001    PTCA with one stent    Eye redness     Poked his eye on 3/18/16 "bloody" right eye    Hiatal hernia     Hydronephrosis     Hyperlipidemia     Hypertension     Irregular heart beat     A-Fib    Low back pain     Neck pain     Nocturnal leg cramps     Osteopenia     Peripheral artery disease (HCC)     Peripheral neuropathy     Pleural effusion     Sleep apnea     REFUSES MACHINE HS    Spinal stenosis     Vocal cord polyp 10/2012    panendoscopy       Past Surgical History:   Procedure Laterality Date    ANGIOPLASTY      APPENDECTOMY  04/2007    CARDIOVERSION      x2, for A-Fib    CARDIOVERSION      CATARACT EXTRACTION      CATARACT EXTRACTION W/ INTRAOCULAR LENS IMPLANT Left 01/2014    CORONARY ANGIOPLASTY WITH STENT PLACEMENT  2001    one stent    HERNIA REPAIR Right     inquinal    JOINT REPLACEMENT Left     Hip    KNEE SURGERY  1994    also had arthroscopy right knee 2005    PANENDOSCOPY  10/23/2012    vocal cord polyp    PILONIDAL CYST DRAINAGE      x 3    MO SURG IMPLNT [place] Left 8/23/2017    Procedure: Placement of Thoracic SCS with left buttock IPG (IMPULSE);   Surgeon: Sandhya Collins MD;  [facility];  Service: Neurosurgery    IN XCAPSL CTRC RMVL INSJ IO LENS PROSTH W/O ECP Right 4/6/2016    Procedure: EXTRACTION EXTRACAPSULAR CATARACT PHACO INTRAOCULAR LENS (IOL);   Surgeon: Lopez Chen MD;  Location: [facility] MAIN OR;  Service: Ophthalmology   [address] TRIAL W/ LAMINOTOMY N/A 4/13/2017    Procedure: INSERTION DORSAL COLUMN SPINAL CORD STIMULATOR TRIAL;  Surgeon: Mary Ellen Eason MD;  Location: [place] [address] MAIN OR;  Service:    [address] [place] [address] INJECTION FOR RESEARCH STUDY  5/28/2015       Family History   Problem Relation Age of Onset    Cancer Father 80        prostate    No Known Problems Mother     No Known Problems Sister     No Known Problems Brother     No Known Problems Daughter     No Known Problems Son     No Known Problems Maternal Aunt     No Known Problems Maternal Uncle     No Known Problems Paternal Aunt     No Known Problems Paternal Uncle     No Known Problems Maternal Grandmother     No Known Problems Maternal Grandfather     No Known Problems Paternal Grandmother     No Known Problems Paternal Grandfather        Social History     Occupational History    Not on file   Tobacco Use    Smoking status: Current Every Day Smoker     Packs/day: 0 75     Years: 60 00     Pack years: 45 00    Smokeless tobacco: Never Used   Vaping Use    Vaping Use: Never used   Substance and Sexual Activity    Alcohol use: Yes     Comment: occasionally    Drug use: No    Sexual activity: Not on file         Current Outpatient Medications:     apixaban (Eliquis) 5 mg, Take 1 tablet (5 mg total) by mouth 2 (two) times a day, Disp: 60 tablet, Rfl: 11    aspirin (ECOTRIN LOW STRENGTH) 81 mg EC tablet, Take 81 mg by mouth daily, Disp: , Rfl:     atorvastatin (LIPITOR) 40 mg tablet, Take 40 mg by mouth daily  , Disp: , Rfl:     budesonide (PULMICORT) 0 5 mg/2 mL nebulizer solution, Take 2 mL (0 5 mg total) by nebulization 2 (two) times a day, Disp: 120 mL, Rfl: 5    diltiazem (CARDIZEM CD) 240 mg 24 hr capsule, , Disp: , Rfl:     diltiazem (DILACOR XR) 240 MG 24 hr capsule, Take 1 capsule (240 mg total) by mouth daily at bedtime, Disp: 90 capsule, Rfl: 3    fluticasone (FLONASE) 50 mcg/act nasal spray, 2 sprays into each nostril daily, Disp: , Rfl:     formoterol (Perforomist) 20 MCG/2ML nebulizer solution, Take 2 mL (20 mcg total) by nebulization 2 (two) times a day, Disp: 120 mL, Rfl: 5    gabapentin (NEURONTIN) 300 mg capsule, TAKE 3 CAPSULES BY MOUTH THREE TIMES DAILY, Disp: 270 capsule, Rfl: 0    ipratropium-albuterol (DUO-NEB) 0 5-2 5 mg/3 mL nebulizer solution, USE 1 VIAL IN NEBULIZER 3 TIMES DAILY, Disp: 90 vial, Rfl: 11    meclizine (ANTIVERT) 12 5 MG tablet, TAKE 1-2 TABLETS BY MOUTH EVERY 8 HOURS AS NEEDED PRN; DIZZINESS, Disp: , Rfl: 3    naloxone (NARCAN) 4 mg/0 1 mL nasal spray, Administer 1 spray into a nostril  If no response after 2-3 minutes, give another dose in the other nostril using a new spray , Disp: 1 each, Rfl: 1    nebivolol (BYSTOLIC) 10 mg tablet, Take 1 tablet (10 mg total) by mouth daily (Patient taking differently: Take 20 mg by mouth daily ), Disp: 90 tablet, Rfl: 3    nicotine (NICOTROL) 10 MG inhaler, Use 1  Cartridge per hour as needed    Do not exceed 10 cartridges per day, Disp: 168 each, Rfl: 6    omeprazole (PriLOSEC) 20 mg delayed release capsule, Take 1 capsule by mouth as needed for heartburn , Disp: , Rfl:     OXYGEN-HELIUM IN, Inhale 2 L daily at bedtime, Disp: , Rfl:     phenazopyridine (PYRIDIUM) 200 mg tablet, , Disp: , Rfl: 0    Revefenacin (Yupelri) 175 MCG/3ML SOLN, Take 3 mL (175 mcg total) by nebulization daily, Disp: 90 mL, Rfl: 5    tamsulosin (FLOMAX) 0 4 mg, Take 0 4 mg by mouth daily with dinner, Disp: , Rfl:     traZODone (DESYREL) 50 mg tablet, , Disp: , Rfl:     HYDROcodone-acetaminophen (NORCO)  mg per tablet, Take 1 tablet by mouth every 6 (six) hours as needed for moderate pain Max Daily Amount: 4 tablets, Disp: 120 tablet, Rfl: 0    [START ON 5/11/2022] HYDROcodone-acetaminophen (NORCO)  mg per tablet, Take 1 tablet by mouth every 6 (six) hours as needed for moderate pain Max Daily Amount: 4 tablets, Disp: 120 tablet, Rfl: 0    No Known Allergies    Physical Exam:    /85   Pulse 81   Ht 5' 10" (1 778 m)   Wt 125 kg (275 lb)   BMI 39 46 kg/m²     Constitutional:obese  Eyes:anicteric  HEENT:grossly intact  Neck:supple, symmetric, trachea midline and no masses   Pulmonary:even and unlabored uses portable oxygen via nasal cannula  Cardiovascular:No edema or pitting edema present  Skin:Normal without rashes or lesions and well hydrated  Psychiatric:Mood and affect appropriate  Neurologic:Cranial Nerves II-XII grossly intact  Musculoskeletal:in wheelchair      Imaging  No orders to display         No orders of the defined types were placed in this encounter

## 2022-04-11 NOTE — PATIENT INSTRUCTIONS
Opioid Safety   WHAT YOU NEED TO KNOW:   What do I need to know about opioid safety? Safety includes the correct use, storage, and disposal of opioids  Examples of opioid pain medicines are oxycodone, morphine, fentanyl, and codeine  How are opioids given? Opioids can be given as a pill, patch, or suppository  They can also be given as an injection into a vein, near a nerve, or into a joint  Your prescription may include one or both of the following:  · Short-acting opioids  work fast and relieve pain for about 3 to 6 hours  They are often used for acute or breakthrough pain  · Long-acting opioids  usually last at least 8 hours  You can take them less often and they may be used for chronic pain  How do I use opioids safely? · Take prescribed opioids exactly as directed  Opioids come with directions based on the kind and how it is given  Talk to your healthcare provider or a pharmacist if you have any questions  Do not take more than the recommended amount  Too much can cause a life-threatening overdose  Do not continue to take it after your pain stops  You may develop tolerance  This means you keep needing higher doses to get the same effect  You may also develop opioid use disorder  This means you are not able to control your opioid use  · Do not give opioids to others or take opioids that belong to someone else  The kind or amount one person takes may not be right for another  The person you share them with may also be taking medicines that do not mix with opioids  He or she may drink alcohol or use other drugs that can cause life-threatening problems when mixed with opioids  · Do not mix opioids with other medicines or alcohol  The combination can cause an overdose, or cause you to stop breathing  Alcohol, sleeping pills, and medicines such as antihistamines can make you sleepy  A combination with opioids can lead to a coma      · Do not drive or operate heavy machinery after you use an opioid  You may feel drowsy or have trouble concentrating  You can injure yourself or others if you drive or use heavy machinery when you are not alert  Your provider or pharmacist can tell you how long to wait after a dose before you do these activities  · Talk to your healthcare provider if you have any side effects  Side effects include nausea, sleepiness, itching, and trouble thinking clearly  Your provider may need to make changes to the kind or amount of opioid you are taking  He or she can also help you find ways to prevent or relieve side effects  What can I do to manage constipation? Constipation is the most common side effect of opioid medicine  Constipation is when you have hard, dry bowel movements, or you go longer than usual between bowel movements  Tell your healthcare provider about all changes in your bowel movements while you are taking opioids  He or she may recommend laxative medicine to help you have a bowel movement  He or she may also change the kind of opioid you are taking, or change when you take it  The following are more ways you can prevent or relieve constipation:  · Drink liquids as directed  You may need to drink extra liquids to help soften and move your bowels  Ask how much liquid to drink each day and which liquids are best for you  · Eat high-fiber foods  This may help decrease constipation by adding bulk to your bowel movements  High-fiber foods include fruits, vegetables, whole-grain breads and cereals, and beans  Your healthcare provider or dietitian can help you create a high-fiber meal plan  Your provider may also recommend a fiber supplement if you cannot get enough fiber from food  · Exercise regularly  Regular physical activity can help stimulate your intestines  Walking is a good exercise to prevent or relieve constipation  Ask which exercises are best for you  · Schedule a time each day to have a bowel movement    This may help train your body to have regular bowel movements  Bend forward while you are on the toilet to help move the bowel movement out  Sit on the toilet for at least 10 minutes, even if you do not have a bowel movement  How do I store opioids safely? · Store opioids where others cannot easily get them  Keep them in a locked cabinet or secure area  Do not  keep them in a purse or other bag you carry with you  A person may be looking for something else and find the opioids  · Make sure opioids are stored out of the reach of children  A child can easily overdose on opioids  Opioids may look like candy to a small child  What is the best way to dispose of opioids? The laws vary by country and area  In the [place], the best way is to return the opioids through a take-back program  This program is offered by the [employer] (OB[employer])  The following are options for using the program:  · Take the opioids to a JOHNATHAN collection site  The site is often a law enforcement center  Call your local law enforcement center for scheduled take-back days in your area  You will be given information on where to go if the collection site is in a different location  · Take the opioids to an approved pharmacy or hospital   A pharmacy or hospital may be set up as a collection site  You will need to ask if it is a JOHNATHAN collection site if you were not directed there  A pharmacy or doctor's office may not be able to take back opioids unless it is a JOHNATHAN site  · Use a mail-back system  This means you are given containers to put the opioids into  You will then mail them in the containers  · Use a take-back drop box  This is a place to leave the opioids at any time  People and animals will not be able to get into the box  Your local law enforcement agency can tell you where to find a drop box in your area  What are some other safe ways to dispose of opioids? The medicine may come with disposal instructions   The instructions may vary depending on the brand of medicine you are using  Instructions may come in a Medication Guide, but not every medicine has one  You may instead get instructions from your pharmacy or doctor  Follow instructions carefully  The following are general guidelines to follow:  · Find out if you can flush the opioid  Some opioids can be flushed down the toilet or poured into the sink  You will need to contact authorities in your area to see if this is an option for you  The FDA also offers a list of medicines that are safe to flush down the toilet  You can check the list if you cannot get the information for your local area  · Ask your waste management company about rules for putting opioids in the trash  The company will be able to give you specific directions  Scratch out personal information on the original medicine label so it cannot be read  Then put it in the trash  Do not label the trash or put any information on it about the opioids  It should look like regular household trash so no one is tempted to look for the opioids  Keep the trash out of the reach of children and animals  Always make sure trash is secure  · Talk to officials if you live in a facility  If you live in a nursing home or assisted living center, talk to an official  The person will know the rules for your area  What are some other ways to manage pain? · Ask your healthcare provider about non-opioid medicines to control pain  Some medicines may even work better than opioids, depending on the cause of your pain  Nonprescription medicines include NSAIDs (such as ibuprofen) and acetaminophen  Prescription medicines include muscle relaxers, antidepressants, and steroids  · Pain may be managed without any medicines  Some ways to relieve pain include massage, aromatherapy, or meditation  Physical or occupational therapy may also help  Where can I find more information?    · Drug Enforcement Administration  [address]  Phone: 6- 310 - 697-6046  Web Address: [facility] gov/drug_disposal/    · [address]  and Drug Administration  [place] , [address]  Phone: 5- 161 - 013-2230  Web Address: http://[employer]/    Call your local emergency number (911 in the [address]), or have someone else call if:   · You have a seizure  · You cannot be woken  · You have trouble staying awake and your breathing is slow or shallow  · Your speech is slurred, or you are confused  · You are dizzy or stumble when you walk  When should I or someone close to me call my doctor? · You are extremely drowsy, or you have trouble staying awake or speaking  · You have pale or clammy skin  · You have blue fingernails or lips  · Your heartbeat is slower than normal     · You cannot stop vomiting  · You have questions or concerns about your condition or care  CARE AGREEMENT:   You have the right to help plan your care  Learn about your health condition and how it may be treated  Discuss treatment options with your healthcare providers to decide what care you want to receive  You always have the right to refuse treatment  The above information is an  only  It is not intended as medical advice for individual conditions or treatments  Talk to your doctor, nurse or pharmacist before following any medical regimen to see if it is safe and effective for you  © Copyright [employer] 2022 Information is for End User's use only and may not be sold, redistributed or otherwise used for commercial purposes   All illustrations and images included in CareNotes® are the copyrighted property of [employer]  or [facility] Organizer

## 2022-04-27 ENCOUNTER — TELEPHONE (OUTPATIENT)
Dept: PAIN MEDICINE | Facility: CLINIC | Age: 82
End: 2022-04-27

## 2022-04-27 NOTE — TELEPHONE ENCOUNTER
Lm on cell # for pt to cb  Home number did not ring at all this time    If pt calls back, scripts are already at pharmacy and were sent at time of ov  Pt needs to call pharmacy

## 2022-04-27 NOTE — TELEPHONE ENCOUNTER
Scripts were sent at 4/11 ov with fill dates of 4/11 and 5/11  Will advise pt to reach out to pharmacy

## 2022-04-27 NOTE — TELEPHONE ENCOUNTER
Pt contacted Call Center requested refill of their medication          Medication Name: HYDROcodone-acetaminophen (NORCO    Dosage of Med:  mg    Frequency of Med: 4xs a day       Remaining Medication: 10      Pharmacy and Location:  [address] [place] [address] [address]

## 2022-05-03 ENCOUNTER — TELEPHONE (OUTPATIENT)
Dept: PAIN MEDICINE | Facility: CLINIC | Age: 82
End: 2022-05-03

## 2022-05-03 NOTE — TELEPHONE ENCOUNTER
Pt contacted Call Center requested refill of their medication  Medication Name: Gabapentin      Dosage of Med: 300 mg       Frequency of Med: TAKE 3 CAPSULES BY MOUTH THREE TIMES DAILY      Remaining Medication: 5-6 day supply rough estimate      Pharmacy and Location: [facility] on file         Pt  Preferred Callback Phone Number: [phone]      Thank you

## 2022-05-03 NOTE — TELEPHONE ENCOUNTER
S/W pt regarding below issue  States early this am, he was opening the garage door for son and the [facility] medication bottle fell out of his pocket and rolled under the truck  States laniee rad over the bottle and crushed it and the [facility]  Advised we could not refill these medications if lost, deystroyed or stolen  Advised may be able to with a police report, but that AS would be the one to make the decision to do so  Pt states he will see what he can do when his son gets home

## 2022-05-04 DIAGNOSIS — G89.4 CHRONIC PAIN SYNDROME: Primary | ICD-10-CM

## 2022-05-04 RX ORDER — HYDROCODONE BITARTRATE AND ACETAMINOPHEN 10; 325 MG/1; MG/1
1 TABLET ORAL EVERY 6 HOURS PRN
Qty: 32 TABLET | Refills: 0 | Status: SHIPPED | OUTPATIENT
Start: 2022-05-04 | End: 2022-07-18 | Stop reason: SDUPTHER

## 2022-05-04 NOTE — TELEPHONE ENCOUNTER
Please let the patient know that I gave him a short prescription for Norco 10/325 mg QID to get him through until his next fill date on 05/11/2022  Also remind him that this will not be something that is done again should he damage his medication before his next fill date

## 2022-05-04 NOTE — TELEPHONE ENCOUNTER
S/w John Crowder at [address]  Pt filled script for Gabapentin 900 mg tid on 4/9 # 270  S/w pt  Pt states that he has  8 days worth of Gabapentin left and admits to taking it every 6 hours instead of every 8 hours x 1 month before he realized his mistake  Pt now taking every 8 hours as prescribed  Pt is requesting a refill of Gabapentin  Advised will notify LH and CB

## 2022-05-04 NOTE — TELEPHONE ENCOUNTER
S/w the patient who states that he swept up the medication and threw it in the trash  No police were called to file a report  Stated "what am I going to do, nothing I can do   It's gone "  Advised will notify [facility]

## 2022-05-16 ENCOUNTER — TELEPHONE (OUTPATIENT)
Dept: PAIN MEDICINE | Facility: MEDICAL CENTER | Age: 82
End: 2022-05-16

## 2022-05-16 NOTE — TELEPHONE ENCOUNTER
--pt to C/B--    S/W pt  Pt stated on 5/12 he got 32 pills that they wouldn't give it to him on 5/4 when the script was dated  Advised him LH ordered 32 pills and then has a script for 5/11  Pt stated he has 4 days left and he just suffered for 2 weeks without pills  Pt stated he will call the pharmacy and then call [facility] back

## 2022-05-16 NOTE — TELEPHONE ENCOUNTER
Pt has a problem with his [facility] script and would like to know if a new script can be sent for him  Pt was only given a 32  tabs yesterday     Pt can be reached at [phone]

## 2022-06-16 ENCOUNTER — TELEPHONE (OUTPATIENT)
Dept: PAIN MEDICINE | Facility: CLINIC | Age: 82
End: 2022-06-16

## 2022-06-16 DIAGNOSIS — G89.4 CHRONIC PAIN SYNDROME: ICD-10-CM

## 2022-06-16 RX ORDER — HYDROCODONE BITARTRATE AND ACETAMINOPHEN 10; 325 MG/1; MG/1
1 TABLET ORAL EVERY 6 HOURS PRN
Qty: 84 TABLET | Refills: 0 | Status: SHIPPED | OUTPATIENT
Start: 2022-06-19 | End: 2022-07-19 | Stop reason: SDUPTHER

## 2022-06-16 NOTE — TELEPHONE ENCOUNTER
In the state [facility], I cannot legally prescribe opioid medications if the patient has not been seen in the last 3 months  3 months (90 days) from the patient's last visit with Bhavik Carroll brings him to July 9, 2022  I will need to see him after that date to continue him on opioid medications  He can see me in [place] for a one time visit to try to fix this issue, but I do need to see him from a legal perspective to prescribe for him  I provided a refill until July 9, 2022  I cannot prescribe further than that date without seeing him  Thanks

## 2022-06-16 NOTE — TELEPHONE ENCOUNTER
S/w pt  Pt is requesting a refill of Norco   Pt advised that an OVS is required for refills  Pt had to cancel 6/9 OVS d/t illness  OVS re scheduled for 8/22 and pt is on cancellation list  Pt states that he takes it qid, it helps his pain and he has no se's  Advised will notify AS and CB

## 2022-06-16 NOTE — TELEPHONE ENCOUNTER
Pt contacted Call Center requested refill of their medication  Medication Name: Corey High      Dosage of Med: 10 - 325 mg      Frequency of Med: Take 1 tablet by mouth every 6 (six) hours as needed for moderate pain Max Daily Amount: 4 tablets      Remaining Medication: supply until 6/19/22      Pharmacy and Location: [facility] Adria Mcghee on file         Pt  Preferred Callback Phone Number: [phone]       Thank you

## 2022-06-17 NOTE — TELEPHONE ENCOUNTER
S/w pt, was able to move up his appt to 6/27  Informed pt that AS sent a rx to [facility] that he can fill on Sunday  Cx'd 8/22 appt

## 2022-06-22 NOTE — TELEPHONE ENCOUNTER
Patient states he went to fill his Norco medication & was only given 84 tablets, due to them not having anymore in stock at this time  He is requesting a script for the remaining 36 tablets   Please reach out to him at # [phone]

## 2022-06-22 NOTE — TELEPHONE ENCOUNTER
S/w pharmacist at [facility]  Pt picked up script written for 84 tabs on 6/19  S/w pt  Pt advised that as per AS note on 6/16, he is unable to prescribe narcotics for pt if he has not been seen in office for 90 days  Pt concerned that his meds are "messed up"  Pt has OVS 6/27 with AS and will discuss at that time  Pt has enough medication until that appt  Pt appreciative of call

## 2022-07-18 ENCOUNTER — OFFICE VISIT (OUTPATIENT)
Dept: PAIN MEDICINE | Facility: CLINIC | Age: 82
End: 2022-07-18
Payer: MEDICARE

## 2022-07-18 VITALS
DIASTOLIC BLOOD PRESSURE: 75 MMHG | TEMPERATURE: 98.2 F | BODY MASS INDEX: 39.46 KG/M2 | HEIGHT: 70 IN | SYSTOLIC BLOOD PRESSURE: 145 MMHG | RESPIRATION RATE: 19 BRPM | HEART RATE: 80 BPM

## 2022-07-18 DIAGNOSIS — Z79.891 LONG-TERM CURRENT USE OF OPIATE ANALGESIC: ICD-10-CM

## 2022-07-18 DIAGNOSIS — F11.20 UNCOMPLICATED OPIOID DEPENDENCE (HCC): ICD-10-CM

## 2022-07-18 DIAGNOSIS — M51.16 INTERVERTEBRAL DISC DISORDER WITH RADICULOPATHY OF LUMBAR REGION: ICD-10-CM

## 2022-07-18 DIAGNOSIS — G89.4 CHRONIC PAIN SYNDROME: Primary | ICD-10-CM

## 2022-07-18 PROCEDURE — 99214 OFFICE O/P EST MOD 30 MIN: CPT | Performed by: ANESTHESIOLOGY

## 2022-07-18 PROCEDURE — 80305 DRUG TEST PRSMV DIR OPT OBS: CPT | Performed by: ANESTHESIOLOGY

## 2022-07-18 RX ORDER — HYDROCODONE BITARTRATE AND ACETAMINOPHEN 10; 325 MG/1; MG/1
1 TABLET ORAL 4 TIMES DAILY PRN
Qty: 120 TABLET | Refills: 0 | Status: SHIPPED | OUTPATIENT
Start: 2022-08-17 | End: 2022-09-14 | Stop reason: SDUPTHER

## 2022-07-18 RX ORDER — GABAPENTIN 300 MG/1
900 CAPSULE ORAL 4 TIMES DAILY
Qty: 360 CAPSULE | Refills: 1 | Status: SHIPPED | OUTPATIENT
Start: 2022-07-18 | End: 2022-07-26

## 2022-07-18 RX ORDER — HYDROCODONE BITARTRATE AND ACETAMINOPHEN 10; 325 MG/1; MG/1
1 TABLET ORAL 4 TIMES DAILY PRN
Qty: 120 TABLET | Refills: 0 | Status: SHIPPED | OUTPATIENT
Start: 2022-07-18 | End: 2022-09-14 | Stop reason: SDUPTHER

## 2022-07-18 NOTE — PROGRESS NOTES
Scribe Attestation    I,:  MITCHELL Barton am acting as a scribe while in the presence of the attending physician :       I,:  Magaly Hester MD personally performed the services described in this documentation    as scribed in my presence :         Pain Medicine Follow-Up Note    Assessment:  1  Chronic pain syndrome    2  Long-term current use of opiate analgesic    3  Uncomplicated opioid dependence ([address] )    4  Intervertebral disc disorder with radiculopathy of lumbar region        Plan:  Orders Placed This Encounter   Procedures    MM ALL_Prescribed Meds and Special Instructions     Order Specific Question:   [facility] Is ALBUTEROL prescribed? Answer:   Yes     Order Specific Question:   [facility] Is ATORVASTATIN prescribed? Answer:   Yes     Order Specific Question:   [facility] Is GABAPENTIN prescribed? Answer:   Yes     Order Specific Question:   [facility] Is NALOXONE Prescribed? Answer:   Yes     Order Specific Question:   [facility] Is OMEPRAZOLE prescribed? Answer:   Yes     Order Specific Question:   [place] Is TRAZODONE prescribed? Answer:    Yes    MM DT_Alprazolam Definitive Test    MM DT_Amphetamine Definitive Test    MM DT_Buprenorphine Definitive Test    MM DT_Citalopram/Escitalopram Definitive Test    MM DT_Clonazepam Definitive Test    MM DT_Cocaine Definitive Test    MM DT_Codeine Definitive Test    MM Diazepam Definitive Test    MM DT_Ethyl Glucuronide/Ethyl Sulfate Definitive Test    MM DT_Fentanyl Definitive Test    MM DT_Heroin Definitive Test    MM DT_Hydrocodone Definitive Test    MM DT_Hydromorphone Definitive Test    MM Lorazepam Definitive Test    MM DT_MDMA Definitive Test    MM DT_Methadone Definitive Test    MM DT_Methamphetamine Definitive Test    MM DT_Methylphenidate Definitive Test    MM DT_Morphine Definitive Test    MM DT_Oxazepam Definitive Test    MM DT_Oxycodone Definitive Test    MM DT_Oxymorphone Definitive Test   [facility] MM DT_Phentermine Definitive Test    MM DT_Pregablin Definitive    MM DT_Tapentadol Definitive Test    MM DT_Temazapam Definitive Test    MM DT_THC Definitive Test    MM DT_Tramadol Definitive Test    MM DT_Validity Creatinine    MM DT_Validity Oxidant    MM DT_Validity pH    MM DT_Validity Specific       New Medications Ordered This Visit   Medications    HYDROcodone-acetaminophen (NORCO)  mg per tablet     Sig: Take 1 tablet by mouth 4 (four) times a day as needed for severe pain Max Daily Amount: 4 tablets     Dispense:  120 tablet     Refill:  0     Patients remaining pills were destroyed in an accident  This is to get him to his next fill date of 5/11/22    HYDROcodone-acetaminophen (NORCO)  mg per tablet     Sig: Take 1 tablet by mouth 4 (four) times a day as needed for moderate pain Max Daily Amount: 4 tablets     Dispense:  120 tablet     Refill:  0    gabapentin (NEURONTIN) 300 mg capsule     Sig: Take 3 capsules (900 mg total) by mouth 4 (four) times a day     Dispense:  360 capsule     Refill:  1       My impressions and treatment recommendations were discussed in detail with the patient who verbalized understanding and had no further questions  The patient continues to report overall reduced pain with improved level of function by 40% therefore we will continue him on Percocet 10/325 mg up to 4 times daily as needed and increase his gabapentin 900 mg to 4 times daily  The patient does report drowsiness as a side effect from these medications and states that it helps him rest   The Percocet was E prescribed with a fill dates of 07/18/2022 and 08/17/2022  The gabapentin was refilled at this time patient to take 900 mg 4 times daily  Patient is agreeable with this plan  [place] Prescription Drug Monitoring Program report was reviewed and was appropriate     A urine drug screen was collected at today's office visit as part of our medication management protocol   The point of care testing results were appropriate for what was being prescribed  The specimen will be sent for confirmatory testing  The drug screen is medically necessary because the patient is either dependent on opioid medication or is being considered for opioid medication therapy and the results could impact ongoing or future treatment  The drug screen is to evaluate for the presences or absence of prescribed, non-prescribed, and/or illicit drugs/substances  There are risks associated with opioid medications, including dependence, addiction and tolerance  The patient understands and agrees to use these medications only as prescribed  Potential side effects of the medications include, but are not limited to, constipation, drowsiness, addiction, impaired judgment and risk of fatal overdose if not taken as prescribed  The patient was warned against driving while taking sedation medications  Sharing medications is a felony  At this point in time, the patient is showing no signs of addiction, abuse, diversion or suicidal ideation  Follow-up is planned in 2 months time or sooner as warranted  Discharge instructions were provided  I personally saw and examined the patient and I agree with the above discussed plan of care  History of Present Illness:    Deneen Smith is a 78 y o  male who presents to [facility] for interval re-evaluation of the above stated pain complaints  The patient has a past medical and chronic pain history as outlined in the assessment section  He was last seen on 04/11/2022  Patient presents to the office pain and that his pain symptoms are worse with low back and right leg pain score of 8 - 10 / 10  Patient states that his pain is worse today due to the brain  But typically his pain is worse in the morning, evening, and at night  His pain is constant in nature  The quality of his pain is sharp and throbbing    Patient states that he receives 40% relief of pain with his current pain regimen  Pain Contract Signed:  02/08/2022  Last Urine Drug Screen:  07/18/2022    Other than as stated above, the patient denies any interval changes in medications, medical condition, mental condition, symptoms, or allergies since the last office visit  Review of Systems:    Review of Systems   Constitutional: Negative for unexpected weight change  HENT: Negative for ear pain  Eyes: Negative for visual disturbance  Respiratory: Positive for shortness of breath  Negative for wheezing  Gastrointestinal: Negative for abdominal pain  Musculoskeletal: Positive for back pain, gait problem and joint swelling  Decreased ROM, joint pain    Muscle weakness   Neurological: Positive for weakness and numbness  Psychiatric/Behavioral: Positive for decreased concentration           Patient Active Problem List   Diagnosis    Chronic pain syndrome    Pain of lumbar spine    Sacroiliitis (HCC)    Intervertebral disc disorder with radiculopathy of lumbar region    Centrilobular emphysema (HCC)    Dyspnea on exertion    Chronic atrial fibrillation (HCC)    CAD S/P percutaneous coronary angioplasty    Chronic respiratory failure with hypoxia (HCC)    Continuous dependence on cigarette smoking    Cervical radiculopathy    Cervical spinal stenosis    Cervical pain    Elevated PSA    Obesity, morbid ([address] )    Uncomplicated opioid dependence ([address] )    Essential hypertension    Dyslipidemia    Asymptomatic PVCs    Mild aortic stenosis    Obstructive sleep apnea       Past Medical History:   Diagnosis Date    Acid reflux     Arthritis     BPH (benign prostatic hyperplasia)     no treatment    Calcium disorder     Chronic atrial fibrillation (HCC) 2/6/2018    Chronic pain     Chronic pain disorder     COPD (chronic obstructive pulmonary disease) ([address] )     Coronary artery disease 2001    PTCA with one stent    Eye redness     Poked his eye on 3/18/16 "bloody" right eye    Hiatal hernia     Hydronephrosis     Hyperlipidemia     Hypertension     Irregular heart beat     A-Fib    Low back pain     Neck pain     Nocturnal leg cramps     Osteopenia     Peripheral artery disease (HCC)     Peripheral neuropathy     Pleural effusion     Sleep apnea     REFUSES MACHINE HS    Spinal stenosis     Vocal cord polyp 10/2012    panendoscopy       Past Surgical History:   Procedure Laterality Date    ANGIOPLASTY      APPENDECTOMY  04/2007    CARDIOVERSION      x2, for A-Fib    CARDIOVERSION      CATARACT EXTRACTION      CATARACT EXTRACTION W/ INTRAOCULAR LENS IMPLANT Left 01/2014    CORONARY ANGIOPLASTY WITH STENT PLACEMENT  2001    one stent    HERNIA REPAIR Right     inquinal    JOINT REPLACEMENT Left     Hip    KNEE SURGERY  1994    also had arthroscopy right knee 2005    PANENDOSCOPY  10/23/2012    vocal cord polyp    PILONIDAL CYST DRAINAGE      x 3    FL SURG IMPLNT [address] Left 8/23/2017    Procedure: Placement of Thoracic SCS with left buttock IPG (IMPULSE); Surgeon: Michael Gallegos MD;  Location:  MAIN OR;  Service: Neurosurgery    FL XCAPSL CTRC RMVL INSJ IO LENS PROSTH W/O ECP Right 4/6/2016    Procedure: EXTRACTION EXTRACAPSULAR CATARACT PHACO INTRAOCULAR LENS (IOL);   Surgeon: Apolonia Cowan MD;  [place] [facility];  Service: Ophthalmology    SPINAL CORD STIMULATOR TRIAL W/ LAMINOTOMY N/A 4/13/2017    Procedure: INSERTION DORSAL COLUMN SPINAL CORD STIMULATOR TRIAL;  Surgeon: Alfonso Bird MD;  Location: [facility] MAIN OR;  Service:    [address] [place] [address] INJECTION FOR RESEARCH STUDY  5/28/2015       Family History   Problem Relation Age of Onset    Cancer Father 80        prostate    No Known Problems Mother     No Known Problems Sister     No Known Problems Brother     No Known Problems Daughter     No Known Problems Son     No Known Problems Maternal Aunt     No Known Problems Maternal Uncle  No Known Problems Paternal Aunt     No Known Problems Paternal Uncle     No Known Problems Maternal Grandmother     No Known Problems Maternal Grandfather     No Known Problems Paternal Grandmother     No Known Problems Paternal Grandfather        Social History     Occupational History    Not on file   Tobacco Use    Smoking status: Current Every Day Smoker     Packs/day: 0 75     Years: 60 00     Pack years: 45 00    Smokeless tobacco: Never Used   Vaping Use    Vaping Use: Never used   Substance and Sexual Activity    Alcohol use: Yes     Comment: occasionally    Drug use: No    Sexual activity: Not on file         Current Outpatient Medications:     apixaban (Eliquis) 5 mg, Take 1 tablet (5 mg total) by mouth 2 (two) times a day, Disp: 60 tablet, Rfl: 11    aspirin (ECOTRIN LOW STRENGTH) 81 mg EC tablet, Take 81 mg by mouth daily, Disp: , Rfl:     atorvastatin (LIPITOR) 40 mg tablet, Take 40 mg by mouth daily  , Disp: , Rfl:     budesonide (PULMICORT) 0 5 mg/2 mL nebulizer solution, Take 2 mL (0 5 mg total) by nebulization 2 (two) times a day, Disp: 120 mL, Rfl: 5    diltiazem (CARDIZEM CD) 240 mg 24 hr capsule, , Disp: , Rfl:     diltiazem (DILACOR XR) 240 MG 24 hr capsule, Take 1 capsule (240 mg total) by mouth daily at bedtime, Disp: 90 capsule, Rfl: 3    fluticasone (FLONASE) 50 mcg/act nasal spray, 2 sprays into each nostril daily, Disp: , Rfl:     formoterol (Perforomist) 20 MCG/2ML nebulizer solution, Take 2 mL (20 mcg total) by nebulization 2 (two) times a day, Disp: 120 mL, Rfl: 5    gabapentin (NEURONTIN) 300 mg capsule, Take 3 capsules (900 mg total) by mouth 4 (four) times a day, Disp: 360 capsule, Rfl: 1    [START ON 8/17/2022] HYDROcodone-acetaminophen (NORCO)  mg per tablet, Take 1 tablet by mouth 4 (four) times a day as needed for severe pain Max Daily Amount: 4 tablets, Disp: 120 tablet, Rfl: 0    HYDROcodone-acetaminophen (NORCO)  mg per tablet, Take 1 tablet by mouth 4 (four) times a day as needed for moderate pain Max Daily Amount: 4 tablets, Disp: 120 tablet, Rfl: 0    ipratropium-albuterol (DUO-NEB) 0 5-2 5 mg/3 mL nebulizer solution, USE 1 VIAL IN NEBULIZER 3 TIMES DAILY, Disp: 90 vial, Rfl: 11    meclizine (ANTIVERT) 12 5 MG tablet, TAKE 1-2 TABLETS BY MOUTH EVERY 8 HOURS AS NEEDED PRN; DIZZINESS, Disp: , Rfl: 3    naloxone (NARCAN) 4 mg/0 1 mL nasal spray, Administer 1 spray into a nostril  If no response after 2-3 minutes, give another dose in the other nostril using a new spray , Disp: 1 each, Rfl: 1    nebivolol (BYSTOLIC) 10 mg tablet, Take 1 tablet (10 mg total) by mouth daily (Patient taking differently: Take 20 mg by mouth daily), Disp: 90 tablet, Rfl: 3    nicotine (NICOTROL) 10 MG inhaler, Use 1  Cartridge per hour as needed  Do not exceed 10 cartridges per day, Disp: 168 each, Rfl: 6    omeprazole (PriLOSEC) 20 mg delayed release capsule, Take 1 capsule by mouth as needed for heartburn , Disp: , Rfl:     OXYGEN-HELIUM IN, Inhale 2 L daily at bedtime, Disp: , Rfl:     phenazopyridine (PYRIDIUM) 200 mg tablet, , Disp: , Rfl: 0    Revefenacin (Yupelri) 175 MCG/3ML SOLN, Take 3 mL (175 mcg total) by nebulization daily, Disp: 90 mL, Rfl: 5    tamsulosin (FLOMAX) 0 4 mg, Take 0 4 mg by mouth daily with dinner, Disp: , Rfl:     traZODone (DESYREL) 50 mg tablet, , Disp: , Rfl:     HYDROcodone-acetaminophen (NORCO)  mg per tablet, Take 1 tablet by mouth every 6 (six) hours as needed for moderate pain for up to 21 days Max Daily Amount: 4 tablets, Disp: 84 tablet, Rfl: 0    No Known Allergies    Physical Exam:    /75   Pulse 80   Temp 98 2 °F (36 8 °C)   Resp 19   Ht 5' 10" (1 778 m)   BMI 39 46 kg/m²     Constitutional:normal, well developed, well nourished, alert, in no distress and non-toxic and no overt pain behavior   and obese  Eyes:anicteric  HEENT:grossly intact  Neck:supple, symmetric, trachea midline and no masses Pulmonary:even and unlabored and Using a nasal cannula and oxygen condenser unit  Cardiovascular:No edema or pitting edema present  Skin:Normal without rashes or lesions and well hydrated  Psychiatric:Mood and affect appropriate  Neurologic:Cranial Nerves II-XII grossly intact  Musculoskeletal:in wheelchair      Orders Placed This Encounter   Procedures    MM ALL_Prescribed Meds and Special Instructions    MM DT_Alprazolam Definitive Test    MM DT_Amphetamine Definitive Test    MM DT_Buprenorphine Definitive Test    MM DT_Citalopram/Escitalopram Definitive Test    MM DT_Clonazepam Definitive Test    MM DT_Cocaine Definitive Test    MM DT_Codeine Definitive Test    MM Diazepam Definitive Test    MM DT_Ethyl Glucuronide/Ethyl Sulfate Definitive Test    MM DT_Fentanyl Definitive Test    MM DT_Heroin Definitive Test    MM DT_Hydrocodone Definitive Test    MM DT_Hydromorphone Definitive Test    MM Lorazepam Definitive Test    MM DT_MDMA Definitive Test    MM DT_Methadone Definitive Test    MM DT_Methamphetamine Definitive Test    MM DT_Methylphenidate Definitive Test    MM DT_Morphine Definitive Test    MM DT_Oxazepam Definitive Test    MM DT_Oxycodone Definitive Test    MM DT_Oxymorphone Definitive Test    MM DT_Phentermine Definitive Test    MM DT_Pregablin Definitive    MM DT_Tapentadol Definitive Test    MM DT_Temazapam Definitive Test    MM DT_THC Definitive Test    MM DT_Tramadol Definitive Test    MM DT_Validity Creatinine    MM DT_Validity Oxidant    MM DT_Validity pH    MM DT_Validity Specific

## 2022-07-19 ENCOUNTER — TELEPHONE (OUTPATIENT)
Dept: PAIN MEDICINE | Facility: CLINIC | Age: 82
End: 2022-07-19

## 2022-07-19 DIAGNOSIS — G89.4 CHRONIC PAIN SYNDROME: ICD-10-CM

## 2022-07-19 RX ORDER — HYDROCODONE BITARTRATE AND ACETAMINOPHEN 10; 325 MG/1; MG/1
1 TABLET ORAL 4 TIMES DAILY PRN
Qty: 20 TABLET | Refills: 0 | Status: SHIPPED | OUTPATIENT
Start: 2022-08-11

## 2022-07-19 NOTE — TELEPHONE ENCOUNTER
Patient called stating that his July script for   HYDROcodone-acetaminophen [facility])  mg per tablet is 20 pills short due to the pharmacy not having enough inventory- thank you        [phone]

## 2022-07-19 NOTE — TELEPHONE ENCOUNTER
S/w the pharmacist at [address] [place] [address] ([phone]) to clarify and she stated yes they ran out of norco yesterday and he only received 100 pills  He will run out in 25 days and before the next script can be filled on 8/17/22  He  will need a new script for #20 pills dated for 25 days from now   Thanks

## 2022-07-22 DIAGNOSIS — J43.2 CENTRILOBULAR EMPHYSEMA (HCC): ICD-10-CM

## 2022-07-22 RX ORDER — REVEFENACIN 175 UG/3ML
175 SOLUTION RESPIRATORY (INHALATION) DAILY
Qty: 270 ML | Refills: 2 | Status: SHIPPED | OUTPATIENT
Start: 2022-07-22 | End: 2022-10-20

## 2022-07-22 RX ORDER — BUDESONIDE 0.5 MG/2ML
0.5 INHALANT ORAL 2 TIMES DAILY
Qty: 360 ML | Refills: 2 | Status: SHIPPED | OUTPATIENT
Start: 2022-07-22 | End: 2022-10-20

## 2022-07-22 RX ORDER — FORMOTEROL FUMARATE 20 UG/2ML
20 SOLUTION RESPIRATORY (INHALATION) 2 TIMES DAILY
Qty: 360 ML | Refills: 2 | Status: SHIPPED | OUTPATIENT
Start: 2022-07-22 | End: 2022-10-20

## 2022-07-25 ENCOUNTER — TELEPHONE (OUTPATIENT)
Dept: PAIN MEDICINE | Facility: CLINIC | Age: 82
End: 2022-07-25

## 2022-07-25 DIAGNOSIS — G89.4 CHRONIC PAIN SYNDROME: ICD-10-CM

## 2022-07-25 DIAGNOSIS — M51.16 INTERVERTEBRAL DISC DISORDER WITH RADICULOPATHY OF LUMBAR REGION: ICD-10-CM

## 2022-07-25 NOTE — TELEPHONE ENCOUNTER
Patient states insurance won't cover Gabapentin 4 times a day it needs to be changed back to 3 times a day     Please advise     Thank you

## 2022-07-25 NOTE — TELEPHONE ENCOUNTER
AS, RN s/w pharmacist and she ran the script through and this persons insurance will not fill twelve 300mg caps a day, she would like ot know if you would like to send 600mg one tab QID and 300mg caps one cap QID? She said that will be covered

## 2022-07-26 RX ORDER — GABAPENTIN 600 MG/1
900 TABLET ORAL 4 TIMES DAILY
Qty: 180 TABLET | Refills: 1 | Status: SHIPPED | OUTPATIENT
Start: 2022-07-26 | End: 2022-09-14

## 2022-07-26 RX ORDER — GABAPENTIN 600 MG/1
900 TABLET ORAL 4 TIMES DAILY
Qty: 180 TABLET | Refills: 1 | Status: SHIPPED | OUTPATIENT
Start: 2022-07-26 | End: 2022-07-26 | Stop reason: SDUPTHER

## 2022-07-26 NOTE — TELEPHONE ENCOUNTER
RN yesterday s/w [facility] pharmacist and last Gabapentin script sent by AS was to [facility] pharmacy on file, not [facility]  Can script please be sent to [facility] pharmacy please

## 2022-07-28 NOTE — TELEPHONE ENCOUNTER
Pt called in to check on the status of the medication HYDROcodone-acetaminophen (NORCO)  mg per tablet           Please be advised thank you    Pt can be reached @ [phone]

## 2022-07-28 NOTE — TELEPHONE ENCOUNTER
S/w pt advised of fill dates for Norco  Pt agreeable & will c/b if any issue w/ obtaining script @ pharmacy

## 2022-08-09 ENCOUNTER — OFFICE VISIT (OUTPATIENT)
Dept: PULMONOLOGY | Facility: MEDICAL CENTER | Age: 82
End: 2022-08-09
Payer: MEDICARE

## 2022-08-09 VITALS
WEIGHT: 275 LBS | DIASTOLIC BLOOD PRESSURE: 70 MMHG | SYSTOLIC BLOOD PRESSURE: 136 MMHG | OXYGEN SATURATION: 93 % | TEMPERATURE: 97.8 F | HEART RATE: 79 BPM | RESPIRATION RATE: 20 BRPM | HEIGHT: 70 IN | BODY MASS INDEX: 39.37 KG/M2

## 2022-08-09 DIAGNOSIS — I48.20 CHRONIC ATRIAL FIBRILLATION (HCC): ICD-10-CM

## 2022-08-09 DIAGNOSIS — I35.0 MILD AORTIC STENOSIS: ICD-10-CM

## 2022-08-09 DIAGNOSIS — J43.2 CENTRILOBULAR EMPHYSEMA (HCC): Primary | ICD-10-CM

## 2022-08-09 DIAGNOSIS — G47.33 OBSTRUCTIVE SLEEP APNEA: ICD-10-CM

## 2022-08-09 DIAGNOSIS — J96.11 CHRONIC RESPIRATORY FAILURE WITH HYPOXIA (HCC): ICD-10-CM

## 2022-08-09 DIAGNOSIS — I10 ESSENTIAL HYPERTENSION: ICD-10-CM

## 2022-08-09 PROCEDURE — 99215 OFFICE O/P EST HI 40 MIN: CPT | Performed by: INTERNAL MEDICINE

## 2022-08-09 RX ORDER — AMOXICILLIN AND CLAVULANATE POTASSIUM 875; 125 MG/1; MG/1
1 TABLET, FILM COATED ORAL EVERY 12 HOURS
COMMUNITY
Start: 2022-06-21 | End: 2022-09-20 | Stop reason: ALTCHOICE

## 2022-08-09 NOTE — ASSESSMENT & PLAN NOTE
I reviewed for headaches inhaler regimen with him  He should be taking budesonide and DuoNebs twice a day you pylori and DuoNeb mid day  He voices understanding  I would also like him to get a chest x-ray is decreased breath sounds at the right base consistent with possible effusion as well as a CBC and BNP

## 2022-08-09 NOTE — PATIENT INSTRUCTIONS
Continue Budesonide twice a day and DUO nebs twice a day  Take Yupelri once a day along with additional Duoneb midday  Get CXR  [facility]

## 2022-08-09 NOTE — PROGRESS NOTES
Assessment/Plan:    Chronic respiratory failure with hypoxia ([facility] )  Pamela Bird has normal oxygen saturations on supplemental 2 L  he will continue to wear his oxygen 247    Obstructive sleep apnea  Pamela Bird continues to defer any treatment for obstructive sleep apnea and will continue to wear his oxygen at night  Centrilobular emphysema ([facility] [address] )  I reviewed for headaches inhaler regimen with him  He should be taking budesonide and DuoNebs twice a day you pylori and DuoNeb mid day  He voices understanding  I would also like him to get a chest x-ray is decreased breath sounds at the right base consistent with possible effusion as well as a CBC and BNP  Diagnoses and all orders for this visit:    Centrilobular emphysema ([facility] )  -     XR chest pa & lateral; Future  -     CBC and differential; Future  -     NT-BNP PRO; Future    Chronic respiratory failure with hypoxia (HCC)    Obstructive sleep apnea    Mild aortic stenosis    Essential hypertension    Chronic atrial fibrillation (HCC)    Other orders  -     amoxicillin-clavulanate (AUGMENTIN) 875-125 mg per tablet; Take 1 tablet by mouth every 12 (twelve) hours (Patient not taking: No sig reported)          Subjective:      Patient ID: Rico Sol is a 78 y o  male  Sb Expose feels stable with regards to his breathing  Although he has moderate to severe COPD and continues to smoke he is not been hospitalized in the past several years  Maintains on all nebulized solution but takes you pulmonary twice a day budesonide twice a day and DuoNebs twice a day  He has trouble walking up the 20 stairs in his home or any long distance  The following portions of the patient's history were reviewed and updated as appropriate: allergies, current medications, past family history, past medical history, past social history, past surgical history and problem list     Review of Systems   Constitutional: Negative  HENT: Negative  Eyes: Negative  Respiratory: Positive for shortness of breath and wheezing  Cardiovascular: Negative  Gastrointestinal: Negative  Endocrine: Negative  Genitourinary: Negative  Allergic/Immunologic: Negative  Neurological: Negative  Hematological: Negative  Psychiatric/Behavioral: Negative  Objective:      /70 (BP Location: Left arm, Patient Position: Sitting, Cuff Size: Large)   Pulse 79   Temp 97 8 °F (36 6 °C) (Temporal)   Resp 20   Ht 5' 10" (1 778 m)   Wt 125 kg (275 lb)   SpO2 93% Comment: 2 L POC  BMI 39 46 kg/m²          Physical Exam  Constitutional:       Appearance: He is well-developed  HENT:      Head: Normocephalic  Eyes:      Pupils: Pupils are equal, round, and reactive to light  Cardiovascular:      Rate and Rhythm: Normal rate  Pulmonary:      Effort: Pulmonary effort is normal  No respiratory distress  Breath sounds: No wheezing or rales  Comments: Decreased breath sounds particularly at the right base  Abdominal:      Palpations: Abdomen is soft  Musculoskeletal:         General: Normal range of motion  Cervical back: Neck supple  Skin:     General: Skin is warm and dry  Neurological:      Mental Status: He is alert and oriented to person, place, and time

## 2022-08-09 NOTE — ASSESSMENT & PLAN NOTE
Ange James has normal oxygen saturations on supplemental 2 L  he will continue to wear his oxygen 247

## 2022-08-09 NOTE — ASSESSMENT & PLAN NOTE
Roro Jazmín continues to defer any treatment for obstructive sleep apnea and will continue to wear his oxygen at night

## 2022-08-22 DIAGNOSIS — I10 ESSENTIAL HYPERTENSION: ICD-10-CM

## 2022-08-22 RX ORDER — NEBIVOLOL 10 MG/1
10 TABLET ORAL DAILY
Qty: 90 TABLET | Refills: 3 | Status: SHIPPED | OUTPATIENT
Start: 2022-08-22 | End: 2022-09-15 | Stop reason: SDUPTHER

## 2022-08-23 ENCOUNTER — TELEPHONE (OUTPATIENT)
Dept: CARDIOLOGY CLINIC | Facility: CLINIC | Age: 82
End: 2022-08-23

## 2022-08-23 DIAGNOSIS — I10 ESSENTIAL HYPERTENSION: ICD-10-CM

## 2022-09-09 DIAGNOSIS — J43.2 CENTRILOBULAR EMPHYSEMA (HCC): ICD-10-CM

## 2022-09-09 RX ORDER — IPRATROPIUM BROMIDE AND ALBUTEROL SULFATE 2.5; .5 MG/3ML; MG/3ML
3 SOLUTION RESPIRATORY (INHALATION) 3 TIMES DAILY
Qty: 252 ML | Refills: 5 | Status: SHIPPED | OUTPATIENT
Start: 2022-09-09

## 2022-09-09 NOTE — TELEPHONE ENCOUNTER
Patient Que Briggs is requesting a refill for     Medication(s)  name  Ipratropium- albuterol    Dose : 0 5-2 5mg/3ml    Last office Visit 08/09/2022    Pharmacy : [facility] (30 day or 90 day)        Pt requesting a new script for this medication  Chart showing this medication was last sent in 03/2020   Please advise

## 2022-09-13 NOTE — PROGRESS NOTES
Pain Medicine Follow-Up Note    Assessment:  1  Chronic pain syndrome    2  Intervertebral disc disorder with radiculopathy of lumbar region    3  Cervical radiculopathy        Plan:      New Medications Ordered This Visit   Medications    HYDROcodone-acetaminophen (NORCO)  mg per tablet     Sig: Take 1 tablet by mouth 4 (four) times a day as needed for severe pain Please fill on 10/12/2022 Max Daily Amount: 4 tablets     Dispense:  120 tablet     Refill:  0    HYDROcodone-acetaminophen (NORCO)  mg per tablet     Sig: Take 1 tablet by mouth 4 (four) times a day as needed for moderate pain Max Daily Amount: 4 tablets     Dispense:  120 tablet     Refill:  0    gabapentin (NEURONTIN) 300 mg capsule     Sig: Take 3 capsules (900 mg total) by mouth 3 (three) times a day     Dispense:  270 capsule     Refill:  1       My impressions and treatment recommendations were discussed in detail with the patient who verbalized understanding and had no further questions  The patient continues to report moderate relief of his pain with improved level of functioning without significant side effects using Norco 10/325 mg take 1 tablet by mouth 4 times a day as needed for moderate pain in addition to gabapentin 900 mg by mouth 3 times a day  Therefore I will continue the patient on these medications  Norco 10/325 mg tablet E prescribed to the patient's pharmacy with a fill date of today 09/14/2022 and 10/12/2022  [place] Prescription Drug Monitoring Program report was reviewed and was appropriate     There are risks associated with opioid medications, including dependence, addiction and tolerance  The patient understands and agrees to use these medications only as prescribed  Potential side effects of the medications include, but are not limited to, constipation, drowsiness, addiction, impaired judgment and risk of fatal overdose if not taken as prescribed   The patient was warned against driving while taking sedation medications  Sharing medications is a felony  At this point in time, the patient is showing no signs of addiction, abuse, diversion or suicidal ideation  Follow-up is planned in 8 weeks time or sooner as warranted  Discharge instructions were provided  I personally saw and examined the patient and I agree with the above discussed plan of care  History of Present Illness:    Earline Carreno is a 78 y o  male who presents to [facility] for interval re-evaluation of the above stated pain complaints  The patient has a past medical and chronic pain history as outlined in the assessment section  He was last seen on 7/18/2022  At today's visit patient states that his pain symptoms are worse with a pain score of 9/10 on the verbal numeric pain scale  Patient states that his pain is constant in nature  And the quality of the patient's pain is described as sharp, cramping, and shooting  Patient indicates that his pain is in his bilateral low back shooting down his right leg  Pain Contract Signed:  10/4/2021  Last Urine Drug Screen:  7/18/2022    Other than as stated above, the patient denies any interval changes in medications, medical condition, mental condition, symptoms, or allergies since the last office visit  Review of Systems:    Review of Systems   Respiratory: Positive for shortness of breath  Cardiovascular: Negative for chest pain  Gastrointestinal: Negative for constipation, diarrhea, nausea and vomiting  Musculoskeletal: Positive for gait problem and myalgias  Negative for arthralgias and joint swelling  Decreased ROM    Skin: Negative for rash  Neurological: Negative for dizziness, seizures and weakness  Psychiatric/Behavioral:        Memory loss   All other systems reviewed and are negative          Patient Active Problem List   Diagnosis    Chronic pain syndrome    Pain of lumbar spine    Sacroiliitis ([address] )    Intervertebral disc disorder with radiculopathy of lumbar region    Centrilobular emphysema (HCC)    Dyspnea on exertion    Chronic atrial fibrillation (HCC)    CAD S/P percutaneous coronary angioplasty    Chronic respiratory failure with hypoxia (HCC)    Continuous dependence on cigarette smoking    Cervical radiculopathy    Cervical spinal stenosis    Cervical pain    Elevated PSA    Obesity, morbid (HCC)    Uncomplicated opioid dependence ([facility] )    Essential hypertension    Dyslipidemia    Asymptomatic PVCs    Mild aortic stenosis    Obstructive sleep apnea       Past Medical History:   Diagnosis Date    Acid reflux     Arthritis     BPH (benign prostatic hyperplasia)     no treatment    Calcium disorder     Chronic atrial fibrillation ([facility] ) 2/6/2018    Chronic pain     Chronic pain disorder     COPD (chronic obstructive pulmonary disease) ([facility] [address] [facility] )     Coronary artery disease 2001    PTCA with one stent    Eye redness     Poked his eye on 3/18/16 "bloody" right eye    Hiatal hernia     Hydronephrosis     Hyperlipidemia     Hypertension     Irregular heart beat     A-Fib    Low back pain     Neck pain     Nocturnal leg cramps     Osteopenia     Peripheral artery disease (HCC)     Peripheral neuropathy     Pleural effusion     Sleep apnea     REFUSES MACHINE HS    Spinal stenosis     Vocal cord polyp 10/2012    panendoscopy       Past Surgical History:   Procedure Laterality Date    ANGIOPLASTY      APPENDECTOMY  04/2007    CARDIOVERSION      x2, for A-Fib    CARDIOVERSION      CATARACT EXTRACTION      CATARACT EXTRACTION W/ INTRAOCULAR LENS IMPLANT Left 01/2014    CORONARY ANGIOPLASTY WITH STENT PLACEMENT  2001    one stent    HERNIA REPAIR Right     inquinal    JOINT REPLACEMENT Left     Hip    KNEE SURGERY  1994    also had arthroscopy right knee 2005    PANENDOSCOPY  10/23/2012    vocal cord polyp    PILONIDAL CYST DRAINAGE      x 3    IN SURG IMPLNT Rusty Orozco Left 8/23/2017    Procedure: Placement of Thoracic SCS with left buttock IPG (IMPULSE); Surgeon: Darlene Robertson MD;  Location:  MAIN OR;  Service: Neurosurgery    VT XCAPSL CTRC RMVL INSJ IO LENS PROSTH W/O ECP Right 4/6/2016    Procedure: EXTRACTION EXTRACAPSULAR CATARACT PHACO INTRAOCULAR LENS (IOL);   Surgeon: Fuentes Shelton MD;  Location: [facility] MAIN OR;  Service: Ophthalmology   [facility] access hospital[address] TRIAL W/ LAMINOTOMY N/A 4/13/2017    Procedure: INSERTION DORSAL COLUMN SPINAL CORD STIMULATOR TRIAL;  Surgeon: Joie Carlos MD;  Location: [facility] MAIN OR;  Service:    [address] [place] [address] INJECTION FOR RESEARCH STUDY  5/28/2015       Family History   Problem Relation Age of Onset    Cancer Father 80        prostate    No Known Problems Mother     No Known Problems Sister     No Known Problems Brother     No Known Problems Daughter     No Known Problems Son     No Known Problems Maternal Aunt     No Known Problems Maternal Uncle     No Known Problems Paternal Aunt     No Known Problems Paternal Uncle     No Known Problems Maternal Grandmother     No Known Problems Maternal Grandfather     No Known Problems Paternal Grandmother     No Known Problems Paternal Grandfather        Social History     Occupational History    Not on file   Tobacco Use    Smoking status: Current Every Day Smoker     Packs/day: 0 75     Years: 60 00     Pack years: 45 00    Smokeless tobacco: Never Used   Vaping Use    Vaping Use: Never used   Substance and Sexual Activity    Alcohol use: Yes     Comment: occasionally    Drug use: No    Sexual activity: Not on file         Current Outpatient Medications:     apixaban (Eliquis) 5 mg, Take 1 tablet (5 mg total) by mouth 2 (two) times a day, Disp: 60 tablet, Rfl: 11    aspirin (ECOTRIN LOW STRENGTH) 81 mg EC tablet, Take 81 mg by mouth daily, Disp: , Rfl:     atorvastatin (LIPITOR) 40 mg tablet, Take 40 mg by mouth daily  , Disp: , Rfl:     budesonide (PULMICORT) 0 5 mg/2 mL nebulizer solution, Take 2 mL (0 5 mg total) by nebulization 2 (two) times a day, Disp: 360 mL, Rfl: 2    diltiazem (DILACOR XR) 240 MG 24 hr capsule, Take 1 capsule (240 mg total) by mouth daily at bedtime, Disp: 90 capsule, Rfl: 3    formoterol (Perforomist) 20 MCG/2ML nebulizer solution, Take 2 mL (20 mcg total) by nebulization 2 (two) times a day, Disp: 360 mL, Rfl: 2    gabapentin (NEURONTIN) 300 mg capsule, Take 3 capsules (900 mg total) by mouth 3 (three) times a day, Disp: 270 capsule, Rfl: 1    HYDROcodone-acetaminophen (NORCO)  mg per tablet, Take 1 tablet by mouth 4 (four) times a day as needed for moderate pain Max Daily Amount: 4 tablets, Disp: 20 tablet, Rfl: 0    [START ON 10/12/2022] HYDROcodone-acetaminophen (NORCO)  mg per tablet, Take 1 tablet by mouth 4 (four) times a day as needed for severe pain Please fill on 10/12/2022 Max Daily Amount: 4 tablets, Disp: 120 tablet, Rfl: 0    HYDROcodone-acetaminophen (NORCO)  mg per tablet, Take 1 tablet by mouth 4 (four) times a day as needed for moderate pain Max Daily Amount: 4 tablets, Disp: 120 tablet, Rfl: 0    ipratropium-albuterol (DUO-NEB) 0 5-2 5 mg/3 mL nebulizer solution, Take 3 mL by nebulization 3 (three) times a day, Disp: 252 mL, Rfl: 5    nicotine (NICOTROL) 10 MG inhaler, Use 1  Cartridge per hour as needed    Do not exceed 10 cartridges per day, Disp: 168 each, Rfl: 6    omeprazole (PriLOSEC) 20 mg delayed release capsule, Take 1 capsule by mouth as needed for heartburn , Disp: , Rfl:     OXYGEN-HELIUM IN, Inhale 2 L daily at bedtime, Disp: , Rfl:     Revefenacin (Yupelri) 175 MCG/3ML SOLN, Take 3 mL (175 mcg total) by nebulization daily, Disp: 270 mL, Rfl: 2    tamsulosin (FLOMAX) 0 4 mg, Take 0 4 mg by mouth daily with dinner, Disp: , Rfl:     traZODone (DESYREL) 50 mg tablet, , Disp: , Rfl:     amoxicillin-clavulanate (AUGMENTIN) 875-125 mg per tablet, Take 1 tablet by mouth every 12 (twelve) hours (Patient not taking: No sig reported), Disp: , Rfl:     diltiazem (CARDIZEM CD) 240 mg 24 hr capsule, , Disp: , Rfl:     fluticasone (FLONASE) 50 mcg/act nasal spray, 2 sprays into each nostril daily (Patient not taking: No sig reported), Disp: , Rfl:     meclizine (ANTIVERT) 12 5 MG tablet, TAKE 1-2 TABLETS BY MOUTH EVERY 8 HOURS AS NEEDED PRN; DIZZINESS (Patient not taking: No sig reported), Disp: , Rfl: 3    naloxone (NARCAN) 4 mg/0 1 mL nasal spray, Administer 1 spray into a nostril  If no response after 2-3 minutes, give another dose in the other nostril using a new spray  (Patient not taking: No sig reported), Disp: 1 each, Rfl: 1    nebivolol (BYSTOLIC) 10 mg tablet, Take 1 tablet (10 mg total) by mouth daily, Disp: 90 tablet, Rfl: 3    phenazopyridine (PYRIDIUM) 200 mg tablet, , Disp: , Rfl: 0    No Known Allergies    Physical Exam:    There were no vitals taken for this visit  Constitutional:normal, well developed, well nourished, alert, in no distress and non-toxic and no overt pain behavior   and obese  Eyes:anicteric  HEENT:grossly intact  Neck:supple, symmetric, trachea midline and no masses   Pulmonary:even and unlabored and On oxygen using nasal cannula and oxygen condenser  Cardiovascular:No edema or pitting edema present  Skin:Normal without rashes or lesions and well hydrated  Psychiatric:Mood and affect appropriate  Neurologic:Cranial Nerves II-XII grossly intact  Musculoskeletal:in wheelchair

## 2022-09-14 ENCOUNTER — OFFICE VISIT (OUTPATIENT)
Dept: PAIN MEDICINE | Facility: CLINIC | Age: 82
End: 2022-09-14
Payer: MEDICARE

## 2022-09-14 DIAGNOSIS — M54.12 CERVICAL RADICULOPATHY: ICD-10-CM

## 2022-09-14 DIAGNOSIS — G89.4 CHRONIC PAIN SYNDROME: Primary | ICD-10-CM

## 2022-09-14 DIAGNOSIS — M51.16 INTERVERTEBRAL DISC DISORDER WITH RADICULOPATHY OF LUMBAR REGION: ICD-10-CM

## 2022-09-14 PROCEDURE — 99214 OFFICE O/P EST MOD 30 MIN: CPT

## 2022-09-14 RX ORDER — HYDROCODONE BITARTRATE AND ACETAMINOPHEN 10; 325 MG/1; MG/1
1 TABLET ORAL 4 TIMES DAILY PRN
Qty: 120 TABLET | Refills: 0 | Status: SHIPPED | OUTPATIENT
Start: 2022-10-12 | End: 2022-10-13 | Stop reason: SDUPTHER

## 2022-09-14 RX ORDER — HYDROCODONE BITARTRATE AND ACETAMINOPHEN 10; 325 MG/1; MG/1
1 TABLET ORAL 4 TIMES DAILY PRN
Qty: 120 TABLET | Refills: 0 | Status: SHIPPED | OUTPATIENT
Start: 2022-09-14 | End: 2022-10-14

## 2022-09-14 RX ORDER — GABAPENTIN 300 MG/1
900 CAPSULE ORAL 3 TIMES DAILY
Qty: 270 CAPSULE | Refills: 1 | Status: SHIPPED | OUTPATIENT
Start: 2022-09-14

## 2022-09-14 NOTE — PATIENT INSTRUCTIONS

## 2022-09-15 DIAGNOSIS — I10 ESSENTIAL HYPERTENSION: ICD-10-CM

## 2022-09-15 RX ORDER — NEBIVOLOL 10 MG/1
10 TABLET ORAL DAILY
Qty: 90 TABLET | Refills: 3 | Status: SHIPPED | OUTPATIENT
Start: 2022-09-15

## 2022-09-20 ENCOUNTER — OFFICE VISIT (OUTPATIENT)
Dept: CARDIOLOGY CLINIC | Facility: CLINIC | Age: 82
End: 2022-09-20
Payer: MEDICARE

## 2022-09-20 VITALS
HEART RATE: 60 BPM | BODY MASS INDEX: 37.94 KG/M2 | HEIGHT: 70 IN | DIASTOLIC BLOOD PRESSURE: 64 MMHG | TEMPERATURE: 97.8 F | OXYGEN SATURATION: 96 % | SYSTOLIC BLOOD PRESSURE: 126 MMHG | WEIGHT: 265 LBS

## 2022-09-20 DIAGNOSIS — G89.29 CHRONIC MIDLINE LOW BACK PAIN WITH RIGHT-SIDED SCIATICA: ICD-10-CM

## 2022-09-20 DIAGNOSIS — I25.10 CAD S/P PERCUTANEOUS CORONARY ANGIOPLASTY: ICD-10-CM

## 2022-09-20 DIAGNOSIS — Z87.09 H/O ASBESTOSIS: ICD-10-CM

## 2022-09-20 DIAGNOSIS — I48.20 CHRONIC ATRIAL FIBRILLATION (HCC): Primary | ICD-10-CM

## 2022-09-20 DIAGNOSIS — Z98.61 CAD S/P PERCUTANEOUS CORONARY ANGIOPLASTY: ICD-10-CM

## 2022-09-20 DIAGNOSIS — E78.5 DYSLIPIDEMIA: ICD-10-CM

## 2022-09-20 DIAGNOSIS — I49.3 ASYMPTOMATIC PVCS: ICD-10-CM

## 2022-09-20 DIAGNOSIS — E66.9 CLASS 2 OBESITY: ICD-10-CM

## 2022-09-20 DIAGNOSIS — I10 ESSENTIAL HYPERTENSION: ICD-10-CM

## 2022-09-20 DIAGNOSIS — I35.0 MILD AORTIC STENOSIS: ICD-10-CM

## 2022-09-20 DIAGNOSIS — J43.2 CENTRILOBULAR EMPHYSEMA (HCC): ICD-10-CM

## 2022-09-20 DIAGNOSIS — M54.41 CHRONIC MIDLINE LOW BACK PAIN WITH RIGHT-SIDED SCIATICA: ICD-10-CM

## 2022-09-20 PROCEDURE — 93000 ELECTROCARDIOGRAM COMPLETE: CPT | Performed by: INTERNAL MEDICINE

## 2022-09-20 PROCEDURE — 99214 OFFICE O/P EST MOD 30 MIN: CPT | Performed by: INTERNAL MEDICINE

## 2022-09-20 NOTE — PROGRESS NOTES
Office Cardiology Progress Note  Negro [place] 78 y o  male MRN: [id number]  09/20/22  9:43 AM      ASSESSMENT:    1  Controlled chronic atrial fibrillation on   Eliquis oral anticoagulation plus diltiazem and Bystolic for rate control  2  Frequent PVCs or aberrantly conducted complexes, asymptomatic, and previous pattern of bigeminy/trigeminy,  with isolated unifocal PVCs today   Ventricular triplet noted previously   Patient refuses Holter monitor  3  Stable CAD with history of remote PCI, details unknown to me, done 12 35 years ago, with patient alleging last stress test 4 8-5 3+ years ago   He now refuses any further stress testing  4  Murmur of mild aortic valve stenosis, previously confirmed on echocardiography in 2018  5  Significant COPD with centrilobular emphysema  but now stable with chronic exertional dyspnea with hypoxemia and ongoing cigarette abuse at 1/2-3/4 packs per day and prior occasional exacerbations    Formerly on 1 to 1-1/2 packs per day  Today with no audible wheezing but is tachypneic   Uses nebulizer q i d  and is on oxygen most of the time  6  Obstructive sleep apnea, patient refusing CPAP and using nasal oxygen 2 L/m h s  and p r n   7  Marked obesity, class II, with 30 5 pound weight gain in 5 35 years with  25 pound weight  gain in approximately 16+ months and 15 pound weight loss in 0 8 years  8  History of right pleural effusion, small  9  History of asbestosis due to his exposure as a   10  Chronic low back, bilateral lower extremity and cervical spine pain and continuous opioid dependence with failure of lumbar epidural steroid injections and lumbar facet radiofrequency ablations with unsatisfactory response to permanent spinal stimulator implant on 08/23/2017   Now with ineffective spinal stimulator    Patient previously seeking Lyrica from pain management  Follows with Dr Jayce Rangel and is on combination of Norco and gabapentin for pain management    11  Long-standing essential hypertension and dyslipidemia, treated and controlled  Has new elevation of triglycerides on lab work dated 12/14/2021  12  History of hiatal hernia and GERD  15  History of former alcoholism, now controlled, with fall caused by alcohol abuse resulting in hospital stay 8/18/13 and last trip and fall 5 3 years ago  14  History of bladder outlet obstruction 8/13 and attributed to General acute hospital has likely localized prostate cancer under observation by Dr Margret Dolan with latest PSA 12 0 as of 11/08/2018  15  History of pneumonia/pleurisy March, 2012  16  Unremarkable cardiac catheterization in 2005  17  Cervical spine fracture after fall in 2009, rib fractures after fall in 2013, left total hip replacement April, 2010  18   Multilevel cervical spondylosis and multilevel cervical spinal stenosis  19  Recent onset of left lower extremity pitting edema,1-2+, likely from venous insufficiency or localized lymphedema  Plan       Patient Instructions     1  Continue current medication  2  Let us know if the left leg swelling gets much worse  3  Cardiology follow-up approximately six months with EKG, [facility] and CMP  4  We will consider updating echocardiogram after next visit  HPI    This 78 y o  male  denies new cardiopulmonary and medical symptoms  The patient has noticed mild edema of his left leg for a couple of months with no pain, redness, tenderness, unusual warmth, fever, chills  He uses nasal oxygen all day long  He is able to ambulate at home with a cane  The patient has chronic pain syndrome for which she uses Norco and gabapentin  This consists of bilateral low back pain with right-sided sciatica  He is followed by pain management for this  The patient claims to still cook some meals for himself at home but is basically housebound  The patient is being seen in follow-up of the below listed diagnoses  Encounter Diagnoses   Name Primary?     [facility] Yes    CAD S/P percutaneous coronary angioplasty     Asymptomatic PVCs     Essential hypertension     Mild aortic stenosis     Dyslipidemia     Centrilobular emphysema (HCC)     Class 2 obesity     H/O asbestosis     Chronic midline low back pain with right-sided sciatica         Review of Systems    All other systems negative, except as noted in history of present illness    Historical Information   Past Medical History:   Diagnosis Date    Acid reflux     Arthritis     BPH (benign prostatic hyperplasia)     no treatment    Calcium disorder     Chronic atrial fibrillation (HCC) 2/6/2018    Chronic pain     Chronic pain disorder     COPD [facility]ronic obstructive pulmonary disease) ([facility] )     Coronary artery disease 2001    PTCA with one stent    Eye redness     Poked his eye on 3/18/16 "bloody" right eye    Hiatal hernia     Hydronephrosis     Hyperlipidemia     Hypertension     Irregular heart beat     A-Fib    Low back pain     Neck pain     Nocturnal leg cramps     Osteopenia     Peripheral [facility] disease [facility]     Peripheral neuropathy     Pleural effusion     Sleep apnea     REFUSES MACHINE HS    Spinal stenosis     Vocal cord polyp 10/2012    panendoscopy     Past Surgical History:   Procedure Laterality Date    ANGIOPLASTY      APPENDECTOMY  04/2007    CARDIOVERSION      x2, for A-Fib    CARDIOVERSION      CATARACT EXTRACTION      CATARACT EXTRACTION W/ INTRAOCULAR LENS IMPLANT Left 01/2014    CORONARY ANGIOPLASTY WITH STENT PLACEMENT  2001    one stent    HERNIA REPAIR Right     inquinal    JOINT REPLACEMENT Left     Hip    KNEE SURGERY  1994    also had arthroscopy right knee 2005    PANENDOSCOPY  10/23/2012    vocal cord polyp    PILONIDAL CYST DRAINAGE      x 3    ID SURG IMPLNT [address] Left 8/23/2017    Procedure: Placement of Thoracic SCS with left buttock IPG (IMPULSE);   Surgeon: Tito Rivera MD;  [facility]; Service: Neurosurgery    [place] XCAPSL CTRC RMVL INSJ IO LENS PROSTH W/O ECP Right 4/6/2016    Procedure: EXTRACTION EXTRACAPSULAR CATARACT PHACO INTRAOCULAR LENS (IOL); Surgeon: Eliezer Guido MD;  [facility] MA[facility];  Service: Ophthalmology    SPINAL CORD STIMULATOR TRIAL W/ LAMINOTOMY N/A 4/13/2017    Procedure: INSERTION DORSAL COLUMN SPINAL CORD STIMULATOR TRIAL;  Surgeon: Mauri Wilcox MD;  Location: [place] [address] MAIN OR;  Service:    [address] [place] [address] INJECTION FOR RESEARCH STUDY  5/28/2015     Social History     Substance and Sexual Activity   Alcohol Use Yes    Comment: occasionally     Social History     Substance and Sexual Activity   Drug Use No     Social History     Tobacco Use   Smoking Status Current Every Day Smoker    Packs/day: 0 75    Years: 60 00    Pack years: 45 00   Smokeless Tobacco Never Used       Family History:  Family History   Problem Relation Age of Onset    Cancer Father 80        prostate    No Known Problems Mother     No Known Problems Sister     No Known Problems Brother     No Known Problems Daughter     No Known Problems Son     No Known Problems Maternal Aunt     No Known Problems Maternal Uncle     No Known Problems Paternal Aunt     No Known Problems Paternal Uncle     No Known Problems Maternal Grandmother     No Known Problems Maternal Grandfather     No Known Problems Paternal Grandmother     No Known Problems Paternal Grandfather          Meds/Allergies     Prior to Admission medications    Medication Sig Start Date End Date Taking?  Authorizing Provider   apixaban (Eliquis) 5 mg Take 1 tablet (5 mg total) by mouth 2 (two) times a day 11/16/21   Dorie Goltz, DO   aspirin (ECOTRIN LOW STRENGTH) 81 mg EC tablet Take 81 mg by mouth daily    Historical Provider, MD   atorvastatin (LIPITOR) 40 mg tablet Take 40 mg by mouth daily      Historical Provider, MD   budesonide (PULMICORT) 0 5 mg/2 mL nebulizer solution Take 2 mL (0 5 mg total) by nebulization 2 (two) times a day 7/22/22 10/20/22  Ghassan Smith PA-C   diltiazem (DILACOR XR) 240 MG 24 hr capsule Take 1 capsule (240 mg total) by mouth daily at bedtime 9/6/18   Nabil Blackburn MD   formoterol (Perforomist) 20 MCG/2ML nebulizer solution Take 2 mL (20 mcg total) by nebulization 2 (two) times a day 7/22/22 10/20/22  Ghassan Smith PA-C   gabapentin (NEURONTIN) 300 mg capsule Take 3 capsules (900 mg total) by mouth 3 (three) times a day 9/14/22   MITCHELL Raymond   HYDROcodone-acetaminophen [facility])  mg per tablet Take 1 tablet by mouth 4 (four) times a day as needed for moderate pain Max Daily Amount: 4 tablets 8/11/22   Cody Zapata MD   HYDROcodone-acetaminophen [facility])  mg per tablet Take 1 tablet by mouth 4 (four) times a day as needed for severe pain Please fill on 10/12/2022 Max Daily Amount: 4 tablets 10/12/22 11/11/22  MITCHELL Raymond   HYDROcodone-acetaminophen (NORCO)  mg per tablet Take 1 tablet by mouth 4 (four) times a day as needed for moderate pain Max Daily Amount: 4 tablets 9/14/22 10/14/22  MITCHELL Raymond   ipratropium-albuterol (DUO-NEB) 0 5-2 5 mg/3 mL nebulizer solution Take 3 mL by nebulization 3 (three) times a day 9/9/22   Sabiha Elise DO   nebivolol (BYSTOLIC) 10 mg tablet Take 1 tablet (10 mg total) by mouth daily 9/15/22   Nabil Blackburn MD   nicotine (NICOTROL) 10 MG inhaler Use 1  Cartridge per hour as needed    Do not exceed 10 cartridges per day 2/18/21   Sebastian Mckinney DO   omeprazole (PriLOSEC) 20 mg delayed release capsule Take 1 capsule by mouth as needed for heartburn  8/20/21   Historical Provider, MD   OXYGEN-HELIUM IN Inhale 2 L daily at bedtime    Historical Provider, MD   Revefenacin Yariel Keto) 175 MCG/3ML SOLN Take 3 mL (175 mcg total) by nebulization daily 7/22/22 10/20/22  Ghassan Smith PA-C   tamsulosin (FLOMAX) 0 4 mg Take 0 4 mg by mouth daily with dinner    Historical Provider, MD [place] (DESBARBARA) 50 mg tablet  3/20/18   Historical Provider, MD   amoxicillin-clavulanate (AUGMENTIN) 875-125 mg per tablet Take 1 tablet by mouth every 12 (twelve) hours  Patient not taking: No sig reported 6/21/22 9/20/22  Historical Provider, MD   diltiazem (CARDIZEM CD) 240 mg 24 hr capsule  1/17/22 9/20/22  Historical Provider, MD   fluticasone (FLONASE) 50 mcg/act nasal spray 2 sprays into each nostril daily  Patient not taking: No sig reported  9/20/22  Historical Provider, MD   meclizine (ANTIVERT) 12 5 MG tablet TAKE 1-2 TABLETS BY MOUTH EVERY 8 HOURS AS NEEDED PRN; DIZZINESS  Patient not taking: No sig reported 7/22/19 9/20/22  Historical Provider, MD   naloxone (NARCAN) 4 mg/0 1 mL nasal spray Administer 1 spray into a nostril  If no response after 2-3 minutes, give another dose in the other nostril using a new spray  Patient not taking: No sig reported 2/1/21 9/20/22  Emanuel Silverman MD   phenazopyridine (PYRIDIUM) 200 mg tablet  12/10/19 9/20/22  Historical Provider, MD       No Known Allergies      Vitals:    09/20/22 0851   BP: 126/64   BP Location: Right arm   Patient Position: Sitting   Cuff Size: Large   Pulse: 60   Temp: 97 8 °F (36 6 °C)   SpO2: 96%   Weight: 120 kg (265 lb)   Height: 5' 10" (1 778 m)       Body mass index is 38 02 kg/m²  15 pound weight loss compared to 12/01/2021  Physical Exam:    General Appearance:  Alert, cooperative, no distress, appears stated age and is markedly obese     Head:  Normocephalic, without obvious abnormality, atraumatic   Eyes:  PERRL, conjunctiva/corneas clear, EOM's intact,   both eyes   Ears:  Normal TM's and external ear canals, both ears   Nose: Nares normal, septum midline, mucosa normal, no drainage or sinus tenderness   Throat: Lips, mucosa, and tongue normal; teeth and gums normal   Neck: Supple, symmetrical, trachea midline, no adenopathy, thyroid: not enlarged, symmetric, no tenderness/mass/nodules, no carotid bruit or JVD   Back:   Symmetric, no curvature, ROM normal, no CVA tenderness   Lungs:   Large airway musical rhonchi bilaterally, no rales or wheezes and respirations unlabored   Chest Wall:  No tenderness or deformity   Heart:  Irregularly irregular cardiac rhythm, S1, S2 normal, grade 2/6 aortic systolic ejection murmur, best heard along right sternal border with some radiation to left upper sternal border, manubrium with intact A2 and no rub or gallop   Abdomen:   Soft, non-tender, bowel sounds active all four quadrants,  no masses, no organomegaly  Marked abdominal obesity noted  Extremities: Extremities normal, atraumatic, no cyanosis with 1-2+ pitting left pretibial and ankle edema and no significant edema on the right  Pulses: Difficult to palpate pedal pulses  Both feet are equally warm  Skin: Skin showed normal color, texture, turgor and no rashes or lesions   Lymph nodes: Cervical, supraclavicular, and axillary nodes normal   Neurologic: Normal         Cardiographics    ECG 09/20/22:    Atrial fibrillation with average ventricular rate 60 bpm and apparent unifocal PVCs  LAFB  Nonspecific diffuse ST-T abnormalities  Prolonged QT   Cannot rule out old inferior infarct  More ST-T abnormality in leads one and aVL and new PVCs compared to 12/01/2021    IMAGING:    No Chest XR results available for this patient            LAB REVIEW:      Lab Results   Component Value Date    SODIUM 139 12/27/2019    K 4 1 12/27/2019     12/27/2019    CO2 29 12/27/2019    BUN 16 12/27/2019    CREATININE 0 89 12/27/2019    GLUF 103 (H) 12/27/2019    CALCIUM 9 0 12/27/2019    AST 20 12/27/2019    ALT 23 12/27/2019    ALKPHOS 61 12/27/2019    EGFR 80 12/27/2019     Lab Results   Component Value Date    CHOLESTEROL 111 12/27/2019    CHOLESTEROL 98 11/08/2018    CHOLESTEROL 116 12/07/2017     Lab Results   Component Value Date    HDL 48 12/27/2019    HDL 54 11/08/2018    HDL 59 12/07/2017       Lab Results   Component Value Date    LDLCALC 38 12/27/2019    LDLCALC 25 11/08/2018    LDLCALC 34 12/07/2017     No components found for: [facility]  Lab Results   Component Value Date    TRIG 124 12/27/2019    TRIG 96 11/08/2018    TRIG 113 12/07/2017       [facility] 12/14/2021:    Lipid panel:  Cholesterol 141, , HDL 50, LDL 61  CMP:  Glucose 103 and otherwise normal    Aissatou Leyva MD

## 2022-09-20 NOTE — PATIENT INSTRUCTIONS
Continue current medication  Let us know if the left leg swelling gets much worse  Cardiology follow-up approximately six months with EKG, [facility] lipids and CMP  We will consider updating echocardiogram after next visit

## 2022-10-04 ENCOUNTER — TELEPHONE (OUTPATIENT)
Dept: PAIN MEDICINE | Facility: CLINIC | Age: 82
End: 2022-10-04

## 2022-10-04 NOTE — TELEPHONE ENCOUNTER
LVM to change his up coming appointment with Nohemy Foster on 11/3/22 to 11/8/22 please help him reschedule, left number and office hours  Canceled 11/3/22 appointment

## 2022-10-13 DIAGNOSIS — G89.4 CHRONIC PAIN SYNDROME: ICD-10-CM

## 2022-10-13 DIAGNOSIS — M54.12 CERVICAL RADICULOPATHY: ICD-10-CM

## 2022-10-13 DIAGNOSIS — M51.16 INTERVERTEBRAL DISC DISORDER WITH RADICULOPATHY OF LUMBAR REGION: Primary | ICD-10-CM

## 2022-10-13 RX ORDER — HYDROCODONE BITARTRATE AND ACETAMINOPHEN 10; 325 MG/1; MG/1
1 TABLET ORAL 4 TIMES DAILY PRN
Qty: 120 TABLET | Refills: 0 | Status: SHIPPED | OUTPATIENT
Start: 2022-10-13 | End: 2022-11-08 | Stop reason: SDUPTHER

## 2022-11-08 ENCOUNTER — TELEPHONE (OUTPATIENT)
Dept: RADIOLOGY | Facility: CLINIC | Age: 82
End: 2022-11-08

## 2022-11-08 ENCOUNTER — OFFICE VISIT (OUTPATIENT)
Dept: PAIN MEDICINE | Facility: CLINIC | Age: 82
End: 2022-11-08

## 2022-11-08 VITALS — SYSTOLIC BLOOD PRESSURE: 135 MMHG | HEART RATE: 81 BPM | DIASTOLIC BLOOD PRESSURE: 93 MMHG

## 2022-11-08 DIAGNOSIS — F11.20 UNCOMPLICATED OPIOID DEPENDENCE (HCC): ICD-10-CM

## 2022-11-08 DIAGNOSIS — M51.16 INTERVERTEBRAL DISC DISORDER WITH RADICULOPATHY OF LUMBAR REGION: ICD-10-CM

## 2022-11-08 DIAGNOSIS — Z79.891 LONG-TERM CURRENT USE OF OPIATE ANALGESIC: ICD-10-CM

## 2022-11-08 DIAGNOSIS — M54.12 CERVICAL RADICULOPATHY: ICD-10-CM

## 2022-11-08 DIAGNOSIS — G89.4 CHRONIC PAIN SYNDROME: Primary | ICD-10-CM

## 2022-11-08 RX ORDER — PREGABALIN 75 MG/1
75 CAPSULE ORAL 3 TIMES DAILY
Qty: 90 CAPSULE | Refills: 1 | Status: SHIPPED | OUTPATIENT
Start: 2022-11-08

## 2022-11-08 RX ORDER — GABAPENTIN 300 MG/1
900 CAPSULE ORAL 3 TIMES DAILY
Qty: 270 CAPSULE | Refills: 1 | Status: SHIPPED | OUTPATIENT
Start: 2022-11-08

## 2022-11-08 RX ORDER — HYDROCODONE BITARTRATE AND ACETAMINOPHEN 10; 325 MG/1; MG/1
1 TABLET ORAL 4 TIMES DAILY PRN
Qty: 120 TABLET | Refills: 0 | Status: SHIPPED | OUTPATIENT
Start: 2022-12-11 | End: 2023-01-10

## 2022-11-08 RX ORDER — HYDROCODONE BITARTRATE AND ACETAMINOPHEN 10; 325 MG/1; MG/1
1 TABLET ORAL 4 TIMES DAILY PRN
Qty: 120 TABLET | Refills: 0 | Status: SHIPPED | OUTPATIENT
Start: 2022-11-11 | End: 2022-12-11

## 2022-11-08 NOTE — PROGRESS NOTES
Pain Medicine Follow-Up Note    Assessment:  1  Chronic pain syndrome    2  Intervertebral disc disorder with radiculopathy of lumbar region    3  Cervical radiculopathy    4  Long-term current use of opiate analgesic    5  Uncomplicated opioid dependence ([address] )        Plan:  Orders Placed This Encounter   Procedures   • MM ALL_Prescribed Meds and Special Instructions     Order Specific Question:   [place] Is ALBUTEROL prescribed? Answer:   Yes     Order Specific Question:   [facility] Is GABAPENTIN prescribed? Answer:   Yes     Order Specific Question:   [place] Is HYDROCODONE/APAP prescribed? Answer:   Yes     Order Specific Question:   [place] Is ATORVASTATIN prescribed? Answer:   Yes     Order Specific Question:   [place] Is OMEPRAZOLE prescribed? Answer:   Yes     Order Specific Question:   [place] Is TRAZODONE prescribed? Answer:    Yes   • MM DT_Alprazolam Definitive Test   • MM DT_Amphetamine Definitive Test   • MM DT_Aripiprazole Definitive Test   • MM DT_Bath Salts Definitive Test   • MM DT_Buprenorphine Definitive Test   • MM DT_Butalbital Definitive Test   • MM DT_Clozapine Definitive Test   • MM DT_Clonazepam Definitive Test   • MM DT_Cocaine Definitive Test   • MM DT_Codeine Definitive Test   • MM DT_Desipramine Definitive Test   • MM DT_Dextromethorphan Definitive Test   • MM Diazepam Definitive Test   • MM DT_Ethyl Glucuronide/Ethyl Sulfate Definitive Test   • MM DT_Fentanyl Definitive Test   • MM DT_Haloperidol Definitive Test   • MM DT_Heroin Definitive Test   • MM DT_Hydrocodone Definitive Test   • MM DT_Hydromorphone Definitive Test   • MM DT_Imipramine Definitive Test   • MM DT_Kratom Definitive Test   • MM DT_Levorphanol Definitive Test   • MM Lorazepam Definitive Test   • MM DT_MDMA Definitive Test   • MM DT_Meperidine Definitive Test   • MM DT_Methadone Definitive Test   • MM DT_Methamphetamine Definitive Test   • MM DT_Methylphenidate Definitive Test   • MM DT_Morphine Definitive Test   • MM DT_Oxazepam Definitive Test   • MM DT_Oxycodone Definitive Test   • MM DT_Oxymorphone Definitive Test   • MM DT_Phenobarbital Definitive Test   • MM DT_Phencyclidine Definitive Test   • MM DT_Phentermine Definitive Test   • MM DT_Secobarbital Definitive Test   • MM DT_Spice Definitive Test   • MM DT_Tapentadol Definitive Test   • MM DT_Temazapam Definitive Test   • MM DT_THC Definitive Test   • MM DT_Tramadol Definitive Test       New Medications Ordered This Visit   Medications   • gabapentin (NEURONTIN) 300 mg capsule     Sig: Take 3 capsules (900 mg total) by mouth 3 (three) times a day     Dispense:  270 capsule     Refill:  1   • HYDROcodone-acetaminophen (NORCO)  mg per tablet     Sig: Take 1 tablet by mouth 4 (four) times a day as needed for severe pain Please fill on 10/12/2022 Max Daily Amount: 4 tablets Do not start before December 11, 2022  Dispense:  120 tablet     Refill:  0     May fill 1 day prior to 12/11/2022   • HYDROcodone-acetaminophen (NORCO)  mg per tablet     Sig: Take 1 tablet by mouth 4 (four) times a day as needed for moderate pain Max Daily Amount: 4 tablets Do not start before November 11, 2022  Dispense:  120 tablet     Refill:  0     Please refill on 11/11/2022   • pregabalin (LYRICA) 75 mg capsule     Sig: Take 1 capsule (75 mg total) by mouth 3 (three) times a day     Dispense:  90 capsule     Refill:  1       My impressions and treatment recommendations were discussed in detail with the patient who verbalized understanding and had no further questions  Patient continues to report an overall reduction of his pain and improvement in his level of functioning without significant side effects using hydrocodone/acetaminophen 10/325 mg tablets patient takes 1 tablet up to 4 times daily as needed for moderate pain    Patient states that he has noticed that he has had some more drowsiness that he is relating to the gabapentin therefore at this time I feel the patient should trial Lyrica  Patient will need to titrate down off gabapentin and then titrate on to the Lyrica I in order to reduce side effects  Patient should take 600 mg of gabapentin t i d  for 1 week, then take 600 mg in the Am and Afternoon and take 75mg lyrica at bedtime for 3 days  Then take gabapentin 600mg  in the Am and take Lyrica 75mg in the evening and Lyrica 75mg at bedtime for 4 days  Then dicontinue gabapentin and start Lyrica 75mg TID  Hydrocodone/acetaminophen 10/325 mg tablet E prescribed to the patient's pharmacy with fill dates of 11/11/2022 and 12/11/2022  [place] Prescription Drug Monitoring Program report was reviewed and was appropriate     A urine drug screen was collected at today's office visit as part of our medication management protocol  The point of care testing results were appropriate for what was being prescribed  The specimen will be sent for confirmatory testing  The drug screen is medically necessary because the patient is either dependent on opioid medication or is being considered for opioid medication therapy and the results could impact ongoing or future treatment  The drug screen is to evaluate for the presences or absence of prescribed, non-prescribed, and/or illicit drugs/substances  There are risks associated with opioid medications, including dependence, addiction and tolerance  The patient understands and agrees to use these medications only as prescribed  Potential side effects of the medications include, but are not limited to, constipation, drowsiness, addiction, impaired judgment and risk of fatal overdose if not taken as prescribed  The patient was warned against driving while taking sedation medications  Sharing medications is a felony  At this point in time, the patient is showing no signs of addiction, abuse, diversion or suicidal ideation  Follow-up is planned in 8 weeks time or sooner as warranted  Discharge instructions were provided  I personally saw and examined the patient and I agree with the above discussed plan of care  History of Present Illness:    Selvin Calle is a 78 y o  male who presents to [facility] for interval re-evaluation of the above stated pain complaints  The patient has a past medical and chronic pain history as outlined in the assessment section  He was last seen on 9/14/2022  At today's visit patient states that his pain symptoms are worse with a pain score of 8 to 10/10 on the verbal numeric pain scale  Patient states that his pain is worse in the morning, evening, and at night  The patient's pain is constant in nature  The quality of the patient's pain is described as dull-aching and sharp  Patient indicates that his pain is located in his posterior neck and bilateral low back  Pain Contract Signed:  02/07/2022  Last Urine Drug Screen: 11/08/2022    Other than as stated above, the patient denies any interval changes in medications, medical condition, mental condition, symptoms, or allergies since the last office visit  Review of Systems:    Review of Systems   Respiratory: Positive for shortness of breath  Cardiovascular: Negative for chest pain  Gastrointestinal: Negative for constipation, diarrhea, nausea and vomiting  Musculoskeletal: Positive for gait problem  Negative for arthralgias, joint swelling and myalgias  Decreased ROM, Muscle Weakness   Skin: Negative for rash  Neurological: Negative for dizziness, seizures and weakness  Memory Loss   All other systems reviewed and are negative          Patient Active Problem List   Diagnosis   • Chronic pain syndrome   • Pain of lumbar spine   • Sacroiliitis (HCC)   • Intervertebral disc disorder with radiculopathy of lumbar region   • Centrilobular emphysema (HCC)   • Dyspnea on exertion   • Chronic atrial fibrillation (HCC)   • CAD S/P percutaneous coronary angioplasty   • Chronic respiratory failure with hypoxia ([facility])   • Continuous dependence on cigarette smoking   • Cervical radiculopathy   • Cervical spinal stenosis   • Cervical pain   • Elevated PSA   • Obesity, morbid ([facility])   • Uncomplicated opioid dependence ([facility] )   • Essential hypertension   • Dyslipidemia   • Asymptomatic PVCs   • Mild aortic stenosis   • Obstructive sleep apnea   • H/O asbestosis   • Chronic midline low back pain with right-sided sciatica       Past Medical History:   Diagnosis Date   • Acid reflux    • Arthritis    • BPH (benign prostatic hyperplasia)     no treatment   • Calcium disorder    • Chronic atrial fibrillation ([place] [address] ) 2/6/2018   • Chronic pain    • Chronic pain disorder    • COPD (chronic obstructive pulmonary disease) ([place] [address] )    • Coronary artery disease 2001    PTCA with one stent   • Eye redness     Poked his eye on 3/18/16 "bloody" right eye   • Hiatal hernia    • Hydronephrosis    • Hyperlipidemia    • Hypertension    • Irregular heart beat     A-Fib   • Low back pain    • Neck pain    • Nocturnal leg cramps    • Osteopenia    • Peripheral artery disease ([facility])    • Peripheral neuropathy    • Pleural effusion    • Sleep apnea     REFUSES MACHINE HS   • Spinal stenosis    • Vocal cord polyp 10/2012    panendoscopy       Past Surgical History:   Procedure Laterality Date   • ANGIOPLASTY     • APPENDECTOMY  04/2007   • CARDIOVERSION      x2, for A-Fib   • CARDIOVERSION     • CATARACT EXTRACTION     • CATARACT EXTRACTION W/ INTRAOCULAR LENS IMPLANT Left 01/2014   • CORONARY ANGIOPLASTY WITH STENT PLACEMENT  2001    one stent   • HERNIA REPAIR Right     inquinal   • JOINT REPLACEMENT Left     Hip   • KNEE SURGERY  1994    also had arthroscopy right knee 2005   • PANENDOSCOPY  10/23/2012    vocal cord polyp   • PILONIDAL CYST DRAINAGE      x 3   • DE SURG IMPLNT NEUROELECT,EPIDURAL Left 8/23/2017    Procedure: Placement of Thoracic SCS with left buttock IPG (IMPULSE);   Surgeon: Elizabeth Vasquez Ghulam Merritt MD;  Location:  MAIN OR;  Service: Neurosurgery   • ME XCAPSL CTRC RMVL INSJ IO LENS PROSTH W/O ECP Right 4/6/2016    Procedure: EXTRACTION EXTRACAPSULAR CATARACT PHACO INTRAOCULAR LENS (IOL);   Surgeon: Julienne Laura MD;  Location: [facility] MAIN OR;  Service: Ophthalmology   • SPINAL CORD STIMULATOR TRIAL W/ LAMINOTOMY N/A 4/13/2017    Procedure: INSERTION DORSAL COLUMN SPINAL CORD STIMULATOR TRIAL;  Surgeon: Cedric Moody MD;  Location: [facility] MAIN OR;  Service:    • TONSILLECTOMY     • [facility] STUDY  5/28/2015       Family History   Problem Relation Age of Onset   • Cancer Father 80        prostate   • No Known Problems Mother    • No Known Problems Sister    • No Known Problems Brother    • No Known Problems Daughter    • No Known Problems Son    • No Known Problems Maternal Aunt    • No Known Problems Maternal Uncle    • No Known Problems Paternal Aunt    • No Known Problems Paternal Uncle    • No Known Problems Maternal Grandmother    • No Known Problems Maternal Grandfather    • No Known Problems Paternal Grandmother    • No Known Problems Paternal Grandfather        Social History     Occupational History   • Not on file   Tobacco Use   • Smoking status: Current Every Day Smoker     Packs/day: 0 75     Years: 60 00     Pack years: 45 00   • Smokeless tobacco: Never Used   Vaping Use   • Vaping Use: Never used   Substance and Sexual Activity   • Alcohol use: Yes     Comment: occasionally   • Drug use: No   • Sexual activity: Not on file         Current Outpatient Medications:   •  apixaban (Eliquis) 5 mg, Take 1 tablet (5 mg total) by mouth 2 (two) times a day, Disp: 60 tablet, Rfl: 11  •  aspirin (ECOTRIN LOW STRENGTH) 81 mg EC tablet, Take 81 mg by mouth daily, Disp: , Rfl:   •  atorvastatin (LIPITOR) 40 mg tablet, Take 40 mg by mouth daily  , Disp: , Rfl:   •  diltiazem (DILACOR XR) 240 MG 24 hr capsule, Take 1 capsule (240 mg total) by mouth daily at bedtime, Disp: 90 capsule, Rfl: 3  •  gabapentin (NEURONTIN) 300 mg capsule, Take 3 capsules (900 mg total) by mouth 3 (three) times a day, Disp: 270 capsule, Rfl: 1  •  [START ON 12/11/2022] HYDROcodone-acetaminophen (NORCO)  mg per tablet, Take 1 tablet by mouth 4 (four) times a day as needed for severe pain Please fill on 10/12/2022 Max Daily Amount: 4 tablets Do not start before December 11, 2022 , Disp: 120 tablet, Rfl: 0  •  [START ON 11/11/2022] HYDROcodone-acetaminophen (NORCO)  mg per tablet, Take 1 tablet by mouth 4 (four) times a day as needed for moderate pain Max Daily Amount: 4 tablets Do not start before November 11, 2022 , Disp: 120 tablet, Rfl: 0  •  ipratropium-albuterol (DUO-NEB) 0 5-2 5 mg/3 mL nebulizer solution, Take 3 mL by nebulization 3 (three) times a day, Disp: 252 mL, Rfl: 5  •  nebivolol (BYSTOLIC) 10 mg tablet, Take 1 tablet (10 mg total) by mouth daily, Disp: 90 tablet, Rfl: 3  •  nicotine (NICOTROL) 10 MG inhaler, Use 1  Cartridge per hour as needed  Do not exceed 10 cartridges per day, Disp: 168 each, Rfl: 6  •  omeprazole (PriLOSEC) 20 mg delayed release capsule, Take 1 capsule by mouth as needed for heartburn , Disp: , Rfl:   •  OXYGEN-HELIUM IN, Inhale 2 L daily at bedtime, Disp: , Rfl:   •  pregabalin (LYRICA) 75 mg capsule, Take 1 capsule (75 mg total) by mouth 3 (three) times a day, Disp: 90 capsule, Rfl: 1  •  tamsulosin (FLOMAX) 0 4 mg, Take 0 4 mg by mouth daily with dinner, Disp: , Rfl:   •  traZODone (DESYREL) 50 mg tablet, , Disp: , Rfl:   •  budesonide (PULMICORT) 0 5 mg/2 mL nebulizer solution, Take 2 mL (0 5 mg total) by nebulization 2 (two) times a day, Disp: 360 mL, Rfl: 2    No Known Allergies    Physical Exam:    /93   Pulse 81     Constitutional:normal, well developed, well nourished, alert, in no distress and non-toxic and no overt pain behavior   and obese  Eyes:anicteric  HEENT:grossly intact  Neck:supple, symmetric, trachea midline and no masses Pulmonary:even and unlabored and Using a oxygen condenser with nasal cannula  Cardiovascular:No edema or pitting edema present  Skin:Normal without rashes or lesions and well hydrated  Psychiatric:Mood and affect appropriate  Neurologic:Cranial Nerves II-XII grossly intact  Musculoskeletal:antalgic and in wheelchair        Orders Placed This Encounter   Procedures   • MM ALL_Prescribed Meds and Special Instructions   • MM DT_Alprazolam Definitive Test   • MM DT_Amphetamine Definitive Test   • MM DT_Aripiprazole Definitive Test   • MM DT_Bath Salts Definitive Test   • MM DT_Buprenorphine Definitive Test   • MM DT_Butalbital Definitive Test   • MM DT_Clozapine Definitive Test   • MM DT_Clonazepam Definitive Test   • MM DT_Cocaine Definitive Test   • MM DT_Codeine Definitive Test   • MM DT_Desipramine Definitive Test   • MM DT_Dextromethorphan Definitive Test   • MM Diazepam Definitive Test   • MM DT_Ethyl Glucuronide/Ethyl Sulfate Definitive Test   • MM DT_Fentanyl Definitive Test   • MM DT_Haloperidol Definitive Test   • MM DT_Heroin Definitive Test   • MM DT_Hydrocodone Definitive Test   • MM DT_Hydromorphone Definitive Test   • MM DT_Imipramine Definitive Test   • MM DT_Kratom Definitive Test   • MM DT_Levorphanol Definitive Test   • MM Lorazepam Definitive Test   • MM DT_MDMA Definitive Test   • MM DT_Meperidine Definitive Test   • MM DT_Methadone Definitive Test   • MM DT_Methamphetamine Definitive Test   • MM DT_Methylphenidate Definitive Test   • MM DT_Morphine Definitive Test   • MM DT_Oxazepam Definitive Test   • MM DT_Oxycodone Definitive Test   • MM DT_Oxymorphone Definitive Test   • MM DT_Phenobarbital Definitive Test   • MM DT_Phencyclidine Definitive Test   • MM DT_Phentermine Definitive Test   • MM DT_Secobarbital Definitive Test   • MM DT_Spice Definitive Test   • MM DT_Tapentadol Definitive Test   • MM DT_Temazapam Definitive Test   • MM DT_THC Definitive Test   • MM DT_Tramadol Definitive Test

## 2022-11-08 NOTE — TELEPHONE ENCOUNTER
----- Message from Petra Jacob, [address] Ernestine Mcghee sent at 11/8/2022  2:41 PM EST -----    Regarding: Titrating gabapentin to Lyrica      Please call patient and let him know that this is how he should titrate off his gabapentin and on to Lyrica  Patient will need to titrate down off gabapentin and then titrate on to the Lyrica I in order to reduce side effects  Patient should take 600 mg of gabapentin t i d  for 1 week,     Then take 600 mg in the Am and Afternoon and take 75mg lyrica at bedtime for 4 days  Then take gabapentin 600mg  in the Am and take Lyrica 75mg in the evening and Lyrica 75mg at bedtime for 4 days  Then dicontinue gabapentin and start Lyrica 75mg TID

## 2022-11-10 LAB
6MAM UR QL CFM: NEGATIVE NG/ML
7AMINOCLONAZEPAM UR QL CFM: NEGATIVE NG/ML
A-OH ALPRAZ UR QL CFM: NEGATIVE NG/ML
AMPHET UR QL CFM: NEGATIVE NG/ML
AMPHET UR QL CFM: NEGATIVE NG/ML
BUPRENORPHINE UR QL CFM: NEGATIVE NG/ML
BUTALBITAL UR QL CFM: NEGATIVE NG/ML
BZE UR QL CFM: NEGATIVE NG/ML
CODEINE UR QL CFM: NEGATIVE NG/ML
DESIPRAMINE UR QL CFM: NEGATIVE NG/ML
DESIPRAMINE UR QL CFM: NEGATIVE NG/ML
EDDP UR QL CFM: NEGATIVE NG/ML
ETHYL GLUCURONIDE UR QL CFM: NEGATIVE NG/ML
ETHYL SULFATE UR QL SCN: NEGATIVE NG/ML
EUTYLONE UR QL: NEGATIVE NG/ML
FENTANYL UR QL CFM: NEGATIVE NG/ML
GLIADIN IGG SER IA-ACNC: NEGATIVE NG/ML
GLUCOSE 30M P 50 G LAC PO SERPL-MCNC: NEGATIVE NG/ML
HYDROCODONE UR QL CFM: NORMAL NG/ML
HYDROCODONE UR QL CFM: NORMAL NG/ML
HYDROMORPHONE UR QL CFM: NORMAL NG/ML
IMIPRAMINE UR QL CFM: NEGATIVE NG/ML
LORAZEPAM UR QL CFM: NEGATIVE NG/ML
MDMA UR QL CFM: NEGATIVE NG/ML
ME-PHENIDATE UR QL CFM: NEGATIVE NG/ML
MEPERIDINE UR QL CFM: NEGATIVE NG/ML
METHADONE UR QL CFM: NEGATIVE NG/ML
METHAMPHET UR QL CFM: NEGATIVE NG/ML
MORPHINE UR QL CFM: NEGATIVE NG/ML
MORPHINE UR QL CFM: NEGATIVE NG/ML
NORBUPRENORPHINE UR QL CFM: NEGATIVE NG/ML
NORDIAZEPAM UR QL CFM: NEGATIVE NG/ML
NORFENTANYL UR QL CFM: NEGATIVE NG/ML
NORHYDROCODONE UR QL CFM: NORMAL NG/ML
NORHYDROCODONE UR QL CFM: NORMAL NG/ML
NORMEPERIDINE UR QL CFM: NEGATIVE NG/ML
NOROXYCODONE UR QL CFM: NEGATIVE NG/ML
OPC-3373 QUANTIFICATION: NEGATIVE
OXAZEPAM UR QL CFM: NEGATIVE NG/ML
OXYCODONE UR QL CFM: NEGATIVE NG/ML
OXYMORPHONE UR QL CFM: NEGATIVE NG/ML
OXYMORPHONE UR QL CFM: NEGATIVE NG/ML
PARA-FLUOROFENTANYL QUANTIFICATION: NORMAL NG/ML
PCP UR QL CFM: NEGATIVE NG/ML
PHENOBARB UR QL CFM: NEGATIVE NG/ML
RESULT ALL_PRESCRIBED MEDS AND SPECIAL INSTRUCTIONS: NORMAL
SECOBARBITAL UR QL CFM: NEGATIVE NG/ML
SL AMB 4-ANPP QUANTIFICATION: NORMAL NG/ML
SL AMB 5F-ADB-M7 METABOLITE QUANTIFICATION: NEGATIVE NG/ML
SL AMB 7-OH-MITRAGYNINE (KRATOM ALKALOID) QUANTIFICATION: NEGATIVE NG/ML
SL AMB AB-FUBINACA-M3 METABOLITE QUANTIFICATION: NEGATIVE NG/ML
SL AMB ACETYL FENTANYL QUANTIFICATION: NORMAL NG/ML
SL AMB ACETYL NORFENTANYL QUANTIFICATION: NORMAL NG/ML
SL AMB ACRYL FENTANYL QUANTIFICATION: NORMAL NG/ML
SL AMB CARFENTANIL QUANTIFICATION: NORMAL NG/ML
SL AMB CLOZAPINE QUANTIFICATION: NEGATIVE NG/ML
SL AMB CTHC (MARIJUANA METABOLITE) QUANTIFICATION: NEGATIVE NG/ML
SL AMB DEXTROMETHORPHAN QUANTIFICATION: NEGATIVE NG/ML
SL AMB DEXTRORPHAN (DEXTROMETHORPHAN METABOLITE) QUANT: NEGATIVE NG/ML
SL AMB DEXTRORPHAN (DEXTROMETHORPHAN METABOLITE) QUANT: NEGATIVE NG/ML
SL AMB HALOPERIDOL  QUANTIFICATION: NEGATIVE NG/ML
SL AMB HALOPERIDOL METABOLITE QUANTIFICATION: NEGATIVE NG/ML
SL AMB JWH018 METABOLITE QUANTIFICATION: NEGATIVE NG/ML
SL AMB JWH073 METABOLITE QUANTIFICATION: NEGATIVE NG/ML
SL AMB MDMB-FUBINACA-M1 METABOLITE QUANTIFICATION: NEGATIVE NG/ML
SL AMB METHYLONE QUANTIFICATION: NEGATIVE NG/ML
SL AMB N-DESMETHYL-TRAMADOL QUANTIFICATION: NEGATIVE NG/ML
SL AMB N-DESMETHYLCLOZAPINE QUANTIFICATION: NEGATIVE NG/ML
SL AMB PHENTERMINE QUANTIFICATION: NEGATIVE NG/ML
SL AMB RCS4 METABOLITE QUANTIFICATION: NEGATIVE NG/ML
SL AMB RITALINIC ACID QUANTIFICATION: NEGATIVE NG/ML
SPECIMEN DRAWN SERPL: NEGATIVE NG/ML
TAPENTADOL UR QL CFM: NEGATIVE NG/ML
TEMAZEPAM UR QL CFM: NEGATIVE NG/ML
TEMAZEPAM UR QL CFM: NEGATIVE NG/ML
TRAMADOL UR QL CFM: NEGATIVE NG/ML
URATE/CREAT 24H UR: NEGATIVE NG/ML

## 2022-11-11 NOTE — TELEPHONE ENCOUNTER
S/w pt,given instruction as ordered  Pt wrote the instruction on a paper  Pt verbalized understanding

## 2022-11-11 NOTE — TELEPHONE ENCOUNTER
Caller: Patient    Doctor: Stevie Ocasio    Reason for call: Calling back returning missed call    Call back#: [phone]

## 2022-12-14 ENCOUNTER — TELEPHONE (OUTPATIENT)
Dept: PAIN MEDICINE | Facility: CLINIC | Age: 82
End: 2022-12-14

## 2022-12-14 DIAGNOSIS — G89.4 CHRONIC PAIN SYNDROME: ICD-10-CM

## 2022-12-14 DIAGNOSIS — M54.12 CERVICAL RADICULOPATHY: ICD-10-CM

## 2022-12-14 DIAGNOSIS — M51.16 INTERVERTEBRAL DISC DISORDER WITH RADICULOPATHY OF LUMBAR REGION: ICD-10-CM

## 2022-12-14 RX ORDER — HYDROCODONE BITARTRATE AND ACETAMINOPHEN 10; 325 MG/1; MG/1
1 TABLET ORAL 4 TIMES DAILY PRN
Qty: 120 TABLET | Refills: 0 | Status: SHIPPED | OUTPATIENT
Start: 2022-12-14 | End: 2022-12-15 | Stop reason: SDUPTHER

## 2022-12-14 NOTE — TELEPHONE ENCOUNTER
Caller: patient    Doctor: Christi Mcguire    Reason for call: pt states [facility] pharmacy on file doesn't have Norco  10 - 325 mg medication at this time   He would like options where he's able to fill it    Call back#: [phone]

## 2022-12-14 NOTE — TELEPHONE ENCOUNTER
Caller: patient    Doctor:mateo    Reason for call: if the prescription can be send to [address], in the chart for the hydrocodone     [facility]    Call back#: [phone]

## 2022-12-14 NOTE — TELEPHONE ENCOUNTER
--pt to C/B--    S/W pt  Advised pt to call the local pharmacies to see who has it and then call SPA back  Pt verbalized understanding

## 2022-12-15 ENCOUNTER — TELEPHONE (OUTPATIENT)
Dept: PAIN MEDICINE | Facility: MEDICAL CENTER | Age: 82
End: 2022-12-15

## 2022-12-15 DIAGNOSIS — G89.4 CHRONIC PAIN SYNDROME: ICD-10-CM

## 2022-12-15 DIAGNOSIS — M54.12 CERVICAL RADICULOPATHY: ICD-10-CM

## 2022-12-15 DIAGNOSIS — M51.16 INTERVERTEBRAL DISC DISORDER WITH RADICULOPATHY OF LUMBAR REGION: ICD-10-CM

## 2022-12-15 RX ORDER — HYDROCODONE BITARTRATE AND ACETAMINOPHEN 10; 325 MG/1; MG/1
1 TABLET ORAL 4 TIMES DAILY PRN
Qty: 45 TABLET | Refills: 0 | Status: SHIPPED | OUTPATIENT
Start: 2022-12-15 | End: 2023-01-17 | Stop reason: SDUPTHER

## 2022-12-15 RX ORDER — HYDROCODONE BITARTRATE AND ACETAMINOPHEN 10; 325 MG/1; MG/1
1 TABLET ORAL 4 TIMES DAILY PRN
Qty: 75 TABLET | Refills: 0 | Status: SHIPPED | OUTPATIENT
Start: 2022-12-23 | End: 2023-01-17 | Stop reason: SDUPTHER

## 2022-12-15 NOTE — TELEPHONE ENCOUNTER
Caller: Drew Fair     Doctor: Dr Celaya     Reason for call: Patient calling  Stating  [facility] pharmacy does not have his medication HYDROcodone-acetaminophen (NORCO)  mg per tablet and he is all out of medication.    Please advise.    Call back#: [phone]

## 2022-12-15 NOTE — TELEPHONE ENCOUNTER
S/w pt and advised script was sent to [facility] yesterday.  Per pt, [facility] are both out of this medication and told pt it was on backorder and unsure when med would be available.  Pt states he is out of medication.  Please advise if something else can be sent to [facility] on file

## 2022-12-21 NOTE — PATIENT INSTRUCTIONS

## 2022-12-30 ENCOUNTER — TELEPHONE (OUTPATIENT)
Dept: PAIN MEDICINE | Facility: MEDICAL CENTER | Age: 82
End: 2022-12-30

## 2022-12-30 NOTE — TELEPHONE ENCOUNTER
S/w pt who is rqst refill of Lyrica 75mg TID  Pt denies SE and states working well for mngt of pain sx  LOV 11/8/22 MG  LF 11/8/22 w/ 1 rf  Nxt OV 11/3/23 w/ MG    Pls advise  Refill can be sent to pharm on file  Thank you!

## 2022-12-30 NOTE — TELEPHONE ENCOUNTER
Pt contacted [facility] requested refill of their medication          Medication Name: lyrica      Dosage of Med: 75 mg      Frequency of Med: 1 tab TID     Remaining Medication: 2 days left

## 2023-01-02 DIAGNOSIS — M51.16 INTERVERTEBRAL DISC DISORDER WITH RADICULOPATHY OF LUMBAR REGION: ICD-10-CM

## 2023-01-02 DIAGNOSIS — M54.12 CERVICAL RADICULOPATHY: ICD-10-CM

## 2023-01-02 RX ORDER — PREGABALIN 75 MG/1
75 CAPSULE ORAL 3 TIMES DAILY
Qty: 90 CAPSULE | Refills: 1 | Status: SHIPPED | OUTPATIENT
Start: 2023-01-02

## 2023-01-04 ENCOUNTER — OFFICE VISIT (OUTPATIENT)
Dept: PULMONOLOGY | Facility: MEDICAL CENTER | Age: 83
End: 2023-01-04

## 2023-01-04 VITALS
HEIGHT: 70 IN | RESPIRATION RATE: 20 BRPM | HEART RATE: 86 BPM | DIASTOLIC BLOOD PRESSURE: 88 MMHG | SYSTOLIC BLOOD PRESSURE: 130 MMHG | TEMPERATURE: 98.7 F | BODY MASS INDEX: 37.94 KG/M2 | WEIGHT: 265 LBS | OXYGEN SATURATION: 96 %

## 2023-01-04 DIAGNOSIS — J96.11 CHRONIC RESPIRATORY FAILURE WITH HYPOXIA (HCC): Chronic | ICD-10-CM

## 2023-01-04 DIAGNOSIS — J43.2 CENTRILOBULAR EMPHYSEMA (HCC): Primary | Chronic | ICD-10-CM

## 2023-01-04 DIAGNOSIS — R06.09 DYSPNEA ON EXERTION: Chronic | ICD-10-CM

## 2023-01-04 DIAGNOSIS — F17.210 CONTINUOUS DEPENDENCE ON CIGARETTE SMOKING: Chronic | ICD-10-CM

## 2023-01-04 NOTE — ASSESSMENT & PLAN NOTE
Patient does have dyspnea on exertion  This is multifactorial   He does have morbid obesity with current weight of 265 pounds or 38 02 kg/m²  He also has history of diastolic heart failure he does follow with Dr Kylie Zafar    Also has hypoxia secondary to centrilobular obesity as well as ventilation obesity syndrome

## 2023-01-04 NOTE — ASSESSMENT & PLAN NOTE
Patient was not completely aware of the names of his medications via nebulizer he is using 3 different files  He is likely using DuoNebs twice a day with budesonide and also Yupelri once daily  He has not had a chest x-ray since his last visit  Does have diminished lung sounds in bilateral bases  His last POCT was done in July 2021  At that time forced vital capacity was 2 07 L or 52% of predicted, FEV1 was 1 39 L or 50% of predicted and obstruction ratio of 67%  He appears to be stable at this time    He has agreed to chest x-ray and I will reorder this  Additionally patient has received his influenza vaccine Fluzone today

## 2023-01-04 NOTE — ASSESSMENT & PLAN NOTE
patient continues to use and benefit from supplemental oxygen  His O2 saturation on 2 L at rest was 96%  On 2 L room air his O2 sat was 93% with ambulating 250 feet O2 saturation went down to 89%  On 2 L O2 saturation was 94%  He also continues to use and benefit from 2 L at nighttime    Patient does have moderate centrilobular emphysema

## 2023-01-04 NOTE — PATIENT INSTRUCTIONS
Cigarette Smoking and Your Health   AMBULATORY CARE:   Risks to your health if you smoke:  Nicotine and other chemicals found in tobacco and e-cigarettes can damage every cell in your body  Even if you are a light smoker, you have an increased risk for cancer, heart disease, and lung disease  If you are pregnant or have diabetes, smoking increases your risk for complications  Nicotine can affect an adolescent's developing brain  This can lead to trouble thinking, learning, or paying attention  Benefits to your health if you stop smoking: You decrease respiratory symptoms such as coughing, wheezing, and shortness of breath  You reduce your risk for cancers of the lung, mouth, throat, kidney, bladder, pancreas, stomach, and cervix  If you already have cancer, you increase the benefits of chemotherapy  You also reduce your risk for cancer returning or a second cancer from developing  You reduce your risk for heart disease, blood clots, heart attack, and stroke  You reduce your risk for lung infections, and diseases such as pneumonia, asthma, chronic bronchitis, and emphysema  Your circulation improves  More oxygen can be delivered to your body  If you have diabetes, you lower your risk for complications, such as kidney, artery, and eye diseases  You also lower your risk for nerve damage  Nerve damage can lead to amputations, poor vision, and blindness  You improve your body's ability to heal and to fight infections  An adolescent can help his or her brain and body develop in a healthy way  Talk to your adolescent about all the health risks of nicotine  If you can, start talking about nicotine when your child is younger than 12 years  This may make it easier for him or her not to start using nicotine as a teenager or adult  Explain to him or her that it is best never to start  It can be hard to try to quit later      Benefits to the health of others if you stop smoking:  Tobacco is harmful to nonsmokers who breathe in your secondhand smoke  The following are ways the health of others around you may improve when you stop smoking: You lower the risks for lung cancer and heart disease in nonsmoking adults  If you are pregnant, you lower the risk for miscarriage, early delivery, low birth weight, and stillbirth  You also lower your baby's risk for SIDS, obesity, developmental delay, and neurobehavioral problems, such as ADHD  If you have children, you lower their risk for ear infections, colds, pneumonia, bronchitis, and asthma  Follow up with your doctor as directed:  Write down your questions so you remember to ask them during your visits  For support and more information:   American [employer] Association  [address] [facility]  [address]  Phone: [facility] [address]  Phone: 6- 608 - [phone]  Web Address: Loreta gonzalez    Smokefree  gov  Phone: 9- 947 - 222-1872  Web Address: [place] smokefree  [facility] 2022 Information is for End User's use only and may not be sold, redistributed or otherwise used for commercial purposes  All illustrations and images included in CareNotes® are the copyrighted property of [employer]  or [address] [place], [place] [address]   The above information is an  only  It is not intended as medical advice for individual conditions or treatments  Talk to your doctor, nurse or pharmacist before following any medical regimen to see if it is safe and effective for you

## 2023-01-04 NOTE — PROGRESS NOTES
Assessment/Plan:     Problem List Items Addressed This Visit        Respiratory    Centrilobular emphysema ([address] ) - Primary (Chronic)     Patient was not completely aware of the names of his medications via nebulizer he is using 3 different files  He is likely using DuoNebs twice a day with budesonide and also Yupelri once daily  He has not had a chest x-ray since his last visit  Does have diminished lung sounds in bilateral bases  His last POCT was done in July 2021  At that time forced vital capacity was 2 07 L or 52% of predicted, FEV1 was 1 39 L or 50% of predicted and obstruction ratio of 67%  He appears to be stable at this time  He has agreed to chest x-ray and I will reorder this  Additionally patient has received his influenza vaccine Fluzone today         Relevant Orders    XR chest pa & lateral    influenza vaccine, high-dose, PF 0 7 mL (FLUZONE HIGH-DOSE)    Chronic respiratory failure with hypoxia (HCC) (Chronic)     patient continues to use and benefit from supplemental oxygen  His O2 saturation on 2 L at rest was 96%  On 2 L room air his O2 sat was 93% with ambulating 250 feet O2 saturation went down to 89%  On 2 L O2 saturation was 94%  He also continues to use and benefit from 2 L at nighttime  Patient does have moderate centrilobular emphysema            Other    Dyspnea on exertion (Chronic)     Patient does have dyspnea on exertion  This is multifactorial   He does have morbid obesity with current weight of 265 pounds or 38 02 kg/m²  He also has history of diastolic heart failure he does follow with Dr Marva Oropeza  Also has hypoxia secondary to centrilobular obesity as well as ventilation obesity syndrome         Continuous dependence on cigarette smoking (Chronic)     Patient continues to smoke approximately 1/2 to 3/4 pack of cigarettes per day not been able to stop smoking and a chest x-ray was discussed with patient  He is agreeable to do the same                Return in about 6 months (around 7/4/2023)  All questions are answered to the patient's satisfaction and understanding  He verbalizes understanding  He is encouraged to call with any further questions or concerns  Portions of the record may have been created with voice recognition software  Occasional wrong word or "sound a like" substitutions may have occurred due to the inherent limitations of voice recognition software  Read the chart carefully and recognize, using context, where substitutions have occurred  Electronically Signed by MITCHELL Alvarado    ______________________________________________________________________    Chief Complaint:   Chief Complaint   Patient presents with   • Follow-up       Patient ID: Candy Fine is a 78 y o  y o  male has a past medical history of Acid reflux, Arthritis, BPH (benign prostatic hyperplasia), Calcium disorder, Chronic atrial fibrillation ([address] ) (2/6/2018), Chronic pain, Chronic pain disorder, COPD (chronic obstructive pulmonary disease) ([address] ), Coronary artery disease (2001), Eye redness, Hiatal hernia, Hydronephrosis, Hyperlipidemia, Hypertension, Irregular heart beat, Low back pain, Neck pain, Nocturnal leg cramps, Osteopenia, Peripheral artery disease ([address] ), Peripheral neuropathy, Pleural effusion, Sleep apnea, Spinal stenosis, and 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)  1/4/2023  Patient presents today for follow-up visit  KEVIN Wilson is a 39-year-old male with history of centrilobular emphysema  This gentleman has a past history of coronary artery disease and also chronic respiratory failure with hypoxia  He also has atrial fibrillation and previous history of obstructive sleep apnea  Here today as a follow-up from his last visit in August 2022  He has not yet had his chest x-ray that was ordered  He is last chest x-ray was in July 2021    There was mild cardiomegaly with no active disease he has a history of calcified pleural plaques that were evident and a mild benign scar that was noted  He also has a spinal cord stimulator  And continues to smoke about a half a pack of cigarettes per day  Review of Systems   Constitutional: Negative  HENT: Negative  Eyes: Negative  Respiratory: Positive for cough and shortness of breath  Cough is more prevalent in the morning  He will bring up some productive clear sputum   Cardiovascular:        She reports leg pain with walking   Gastrointestinal: Negative  Endocrine: Negative  Genitourinary: Negative  Musculoskeletal: Positive for back pain  Skin: Negative  Allergic/Immunologic: Negative  Neurological: Negative  Hematological: Negative  Psychiatric/Behavioral: Negative  Smoking history: He reports that he has been smoking cigarettes  He has a 45 00 pack-year smoking history   He has never used smokeless tobacco     The following portions of the patient's history were reviewed and updated as appropriate: current medications, past family history, past medical history, past social history, past surgical history and problem list     Immunization History   Administered Date(s) Administered   • COVID-19 MODERNA VACC 0 5 ML IM 08/11/2021   • COVID-19 PFIZER VACCINE 0 3 ML IM 07/15/2021     Current Outpatient Medications   Medication Sig Dispense Refill   • apixaban (Eliquis) 5 mg Take 1 tablet (5 mg total) by mouth 2 (two) times a day 60 tablet 11   • aspirin (ECOTRIN LOW STRENGTH) 81 mg EC tablet Take 81 mg by mouth daily     • atorvastatin (LIPITOR) 40 mg tablet Take 40 mg by mouth daily       • diltiazem (DILACOR XR) 240 MG 24 hr capsule Take 1 capsule (240 mg total) by mouth daily at bedtime 90 capsule 3   • HYDROcodone-acetaminophen (NORCO)  mg per tablet Take 1 tablet by mouth 4 (four) times a day as needed for moderate pain Max Daily Amount: 4 tablets Do not start before December 23, 2022  75 tablet 0   • ipratropium-albuterol (DUO-NEB) 0 5-2 5 mg/3 mL nebulizer solution Take 3 mL by nebulization 3 (three) times a day 252 mL 5   • nebivolol (BYSTOLIC) 10 mg tablet Take 1 tablet (10 mg total) by mouth daily 90 tablet 3   • omeprazole (PriLOSEC) 20 mg delayed release capsule Take 1 capsule by mouth as needed for heartburn      • OXYGEN-HELIUM IN Inhale 2 L daily at bedtime     • pregabalin (LYRICA) 75 mg capsule Take 1 capsule (75 mg total) by mouth 3 (three) times a day 90 capsule 1   • tamsulosin (FLOMAX) 0 4 mg Take 0 4 mg by mouth daily with dinner     • traZODone (DESYREL) 50 mg tablet      • budesonide (PULMICORT) 0 5 mg/2 mL nebulizer solution Take 2 mL (0 5 mg total) by nebulization 2 (two) times a day 360 mL 2   • HYDROcodone-acetaminophen (NORCO)  mg per tablet Take 1 tablet by mouth 4 (four) times a day as needed for moderate pain Max Daily Amount: 4 tablets Do not start before November 11, 2022  120 tablet 0   • HYDROcodone-acetaminophen (NORCO)  mg per tablet Take 1 tablet by mouth 4 (four) times a day as needed for severe pain for up to 11 days Max Daily Amount: 4 tablets 45 tablet 0   • nicotine (NICOTROL) 10 MG inhaler Use 1  Cartridge per hour as needed  Do not exceed 10 cartridges per day (Patient not taking: Reported on 1/4/2023) 168 each 6     No current facility-administered medications for this visit  Allergies: Patient has no known allergies  Objective:  Vitals:    01/04/23 0857   BP: 130/88   BP Location: Left arm   Patient Position: Sitting   Cuff Size: Standard   Pulse: 86   Resp: 20   Temp: 98 7 °F (37 1 °C)   TempSrc: Temporal   SpO2: 96%   Weight: 120 kg (265 lb)   Height: 5' 10" (1 778 m)   Oxygen Therapy  SpO2: 96 % (2L)    Wt Readings from Last 3 Encounters:   01/04/23 120 kg (265 lb)   09/20/22 120 kg (265 lb)   08/09/22 125 kg (275 lb)     Body mass index is 38 02 kg/m²  Physical Exam  Constitutional:       Appearance: He is obese  HENT:      Head: Normocephalic and atraumatic        Nose: Nose normal       Mouth/Throat:      Mouth: Mucous membranes are dry  Comments: Mallampati 4  Eyes:      Pupils: Pupils are equal, round, and reactive to light  Cardiovascular:      Rate and Rhythm: Normal rate and regular rhythm  Pulses: Normal pulses  Pulmonary:      Effort: Pulmonary effort is normal       Breath sounds: Normal breath sounds  Musculoskeletal:         General: Normal range of motion  Cervical back: Normal range of motion  Skin:     General: Skin is dry  Capillary Refill: Capillary refill takes less than 2 seconds  Neurological:      General: No focal deficit present  Mental Status: He is alert and oriented to person, place, and time  Psychiatric:         Mood and Affect: Mood normal          Behavior: Behavior normal          Lab Review:   Office Visit on 11/08/2022   Component Date Value   • RESULT ALL_PRESC MEDS SP* 47/12/3632 Not Applicable    • Alpha-Hydroxyalprazolam * 11/08/2022 negative    • Amphetamine Quantificati* 11/08/2022 negative    • Aripiprazole Quantificat* 11/08/2022 negative    • [id number]-[id number] QUANTIFICATION 11/08/2022 negative    • EUTYLONE QUANTIFICATION 11/08/2022 negative    • METHYLONE QUANTIFICATION 11/08/2022 negative    • Buprenorphine Quantifica* 11/08/2022 negative    • Norbuprenorphine Quantif* 11/08/2022 negative    • Butalbital Quantification 11/08/2022 negative    • Clozapine Quantification 11/08/2022 negative    • N-desmethylclozapine Rajan* 11/08/2022 negative    • 7-Amino-Clonazepam Quant* 11/08/2022 negative    • Cocaine metabolite Quant* 11/08/2022 negative    • Codeine Quantification 11/08/2022 negative    • Morphine Quantification 11/08/2022 negative    • Hydrocodone Quantificati* 11/08/2022 positive-1601 914    • Norhydrocodone Quantific* 11/08/2022 positive-313 003    • Hydromorphone Quantifica* 11/08/2022 positive-310  393    • Desipramine Quantificati* 11/08/2022 negative    • Dextromethorphan Quantif* 11/08/2022 negative    • Dextrorphan (Dextrometho* 11/08/2022 negative • Nordiazepam Quantificati* 11/08/2022 negative    • Temazepam Quantification 11/08/2022 negative    • Oxazepam Quantification 11/08/2022 negative    • Ethyl Glucuronide Quanti* 11/08/2022 negative    • Ethyl Sulfate Quantifica* 11/08/2022 negative    • Fentanyl Quantification 11/08/2022 negative    • Norfentanyl Quantificati* 11/08/2022 negative    • 4-ANPP Quantification 11/08/2022 Fen Neg    • Acetyl fentanyl Quantifi* 11/08/2022 Fen Neg    • Acetyl norfentanyl Quant* 11/08/2022 Fen Neg    • Acryl fentanyl Quantific* 11/08/2022 Fen Neg    • Carfentanil Quantificati* 11/08/2022 Fen Neg    • Para-fluorofentanyl German* 11/08/2022 Fen Neg    • Haloperidol  Quantificat* 11/08/2022 negative    • Haloperidol metabolite Q* 11/08/2022 negative    • 6-TAMIKA (Heroin metabolite* 11/08/2022 negative    • Hydrocodone Quantificati* 11/08/2022 positive-1601 914    • Norhydrocodone Quantific* 11/08/2022 positive-313 003    • Hydromorphone Quantifica* 11/08/2022 positive-310  393    • Hydromorphone Quantifica* 11/08/2022 positive-310  393    • Desipramine Quantificati* 11/08/2022 negative    • Imipramine Quantification 11/08/2022 negative    • Mitragynine (Kratom ree* 11/08/2022 negative    • 1-BZ-Eoonojswcke (Kratom* 11/08/2022 negative    • Dextrorphan (Dextrometho* 11/08/2022 negative    • Lorazepam Quantification 11/08/2022 negative    • MDMA Quantification 11/08/2022 negative    • Meperidine Quantification 11/08/2022 negative    • Normeperidine Quantifica* 11/08/2022 negative    • Methadone Quantification 11/08/2022 negative    • EDDP (Methadone metaboli* 11/08/2022 negative    • Amphetamine Quantificati* 11/08/2022 negative    • Methamphetamine Quantifi* 11/08/2022 negative    • Methylphenidate Quantifi* 11/08/2022 negative    • RITALINIC ACID QUANTIFIC* 11/08/2022 negative    • Morphine Quantification 11/08/2022 negative    • Hydromorphone Quantifica* 11/08/2022 positive-310  393    • Oxazepam Quantification 11/08/2022 negative    • Oxycodone Quantification 11/08/2022 negative    • Noroxycodone Quantificat* 11/08/2022 negative    • Oxymorphone Quantificati* 11/08/2022 negative    • Oxymorphone Quantificati* 11/08/2022 negative    • Phenobarbital Quantifica* 11/08/2022 negative    • Phencyclidine Quantifica* 11/08/2022 negative    • PHENTERMINE QUANTIFICATI* 11/08/2022 negative    • Secobarbital Quantificat* 11/08/2022 negative    • 5F-ADB-M7 11/08/2022 negative    • HF-FJPYYPYK-R7 METABOLIT* 11/08/2022 negative    • GIZX-CPGPFWAB-X2 METABOL* 11/08/2022 negative    • FLP703 metabolite Quanti* 11/08/2022 negative    • XMA474 metabolite Quanti* 11/08/2022 negative    • RCS4 METABOLITE QUANTIFI* 11/08/2022 negative    • FVY58/ METABOLITE Q* 11/08/2022 negative    • Tapentadol Quantification 11/08/2022 negative    • Temazepam Quantification 11/08/2022 negative    • Oxazepam Quantification 11/08/2022 negative    • cTHC (Marijuana metaboli* 11/08/2022 negative    • Tramadol Quantification 11/08/2022 negative    • O-desmethyl-tramadol Rajan* 11/08/2022 negative    • N-DESMETHYL-TRAMADOL RAJAN* 11/08/2022 negative        Past Surgical History:   Procedure Laterality Date   • ANGIOPLASTY     • APPENDECTOMY  04/2007   • CARDIOVERSION      x2, for A-Fib   • CARDIOVERSION     • CATARACT EXTRACTION     • CATARACT EXTRACTION W/ INTRAOCULAR LENS IMPLANT Left 01/2014   • CORONARY ANGIOPLASTY WITH STENT PLACEMENT  2001    one stent   • HERNIA REPAIR Right     inquinal   • JOINT REPLACEMENT Left     Hip   • KNEE SURGERY  1994    also had arthroscopy right knee 2005   • PANENDOSCOPY  10/23/2012    vocal cord polyp   • PILONIDAL CYST DRAINAGE      x 3   • ME VALLADARES IMPLTJ NSTIM ELTRDS PLATE/PADDLE EDRL Left 8/23/2017    Procedure: Placement of Thoracic SCS with left buttock IPG (IMPULSE);   Surgeon: Kaye Anguiano MD;  Location: [facility];  Service: Neurosurgery   • ME XCAPSL CTRC RMVL INSJ IO LENS PROSTH W/O ECP Right 4/6/2016    Procedure: EXTRACTION EXTRACAPSULAR CATARACT PHACO INTRAOCULAR LENS (IOL); Surgeon: Britney Crowe MD;  Location: [facility];  Service: Ophthalmology   • SPINAL CORD STIMULATOR TRIAL W/ LAMINOTOMY N/A 4/13/2017    Procedure: INSERTION DORSAL COLUMN SPINAL CORD STIMULATOR TRIAL;  Surgeon: Jessie Brunson MD;  Location: [place] [address] MAIN OR;  Service:    • TONSILLECTOMY     • US GUIDED INJECTION FOR RESEARCH STUDY  5/28/2015        Family History   Problem Relation Age of Onset   • Cancer Father 80        prostate   • No Known Problems Mother    • No Known Problems Sister    • No Known Problems Brother    • No Known Problems Daughter    • No Known Problems Son    • No Known Problems Maternal Aunt    • No Known Problems Maternal Uncle    • No Known Problems Paternal Aunt    • No Known Problems Paternal Uncle    • No Known Problems Maternal Grandmother    • No Known Problems Maternal Grandfather    • No Known Problems Paternal Grandmother    • No Known Problems Paternal Grandfather         Diagnostics:  I have personally reviewed pertinent reports  Office Spirometry Results:     ESS:    No results found

## 2023-01-04 NOTE — ASSESSMENT & PLAN NOTE
Patient continues to smoke approximately 1/2 to 3/4 pack of cigarettes per day not been able to stop smoking and a chest x-ray was discussed with patient  He is agreeable to do the same

## 2023-01-10 ENCOUNTER — TELEPHONE (OUTPATIENT)
Dept: PAIN MEDICINE | Facility: MEDICAL CENTER | Age: 83
End: 2023-01-10

## 2023-01-10 DIAGNOSIS — G89.4 CHRONIC PAIN SYNDROME: ICD-10-CM

## 2023-01-10 RX ORDER — HYDROCODONE BITARTRATE AND ACETAMINOPHEN 10; 325 MG/1; MG/1
1 TABLET ORAL 4 TIMES DAILY PRN
Qty: 120 TABLET | Refills: 0 | Status: SHIPPED | OUTPATIENT
Start: 2023-01-12 | End: 2023-01-17 | Stop reason: SDUPTHER

## 2023-01-10 RX ORDER — HYDROCODONE BITARTRATE AND ACETAMINOPHEN 10; 325 MG/1; MG/1
1 TABLET ORAL 4 TIMES DAILY PRN
Qty: 120 TABLET | Refills: 0 | Status: CANCELLED | OUTPATIENT
Start: 2023-01-12 | End: 2023-02-11

## 2023-01-10 NOTE — TELEPHONE ENCOUNTER
Pt contacted Call Center requested refill of their medication  Medication Name: HYDROcodone-acetaminophen (NORCO)    Dosage of Med:  mg       Frequency of Med: Take 1 tablet by mouth 4 (four) times a day as needed       Remaining Medication: few left       Pharmacy and Location: [facility] [address] [place] [address]         Pt  Preferred Callback Phone Number: [phone]      Thank you

## 2023-01-10 NOTE — TELEPHONE ENCOUNTER
S/w pharmacy  Confirmed norco  #[id number] was picked up on 12/26  S/w pt, confirmed rx above  Per pt, 1 pill po qid w/ + relief, no se's  Believes he has ~ 15 pills on hand  Pt could not provide an accurate pill count  Pt stated that he did not get a call about his 1/3 ov  Advised pt, the writer will request the  staff contact the pt to reschedule his ov  The writer will discuss his medication with Toney Schreiber  Anticipate rx will be sent to the pharmacy for pu  The writer will cb if there is any question or change in the plan as discussed  Pt verbalized understanding and appreciation  SPA clerical team - please fu w/ pt re: kevon ov w/ Janine Lewis   TY

## 2023-01-17 ENCOUNTER — OFFICE VISIT (OUTPATIENT)
Dept: PAIN MEDICINE | Facility: CLINIC | Age: 83
End: 2023-01-17

## 2023-01-17 VITALS — HEART RATE: 87 BPM | DIASTOLIC BLOOD PRESSURE: 90 MMHG | SYSTOLIC BLOOD PRESSURE: 146 MMHG

## 2023-01-17 DIAGNOSIS — Z79.891 LONG-TERM CURRENT USE OF OPIATE ANALGESIC: ICD-10-CM

## 2023-01-17 DIAGNOSIS — G89.4 CHRONIC PAIN SYNDROME: Primary | ICD-10-CM

## 2023-01-17 DIAGNOSIS — M51.16 INTERVERTEBRAL DISC DISORDER WITH RADICULOPATHY OF LUMBAR REGION: ICD-10-CM

## 2023-01-17 DIAGNOSIS — M54.12 CERVICAL RADICULOPATHY: ICD-10-CM

## 2023-01-17 DIAGNOSIS — F11.20 UNCOMPLICATED OPIOID DEPENDENCE (HCC): ICD-10-CM

## 2023-01-17 RX ORDER — PREGABALIN 75 MG/1
75 CAPSULE ORAL 3 TIMES DAILY
Qty: 90 CAPSULE | Refills: 1 | Status: SHIPPED | OUTPATIENT
Start: 2023-01-17

## 2023-01-17 RX ORDER — HYDROCODONE BITARTRATE AND ACETAMINOPHEN 10; 325 MG/1; MG/1
1 TABLET ORAL 4 TIMES DAILY PRN
Qty: 120 TABLET | Refills: 0 | Status: SHIPPED | OUTPATIENT
Start: 2023-03-11 | End: 2023-04-10

## 2023-01-17 RX ORDER — HYDROCODONE BITARTRATE AND ACETAMINOPHEN 10; 325 MG/1; MG/1
1 TABLET ORAL 4 TIMES DAILY PRN
Qty: 120 TABLET | Refills: 0 | Status: SHIPPED | OUTPATIENT
Start: 2023-04-10

## 2023-01-17 RX ORDER — HYDROCODONE BITARTRATE AND ACETAMINOPHEN 10; 325 MG/1; MG/1
1 TABLET ORAL 4 TIMES DAILY PRN
Qty: 120 TABLET | Refills: 0 | Status: SHIPPED | OUTPATIENT
Start: 2023-02-11

## 2023-01-17 NOTE — PATIENT INSTRUCTIONS

## 2023-01-17 NOTE — PROGRESS NOTES
Pain Medicine Follow-Up Note    Assessment:  1  Chronic pain syndrome    2  Intervertebral disc disorder with radiculopathy of lumbar region    3  Cervical radiculopathy    4  Long-term current use of opiate analgesic    5  Uncomplicated opioid dependence ([address] )        Plan:      New Medications Ordered This Visit   Medications   • pregabalin (LYRICA) 75 mg capsule     Sig: Take 1 capsule (75 mg total) by mouth 3 (three) times a day     Dispense:  90 capsule     Refill:  1   • HYDROcodone-acetaminophen (NORCO)  mg per tablet     Sig: Take 1 tablet by mouth 4 (four) times a day as needed for moderate pain Max Daily Amount: 4 tablets Do not start before April 10, 2023  Dispense:  120 tablet     Refill:  0     Fill on 4/10/2023   • HYDROcodone-acetaminophen (NORCO)  mg per tablet     Sig: Take 1 tablet by mouth 4 (four) times a day as needed for moderate pain Max Daily Amount: 4 tablets Do not start before March 11, 2023  Dispense:  120 tablet     Refill:  0     Please refill on 3/11/2023   • HYDROcodone-acetaminophen (NORCO)  mg per tablet     Sig: Take 1 tablet by mouth 4 (four) times a day as needed for severe pain Max Daily Amount: 4 tablets Do not start before February 11, 2023  Dispense:  120 tablet     Refill:  0     May fill 1 day prior to 2/11/2023       My impressions and treatment recommendations were discussed in detail with the patient who verbalized understanding and had no further questions  The patient continues to report an overall reduction of his pain level and improvement with his functioning without significant side effects using hydrocodone/acetaminophen 10/325 mg tablet patient uses 1 tab up to 4 times daily as needed for pain along with Lyrica 75 mg capsule patient uses 3 times daily, therefore I will continue the patient on this medication    Hydrocodone/acetaminophen 10/325 mg tablet and Lyrica E-prescribed to the patient's pharmacy with a do not fill until date of 2/11/2023, 3/11/2023 and 4/10/2023  [place] Prescription Drug Monitoring Program report was reviewed and was appropriate     There are risks associated with opioid medications, including dependence, addiction and tolerance  The patient understands and agrees to use these medications only as prescribed  Potential side effects of the medications include, but are not limited to, constipation, drowsiness, addiction, impaired judgment and risk of fatal overdose if not taken as prescribed  The patient was warned against driving while taking sedation medications  Sharing medications is a felony  At this point in time, the patient is showing no signs of addiction, abuse, diversion or suicidal ideation  Follow-up is planned in 3 months time or sooner as warranted  Discharge instructions were provided  I personally saw and examined the patient and I agree with the above discussed plan of care  History of Present Illness:    Clyde Ocasio is a 78 y o  male who presents to [facility] for interval re-evaluation of the above stated pain complaints  The patient has a past medical and chronic pain history as outlined in the assessment section  He was last seen on 11/08/2022  At today's visit patient states that his pain symptoms are the same with a current pain score of 8 out of 10 on the verbal numeric pain scale  Patient states that his pain is worse in the morning, evening, and at night  The patient's pain is constant in nature  The quality of the patient's pain is described as sharp, throbbing, and shooting  Patient indicates that his pain is located in his bilateral low back and his right posterior thigh  Pain Contract Signed:  1/17/2023  Last Urine Drug Screen:  11/18/2022    Other than as stated above, the patient denies any interval changes in medications, medical condition, mental condition, symptoms, or allergies since the last office visit  Review of Systems:    Review of Systems   Respiratory: Positive for shortness of breath  Cardiovascular: Negative for chest pain  Gastrointestinal: Negative for constipation, diarrhea, nausea and vomiting  Musculoskeletal: Positive for gait problem  Negative for arthralgias, joint swelling and myalgias  Skin: Negative for rash  Neurological: Positive for weakness  Negative for dizziness and seizures  Memory loss   All other systems reviewed and are negative          Past Medical History:   Diagnosis Date   • Acid reflux    • Arthritis    • BPH (benign prostatic hyperplasia)     no treatment   • Calcium disorder    • Chronic atrial fibrillation ([facility]) 2/6/2018   • Chronic pain    • Chronic pain disorder    • COPD (chronic obstructive pulmonary disease) ([facility])    • Coronary artery disease 2001    PTCA with one stent   • Eye redness     Poked his eye on 3/18/16 "bloody" right eye   • Hiatal hernia    • Hydronephrosis    • Hyperlipidemia    • Hypertension    • Irregular heart beat     A-Fib   • Low back pain    • Neck pain    • Nocturnal leg cramps    • Osteopenia    • Peripheral artery disease ([facility])    • Peripheral neuropathy    • Pleural effusion    • Sleep apnea     REFUSES MACHINE HS   • Spinal stenosis    • Vocal cord polyp 10/2012    panendoscopy       Past Surgical History:   Procedure Laterality Date   • ANGIOPLASTY     • APPENDECTOMY  04/2007   • CARDIOVERSION      x2, for A-Fib   • CARDIOVERSION     • CATARACT EXTRACTION     • CATARACT EXTRACTION W/ INTRAOCULAR LENS IMPLANT Left 01/2014   • CORONARY ANGIOPLASTY WITH STENT PLACEMENT  2001    one stent   • HERNIA REPAIR Right     inquinal   • JOINT REPLACEMENT Left     Hip   • KNEE SURGERY  1994    also had arthroscopy right knee 2005   • PANENDOSCOPY  10/23/2012    vocal cord polyp   • PILONIDAL CYST DRAINAGE      x 3   • DE VALLADARES IMPLTJ NSTIM ELTRDS PLATE/PADDLE EDRL Left 8/23/2017    Procedure: Placement of Thoracic SCS with left buttock IPG (IMPULSE); Surgeon: Pepe Gabriel MD;  Location:  MAIN OR;  Service: Neurosurgery   • DC XCAPSL CTRC RMVL INSJ IO LENS PROSTH W/O ECP Right 4/6/2016    Procedure: EXTRACTION EXTRACAPSULAR CATARACT PHACO INTRAOCULAR LENS (IOL);   Surgeon: Harley Anderson MD;  Location: [facility] MAIN OR;  Service: Ophthalmology   • SPINAL CORD STIMULATOR TRIAL W/ LAMINOTOMY N/A 4/13/2017    Procedure: INSERTION DORSAL COLUMN SPINAL CORD STIMULATOR TRIAL;  Surgeon: Reynold Elizalde MD;  Location: [facility] MAIN OR;  Service:    • TONSILLECTOMY     • [address] STUDY  5/28/2015       Family History   Problem Relation Age of Onset   • Cancer Father 80        prostate   • No Known Problems Mother    • No Known Problems Sister    • No Known Problems Brother    • No Known Problems Daughter    • No Known Problems Son    • No Known Problems Maternal Aunt    • No Known Problems Maternal Uncle    • No Known Problems Paternal Aunt    • No Known Problems Paternal Uncle    • No Known Problems Maternal Grandmother    • No Known Problems Maternal Grandfather    • No Known Problems Paternal Grandmother    • No Known Problems Paternal Grandfather        Social History     Occupational History   • Not on file   Tobacco Use   • Smoking status: Every Day     Packs/day: 0 75     Years: 60 00     Pack years: 45 00     Types: Cigarettes   • Smokeless tobacco: Never   Vaping Use   • Vaping Use: Never used   Substance and Sexual Activity   • Alcohol use: Yes     Comment: occasionally   • Drug use: No   • Sexual activity: Not on file         Current Outpatient Medications:   •  [START ON 4/10/2023] HYDROcodone-acetaminophen (NORCO)  mg per tablet, Take 1 tablet by mouth 4 (four) times a day as needed for moderate pain Max Daily Amount: 4 tablets Do not start before April 10, 2023 , Disp: 120 tablet, Rfl: 0  •  [START ON 3/11/2023] HYDROcodone-acetaminophen (NORCO)  mg per tablet, Take 1 tablet by mouth 4 (four) times a day as needed for moderate pain Max Daily Amount: 4 tablets Do not start before March 11, 2023 , Disp: 120 tablet, Rfl: 0  •  [START ON 2/11/2023] HYDROcodone-acetaminophen (NORCO)  mg per tablet, Take 1 tablet by mouth 4 (four) times a day as needed for severe pain Max Daily Amount: 4 tablets Do not start before February 11, 2023 , Disp: 120 tablet, Rfl: 0  •  pregabalin (LYRICA) 75 mg capsule, Take 1 capsule (75 mg total) by mouth 3 (three) times a day, Disp: 90 capsule, Rfl: 1  •  apixaban (Eliquis) 5 mg, Take 1 tablet (5 mg total) by mouth 2 (two) times a day, Disp: 60 tablet, Rfl: 11  •  aspirin (ECOTRIN LOW STRENGTH) 81 mg EC tablet, Take 81 mg by mouth daily, Disp: , Rfl:   •  atorvastatin (LIPITOR) 40 mg tablet, Take 40 mg by mouth daily  , Disp: , Rfl:   •  budesonide (PULMICORT) 0 5 mg/2 mL nebulizer solution, Take 2 mL (0 5 mg total) by nebulization 2 (two) times a day, Disp: 360 mL, Rfl: 2  •  diltiazem (DILACOR XR) 240 MG 24 hr capsule, Take 1 capsule (240 mg total) by mouth daily at bedtime, Disp: 90 capsule, Rfl: 3  •  ipratropium-albuterol (DUO-NEB) 0 5-2 5 mg/3 mL nebulizer solution, Take 3 mL by nebulization 3 (three) times a day, Disp: 252 mL, Rfl: 5  •  nebivolol (BYSTOLIC) 10 mg tablet, Take 1 tablet (10 mg total) by mouth daily, Disp: 90 tablet, Rfl: 3  •  nicotine (NICOTROL) 10 MG inhaler, Use 1  Cartridge per hour as needed    Do not exceed 10 cartridges per day (Patient not taking: Reported on 1/4/2023), Disp: 168 each, Rfl: 6  •  omeprazole (PriLOSEC) 20 mg delayed release capsule, Take 1 capsule by mouth as needed for heartburn , Disp: , Rfl:   •  OXYGEN-HELIUM IN, Inhale 2 L daily at bedtime, Disp: , Rfl:   •  tamsulosin (FLOMAX) 0 4 mg, Take 0 4 mg by mouth daily with dinner, Disp: , Rfl:   •  traZODone (DESYREL) 50 mg tablet, , Disp: , Rfl:     No Known Allergies    Physical Exam:    /90   Pulse 87     Constitutional:normal, well developed, well nourished, alert, in no distress and non-toxic and no overt pain behavior   and obese  Eyes:anicteric  HEENT:grossly intact  Neck:supple, symmetric, trachea midline and no masses   Pulmonary:even and unlabored and Using a oxygen condenser 2 L via nasal cannula  Cardiovascular:No edema or pitting edema present  Skin:Normal without rashes or lesions and well hydrated  Psychiatric:Mood and affect appropriate  Neurologic:Cranial Nerves II-XII grossly intact  Musculoskeletal:ambulates with cane and in wheelchair

## 2023-03-16 ENCOUNTER — TELEPHONE (OUTPATIENT)
Dept: PAIN MEDICINE | Facility: CLINIC | Age: 83
End: 2023-03-16

## 2023-03-16 DIAGNOSIS — G89.4 CHRONIC PAIN SYNDROME: ICD-10-CM

## 2023-03-16 RX ORDER — HYDROCODONE BITARTRATE AND ACETAMINOPHEN 10; 325 MG/1; MG/1
1 TABLET ORAL 4 TIMES DAILY PRN
Qty: 120 TABLET | Refills: 0 | Status: SHIPPED | OUTPATIENT
Start: 2023-03-16 | End: 2023-03-17 | Stop reason: SDUPTHER

## 2023-03-16 NOTE — TELEPHONE ENCOUNTER
Caller: Danielito    Doctor: Kenyatta Douglass     Reason for call: [facility] does not have medication and [facility] just [facility] right now and told them they have it in stock   Please resend to     [id number], [address] [place] [address], [address] [place] [address]   Phone:  [phone]  Fax:  [phone]   JOHNATHAN #:  [id number]    Call back#: [phone]

## 2023-03-16 NOTE — TELEPHONE ENCOUNTER
Caller: sadie     Doctor: Regina Wilhelm     Reason for call: pharmacy told him it would be ready on Tuesday but still not available, please try [address] in [address] [place] [address] Drive     [phone]    For the HYDROcodone-acetaminophen [facility])  mg per tablet        Call back#: [phone]

## 2023-03-16 NOTE — TELEPHONE ENCOUNTER
Please see previous and advise- S/w Pharmacist at [facility] in [place]  They do have 128 Norco  pills left so enough to fill script of 120 if sent asap

## 2023-03-17 RX ORDER — HYDROCODONE BITARTRATE AND ACETAMINOPHEN 10; 325 MG/1; MG/1
1 TABLET ORAL 4 TIMES DAILY PRN
Qty: 120 TABLET | Refills: 0 | Status: SHIPPED | OUTPATIENT
Start: 2023-03-17 | End: 2023-04-16

## 2023-03-17 NOTE — TELEPHONE ENCOUNTER
Edwin Samuel     Doctor: Evelio Meneses     Reason for call: Patient calling stating per pharmacy medication HYDROcodone-acetaminophen (NORCO)  mg per tablet   Was not in I did advise patient to call back and check again per message below  Patient called stating [facility] willceled script stating they did not of meds and to send it to [facility]  Please advise       Call back#: [phone]

## 2023-03-17 NOTE — TELEPHONE ENCOUNTER
These shortages are getting out of hand  [facility] said they had it and I sent the script 24 minutes after you called them     I hope he gets his meds today!!    [facility]

## 2023-04-04 ENCOUNTER — TELEPHONE (OUTPATIENT)
Dept: PAIN MEDICINE | Facility: MEDICAL CENTER | Age: 83
End: 2023-04-04

## 2023-04-04 DIAGNOSIS — M54.12 CERVICAL RADICULOPATHY: ICD-10-CM

## 2023-04-04 DIAGNOSIS — M51.16 INTERVERTEBRAL DISC DISORDER WITH RADICULOPATHY OF LUMBAR REGION: ICD-10-CM

## 2023-04-04 RX ORDER — PREGABALIN 75 MG/1
75 CAPSULE ORAL 3 TIMES DAILY
Qty: 270 CAPSULE | Refills: 1 | Status: SHIPPED | OUTPATIENT
Start: 2023-04-04 | End: 2023-07-10 | Stop reason: SDUPTHER

## 2023-04-04 NOTE — TELEPHONE ENCOUNTER
I sent it to his mail order however he is likely to run out prior to it being delivered.  With the patient like me to send a short supply locally?

## 2023-04-04 NOTE — TELEPHONE ENCOUNTER
S/w the patient and reviewed. He stated that is not necessary, he will be fine. He appreciated the call.

## 2023-04-04 NOTE — TELEPHONE ENCOUNTER
Pt contacted Call Center requested refill of their medication.        Medication Name: pregabalin (LYRICA)     Dosage of Med: 75 mg       Frequency of Med: Take 1 capsule (75 mg total) by mouth 3 (three) times a day      Remaining Medication: 2 days left       Pharmacy and Location: [facility] mail in order         Pt. Preferred Callback Phone Number: [phone]    Patient requesting 90 day supply     Thank you.

## 2023-05-30 ENCOUNTER — TELEPHONE (OUTPATIENT)
Dept: PULMONOLOGY | Facility: CLINIC | Age: 83
End: 2023-05-30

## 2023-07-10 ENCOUNTER — TELEPHONE (OUTPATIENT)
Dept: PAIN MEDICINE | Facility: MEDICAL CENTER | Age: 83
End: 2023-07-10

## 2023-07-10 DIAGNOSIS — M51.16 INTERVERTEBRAL DISC DISORDER WITH RADICULOPATHY OF LUMBAR REGION: ICD-10-CM

## 2023-07-10 DIAGNOSIS — M54.12 CERVICAL RADICULOPATHY: ICD-10-CM

## 2023-07-10 RX ORDER — PREGABALIN 75 MG/1
75 CAPSULE ORAL 3 TIMES DAILY
Qty: 270 CAPSULE | Refills: 1 | Status: SHIPPED | OUTPATIENT
Start: 2023-07-10 | End: 2023-08-03 | Stop reason: SDUPTHER

## 2023-07-10 NOTE — TELEPHONE ENCOUNTER
Pt contacted Call Center requested refill of their medication.        Medication Name: pregabalin (LYRICA)    Dosage of Med: 75 mg       Frequency of Med:   Take 1 capsule (75 mg total) by mouth 3 (three) times a day                 Remaining Medication: 0 left         Pharmacy and Location: [facility] -        Pt. Preferred Callback Phone Number: [phone]      Thank you.

## 2023-07-10 NOTE — TELEPHONE ENCOUNTER
Pt refill request for Lyrica 75mg x1 TID.  Last Rx  with x1 refill by MG.  Remainin  LOV  with MG.  NOV  with MG.  Preferred Pharmacy: [facility]    Please advise-

## 2023-07-17 ENCOUNTER — OFFICE VISIT (OUTPATIENT)
Dept: PAIN MEDICINE | Facility: CLINIC | Age: 83
End: 2023-07-17
Payer: MEDICARE

## 2023-07-17 VITALS — SYSTOLIC BLOOD PRESSURE: 149 MMHG | HEART RATE: 80 BPM | DIASTOLIC BLOOD PRESSURE: 79 MMHG

## 2023-07-17 DIAGNOSIS — Z79.891 LONG-TERM CURRENT USE OF OPIATE ANALGESIC: ICD-10-CM

## 2023-07-17 DIAGNOSIS — F11.20 UNCOMPLICATED OPIOID DEPENDENCE (HCC): ICD-10-CM

## 2023-07-17 DIAGNOSIS — M51.16 INTERVERTEBRAL DISC DISORDER WITH RADICULOPATHY OF LUMBAR REGION: ICD-10-CM

## 2023-07-17 DIAGNOSIS — M54.41 CHRONIC MIDLINE LOW BACK PAIN WITH RIGHT-SIDED SCIATICA: ICD-10-CM

## 2023-07-17 DIAGNOSIS — M54.12 CERVICAL RADICULOPATHY: ICD-10-CM

## 2023-07-17 DIAGNOSIS — G89.29 CHRONIC MIDLINE LOW BACK PAIN WITH RIGHT-SIDED SCIATICA: ICD-10-CM

## 2023-07-17 DIAGNOSIS — G89.4 CHRONIC PAIN SYNDROME: Primary | ICD-10-CM

## 2023-07-17 DIAGNOSIS — M46.1 SACROILIITIS (HCC): ICD-10-CM

## 2023-07-17 PROCEDURE — 99214 OFFICE O/P EST MOD 30 MIN: CPT

## 2023-07-17 RX ORDER — PREDNISONE 10 MG/1
TABLET ORAL
Qty: 26 TABLET | Refills: 0 | Status: SHIPPED | OUTPATIENT
Start: 2023-07-17 | End: 2023-07-27

## 2023-07-17 RX ORDER — HYDROCODONE BITARTRATE AND ACETAMINOPHEN 10; 325 MG/1; MG/1
1 TABLET ORAL 4 TIMES DAILY PRN
Qty: 120 TABLET | Refills: 0 | Status: SHIPPED | OUTPATIENT
Start: 2023-07-20 | End: 2023-07-21 | Stop reason: SDUPTHER

## 2023-07-17 RX ORDER — HYDROCODONE BITARTRATE AND ACETAMINOPHEN 10; 325 MG/1; MG/1
1 TABLET ORAL 4 TIMES DAILY PRN
Qty: 120 TABLET | Refills: 0 | Status: SHIPPED | OUTPATIENT
Start: 2023-08-19

## 2023-07-17 NOTE — PATIENT INSTRUCTIONS

## 2023-07-17 NOTE — PROGRESS NOTES
Pain Medicine Follow-Up Note    Assessment:  1. Chronic pain syndrome    2. Intervertebral disc disorder with radiculopathy of lumbar region    3. Cervical radiculopathy    4. Long-term current use of opiate analgesic    5. Uncomplicated opioid dependence ([address])    6. Sacroiliitis ([address])    7. Chronic midline low back pain with right-sided sciatica        Plan:  Orders Placed This Encounter   Procedures   • MM OF_Alprazolam Definitive   • MM OF_Amphetamine Definitive Test   • MM OF_Atomoxetine Definitive Test   • MM OF_Buprenorphine Definitive Test   • MM OF_Clonazepam Definitive   • MM OF_Cocaine Definitive Test   • MM OF_Codeine Definitive   • MM OF_Diazepam Definitive   • MM OF_Fentanyl Definitive Test   • MM OF_Heroin Definitive Test   • MM OF_Hydrocodone Definitive   • MM OF_Hydromorphone Definitive   • MM OF_Lorazepam Definitive   • MM OF_MDMA Definitive Test   • MM OF_Meperidine Definitive Test   • MM OF_Methadone Definitive Test   • MM OF_Methylphenidate Definitive Test   • MM OF_Morphine Definitive   • MM OF_Oxazepam Definitive   • MM OF_Oxycodone Definitive Test - Oral Fluid   • MM OF_Oxymorphone Definitive Test   • MM OF_Phencyclidine Definitive Test   • MM OF_Temazepam Definitive   • MM OF_THC Definitive Test   • MM OF_Tramadol Definitive Test   • MM ALL_Prescribed Meds and Special Instructions     Order Specific Question:   [facility] Is ALBUTEROL prescribed? Answer:   Yes     Order Specific Question:   [facility] Is HYDROCODONE/APAP prescribed? Answer:   Yes     Order Specific Question:   [facility] Is ATORVASTATIN prescribed? Answer:   Yes     Order Specific Question:   [facility] Is PREGABALIN Prescribed? Answer:   Yes     Order Specific Question:   [facility] Is OMEPRAZOLE prescribed? Answer:   Yes     Order Specific Question:   [facility] Is TRAZODONE prescribed?      Answer:   Yes       New Medications Ordered This Visit   Medications   • HYDROcodone-acetaminophen ([address])  mg per tablet     Sig: Take 1 tablet by mouth 4 (four) times a day as needed for moderate pain Max Daily Amount: 4 tablets Do not start before August 19, 2023. Dispense:  120 tablet     Refill:  0     Fill on 8/19/2023   • HYDROcodone-acetaminophen (NORCO)  mg per tablet     Sig: Take 1 tablet by mouth 4 (four) times a day as needed for severe pain Max Daily Amount: 4 tablets Do not start before July 20, 2023. Dispense:  120 tablet     Refill:  0     Fill on 7/20/2023   • predniSONE 10 mg tablet     Sig: Take 2 tablets (20 mg total) by mouth 2 (two) times a day with meals for 4 days, THEN 1 tablet (10 mg total) 2 (two) times a day with meals for 4 days, THEN 1 tablet (10 mg total) daily for 2 days. Dispense:  26 tablet     Refill:  0       My impressions and treatment recommendations were discussed in detail with the patient who verbalized understanding and had no further questions. The patient continues to report an overall reduction of his pain level and improvement with his functioning without significant side effects using hydrocodone/acetaminophen 10/325 mg tablet patient uses 1 tablet up to 4 times a day as needed for moderate pain along with Lyrica 75 mg capsule 3 times daily, therefore I will continue the patient on these medications. Hydrocodone/acetaminophen 10/325 mg tablet E-prescribed to the patient's pharmacy with a do not fill until date of 7/20/2023 and 8/19/2023. [facility] Prescription Drug Monitoring Program report was reviewed and was appropriate     There are risks associated with opioid medications, including dependence, addiction and tolerance. The patient understands and agrees to use these medications only as prescribed. Potential side effects of the medications include, but are not limited to, constipation, drowsiness, addiction, impaired judgment and risk of fatal overdose if not taken as prescribed.  The patient was warned against driving while taking sedation medications. Sharing medications is a felony. At this point in time, the patient is showing no signs of addiction, abuse, diversion or suicidal ideation. Patient is having worsening pain symptoms. I recommend that the patient increase his Lyrica from Lyrica 75 mg 3 times daily to Lyrica 75 mg twice daily and 150 mg at bedtime. As well as use a prednisone taper now to reduce any inflammation that may be contributing to his pain symptoms. Patient appreciative and verbalized understanding. Follow-up is planned in 8 weeks time or sooner as warranted. Discharge instructions were provided. I personally saw and examined the patient and I agree with the above discussed plan of care. History of Present Illness:    Guillermina Walls is a 78 y.o. male who presents to [facility] for interval re-evaluation of the above stated pain complaints. The patient has a past medical and chronic pain history as outlined in the assessment section. He was last seen on 4/18/2023. At today's visit patient states that their pain symptoms are worse with a pain score of 8/10 on the verbal numeric pain scale. The patient's pain is worse at no specific time. The patient's pain is constant in nature. And the quality of the patient's pain is described as burning, sharp, and throbbing. The patient's pain is located in the bilateral low back and right thigh. Patient states the amount of pain relief he is obtaining from his current pain relievers is enough to make a difference in his life however he is unsure how much it reduces his overall pain symptoms. Pain Contract Signed:  1/18/2023  Last Urine Drug Screen:  4/18/2023  New drug screen: 7/17/2023    Other than as stated above, the patient denies any interval changes in medications, medical condition, mental condition, symptoms, or allergies since the last office visit.          Review of Systems:    Review of Systems   Respiratory: Negative for shortness of breath. Cardiovascular: Negative for chest pain. Gastrointestinal: Negative for constipation, diarrhea, nausea and vomiting. Musculoskeletal: Positive for back pain. Negative for arthralgias, gait problem, joint swelling and myalgias. Difficulty walking, decreased range of motion   Skin: Negative for rash. Neurological: Positive for weakness. Negative for dizziness and seizures. Memory loss   All other systems reviewed and are negative.         Past Medical History:   Diagnosis Date   • Acid reflux    • Arthritis    • BPH (benign prostatic hyperplasia)     no treatment   • Calcium disorder    • Chronic atrial fibrillation ([facility]) 2/6/2018   • Chronic pain    • Chronic pain disorder    • COPD (chronic obstructive pulmonary disease) ([facility])    • Coronary artery disease 2001    PTCA with one stent   • Eye redness     Poked his eye on 3/18/16 "bloody" right eye   • Hiatal hernia    • Hydronephrosis    • Hyperlipidemia    • Hypertension    • Irregular heart beat     A-Fib   • Low back pain    • Neck pain    • Nocturnal leg cramps    • Osteopenia    • Peripheral artery disease ([facility])    • Peripheral neuropathy    • Pleural effusion    • Sleep apnea     REFUSES MACHINE HS   • Spinal stenosis    • Vocal cord polyp 10/2012    panendoscopy       Past Surgical History:   Procedure Laterality Date   • ANGIOPLASTY     • APPENDECTOMY  04/2007   • CARDIOVERSION      x2, for A-Fib   • CARDIOVERSION     • CATARACT EXTRACTION     • CATARACT EXTRACTION W/ INTRAOCULAR LENS IMPLANT Left 01/2014   • CORONARY ANGIOPLASTY WITH STENT PLACEMENT  2001    one stent   • HERNIA REPAIR Right     inquinal   • JOINT REPLACEMENT Left     Hip   • KNEE SURGERY  1994    also had arthroscopy right knee 2005   • PANENDOSCOPY  10/23/2012    vocal cord polyp   • PILONIDAL CYST DRAINAGE      x 3   • OK VALLADARES IMPLTJ NSTIM ELTRDS PLATE/PADDLE EDRL Left 8/23/2017    Procedure: Placement of Thoracic SCS with left buttock IPG (IMPULSE); Surgeon: Lorri Durham MD;  Location:  MAIN OR;  Service: Neurosurgery   • MD XCAPSL CTRC RMVL INSJ IO LENS PROSTH W/O ECP Right 4/6/2016    Procedure: EXTRACTION EXTRACAPSULAR CATARACT PHACO INTRAOCULAR LENS (IOL);   Surgeon: Lionel Wilcox MD;  Location: [facility] MAIN OR;  Service: Ophthalmology   • SPINAL CORD STIMULATOR TRIAL W/ LAMINOTOMY N/A 4/13/2017    Procedure: INSERTION DORSAL COLUMN SPINAL CORD STIMULATOR TRIAL;  Surgeon: Shaye Barillas MD;  Location: [facility] MAIN OR;  Service:    • TONSILLECTOMY     • [facility] STUDY  5/28/2015       Family History   Problem Relation Age of Onset   • Cancer Father 80        prostate   • No Known Problems Mother    • No Known Problems Sister    • No Known Problems Brother    • No Known Problems Daughter    • No Known Problems Son    • No Known Problems Maternal Aunt    • No Known Problems Maternal Uncle    • No Known Problems Paternal Aunt    • No Known Problems Paternal Uncle    • No Known Problems Maternal Grandmother    • No Known Problems Maternal Grandfather    • No Known Problems Paternal Grandmother    • No Known Problems Paternal Grandfather        Social History     Occupational History   • Not on file   Tobacco Use   • Smoking status: Every Day     Packs/day: 0.75     Years: 60.00     Total pack years: 45.00     Types: Cigarettes   • Smokeless tobacco: Never   Vaping Use   • Vaping Use: Never used   Substance and Sexual Activity   • Alcohol use: Yes     Comment: occasionally   • Drug use: No   • Sexual activity: Not on file         Current Outpatient Medications:   •  apixaban (Eliquis) 5 mg, Take 1 tablet (5 mg total) by mouth 2 (two) times a day, Disp: 60 tablet, Rfl: 11  •  aspirin (ECOTRIN LOW STRENGTH) 81 mg EC tablet, Take 81 mg by mouth daily, Disp: , Rfl:   •  atorvastatin (LIPITOR) 40 mg tablet, Take 40 mg by mouth daily  , Disp: , Rfl:   •  budesonide (PULMICORT) 0.5 mg/2 mL nebulizer solution, USE 1 VIAL  IN NEBULIZER TWICE  DAILY - (Rinse Mouth After Each Treatment), Disp: 120 mL, Rfl: 5  •  diltiazem (DILACOR XR) 240 MG 24 hr capsule, Take 1 capsule (240 mg total) by mouth daily at bedtime, Disp: 90 capsule, Rfl: 3  •  [START ON 8/19/2023] HYDROcodone-acetaminophen (NORCO)  mg per tablet, Take 1 tablet by mouth 4 (four) times a day as needed for moderate pain Max Daily Amount: 4 tablets Do not start before August 19, 2023., Disp: 120 tablet, Rfl: 0  •  [START ON 7/20/2023] HYDROcodone-acetaminophen (NORCO)  mg per tablet, Take 1 tablet by mouth 4 (four) times a day as needed for severe pain Max Daily Amount: 4 tablets Do not start before July 20, 2023., Disp: 120 tablet, Rfl: 0  •  ipratropium-albuterol (DUO-NEB) 0.5-2.5 mg/3 mL nebulizer solution, Take 3 mL by nebulization 3 (three) times a day, Disp: 252 mL, Rfl: 5  •  nebivolol (BYSTOLIC) 10 mg tablet, Take 1 tablet (10 mg total) by mouth daily, Disp: 90 tablet, Rfl: 3  •  omeprazole (PriLOSEC) 20 mg delayed release capsule, Take 1 capsule by mouth as needed for heartburn , Disp: , Rfl:   •  OXYGEN-HELIUM IN, Inhale 2 L daily at bedtime, Disp: , Rfl:   •  Perforomist 20 MCG/2ML nebulizer solution, USE 1 VIAL  IN  NEBULIZER TWICE  DAILY - MORNING AND EVENING, Disp: 120 mL, Rfl: 5  •  predniSONE 10 mg tablet, Take 2 tablets (20 mg total) by mouth 2 (two) times a day with meals for 4 days, THEN 1 tablet (10 mg total) 2 (two) times a day with meals for 4 days, THEN 1 tablet (10 mg total) daily for 2 days. , Disp: 26 tablet, Rfl: 0  •  pregabalin (LYRICA) 75 mg capsule, Take 1 capsule (75 mg total) by mouth 3 (three) times a day, Disp: 270 capsule, Rfl: 1  •  tamsulosin (FLOMAX) 0.4 mg, Take 0.4 mg by mouth daily with dinner, Disp: , Rfl:   •  traZODone (DESYREL) 50 mg tablet, , Disp: , Rfl:   •  Yupelri 175 MCG/3ML SOLN, USE 1 VIAL IN NEBULIZER DAILY - DO NOT MIX WITH OTHER NEB MEDS, USE BEFORE OR AFTER, Disp: 90 mL, Rfl: 5  •  nicotine (NICOTROL) 10 MG inhaler, Use 1  Cartridge per hour as needed. Do not exceed 10 cartridges per day (Patient not taking: Reported on 1/4/2023), Disp: 168 each, Rfl: 6    No Known Allergies    Physical Exam:    /79   Pulse 80     Constitutional:normal, well developed, well nourished, alert, in no distress and non-toxic and no overt pain behavior. and obese  Eyes:anicteric  HEENT:grossly intact  Neck:supple, symmetric, trachea midline and no masses   Pulmonary:even and unlabored and Patient using oxygen condenser via nasal cannula  Cardiovascular:No edema or pitting edema present  Skin: Dry and dusky  Psychiatric:Mood and affect appropriate  Neurologic:Cranial Nerves II-XII grossly intact  Musculoskeletal:antalgic and Using an electric scooter        Orders Placed This Encounter   Procedures   • MM OF_Alprazolam Definitive   • MM OF_Amphetamine Definitive Test   • MM OF_Atomoxetine Definitive Test   • MM OF_Buprenorphine Definitive Test   • MM OF_Clonazepam Definitive   • MM OF_Cocaine Definitive Test   • MM OF_Codeine Definitive   • MM OF_Diazepam Definitive   • MM OF_Fentanyl Definitive Test   • MM OF_Heroin Definitive Test   • MM OF_Hydrocodone Definitive   • MM OF_Hydromorphone Definitive   • MM OF_Lorazepam Definitive   • MM OF_MDMA Definitive Test   • MM OF_Meperidine Definitive Test   • MM OF_Methadone Definitive Test   • MM OF_Methylphenidate Definitive Test   • MM OF_Morphine Definitive   • MM OF_Oxazepam Definitive   • MM OF_Oxycodone Definitive Test - Oral Fluid   • MM OF_Oxymorphone Definitive Test   • MM OF_Phencyclidine Definitive Test   • MM OF_Temazepam Definitive   • MM OF_THC Definitive Test   • MM OF_Tramadol Definitive Test   • MM ALL_Prescribed Meds and Special Instructions       This document was created using speech voice recognition software.    Grammatical errors, random word insertions, pronoun errors, and incomplete sentences are an occasional consequence of this system due to software limitations, ambient noise, and hardware issues. Any formal questions or concerns about content, text, or information contained within the body of this dictation should be directly addressed to the provider for clarification.

## 2023-07-21 ENCOUNTER — TELEPHONE (OUTPATIENT)
Dept: PAIN MEDICINE | Facility: MEDICAL CENTER | Age: 83
End: 2023-07-21

## 2023-07-21 DIAGNOSIS — G89.4 CHRONIC PAIN SYNDROME: ICD-10-CM

## 2023-07-21 DIAGNOSIS — M54.12 CERVICAL RADICULOPATHY: ICD-10-CM

## 2023-07-21 DIAGNOSIS — M51.16 INTERVERTEBRAL DISC DISORDER WITH RADICULOPATHY OF LUMBAR REGION: ICD-10-CM

## 2023-07-21 LAB
7AMINOCLONAZEPAM SAL QL CFM: NEGATIVE NG/ML
AMPHET SAL QL CFM: NEGATIVE NG/ML
BUPRENORPHINE SAL QL SCN: NEGATIVE NG/ML
CARBOXYTHC SAL QL CFM: NEGATIVE NG/ML
CCP IGG SERPL-ACNC: NEGATIVE
COCAINE SAL QL CFM: NEGATIVE NG/ML
CODEINE SAL QL CFM: NEGATIVE NG/ML
EDDP SAL QL CFM: NEGATIVE NG/ML
HYDROCODONE SAL QL CFM: NORMAL NG/ML
HYDROCODONE SAL QL CFM: NORMAL NG/ML
HYDROMORPHONE SAL QL CFM: NEGATIVE NG/ML
LEUKEMIA MARKERS BLD-IMP: NEGATIVE NG/ML
M PROTEIN 3 UR ELPH-MCNC: NEGATIVE NG/ML
M TB TUBERC IGNF/MITOGEN IGNF CONTROL: NEGATIVE NG/ML
METHADONE SAL QL CFM: NEGATIVE NG/ML
MORPHINE SAL QL CFM: NEGATIVE NG/ML
MORPHINE SAL QL CFM: NEGATIVE NG/ML
OXYMORPHONE SAL QL CFM: NEGATIVE NG/ML
OXYMORPHONE SAL QL CFM: NEGATIVE NG/ML
PCP SAL QL CFM: NEGATIVE NG/ML
RESULT ALL_PRESCRIBED MEDS AND SPECIAL INSTRUCTIONS: NORMAL
SL AMB 6-MAM (HEROIN METABOLITE) QUANTIFICATION: NEGATIVE NG/ML
SL AMB ALPRAZOLAM QUANTIFICATION: NEGATIVE NG/ML
SL AMB ATOMOXETINE QUANTIFICATION: NORMAL
SL AMB CLONAZEPAM QUANTIFICATION: NEGATIVE NG/ML
SL AMB DIAZEPAM QUANTIFICATION: NEGATIVE NG/ML
SL AMB FENTANYL QUANTIFICATION: NEGATIVE NG/ML
SL AMB MDMA QUANTIFICATION: NEGATIVE NG/ML
SL AMB N-DESMETHYL-TRAMADOL QUANTIFICATION SALIVA: NEGATIVE NG/ML
SL AMB NORBUPRENORPHINE QUANTIFICATION: NEGATIVE NG/ML
SL AMB NORDIAZEPAM QUANTIFICATION: NEGATIVE NG/ML
SL AMB NORFENTANYL QUANTIFICATION: NEGATIVE NG/ML
SL AMB NORHYDROCODONE QUANTIFICATION: NORMAL NG/ML
SL AMB NORHYDROCODONE QUANTIFICATION: NORMAL NG/ML
SL AMB NORMEPERIDINE QUANTIFICATION: NEGATIVE NG/ML
SL AMB NOROXYCODONE QUANTIFICATION: NEGATIVE NG/ML
SL AMB OXAZEPAM QUANTIFICATION: NEGATIVE NG/ML
SL AMB RITALINIC ACID QUANTIFICATION: NEGATIVE
SL AMB TEMAZEPAM QUANTIFICATION: NEGATIVE NG/ML
SL AMB TEMAZEPAM QUANTIFICATION: NEGATIVE NG/ML
SL AMB TRAMADOL QUANTIFICATION: NEGATIVE NG/ML
SQUAMOUS #/AREA URNS HPF: NEGATIVE NG/ML

## 2023-07-21 RX ORDER — HYDROCODONE BITARTRATE AND ACETAMINOPHEN 10; 325 MG/1; MG/1
1 TABLET ORAL 4 TIMES DAILY PRN
Qty: 120 TABLET | Refills: 0 | Status: SHIPPED | OUTPATIENT
Start: 2023-07-21

## 2023-07-21 NOTE — TELEPHONE ENCOUNTER
Caller: Uli Betts     Doctor: Dr Domenica Licea     Reason for call: Patient returning call he stated [address] or [place] pharmacy you can try to contact.     Call back#: [phone]

## 2023-07-21 NOTE — TELEPHONE ENCOUNTER
Attempted to contact pt, Busy signal  Will retry but if pt calls back, what Pharmacy would he like to try?   May be better for him to call and see if they have it in stock

## 2023-07-21 NOTE — TELEPHONE ENCOUNTER
Pt contacted Call Center requested refill of their medication. Medication Name: HYDROcodone-acetaminophen (NORCO)      Dosage of Med:  mg       Frequency of Med: Take 1 tablet by mouth 4 (four) times a day as needed       Remaining Medication: few left       Pharmacy and Location: Patient states [facility] pharmacy does not have medication please advise. Pt. Preferred Callback Phone Number: [phone]      Thank you.

## 2023-08-19 ENCOUNTER — NURSE TRIAGE (OUTPATIENT)
Dept: OTHER | Facility: OTHER | Age: 83
End: 2023-08-19

## 2023-08-19 DIAGNOSIS — M51.16 INTERVERTEBRAL DISC DISORDER WITH RADICULOPATHY OF LUMBAR REGION: ICD-10-CM

## 2023-08-19 DIAGNOSIS — M54.12 CERVICAL RADICULOPATHY: ICD-10-CM

## 2023-08-19 DIAGNOSIS — G89.4 CHRONIC PAIN SYNDROME: ICD-10-CM

## 2023-08-19 NOTE — TELEPHONE ENCOUNTER
Regarding: Pain Controll  ----- Message from Shila Saucedo sent at 8/19/2023  5:27 PM EDT -----  " My Doctor called in my Oxycodone, but it was Cancelled.  I need advise for Pain Control."

## 2023-08-19 NOTE — TELEPHONE ENCOUNTER
Reason for Disposition  • [1] Prescription not at pharmacy AND [2] was prescribed by PCP recently (Exception: triager has access to EMR and prescription is recorded there. Go to Home Care and confirm for pharmacy.)    Answer Assessment - Initial Assessment Questions  1. DRUG NAME: "What medicine do you need to have refilled?"      Norco  2. REFILLS REMAINING: "How many refills are remaining?" (Note: The label on the medicine or pill bottle will show how many refills are remaining. If there are no refills remaining, then a renewal may be needed.)      0    Protocols used: MEDICATION REFILL AND RENEWAL CALL-ADULT-    Pt called stated he is unable to refill his Norco. There is an active order in the chart that states start 2023. Spoke with [facility]. They state the prescription is  since it was written . They also only have 83 of the 120 tabs ordered. Paged on-call for possible new order to different pharmacy. Pt would like refilled at [facility] in [place]. Per Dr. Leeland Leyden, he will call in tomorrow when the pharmacy is open. Pt made aware.

## 2023-08-20 ENCOUNTER — TELEPHONE (OUTPATIENT)
Dept: OTHER | Facility: OTHER | Age: 83
End: 2023-08-20

## 2023-08-21 ENCOUNTER — TELEPHONE (OUTPATIENT)
Age: 83
End: 2023-08-21

## 2023-08-21 DIAGNOSIS — M54.12 CERVICAL RADICULOPATHY: ICD-10-CM

## 2023-08-21 DIAGNOSIS — M51.16 INTERVERTEBRAL DISC DISORDER WITH RADICULOPATHY OF LUMBAR REGION: ICD-10-CM

## 2023-08-21 DIAGNOSIS — G89.4 CHRONIC PAIN SYNDROME: ICD-10-CM

## 2023-08-21 RX ORDER — HYDROCODONE BITARTRATE AND ACETAMINOPHEN 10; 325 MG/1; MG/1
1 TABLET ORAL 4 TIMES DAILY PRN
Qty: 120 TABLET | Refills: 0 | Status: SHIPPED | OUTPATIENT
Start: 2023-08-21 | End: 2023-08-22 | Stop reason: SDUPTHER

## 2023-08-21 NOTE — TELEPHONE ENCOUNTER
PT. Called in stating his Norco medication still is not at the pharmacy and he is requesting it be sent in as soon as possible.

## 2023-08-21 NOTE — TELEPHONE ENCOUNTER
Pt contacted Call Center requested refill of their medication. Medication Name: HYDROcodone-acetaminophen (NORCO)      Dosage of Med:  mg       Frequency of Med: Take 1 tablet by mouth 4 (four) times a day as needed for moderate pain Max Daily Amount      Remaining Medication: few left       Pharmacy and Location[facility], [address]         Pt. Preferred Callback Phone Number: [phone]      Thank you.

## 2023-08-22 ENCOUNTER — TELEPHONE (OUTPATIENT)
Dept: OBGYN CLINIC | Facility: CLINIC | Age: 83
End: 2023-08-22

## 2023-08-22 RX ORDER — HYDROCODONE BITARTRATE AND ACETAMINOPHEN 10; 325 MG/1; MG/1
1 TABLET ORAL 4 TIMES DAILY PRN
Qty: 120 TABLET | Refills: 0 | Status: SHIPPED | OUTPATIENT
Start: 2023-08-22

## 2023-08-22 NOTE — TELEPHONE ENCOUNTER
SECOND CALL    Patient calling again today looking for his Pain Medication, he hasn't had any since Sat. 08/19.     Medication Name: HYDROcodone-acetaminophen [facility])        Dosage of Med:  mg       Frequency of Med: Take 1 tablet by mouth 4 (four) times a day as needed for moderate pain Max Daily Amount    Pharmacy:  [address] [place] [address] [id number] [facility], [address] [place] [address] [phone]    CB: [phone]

## 2023-08-22 NOTE — TELEPHONE ENCOUNTER
Pt came into office and stated that [facility] told him there was no RX on file.  Per pts chart, Rx was sent by Dr No on 8/21/2023.  Pt states he hasn't had any medication since Saturday.  Pt can reached [phone].

## 2023-08-22 NOTE — TELEPHONE ENCOUNTER
Caller: patient    Doctor: ANKIT    Reason for call: patient stated [facility] did not receive the script.  Please update patient    Call back#:   [id number] - [facility], [address] [place] [address] ([address] [facility] [phone]

## 2023-08-22 NOTE — TELEPHONE ENCOUNTER
Attempted to all the patient and no answer from [facility]. Attempted to call them twice, no AM. Will have to attempt again later.

## 2023-09-07 ENCOUNTER — TELEPHONE (OUTPATIENT)
Dept: PAIN MEDICINE | Facility: CLINIC | Age: 83
End: 2023-09-07

## 2023-09-07 ENCOUNTER — TELEPHONE (OUTPATIENT)
Age: 83
End: 2023-09-07

## 2023-09-07 NOTE — TELEPHONE ENCOUNTER
PA REQUEST FOR COVERAGE DETERMINATION OF PREGABALIN HAS BEEN SUBMITTED TO PLAN    THEY MAY ADJUST QTY  RATHER THAN 360.     WILL AWAIT PLAN RESPONSE    [facility] [phone] [facility]

## 2023-09-13 ENCOUNTER — OFFICE VISIT (OUTPATIENT)
Dept: PAIN MEDICINE | Facility: CLINIC | Age: 83
End: 2023-09-13
Payer: MEDICARE

## 2023-09-13 VITALS — SYSTOLIC BLOOD PRESSURE: 122 MMHG | DIASTOLIC BLOOD PRESSURE: 74 MMHG | HEART RATE: 64 BPM

## 2023-09-13 DIAGNOSIS — M51.16 INTERVERTEBRAL DISC DISORDER WITH RADICULOPATHY OF LUMBAR REGION: ICD-10-CM

## 2023-09-13 DIAGNOSIS — G89.4 CHRONIC PAIN SYNDROME: Primary | ICD-10-CM

## 2023-09-13 DIAGNOSIS — M54.12 CERVICAL RADICULOPATHY: ICD-10-CM

## 2023-09-13 DIAGNOSIS — F11.20 UNCOMPLICATED OPIOID DEPENDENCE (HCC): ICD-10-CM

## 2023-09-13 DIAGNOSIS — M46.1 SACROILIITIS (HCC): ICD-10-CM

## 2023-09-13 DIAGNOSIS — Z79.891 LONG-TERM CURRENT USE OF OPIATE ANALGESIC: ICD-10-CM

## 2023-09-13 PROCEDURE — 99214 OFFICE O/P EST MOD 30 MIN: CPT

## 2023-09-13 RX ORDER — HYDROCODONE BITARTRATE AND ACETAMINOPHEN 10; 325 MG/1; MG/1
1 TABLET ORAL 4 TIMES DAILY PRN
Qty: 120 TABLET | Refills: 0 | Status: SHIPPED | OUTPATIENT
Start: 2023-09-19

## 2023-09-13 RX ORDER — HYDROCODONE BITARTRATE AND ACETAMINOPHEN 10; 325 MG/1; MG/1
1 TABLET ORAL 4 TIMES DAILY PRN
Qty: 120 TABLET | Refills: 0 | Status: SHIPPED | OUTPATIENT
Start: 2023-10-18

## 2023-09-13 NOTE — PROGRESS NOTES
Pain Medicine Follow-Up Note    Assessment:  1. Chronic pain syndrome    2. Intervertebral disc disorder with radiculopathy of lumbar region    3. Cervical radiculopathy    4. Long-term current use of opiate analgesic    5. Uncomplicated opioid dependence ([address])    6. Sacroiliitis ([address])        Plan:    New Medications Ordered This Visit   Medications   • HYDROcodone-acetaminophen (NORCO)  mg per tablet     Sig: Take 1 tablet by mouth 4 (four) times a day as needed for severe pain Max Daily Amount: 4 tablets Do not start before October 18, 2023. Dispense:  120 tablet     Refill:  0     Fill on 10/18/2023   • HYDROcodone-acetaminophen (NORCO)  mg per tablet     Sig: Take 1 tablet by mouth 4 (four) times a day as needed for moderate pain Max Daily Amount: 4 tablets Do not start before September 19, 2023. Dispense:  120 tablet     Refill:  0     Fill on 9/19/2023       My impressions and treatment recommendations were discussed in detail with the patient who verbalized understanding and had no further questions. The patient continues to report an overall reduction of his pain level and improvement with his functioning without significant side effects using hydrocodone/acetaminophen 10/325 mg tablet patient uses 1 tablet up to 4 times a day as needed for moderate pain along with Lyrica 75 mg capsule patient takes 1 capsule twice daily and 2 capsules at at bedtime, therefore I will continue the patient on these medications. Hydrocodone/acetaminophen 10/325 mg tablet E-prescribed to the patient's pharmacy with a do not fill until date of 9/19/2023 and 10/18/2023. [place] Prescription Drug Monitoring Program report was reviewed and was appropriate     There are risks associated with opioid medications, including dependence, addiction and tolerance. The patient understands and agrees to use these medications only as prescribed.  Potential side effects of the medications include, but are not limited to, constipation, drowsiness, addiction, impaired judgment and risk of fatal overdose if not taken as prescribed. The patient was warned against driving while taking sedation medications. Sharing medications is a felony. At this point in time, the patient is showing no signs of addiction, abuse, diversion or suicidal ideation. Follow-up is planned in 8 weeks time or sooner as warranted. Discharge instructions were provided. I personally saw and examined the patient and I agree with the above discussed plan of care. History of Present Illness:    Sammie Rivera is a 78 y.o. male who presents to [facility] for interval re-evaluation of the above stated pain complaints. The patient has a past medical and chronic pain history as outlined in the assessment section. He was last seen on 7/17/2023. At today's visit patient states that their pain symptoms are worse with a pain score of 8/10 on the verbal numeric pain scale. The patient's pain is worse worse all the time. The patient's pain is constant in nature. And the quality of the patient's pain is described as sharp, cramping, and shooting. The patient's pain is located in the bilateral low back radiating down his right posterior thigh. Patient states that the medication does make a difference in his life. He does state that the Lyrica makes him sleepy and he naps due to it. Pain Contract Signed: 1/18/2023  Last Urine Drug Screen: 7/17/2023    Other than as stated above, the patient denies any interval changes in medications, medical condition, mental condition, symptoms, or allergies since the last office visit. Review of Systems:    Review of Systems   Respiratory: Positive for shortness of breath. Cardiovascular: Negative for chest pain. Gastrointestinal: Negative for constipation, diarrhea, nausea and vomiting. Musculoskeletal: Positive for back pain and gait problem.  Negative for arthralgias, joint swelling and myalgias. Decreased range of motion   Skin: Negative for rash. Neurological: Positive for weakness and numbness. Negative for dizziness and seizures. Memory loss   All other systems reviewed and are negative. Past Medical History:   Diagnosis Date   • Acid reflux    • Arthritis    • BPH (benign prostatic hyperplasia)     no treatment   • Calcium disorder    • Chronic atrial fibrillation ([facility]) 2/6/2018   • Chronic pain    • Chronic pain disorder    • COPD (chronic obstructive pulmonary disease) ([facility])    • Coronary artery disease 2001    PTCA with one stent   • Eye redness     Poked his eye on 3/18/16 "bloody" right eye   • Hiatal hernia    • Hydronephrosis    • Hyperlipidemia    • Hypertension    • Irregular heart beat     A-Fib   • Low back pain    • Neck pain    • Nocturnal leg cramps    • Osteopenia    • Peripheral artery disease (HCC)    • Peripheral neuropathy    • Pleural effusion    • Sleep apnea     REFUSES MACHINE HS   • Spinal stenosis    • Vocal cord polyp 10/2012    panendoscopy       Past Surgical History:   Procedure Laterality Date   • ANGIOPLASTY     • APPENDECTOMY  04/2007   • CARDIOVERSION      x2, for A-Fib   • CARDIOVERSION     • CATARACT EXTRACTION     • CATARACT EXTRACTION W/ INTRAOCULAR LENS IMPLANT Left 01/2014   • CORONARY ANGIOPLASTY WITH STENT PLACEMENT  2001    one stent   • HERNIA REPAIR Right     inquinal   • JOINT REPLACEMENT Left     Hip   • KNEE SURGERY  1994    also had arthroscopy right knee 2005   • PANENDOSCOPY  10/23/2012    vocal cord polyp   • PILONIDAL CYST DRAINAGE      x 3   • KY VALLADARES IMPLTJ NSTIM ELTRDS PLATE/PADDLE EDRL Left 8/23/2017    Procedure: Placement of Thoracic SCS with left buttock IPG (IMPULSE);   Surgeon: Jeancarlos Hightower MD;  Location: [facility] MAIN [facility];  Service: Neurosurgery   • KY XCAPSL CTRC RMVL INSJ IO LENS PROSTH W/O ECP Right 4/6/2016    Procedure: EXTRACTION EXTRACAPSULAR CATARACT PHACO INTRAOCULAR LENS (IOL);   Surgeon: Ayesha Denver, MD;  Location: [facility] MAIN OR;  Service: Ophthalmology   • SPINAL CORD STIMULATOR TRIAL W/ LAMINOTOMY N/A 4/13/2017    Procedure: INSERTION DORSAL COLUMN SPINAL CORD STIMULATOR TRIAL;  Surgeon: Shady Centeno MD;  Location: [facility] MAIN OR;  Service:    • TONSILLECTOMY     • [facility] STUDY  5/28/2015       Family History   Problem Relation Age of Onset   • Cancer Father 80        prostate   • No Known Problems Mother    • No Known Problems Sister    • No Known Problems Brother    • No Known Problems Daughter    • No Known Problems Son    • No Known Problems Maternal Aunt    • No Known Problems Maternal Uncle    • No Known Problems Paternal Aunt    • No Known Problems Paternal Uncle    • No Known Problems Maternal Grandmother    • No Known Problems Maternal Grandfather    • No Known Problems Paternal Grandmother    • No Known Problems Paternal Grandfather        Social History     Occupational History   • Not on file   Tobacco Use   • Smoking status: Every Day     Packs/day: 0.75     Years: 60.00     Total pack years: 45.00     Types: Cigarettes   • Smokeless tobacco: Never   Vaping Use   • Vaping Use: Never used   Substance and Sexual Activity   • Alcohol use: Yes     Comment: occasionally   • Drug use: No   • Sexual activity: Not on file         Current Outpatient Medications:   •  apixaban (Eliquis) 5 mg, Take 1 tablet (5 mg total) by mouth 2 (two) times a day, Disp: 60 tablet, Rfl: 11  •  aspirin (ECOTRIN LOW STRENGTH) 81 mg EC tablet, Take 81 mg by mouth daily, Disp: , Rfl:   •  atorvastatin (LIPITOR) 40 mg tablet, Take 40 mg by mouth daily  , Disp: , Rfl:   •  budesonide (PULMICORT) 0.5 mg/2 mL nebulizer solution, USE 1 VIAL  IN  NEBULIZER TWICE  DAILY - (Rinse Mouth After Each Treatment), Disp: 120 mL, Rfl: 5  •  diltiazem (DILACOR XR) 240 MG 24 hr capsule, Take 1 capsule (240 mg total) by mouth daily at bedtime, Disp: 90 capsule, Rfl: 3  •  [START ON 10/18/2023] HYDROcodone-acetaminophen (NORCO)  mg per tablet, Take 1 tablet by mouth 4 (four) times a day as needed for severe pain Max Daily Amount: 4 tablets Do not start before October 18, 2023., Disp: 120 tablet, Rfl: 0  •  [START ON 9/19/2023] HYDROcodone-acetaminophen (NORCO)  mg per tablet, Take 1 tablet by mouth 4 (four) times a day as needed for moderate pain Max Daily Amount: 4 tablets Do not start before September 19, 2023., Disp: 120 tablet, Rfl: 0  •  ipratropium-albuterol (DUO-NEB) 0.5-2.5 mg/3 mL nebulizer solution, Take 3 mL by nebulization 3 (three) times a day, Disp: 252 mL, Rfl: 5  •  nebivolol (BYSTOLIC) 10 mg tablet, Take 1 tablet (10 mg total) by mouth daily, Disp: 90 tablet, Rfl: 3  •  omeprazole (PriLOSEC) 20 mg delayed release capsule, Take 1 capsule by mouth as needed for heartburn , Disp: , Rfl:   •  OXYGEN-HELIUM IN, Inhale 2 L daily at bedtime, Disp: , Rfl:   •  Perforomist 20 MCG/2ML nebulizer solution, USE 1 VIAL  IN  NEBULIZER TWICE  DAILY - MORNING AND EVENING, Disp: 120 mL, Rfl: 5  •  pregabalin (LYRICA) 75 mg capsule, Take 1 capsule (75 mg total) by mouth 2 (two) times a day AND 2 capsules (150 mg total) daily at bedtime. , Disp: 360 capsule, Rfl: 1  •  tamsulosin (FLOMAX) 0.4 mg, Take 0.4 mg by mouth daily with dinner, Disp: , Rfl:   •  traZODone (DESYREL) 50 mg tablet, , Disp: , Rfl:   •  Yupelri 175 MCG/3ML SOLN, USE 1 VIAL IN NEBULIZER DAILY - DO NOT MIX WITH OTHER NEB MEDS, USE BEFORE OR AFTER, Disp: 90 mL, Rfl: 5  •  nicotine (NICOTROL) 10 MG inhaler, Use 1  Cartridge per hour as needed. Do not exceed 10 cartridges per day (Patient not taking: Reported on 1/4/2023), Disp: 168 each, Rfl: 6    No Known Allergies    Physical Exam:    /74 (BP Location: Left arm, Patient Position: Sitting, Cuff Size: Large)   Pulse 64     Constitutional:normal, well developed, well nourished, alert, in no distress and non-toxic and no overt pain behavior.  and obese  Eyes:anicteric  HEENT:grossly intact  Neck:supple, symmetric, trachea midline and no masses   Pulmonary:even and unlabored and Using an oxygen condenser on 2 L of oxygen via nasal cannula  Cardiovascular:No edema or pitting edema present  Skin:Normal without rashes or lesions and well hydrated  Psychiatric:Mood and affect appropriate  Neurologic:Cranial Nerves II-XII grossly intact  Musculoskeletal:antalgic and Riding a scooter      This document was created using speech voice recognition software. Grammatical errors, random word insertions, pronoun errors, and incomplete sentences are an occasional consequence of this system due to software limitations, ambient noise, and hardware issues. Any formal questions or concerns about content, text, or information contained within the body of this dictation should be directly addressed to the provider for clarification.

## 2023-09-13 NOTE — PATIENT INSTRUCTIONS

## 2023-09-18 ENCOUNTER — TELEPHONE (OUTPATIENT)
Age: 83
End: 2023-09-18

## 2023-09-18 DIAGNOSIS — G89.4 CHRONIC PAIN SYNDROME: Primary | ICD-10-CM

## 2023-09-19 NOTE — TELEPHONE ENCOUNTER
Caller: Deisy Elizondo    Doctor: Leeann Bauman     Reason for call: Patient calling asking if medication Hydrocodone was sent to pharmacy please advise     Call back#: [phone]

## 2023-09-19 NOTE — TELEPHONE ENCOUNTER
Spoke with Pt regarding Norco prescription. Advised Pt that his Norco script was written today 9/19 & sent to [facility], [address] was NOT sent to [facility], [facility] ([address]). Advised Pt to call back with any issues. Pt verbalized understanding & was appreciative for the clarification.

## 2023-09-19 NOTE — TELEPHONE ENCOUNTER
Pt is calling back in regard to a refill for his medication and said his pharmacy hasn't received anything for a refill. If we can call his pharmacy for him to get his refill.      HYDROcodone-acetaminophen (NORCO)  mg per tablet      [facility] [id number] [address] ([address] [address])   Phone: [phone]  Fax: [phone]      Pt can be reached [phone]

## 2023-09-19 NOTE — TELEPHONE ENCOUNTER
Caller: francisco javier    Doctor: Silvia schroeder     Reason for call: [facility] does not have medication in stock     Please send to [address] please     Please call him once sent to [facility] back#: [phone]

## 2023-09-20 NOTE — TELEPHONE ENCOUNTER
S/w pt, pt states he is unsure if [facility] has [facility] in stock bc they would not disclose that information to pt. Pt did NOT  prescription from [facility] due to [facility] not having it in stock, however, [facility] did tell the pt they are to get [facility] in today, 9/20/23. Pt to call [facility] later today and see if he is able to get his prescription filled today. If he CANNOT he is to call us back so the nurse can then call [facility] to see if it is in stock. Pt verbalized understanding and appreciative of call. Awaiting pt CB.

## 2023-09-20 NOTE — TELEPHONE ENCOUNTER
S/W pt who [facility]co [facility], [facility], nurse called [facility] pharmacy and asked if they received their shipment, they did not but they have 76 tablets, script is for 120 tablets. Nurse asked for 76 tablets to be filled and nurse to reach out to [facility] for remainder script of norco.      MG please send remainder 44 tabs of norco to [facility] [place]. Nurse called pt and made pt aware of previous, pt appreciative of call.

## 2023-09-20 NOTE — TELEPHONE ENCOUNTER
Caller: pt    Doctor: Janae Hedrick    Reason for call: [facility]. He says one of the rns has to call for him.     Call back#: Tika Bullock [phone]

## 2023-09-21 RX ORDER — HYDROCODONE BITARTRATE AND ACETAMINOPHEN 10; 325 MG/1; MG/1
1 TABLET ORAL 4 TIMES DAILY PRN
Qty: 44 TABLET | Refills: 0 | Status: SHIPPED | OUTPATIENT
Start: 2023-09-21

## 2023-10-20 ENCOUNTER — TELEPHONE (OUTPATIENT)
Age: 83
End: 2023-10-20

## 2023-10-20 DIAGNOSIS — G89.4 CHRONIC PAIN SYNDROME: ICD-10-CM

## 2023-10-20 DIAGNOSIS — M51.16 INTERVERTEBRAL DISC DISORDER WITH RADICULOPATHY OF LUMBAR REGION: ICD-10-CM

## 2023-10-20 DIAGNOSIS — M54.12 CERVICAL RADICULOPATHY: ICD-10-CM

## 2023-10-20 RX ORDER — HYDROCODONE BITARTRATE AND ACETAMINOPHEN 10; 325 MG/1; MG/1
1 TABLET ORAL 4 TIMES DAILY PRN
Qty: 120 TABLET | Refills: 0 | Status: SHIPPED | OUTPATIENT
Start: 2023-10-20

## 2023-10-20 NOTE — TELEPHONE ENCOUNTER
Caller: Tam Arce, pt    Doctor: Vidya Solis    Reason for call: pt called to state the pharmacy has limited hydrocodone and he only has 2 days left    Call back#: [phone]

## 2023-10-20 NOTE — TELEPHONE ENCOUNTER
S/w pt and advised he call pharmacy to fill 10/18 script when he is ready. Advised of f/u ov next week.

## 2023-10-20 NOTE — TELEPHONE ENCOUNTER
Caller: Gilma(pharmacy)    Doctor: dr Maria E Zepeda    Reason for call: pharmacy calling to advise because the hydrocodone is in low inventory and not other [facility] it. They have an oxycodone with same dosage.  Can the patient use that instead    Call back#: [phone] dial number [phone]

## 2023-10-20 NOTE — TELEPHONE ENCOUNTER
Pt contacted Call Center requested refill of their medication. Medication Name: HYDROcodone-acetaminophen (NORCO)       Dosage of Med:  mg       Frequency of Med:  Take 1 tablet by mouth 4 (four) times a day as needed for severe pain        Remaining Medication: Some left       Pharmacy and Location: [facility]         Pt. Preferred Callback Phone Number: [phone]      Thank you.

## 2023-10-21 ENCOUNTER — TELEPHONE (OUTPATIENT)
Dept: OTHER | Facility: OTHER | Age: 83
End: 2023-10-21

## 2023-10-21 NOTE — TELEPHONE ENCOUNTER
Patient called [facility], stated that [place]  mg is not available at the pharmacy. Patient couldn't provide the name of the alternative medication. Patient was advised to call pharmacy when it is open to ask if Sherrie Prabhakar is available. If Sherrie Prabhakar is still unavailable at the pharmacy, to call back. Patient agreeable, verbalized understanding of the advice.

## 2023-10-21 NOTE — TELEPHONE ENCOUNTER
Pt called in stating that the pharmacy said they are out of   HYDROcodone-acetaminophen. Pharmacy recommended if there is no place around that has the prescription available to prescribe oxycodone.

## 2023-10-21 NOTE — TELEPHONE ENCOUNTER
Dony from [address] [place] [address] is calling to inform office that  HYDROcodone-acetaminophen ([address])  mg per tablet is not instock and requesting that Rx be change or sent to another local pharmacy

## 2023-10-23 NOTE — TELEPHONE ENCOUNTER
I wanted to avoid prescribing oxycodone due to his respiratory status patient is on oxygen. Oxycodone/acetaminophen 5/325 mg tablet E prescribed to the [facility] pharmacy. We may need to discuss changing his medication to buprenorphine in the future.

## 2023-10-23 NOTE — TELEPHONE ENCOUNTER
S/W pt. Advised him the script was sent on Friday. Pt will call his pharmacy today and C/B with any issues.

## 2023-10-23 NOTE — TELEPHONE ENCOUNTER
I think there is 2 different thread going on I sent oxycodone/acetaminophen 5/325 mg tablet to [facility] however I was trying to avoid using oxycodone since this patient is chronically on oxygen. We may need to consider changing his medication to buprenorphine.

## 2023-10-23 NOTE — TELEPHONE ENCOUNTER
I am sorry I replied in the other thread. I sent over oxycodone/acetaminophen 5/325 mg tablets patient may take 1 tablet up to 4 times a day as needed for pain.   I will address the possible change at his next office visit

## 2023-10-23 NOTE — TELEPHONE ENCOUNTER
Caller: Latisha     Doctor: [address]     Reason for call: Patient would like to speak to nurse regarding medication refill please advise     Call back#: [phone]

## 2023-10-23 NOTE — TELEPHONE ENCOUNTER
S/W pt. Pt stated his pharmacy does not have Norco that it is hard to get and had been hard to get. [facility] for [facility] script to be changed to oxycodone with tylenol. Pt stated he is almost out of pills and needs to pick it up today. Please advise.

## 2023-10-23 NOTE — TELEPHONE ENCOUNTER
S/w [facility] pharmacy. They do not have the 7.5/325 Norco but they do have the Percocet 5/325 mg.

## 2023-10-23 NOTE — TELEPHONE ENCOUNTER
Changed medication from hydrocodone/acetaminophen 10-7.5 with the option to take 5 tablets/day as needed for pain. I sent this to the [facility] pharmacy.

## 2023-11-22 ENCOUNTER — OFFICE VISIT (OUTPATIENT)
Dept: PAIN MEDICINE | Facility: CLINIC | Age: 83
End: 2023-11-22
Payer: MEDICARE

## 2023-11-22 VITALS
HEART RATE: 79 BPM | DIASTOLIC BLOOD PRESSURE: 121 MMHG | WEIGHT: 257 LBS | HEIGHT: 70 IN | SYSTOLIC BLOOD PRESSURE: 198 MMHG | BODY MASS INDEX: 36.79 KG/M2

## 2023-11-22 DIAGNOSIS — G89.29 CHRONIC MIDLINE LOW BACK PAIN WITH RIGHT-SIDED SCIATICA: ICD-10-CM

## 2023-11-22 DIAGNOSIS — F11.20 OPIOID TYPE DEPENDENCE, CONTINUOUS (HCC): ICD-10-CM

## 2023-11-22 DIAGNOSIS — M54.41 CHRONIC MIDLINE LOW BACK PAIN WITH RIGHT-SIDED SCIATICA: ICD-10-CM

## 2023-11-22 DIAGNOSIS — G89.4 CHRONIC PAIN SYNDROME: Primary | ICD-10-CM

## 2023-11-22 DIAGNOSIS — Z79.891 LONG-TERM CURRENT USE OF OPIATE ANALGESIC: ICD-10-CM

## 2023-11-22 DIAGNOSIS — M51.16 INTERVERTEBRAL DISC DISORDER WITH RADICULOPATHY OF LUMBAR REGION: ICD-10-CM

## 2023-11-22 DIAGNOSIS — F11.20 UNCOMPLICATED OPIOID DEPENDENCE (HCC): ICD-10-CM

## 2023-11-22 PROCEDURE — 99214 OFFICE O/P EST MOD 30 MIN: CPT

## 2023-11-22 RX ORDER — HYDROCODONE BITARTRATE AND ACETAMINOPHEN 10; 325 MG/1; MG/1
1 TABLET ORAL EVERY 6 HOURS PRN
Qty: 120 TABLET | Refills: 0 | Status: SHIPPED | OUTPATIENT
Start: 2023-12-21

## 2023-11-22 RX ORDER — HYDROCODONE BITARTRATE AND ACETAMINOPHEN 10; 325 MG/1; MG/1
1 TABLET ORAL EVERY 6 HOURS PRN
Qty: 120 TABLET | Refills: 0 | Status: SHIPPED | OUTPATIENT
Start: 2023-11-22

## 2023-11-22 NOTE — PATIENT INSTRUCTIONS

## 2023-11-22 NOTE — PROGRESS NOTES
Pain Medicine Follow-Up Note    Assessment:  1. Chronic pain syndrome    2. Intervertebral disc disorder with radiculopathy of lumbar region    3. Chronic midline low back pain with right-sided sciatica    4. Uncomplicated opioid dependence ([address])    5. Opioid type dependence, continuous ([address])    6. Long-term current use of opiate analgesic        Plan:  Orders Placed This Encounter   Procedures    MM DT_Buprenorphine Definitive Test    MM DT_Butalbital Definitive Test    MM DT_Clonazepam Definitive Test    MM DT_Clozapine Definitive Test    MM DT_Cocaine Definitive Test    MM DT_Codeine Definitive Test    MM DT_Desipramine Definitive Test    MM DT_Dextromethorphan Definitive Test    MM Diazepam Definitive Test    MM DT_Ethyl Glucuronide/Ethyl Sulfate Definitive Test    MM DT_Fentanyl Definitive Test    MM DT_Haloperidol Definitive Test    MM DT_Heroin Definitive Test    MM DT_Hydrocodone Definitive Test    MM DT_Hydromorphone Definitive Test    MM DT_Imipramine Definitive Test    MM DT_Kratom Definitive Test    MM DT_Levorphanol Definitive Test    MM DT_MDMA Definitive Test    MM DT_Meperidine Definitive Test    MM DT_Methadone Definitive Test    MM DT_Methamphetamine Definitive Test    MM DT_Methylphenidate Definitive Test    MM DT_Morphine Definitive Test    MM DT_Oxazepam Definitive Test    MM DT_Oxycodone Definitive Test    MM DT_Oxymorphone Definitive Test    MM DT_Phencyclidine Definitive Test    MM DT_Phenobarbital Definitive Test    MM DT_Phentermine Definitive Test    MM DT_Secobarbital Definitive Test    MM DT_Spice Definitive Test    MM DT_Tapentadol Definitive Test    MM DT_Temazapam Definitive Test    MM DT_THC Definitive Test    MM DT_Tramadol Definitive Test    MM ALL_Prescribed Meds and Special Instructions     Order Specific Question:   [facility] Is ATORVASTATIN prescribed? Answer:   Yes     Order Specific Question:   [facility] Is HYDROCODONE/APAP prescribed?      Answer:   Yes     Order Specific Question:   [facility] Is OMEPRAZOLE prescribed? Answer:   Yes     Order Specific Question:   [facility] Is OXYCODONE/APAP prescribed? Answer:   Yes     Order Specific Question:   [facility] Is PREGABALIN Prescribed? Answer:   Yes     Order Specific Question:   [facility] Is TRAZODONE prescribed? Answer:   Yes    MM DT_Alprazolam Definitive Test    MM DT_Amphetamine Definitive Test    MM DT_Aripiprazole Definitive Test    MM DT_Bath Salts Definitive Test       New Medications Ordered This Visit   Medications    HYDROcodone-acetaminophen (NORCO)  mg per tablet     Sig: Take 1 tablet by mouth every 6 (six) hours as needed for moderate pain Max Daily Amount: 4 tablets     Dispense:  120 tablet     Refill:  0     Please fill today, patient on way over now. Thank you!! HYDROcodone-acetaminophen (NORCO)  mg per tablet     Sig: Take 1 tablet by mouth every 6 (six) hours as needed for moderate pain Max Daily Amount: 4 tablets Do not start before December 21, 2023. Dispense:  120 tablet     Refill:  0     Fill on 12/21/2023   TY       My impressions and treatment recommendations were discussed in detail with the patient who verbalized understanding and had no further questions. Patient does report that his pain is worse. He has been having difficultly getting hydrocodone/acetaminophen 10/325 mg. Last month the pharmacy suggested Oxycodone/acetaminophen 5/325 mg, however I do not feel this is appropriate and long-term due to the patient using oxygen. I did discuss trialing Butrans patch however patient states he prefers the hydrocodone and wishes to stay on that.     The patient continues to report an overall reduction of his pain level and improvement with his functioning without significant side effects using hydrocodone/acetaminophen 10/325 mg tablet patient uses 1 tablet up to 4 times a day as needed for moderate pain along with Lyrica 75 mg capsule patient takes 1 capsule twice daily and 2 capsules at at bedtime, therefore I will continue the patient on these medications. Hydrocodone/acetaminophen 10/325 mg tablet E-prescribed to the patient's pharmacy with a do not fill until date of today 11/22/2023 12/21/2023. [address] Prescription Drug Monitoring Program report was reviewed and was appropriate     A urine drug screen was collected at today's office visit as part of our medication management protocol. The point of care testing results were appropriate for what was being prescribed. The specimen will be sent for confirmatory testing. The drug screen is medically necessary because the patient is either dependent on opioid medication or is being considered for opioid medication therapy and the results could impact ongoing or future treatment. The drug screen is to evaluate for the presences or absence of prescribed, non-prescribed, and/or illicit drugs/substances. There are risks associated with opioid medications, including dependence, addiction and tolerance. The patient understands and agrees to use these medications only as prescribed. Potential side effects of the medications include, but are not limited to, constipation, drowsiness, addiction, impaired judgment and risk of fatal overdose if not taken as prescribed. The patient was warned against driving while taking sedation medications. Sharing medications is a felony. At this point in time, the patient is showing no signs of addiction, abuse, diversion or suicidal ideation. Follow-up is planned in 8 weeks time or sooner as warranted. Discharge instructions were provided. I personally saw and examined the patient and I agree with the above discussed plan of care. History of Present Illness:    Helen Del Angel is a 78 y.o. male who presents to [facility] for interval re-evaluation of the above stated pain complaints.  The patient has a past medical and chronic pain history as outlined in the assessment section. He was last seen on 9/13/203. At today's visit patient states that their pain symptoms are worse with a pain score of 7/10 on the verbal numeric pain scale. The patient's pain is worse worse all the time. The patient's pain is constant in nature. And the quality of the patient's pain is described as sharp, cramping, and shooting. The patient's pain is located in the bilateral low back radiating down his right posterior thigh. Patient states that the medication does make a difference in his life. Patient reports that his pain medication provides him approximately 50% relief of his pain symptoms. He does state that the Lyrica makes him sleepy and he naps due to it. Patient denies any side effects using hydrocodone/acetaminophen. Pain Contract Signed:  1/18/2023  Last Urine Drug Screen:  7/17/2023  New Urine Drug Screen: 11/22/2023    Other than as stated above, the patient denies any interval changes in medications, medical condition, mental condition, symptoms, or allergies since the last office visit. Review of Systems:    Review of Systems   Respiratory:  Positive for shortness of breath. Cardiovascular:  Negative for chest pain. Gastrointestinal:  Negative for constipation, diarrhea, nausea and vomiting. Musculoskeletal:  Positive for gait problem and myalgias. Negative for arthralgias and joint swelling. Decreased ROM and muscle weakness   Skin:  Negative for rash. Neurological:  Negative for dizziness, seizures and weakness.         Memory loss         Past Medical History:   Diagnosis Date    Acid reflux     Arthritis     BPH (benign prostatic hyperplasia)     no treatment    Calcium disorder     Chronic atrial fibrillation (HCC) 2/6/2018    Chronic pain     Chronic pain disorder     COPD (chronic obstructive pulmonary disease) (HCC)     Coronary artery disease 2001    PTCA with one stent    Eye redness     Poked his eye on 3/18/16 "bloody" right eye    Hiatal hernia     Hydronephrosis     Hyperlipidemia     Hypertension     Irregular heart beat     A-Fib    Low back pain     Neck pain     Nocturnal leg cramps     Osteopenia     Peripheral artery disease (HCC)     Peripheral neuropathy     Pleural effusion     Sleep apnea     REFUSES MACHINE HS    Spinal stenosis     Vocal cord polyp 10/2012    panendoscopy       Past Surgical History:   Procedure Laterality Date    ANGIOPLASTY      APPENDECTOMY  04/2007    CARDIOVERSION      x2, for A-Fib    CARDIOVERSION      CATARACT EXTRACTION      CATARACT EXTRACTION W/ INTRAOCULAR LENS IMPLANT Left 01/2014    CORONARY ANGIOPLASTY WITH STENT PLACEMENT  2001    one stent    HERNIA REPAIR Right     inquinal    JOINT REPLACEMENT Left     Hip    KNEE SURGERY  1994    also had arthroscopy right knee 2005    PANENDOSCOPY  10/23/2012    vocal cord polyp    PILONIDAL CYST DRAINAGE      x 3    GA VALLADARES IMPLTJ NSTIM ELTRDS PLATE/PADDLE EDRL Left 8/23/2017    Procedure: Placement of Thoracic SCS with left buttock IPG (IMPULSE); Surgeon: Kathy Cook MD;  Location:  MAIN OR;  Service: Neurosurgery    GA XCAPSL CTRC RMVL INSJ IO LENS PROSTH W/O ECP Right 4/6/2016    Procedure: EXTRACTION EXTRACAPSULAR CATARACT PHACO INTRAOCULAR LENS (IOL);   Surgeon: Sharyle Bowen, MD;  Location: [facility] MAIN OR;  Service: Ophthalmology    SPINAL CORD STIMULATOR TRIAL W/ LAMINOTOMY N/A 4/13/2017    Procedure: INSERTION DORSAL COLUMN SPINAL CORD STIMULATOR TRIAL;  Surgeon: Dre Bob MD;  Location: [facility] MAIN OR;  Service:     [facility] INJECTION FOR RESEARCH STUDY  5/28/2015       Family History   Problem Relation Age of Onset    Cancer Father 80        prostate    No Known Problems Mother     No Known Problems Sister     No Known Problems Brother     No Known Problems Daughter     No Known Problems Son     No Known Problems Maternal Aunt     No Known Problems Maternal Uncle     No Known Problems Paternal Aunt     No Known Problems Paternal Uncle     No Known Problems Maternal Grandmother     No Known Problems Maternal Grandfather     No Known Problems Paternal Grandmother     No Known Problems Paternal Grandfather        Social History     Occupational History    Not on file   Tobacco Use    Smoking status: Every Day     Packs/day: 0.75     Years: 60.00     Total pack years: 45.00     Types: Cigarettes    Smokeless tobacco: Never   Vaping Use    Vaping Use: Never used   Substance and Sexual Activity    Alcohol use: Yes     Comment: occasionally    Drug use: No    Sexual activity: Not on file         Current Outpatient Medications:     HYDROcodone-acetaminophen (NORCO)  mg per tablet, Take 1 tablet by mouth every 6 (six) hours as needed for moderate pain Max Daily Amount: 4 tablets, Disp: 120 tablet, Rfl: 0    [START ON 12/21/2023] HYDROcodone-acetaminophen (NORCO)  mg per tablet, Take 1 tablet by mouth every 6 (six) hours as needed for moderate pain Max Daily Amount: 4 tablets Do not start before December 21, 2023., Disp: 120 tablet, Rfl: 0    apixaban (Eliquis) 5 mg, Take 1 tablet (5 mg total) by mouth 2 (two) times a day, Disp: 60 tablet, Rfl: 11    aspirin (ECOTRIN LOW STRENGTH) 81 mg EC tablet, Take 81 mg by mouth daily, Disp: , Rfl:     atorvastatin (LIPITOR) 40 mg tablet, Take 40 mg by mouth daily  , Disp: , Rfl:     budesonide (PULMICORT) 0.5 mg/2 mL nebulizer solution, USE 1 VIAL  IN  NEBULIZER TWICE  DAILY - (Rinse Mouth After Each Treatment), Disp: 120 mL, Rfl: 5    diltiazem (DILACOR XR) 240 MG 24 hr capsule, Take 1 capsule (240 mg total) by mouth daily at bedtime, Disp: 90 capsule, Rfl: 3    ipratropium-albuterol (DUO-NEB) 0.5-2.5 mg/3 mL nebulizer solution, Take 3 mL by nebulization 3 (three) times a day, Disp: 252 mL, Rfl: 5    nebivolol (BYSTOLIC) 10 mg tablet, Take 1 tablet (10 mg total) by mouth daily, Disp: 90 tablet, Rfl: 3    nicotine (NICOTROL) 10 MG inhaler, Use 1  Cartridge per hour as needed. Do not exceed 10 cartridges per day (Patient not taking: Reported on 1/4/2023), Disp: 168 each, Rfl: 6    omeprazole (PriLOSEC) 20 mg delayed release capsule, Take 1 capsule by mouth as needed for heartburn , Disp: , Rfl:     OXYGEN-HELIUM IN, Inhale 2 L daily at bedtime, Disp: , Rfl:     Perforomist 20 MCG/2ML nebulizer solution, USE 1 VIAL  IN  NEBULIZER TWICE  DAILY - MORNING AND EVENING, Disp: 120 mL, Rfl: 5    pregabalin (LYRICA) 75 mg capsule, TAKE 1 CAPSULE TWICE DAILY AND TAKE 2 CAPSULES AT BEDTIME, Disp: 360 capsule, Rfl: 1    tamsulosin (FLOMAX) 0.4 mg, Take 0.4 mg by mouth daily with dinner, Disp: , Rfl:     traZODone (DESYREL) 50 mg tablet, , Disp: , Rfl:     Yupelri 175 MCG/3ML SOLN, USE 1 VIAL IN NEBULIZER DAILY - DO NOT MIX WITH OTHER NEB MEDS, USE BEFORE OR AFTER, Disp: 90 mL, Rfl: 5    No Known Allergies    Physical Exam:    BP (!) 198/121   Pulse 79   Ht 5' 10" (1.778 m) Comment: verbal  Wt 117 kg (257 lb)   BMI 36.88 kg/m²     Constitutional:normal, well developed, well nourished, alert, in no distress and non-toxic and no overt pain behavior.  and obese  Eyes:anicteric  HEENT:grossly intact  Neck:supple, symmetric, trachea midline and no masses   Pulmonary:even and unlabored using oxygen condenser with nasal cannula  Cardiovascular:No edema or pitting edema present  Skin:Normal without rashes or lesions and well hydrated  Psychiatric:Mood and affect appropriate  Neurologic:Cranial Nerves II-XII grossly intact  Musculoskeletal:antalgic and using a scooter to get around      Orders Placed This Encounter   Procedures    MM DT_Buprenorphine Definitive Test    MM DT_Butalbital Definitive Test    MM DT_Clonazepam Definitive Test    MM DT_Clozapine Definitive Test    MM DT_Cocaine Definitive Test    MM DT_Codeine Definitive Test    MM DT_Desipramine Definitive Test    MM DT_Dextromethorphan Definitive Test    MM Diazepam Definitive Test    MM DT_Ethyl Glucuronide/Ethyl Sulfate Definitive Test    MM DT_Fentanyl Definitive Test    MM DT_Haloperidol Definitive Test    MM DT_Heroin Definitive Test    MM DT_Hydrocodone Definitive Test    MM DT_Hydromorphone Definitive Test    MM DT_Imipramine Definitive Test    MM DT_Kratom Definitive Test    MM DT_Levorphanol Definitive Test    MM DT_MDMA Definitive Test    MM DT_Meperidine Definitive Test    MM DT_Methadone Definitive Test    MM DT_Methamphetamine Definitive Test    MM DT_Methylphenidate Definitive Test    MM DT_Morphine Definitive Test    MM DT_Oxazepam Definitive Test    MM DT_Oxycodone Definitive Test    MM DT_Oxymorphone Definitive Test    MM DT_Phencyclidine Definitive Test    MM DT_Phenobarbital Definitive Test    MM DT_Phentermine Definitive Test    MM DT_Secobarbital Definitive Test    MM DT_Spice Definitive Test    MM DT_Tapentadol Definitive Test    MM DT_Temazapam Definitive Test    MM DT_THC Definitive Test    MM DT_Tramadol Definitive Test    MM ALL_Prescribed Meds and Special Instructions    MM DT_Alprazolam Definitive Test    MM DT_Amphetamine Definitive Test    MM DT_Aripiprazole Definitive Test    MM DT_Bath Salts Definitive Test       This document was created using speech voice recognition software. Grammatical errors, random word insertions, pronoun errors, and incomplete sentences are an occasional consequence of this system due to software limitations, ambient noise, and hardware issues. Any formal questions or concerns about content, text, or information contained within the body of this dictation should be directly addressed to the provider for clarification.

## 2023-11-28 LAB
6MAM UR QL CFM: NEGATIVE NG/ML
7AMINOCLONAZEPAM UR QL CFM: NEGATIVE NG/ML
A-OH ALPRAZ UR QL CFM: NEGATIVE NG/ML
AMPHET UR QL CFM: NEGATIVE NG/ML
AMPHET UR QL CFM: NEGATIVE NG/ML
BUPRENORPHINE UR QL CFM: NEGATIVE NG/ML
BUTALBITAL UR QL CFM: NEGATIVE NG/ML
BZE UR QL CFM: NEGATIVE NG/ML
CODEINE UR QL CFM: NEGATIVE NG/ML
DESIPRAMINE UR QL CFM: NEGATIVE NG/ML
DESIPRAMINE UR QL CFM: NEGATIVE NG/ML
EDDP UR QL CFM: NEGATIVE NG/ML
ETHYL GLUCURONIDE UR QL CFM: NEGATIVE NG/ML
ETHYL SULFATE UR QL SCN: NEGATIVE NG/ML
EUTYLONE UR QL: NEGATIVE NG/ML
FENTANYL UR QL CFM: NEGATIVE NG/ML
GLIADIN IGG SER IA-ACNC: NEGATIVE NG/ML
GLUCOSE 30M P 50 G LAC PO SERPL-MCNC: NEGATIVE NG/ML
HYDROCODONE UR QL CFM: ABNORMAL NG/ML
HYDROCODONE UR QL CFM: ABNORMAL NG/ML
HYDROMORPHONE UR QL CFM: ABNORMAL NG/ML
IMIPRAMINE UR QL CFM: NEGATIVE NG/ML
MDMA UR QL CFM: NEGATIVE NG/ML
ME-PHENIDATE UR QL CFM: NEGATIVE NG/ML
MEPERIDINE UR QL CFM: NEGATIVE NG/ML
METHADONE UR QL CFM: NEGATIVE NG/ML
METHAMPHET UR QL CFM: NEGATIVE NG/ML
MORPHINE UR QL CFM: NEGATIVE NG/ML
MORPHINE UR QL CFM: NEGATIVE NG/ML
NORBUPRENORPHINE UR QL CFM: NEGATIVE NG/ML
NORDIAZEPAM UR QL CFM: NEGATIVE NG/ML
NORFENTANYL UR QL CFM: NEGATIVE NG/ML
NORHYDROCODONE UR QL CFM: ABNORMAL NG/ML
NORHYDROCODONE UR QL CFM: ABNORMAL NG/ML
NORMEPERIDINE UR QL CFM: NEGATIVE NG/ML
NOROXYCODONE UR QL CFM: NORMAL NG/ML
OPC-3373 QUANTIFICATION: NEGATIVE
OXAZEPAM UR QL CFM: NEGATIVE NG/ML
OXYCODONE UR QL CFM: NORMAL NG/ML
OXYMORPHONE UR QL CFM: NORMAL NG/ML
OXYMORPHONE UR QL CFM: NORMAL NG/ML
PARA-FLUOROFENTANYL QUANTIFICATION: NORMAL NG/ML
PCP UR QL CFM: NEGATIVE NG/ML
PHENOBARB UR QL CFM: NEGATIVE NG/ML
RESULT ALL_PRESCRIBED MEDS AND SPECIAL INSTRUCTIONS: NORMAL
SECOBARBITAL UR QL CFM: NEGATIVE NG/ML
SL AMB 4-ANPP QUANTIFICATION: NORMAL NG/ML
SL AMB 5F-ADB-M7 METABOLITE QUANTIFICATION: NEGATIVE NG/ML
SL AMB 7-OH-MITRAGYNINE (KRATOM ALKALOID) QUANTIFICATION: NEGATIVE NG/ML
SL AMB AB-FUBINACA-M3 METABOLITE QUANTIFICATION: NEGATIVE NG/ML
SL AMB ACETYL FENTANYL QUANTIFICATION: NORMAL NG/ML
SL AMB ACETYL NORFENTANYL QUANTIFICATION: NORMAL NG/ML
SL AMB ACRYL FENTANYL QUANTIFICATION: NORMAL NG/ML
SL AMB CARFENTANIL QUANTIFICATION: NORMAL NG/ML
SL AMB CLOZAPINE QUANTIFICATION: NEGATIVE NG/ML
SL AMB CTHC (MARIJUANA METABOLITE) QUANTIFICATION: NEGATIVE NG/ML
SL AMB DEXTROMETHORPHAN QUANTIFICATION: NEGATIVE NG/ML
SL AMB DEXTRORPHAN (DEXTROMETHORPHAN METABOLITE) QUANT: NEGATIVE NG/ML
SL AMB DEXTRORPHAN (DEXTROMETHORPHAN METABOLITE) QUANT: NEGATIVE NG/ML
SL AMB HALOPERIDOL  QUANTIFICATION: NEGATIVE NG/ML
SL AMB HALOPERIDOL METABOLITE QUANTIFICATION: NEGATIVE NG/ML
SL AMB JWH018 METABOLITE QUANTIFICATION: NEGATIVE NG/ML
SL AMB JWH073 METABOLITE QUANTIFICATION: NEGATIVE NG/ML
SL AMB MDMB-FUBINACA-M1 METABOLITE QUANTIFICATION: NEGATIVE NG/ML
SL AMB METHYLONE QUANTIFICATION: NEGATIVE NG/ML
SL AMB N-DESMETHYL-TRAMADOL QUANTIFICATION: NEGATIVE NG/ML
SL AMB N-DESMETHYLCLOZAPINE QUANTIFICATION: NEGATIVE NG/ML
SL AMB PHENTERMINE QUANTIFICATION: NEGATIVE NG/ML
SL AMB RCS4 METABOLITE QUANTIFICATION: NEGATIVE NG/ML
SL AMB RITALINIC ACID QUANTIFICATION: NEGATIVE NG/ML
SPECIMEN DRAWN SERPL: NEGATIVE NG/ML
TAPENTADOL UR QL CFM: NEGATIVE NG/ML
TEMAZEPAM UR QL CFM: NEGATIVE NG/ML
TEMAZEPAM UR QL CFM: NEGATIVE NG/ML
TRAMADOL UR QL CFM: NEGATIVE NG/ML
URATE/CREAT 24H UR: NEGATIVE NG/ML

## 2023-12-20 ENCOUNTER — OFFICE VISIT (OUTPATIENT)
Dept: PULMONOLOGY | Facility: MEDICAL CENTER | Age: 83
End: 2023-12-20
Payer: COMMERCIAL

## 2023-12-20 VITALS
DIASTOLIC BLOOD PRESSURE: 64 MMHG | HEIGHT: 70 IN | WEIGHT: 251.2 LBS | RESPIRATION RATE: 12 BRPM | HEART RATE: 57 BPM | BODY MASS INDEX: 35.96 KG/M2 | OXYGEN SATURATION: 94 % | SYSTOLIC BLOOD PRESSURE: 132 MMHG

## 2023-12-20 DIAGNOSIS — F17.210 CIGARETTE NICOTINE DEPENDENCE WITHOUT COMPLICATION: ICD-10-CM

## 2023-12-20 DIAGNOSIS — J96.11 CHRONIC RESPIRATORY FAILURE WITH HYPOXIA (HCC): Primary | Chronic | ICD-10-CM

## 2023-12-20 DIAGNOSIS — G47.33 OBSTRUCTIVE SLEEP APNEA: ICD-10-CM

## 2023-12-20 DIAGNOSIS — J43.2 CENTRILOBULAR EMPHYSEMA (HCC): Chronic | ICD-10-CM

## 2023-12-20 DIAGNOSIS — E66.01 OBESITY, MORBID (HCC): ICD-10-CM

## 2023-12-20 PROCEDURE — 99214 OFFICE O/P EST MOD 30 MIN: CPT | Performed by: NURSE PRACTITIONER

## 2023-12-20 NOTE — ASSESSMENT & PLAN NOTE
Danielito will continue with his supplemental oxygen at 2 L/min as previously prescribed.  On his pulsed dose oxygen he was 89% and 94% on continuous.

## 2023-12-20 NOTE — PROGRESS NOTES
"Pulmonary Follow-Up Note   Drew Fair 83 y.o. male MRN: [id number]  12/20/2023      Assessment/Plan:    Problem List Items Addressed This Visit          Respiratory    Centrilobular emphysema (HCC) (Chronic)     Danielito will continue with his all nebulized regimen.  He has no new or concerning symptoms.  I offered to write down his medications and frequencies today and he declined.  He declined refills today.         Chronic respiratory failure with hypoxia (HCC) - Primary (Chronic)     Danielito will continue with his supplemental oxygen at 2 L/min as previously prescribed.  On his pulsed dose oxygen he was 89% and 94% on continuous.         Obstructive sleep apnea     He refuses PAP therapy and uses his supplemental oxygen via nasal cannula at bedtime.            Other    Cigarette nicotine dependence without complication     Danielito is not interested in quitting smoking, nor discussing this today.         Obesity, morbid (HCC)     Lifestyle modifications and weight loss as able.          Return in about 1 year (around 12/20/2024), or if symptoms worsen or fail to improve.    All of Drew's questions were answered prior to leaving the office today. He will follow-up in 12 months or sooner should the need arise. He is aware to call our office with any further questions or concerns.    History of Present Illness   Reason for Visit: Follow-up  Chief Complaint: \"I am the same.\"  HPI: Drew Fair is a 83 y.o. male who presents to the office today for follow-up.  Danielito reports that he is doing well since his visit nearly 1 year ago.  He reports that his breathing remains the same.  He uses his nebulizer as written on the boxes.  He cannot tell me the names or the frequency today; however, he does recall taking 2 of the medications twice.  He uses his oxygen at 2 L/min around-the-clock.  He denies any new shortness of breath, wheezing, or cough.  He continues to smoke and in his lifetime has smoked 70 years " "and up to 5 packs/day.  Aside from the above, no other complaints.    Review of Systems   All other systems reviewed and are negative.  A full 12-point review of systems was completed and is negative except for those outlined in the HPI.    Historical Information   Past Medical History:   Diagnosis Date    Acid reflux     Arthritis     BPH (benign prostatic hyperplasia)     no treatment    Calcium disorder     Chronic atrial fibrillation (HCC) 2/6/2018    Chronic pain     Chronic pain disorder     COPD (chronic obstructive pulmonary disease) (HCC)     Coronary artery disease 2001    PTCA with one stent    Eye redness     Poked his eye on 3/18/16 \"bloody\" right eye    Hiatal hernia     Hydronephrosis     Hyperlipidemia     Hypertension     Irregular heart beat     A-Fib    Low back pain     Neck pain     Nocturnal leg cramps     Osteopenia     Peripheral artery disease (HCC)     Peripheral neuropathy     Pleural effusion     Sleep apnea     REFUSES MACHINE HS    Spinal stenosis     Vocal cord polyp 10/2012    panendoscopy     Past Surgical History:   Procedure Laterality Date    ANGIOPLASTY      APPENDECTOMY  04/2007    CARDIOVERSION      x2, for A-Fib    CARDIOVERSION      CATARACT EXTRACTION      CATARACT EXTRACTION W/ INTRAOCULAR LENS IMPLANT Left 01/2014    CORONARY ANGIOPLASTY WITH STENT PLACEMENT  2001    one stent    HERNIA REPAIR Right     inquinal    JOINT REPLACEMENT Left     Hip    KNEE SURGERY  1994    also had arthroscopy right knee 2005    PANENDOSCOPY  10/23/2012    vocal cord polyp    PILONIDAL CYST DRAINAGE      x 3    AL VALLADARES IMPLTJ NSTIM ELTRDS PLATE/PADDLE EDRL Left 8/23/2017    Procedure: Placement of Thoracic SCS with left buttock IPG (IMPULSE);  Surgeon: Sathya Gonzalez MD;  Location: [facility];  Service: Neurosurgery    AL XCAPSL CTRC RMVL INSJ IO LENS PROSTH W/O ECP Right 4/6/2016    Procedure: EXTRACTION EXTRACAPSULAR CATARACT PHACO INTRAOCULAR LENS (IOL);  Surgeon: Gume Singh MD;  " Location: [facility] MAIN OR;  Service: Ophthalmology    SPINAL CORD STIMULATOR TRIAL W/ LAMINOTOMY N/A 4/13/2017    Procedure: INSERTION DORSAL COLUMN SPINAL CORD STIMULATOR TRIAL;  Surgeon: Dipak Celaya MD;  Location: [facility] MAIN OR;  Service:     TONSILLECTOMY      US GUIDED INJECTION FOR RESEARCH STUDY  5/28/2015     Family History   Problem Relation Age of Onset    Cancer Father 81        prostate    No Known Problems Mother     No Known Problems Sister     No Known Problems Brother     No Known Problems Daughter     No Known Problems Son     No Known Problems Maternal Aunt     No Known Problems Maternal Uncle     No Known Problems Paternal Aunt     No Known Problems Paternal Uncle     No Known Problems Maternal Grandmother     No Known Problems Maternal Grandfather     No Known Problems Paternal Grandmother     No Known Problems Paternal Grandfather      Social History   Social History     Substance and Sexual Activity   Alcohol Use Yes    Comment: occasionally     Social History     Substance and Sexual Activity   Drug Use No     Social History     Tobacco Use   Smoking Status Every Day    Current packs/day: 0.75    Average packs/day: 0.8 packs/day for 60.0 years (45.0 ttl pk-yrs)    Types: Cigarettes   Smokeless Tobacco Never     E-Cigarette/Vaping    E-Cigarette Use Never User      E-Cigarette/Vaping Substances    Nicotine No     THC No     CBD No     Flavoring No     Other No     Unknown No        Meds/Allergies     Current Outpatient Medications:     apixaban (Eliquis) 5 mg, Take 1 tablet (5 mg total) by mouth 2 (two) times a day, Disp: 60 tablet, Rfl: 11    aspirin (ECOTRIN LOW STRENGTH) 81 mg EC tablet, Take 81 mg by mouth daily, Disp: , Rfl:     atorvastatin (LIPITOR) 40 mg tablet, Take 40 mg by mouth daily  , Disp: , Rfl:     budesonide (PULMICORT) 0.5 mg/2 mL nebulizer solution, USE 1 VIAL  IN  NEBULIZER TWICE  DAILY - (Rinse Mouth After Each Treatment), Disp: 120 mL, Rfl: 5    diltiazem (DILACOR XR)  "240 MG 24 hr capsule, Take 1 capsule (240 mg total) by mouth daily at bedtime, Disp: 90 capsule, Rfl: 3    HYDROcodone-acetaminophen (NORCO)  mg per tablet, Take 1 tablet by mouth every 6 (six) hours as needed for moderate pain Max Daily Amount: 4 tablets, Disp: 120 tablet, Rfl: 0    [START ON 12/21/2023] HYDROcodone-acetaminophen (NORCO)  mg per tablet, Take 1 tablet by mouth every 6 (six) hours as needed for moderate pain Max Daily Amount: 4 tablets Do not start before December 21, 2023., Disp: 120 tablet, Rfl: 0    ipratropium-albuterol (DUO-NEB) 0.5-2.5 mg/3 mL nebulizer solution, Take 3 mL by nebulization 3 (three) times a day, Disp: 252 mL, Rfl: 5    nebivolol (BYSTOLIC) 10 mg tablet, Take 1 tablet (10 mg total) by mouth daily, Disp: 90 tablet, Rfl: 3    nicotine (NICOTROL) 10 MG inhaler, Use 1  Cartridge per hour as needed.  Do not exceed 10 cartridges per day, Disp: 168 each, Rfl: 6    omeprazole (PriLOSEC) 20 mg delayed release capsule, Take 1 capsule by mouth as needed for heartburn , Disp: , Rfl:     OXYGEN-HELIUM IN, Inhale 2 L daily at bedtime, Disp: , Rfl:     Perforomist 20 MCG/2ML nebulizer solution, USE 1 VIAL  IN  NEBULIZER TWICE  DAILY - MORNING AND EVENING, Disp: 120 mL, Rfl: 5    pregabalin (LYRICA) 75 mg capsule, TAKE 1 CAPSULE TWICE DAILY AND TAKE 2 CAPSULES AT BEDTIME, Disp: 360 capsule, Rfl: 1    tamsulosin (FLOMAX) 0.4 mg, Take 0.4 mg by mouth daily with dinner, Disp: , Rfl:     traZODone (DESYREL) 50 mg tablet, , Disp: , Rfl:     Yupelri 175 MCG/3ML SOLN, USE 1 VIAL IN NEBULIZER DAILY - DO NOT MIX WITH OTHER NEB MEDS, USE BEFORE OR AFTER, Disp: 90 mL, Rfl: 5  No Known Allergies    Vitals: Blood pressure 132/64, pulse 57, resp. rate 12, height 5' 10\" (1.778 m), weight 114 kg (251 lb 3.2 oz), SpO2 94%. Body mass index is 36.04 kg/m². Oxygen Therapy  SpO2: 94 % (on 2l cont. 89 on 2l pulse upon arrival)    Physical Exam:  Physical Exam  Vitals reviewed.   Constitutional:       " "General: He is not in acute distress.     Appearance: He is well-developed. He is morbidly obese. He is not toxic-appearing or diaphoretic.      Interventions: Nasal cannula in place.   HENT:      Head: Normocephalic and atraumatic.   Eyes:      General: No scleral icterus.  Neck:      Trachea: No tracheal deviation.   Cardiovascular:      Rate and Rhythm: Normal rate and regular rhythm.      Heart sounds: S1 normal and S2 normal. No murmur heard.     No friction rub. No gallop.   Pulmonary:      Effort: Pulmonary effort is normal. No tachypnea, accessory muscle usage or respiratory distress.      Breath sounds: Normal breath sounds. No stridor. No decreased breath sounds, wheezing, rhonchi or rales.   Chest:      Chest wall: No tenderness.   Abdominal:      General: Bowel sounds are normal. There is no distension.      Palpations: Abdomen is soft.      Tenderness: There is no abdominal tenderness.   Musculoskeletal:         General: No tenderness.      Cervical back: Neck supple.      Right lower leg: No edema.      Left lower leg: No edema.   Skin:     General: Skin is warm and dry.      Findings: No rash.   Neurological:      Mental Status: He is alert and oriented to person, place, and time.      GCS: GCS eye subscore is 4. GCS verbal subscore is 5. GCS motor subscore is 6.   Psychiatric:         Speech: Speech normal.         Behavior: Behavior normal. Behavior is cooperative.       No new labs or diagnostic testing      MITCHELL Soriano  [facility]        Portions of the record may have been created with voice recognition software.  Occasional wrong word or \"sound a like\" substitutions may have occurred due to the inherent limitations of voice recognition software.  Read the chart carefully and recognize, using context, where substitutions have occurred or contact the dictating provider.  "

## 2023-12-20 NOTE — ASSESSMENT & PLAN NOTE
Danielito will continue with his all nebulized regimen.  He has no new or concerning symptoms.  I offered to write down his medications and frequencies today and he declined.  He declined refills today.

## 2024-01-16 DIAGNOSIS — J43.2 CENTRILOBULAR EMPHYSEMA (HCC): ICD-10-CM

## 2024-01-16 RX ORDER — BUDESONIDE 0.5 MG/2ML
INHALANT ORAL
Qty: 120 ML | Refills: 11 | Status: SHIPPED | OUTPATIENT
Start: 2024-01-16

## 2024-01-16 RX ORDER — FORMOTEROL FUMARATE DIHYDRATE 20 UG/2ML
SOLUTION RESPIRATORY (INHALATION)
Qty: 120 ML | Refills: 11 | Status: SHIPPED | OUTPATIENT
Start: 2024-01-16

## 2024-01-18 ENCOUNTER — TELEPHONE (OUTPATIENT)
Age: 84
End: 2024-01-18

## 2024-01-18 DIAGNOSIS — G89.4 CHRONIC PAIN SYNDROME: ICD-10-CM

## 2024-01-18 NOTE — TELEPHONE ENCOUNTER
Pt contacted Call Center requested refill of their medication.        Medication Name: HYDROcodone-acetaminophen (NORCO)       Dosage of Med:  mg      Frequency of Med: Take 1 tablet by mouth every 6 (six) hours as needed for moderate pain       Remaining Medication: 6      Pharmacy and Location:   [id number] [address])  [address] [place] [address] [address] [address]  Phone: [phone]       Pt. Preferred Callback Phone Number: [phone]      Thank you.

## 2024-01-18 NOTE — TELEPHONE ENCOUNTER
Per Chart review, medication sent by  today - E-Prescribing Status: Receipt confirmed by pharmacy (1/18/2024  3:59 PM EST) .   RN S/w Pt, advised of the same. Pt verbalized understanding.

## 2024-01-18 NOTE — TELEPHONE ENCOUNTER
Caller: tali Russell    Doctor: Garcia    Reason for call: pt called stating the pharmacy will not have his norco medication until Monday     Pt is requesting that you send to the [facility] on file.  Pt stated that the [facility] pharmacy will not tell him if they have it in stock and asked if a nurse can call to check before the script is sent    Call back#: [phone]

## 2024-01-19 RX ORDER — HYDROCODONE BITARTRATE AND ACETAMINOPHEN 10; 325 MG/1; MG/1
1 TABLET ORAL EVERY 6 HOURS PRN
Qty: 120 TABLET | Refills: 0 | Status: SHIPPED | OUTPATIENT
Start: 2024-01-19

## 2024-01-19 NOTE — TELEPHONE ENCOUNTER
Caller: tali Russell    Doctor: Garcia    Reason for call: pt called back and stated the [facility] does not have his medication.  He is asking if someone can call [facility] in [place]    Call back#: [phone]     [facility] [address] [place] [address] [address] [phone]

## 2024-01-19 NOTE — TELEPHONE ENCOUNTER
Per Pt, [facility] pharmacy and [facility] pharmacy do not have medication in stock. RN S/w [facility] pharmacy staff who also do not have medication available.     S/w Pt, Pt requesting RN call [facility]. RN S/w [facility] staff who states they are able to fill prescription. Please resend Norco 10-325mg prescription to [facility] [address] [place] [address]. Thank you.

## 2024-01-19 NOTE — TELEPHONE ENCOUNTER
Caller: Drew    Doctor: Ellis    Reason for call: Returning call to speak to nurse    Call back#: [phone]

## 2024-01-19 NOTE — TELEPHONE ENCOUNTER
Caller: Patient     Doctor: Garcia    Reason for call: Requesting this medication to be sent to but asking the office call them first to make sure they have the medication in stock  : [facility]/pharmacy #[id number] - [place], [place] - [address] [phone]     Call back#: [phone]

## 2024-01-24 NOTE — TELEPHONE ENCOUNTER
The pills were not in the bag ?     I am truly sorry, our policy is we do not replace any lost, stolen, or damaged medications.  And now the world has changed so much that even if I  allowed to write a prescription no pharmacy would not fill it since you just picked up 120 pills.

## 2024-01-24 NOTE — TELEPHONE ENCOUNTER
Caller: tali Esteves    Doctor: Garcia    Reason for call: pt called stating when he picked up his Norco medication on 1/19 the cap on the bottle was loose and there was only 100 pills instead of 120.  Pt did call the pharmacy but they said they can't help him.  Pt is asking if there is anything we can do    Call back#: [phone]

## 2024-02-07 ENCOUNTER — TELEPHONE (OUTPATIENT)
Age: 84
End: 2024-02-07

## 2024-02-14 NOTE — TELEPHONE ENCOUNTER
Patient called, he stated he wasn't able to make it into the office last week to  the samples and was asking if he could come in today. Please advise

## 2024-02-14 NOTE — TELEPHONE ENCOUNTER
Disregard, I was able to get in contact with office. Patient will have them picked up today. Thank you

## 2024-02-17 DIAGNOSIS — G89.4 CHRONIC PAIN SYNDROME: ICD-10-CM

## 2024-02-17 NOTE — TELEPHONE ENCOUNTER
Medication Refill Request     Name HYDROcodone-acetaminophen   Dose/Frequency Take 1 tablet by mouth every 6 (six) hours as needed for moderate pain   Quantity 120 tablet   Verified pharmacy   [x] - [facility]  Verified ordering Provider   [x]  Does patient have enough for the next 3 days? Yes [] No [x]

## 2024-02-19 ENCOUNTER — TELEPHONE (OUTPATIENT)
Age: 84
End: 2024-02-19

## 2024-02-19 RX ORDER — HYDROCODONE BITARTRATE AND ACETAMINOPHEN 10; 325 MG/1; MG/1
1 TABLET ORAL EVERY 6 HOURS PRN
Qty: 8 TABLET | Refills: 0 | Status: SHIPPED | OUTPATIENT
Start: 2024-02-19 | End: 2024-02-21

## 2024-02-19 RX ORDER — NALOXONE HYDROCHLORIDE 4 MG/.1ML
SPRAY NASAL
Qty: 1 EACH | Refills: 1 | Status: SHIPPED | OUTPATIENT
Start: 2024-02-19 | End: 2025-02-18

## 2024-02-19 NOTE — TELEPHONE ENCOUNTER
Pt contacted Call Center requested refill of their medication.        Medication Name:   HYDROcodone-acetaminophen (NORCO)  mg            Frequency of Med: Take 1 tablet by mouth every 6 (six) hours as needed for moderate pain       Remaining Medication: 1      Ph[facility] and Location[id number] [address] [facility] [phone]         Pt. Preferred Callback Phone Number: [phone]       Thank you.    [facility] to [facility] in stock

## 2024-02-19 NOTE — TELEPHONE ENCOUNTER
Caller: patient    Doctor: SHERI    Reason for call: calling back regarding his script. Patient aware of script send    Call back#:

## 2024-02-19 NOTE — TELEPHONE ENCOUNTER
S/W pt.  Advised pt of the same.  Pt stated [facility] told him it will be ready around 11.  Pt verbalized understanding.

## 2024-02-19 NOTE — TELEPHONE ENCOUNTER
S/W pt. Advised pt per MG he was to be seen prior to 2/16.  Pt thought the appt they gave him wasn't right.  Scheduled pt for 2/21 at 9:30 w/ MG.  He has 1 pill left.  Pt stated last month the pharmacy shorted him 20 pills.  Please advise.

## 2024-02-19 NOTE — TELEPHONE ENCOUNTER
S/W Tunde at [facility].  They do not have it in stock.  He is not sure if they will get it later today.    LMOM for pt to C/B, C/B # provided.

## 2024-02-21 ENCOUNTER — TELEPHONE (OUTPATIENT)
Age: 84
End: 2024-02-21

## 2024-02-21 ENCOUNTER — TELEMEDICINE (OUTPATIENT)
Dept: PAIN MEDICINE | Facility: CLINIC | Age: 84
End: 2024-02-21
Payer: COMMERCIAL

## 2024-02-21 DIAGNOSIS — M54.12 CERVICAL RADICULOPATHY: ICD-10-CM

## 2024-02-21 DIAGNOSIS — G89.4 CHRONIC PAIN SYNDROME: ICD-10-CM

## 2024-02-21 DIAGNOSIS — M51.16 INTERVERTEBRAL DISC DISORDER WITH RADICULOPATHY OF LUMBAR REGION: ICD-10-CM

## 2024-02-21 PROCEDURE — 99213 OFFICE O/P EST LOW 20 MIN: CPT

## 2024-02-21 RX ORDER — HYDROCODONE BITARTRATE AND ACETAMINOPHEN 10; 325 MG/1; MG/1
1 TABLET ORAL EVERY 6 HOURS PRN
Qty: 120 TABLET | Refills: 0 | Status: SHIPPED | OUTPATIENT
Start: 2024-03-21

## 2024-02-21 RX ORDER — HYDROCODONE BITARTRATE AND ACETAMINOPHEN 10; 325 MG/1; MG/1
1 TABLET ORAL EVERY 6 HOURS PRN
Qty: 120 TABLET | Refills: 0 | Status: SHIPPED | OUTPATIENT
Start: 2024-02-21

## 2024-02-21 NOTE — TELEPHONE ENCOUNTER
Caller: Patient    Doctor: Ellis    Reason for call: Pt is sick and requesting a virtual visit instead of coming into the office.   Mohini is out next week so pt would not get appointment for 2 weeks. Pt has 5 pills left of medication.    Please advise    Call back#: [phone]

## 2024-02-21 NOTE — PROGRESS NOTES
Virtual Brief Visit    This Visit is being completed by telephone. The Patient is located at Home and in the following state in which I hold an active license [place]    The patient was identified by name and date of birth. Drew STEVE Manjula was informed that this is a telemedicine visit and that the visit is being conducted through Telephone.  My office door was closed. No one else was in the room.  He acknowledged consent and understanding of privacy and security of the video platform. The patient has agreed to participate and understands they can discontinue the visit at any time.    Patient is aware this is a billable service.       Assessment/Plan:    The patient continues to report an overall reduction of his pain level and improvement with his functioning without significant side effects using hydrocodone/acetaminophen 10/325 mg tablet patient uses 1 tablet up to 4 times a day as needed for moderate pain along with Lyrica 75 mg capsule patient takes 1 capsule twice daily and 2 capsules at at bedtime, therefore I will continue the patient on these medications.  Hydrocodone/acetaminophen 10/325 mg tablet E-prescribed to the patient's pharmacy with a do not fill until date of today  2/21/2024 and 3/21/2024.    Today the patient is unable to come to the office due to having severe diarrhea and stomach pain.  Patient states that his pain is currently an 8 out of 10 on the verbal numeric pain scale which runs across his low back and down his posterior thighs.    Problem List Items Addressed This Visit       Chronic pain syndrome    Relevant Medications    HYDROcodone-acetaminophen (NORCO)  mg per tablet    HYDROcodone-acetaminophen (NORCO)  mg per tablet (Start on 3/21/2024)    Intervertebral disc disorder with radiculopathy of lumbar region    Relevant Medications    pregabalin (LYRICA) 75 mg capsule    Cervical radiculopathy    Relevant Medications    pregabalin (LYRICA) 75 mg capsule       Recent  Visits  Date Type Provider Dept   02/21/24 Telemedicine MITCHELL [facility]   Showing recent visits within past 7 days and meeting all other requirements  Future Appointments  No visits were found meeting these conditions.  Showing future appointments within next 150 days and meeting all other requirements         Visit Time  Total Visit Duration: 12 min

## 2024-02-23 RX ORDER — PREGABALIN 75 MG/1
CAPSULE ORAL
Qty: 360 CAPSULE | Refills: 1 | Status: SHIPPED | OUTPATIENT
Start: 2024-02-23

## 2024-02-23 NOTE — PATIENT INSTRUCTIONS

## 2024-03-06 DIAGNOSIS — J43.2 CENTRILOBULAR EMPHYSEMA (HCC): ICD-10-CM

## 2024-03-06 RX ORDER — REVEFENACIN 175 UG/3ML
SOLUTION RESPIRATORY (INHALATION)
Qty: 90 ML | Refills: 11 | Status: SHIPPED | OUTPATIENT
Start: 2024-03-06

## 2024-03-18 ENCOUNTER — TELEPHONE (OUTPATIENT)
Age: 84
End: 2024-03-18

## 2024-03-18 NOTE — TELEPHONE ENCOUNTER
Caller: Patient     Doctor: Ellis     Reason for call: Checking to make sure script was sent , advised it was and to be filled on 3/21 , he is going to call and make sure pharmacy has it     Call back#: n/a

## 2024-03-20 ENCOUNTER — TELEPHONE (OUTPATIENT)
Age: 84
End: 2024-03-20

## 2024-03-20 DIAGNOSIS — M54.2 CERVICAL PAIN: ICD-10-CM

## 2024-03-20 DIAGNOSIS — M54.2 CERVICAL PAIN: Primary | ICD-10-CM

## 2024-03-20 RX ORDER — TIZANIDINE 2 MG/1
2 TABLET ORAL 2 TIMES DAILY PRN
Qty: 28 TABLET | Refills: 0 | Status: SHIPPED | OUTPATIENT
Start: 2024-03-20 | End: 2024-03-21 | Stop reason: SDUPTHER

## 2024-03-20 NOTE — TELEPHONE ENCOUNTER
Caller: tali Esteves    Doctor: Ellis    Reason for call: pt calling asking if he can do a video visit or if the dr can call something in for him.  Pt stated his back pain 9/10 and he can barely walk with the pain.  Pt stated there is no way he would be able to get into the office his pain is that bad    Call back#: [phone]

## 2024-03-20 NOTE — TELEPHONE ENCOUNTER
Unfortunately I think he needs to go to the emergency.    Patient is already prescribed opioid medications which I am unable to increase and is already using pregabalin.  I am not sure I could help him over virtual visit or even in person.

## 2024-03-20 NOTE — TELEPHONE ENCOUNTER
Caller: tali Esteves    Doctor: Ellis    Reason for call: pt returning a phone call    Call back#: [phone]

## 2024-03-20 NOTE — TELEPHONE ENCOUNTER
"S/W pt.  Advised pt of the same.  Pt stated \"I am not going to the hospital.\"  Advised awaiting [facility] response.  Advised the ER is always on option.  Pt stated he has no ride and doesn't want to go.  Advised can always call 911.  Pt verbalized understanding.  Please advise.  "

## 2024-03-21 ENCOUNTER — TELEPHONE (OUTPATIENT)
Age: 84
End: 2024-03-21

## 2024-03-21 RX ORDER — TIZANIDINE 2 MG/1
2 TABLET ORAL 2 TIMES DAILY PRN
Qty: 28 TABLET | Refills: 0 | Status: SHIPPED | OUTPATIENT
Start: 2024-03-21 | End: 2024-04-04

## 2024-03-21 NOTE — TELEPHONE ENCOUNTER
Please see previous and advise- Can Tizanidine script be sent to [facility] pharmacy so can be picked up with Norco script?

## 2024-03-21 NOTE — TELEPHONE ENCOUNTER
Caller: [facility]    Doctor: SHERI    Reason for call: [facility] is only a nebulizer pharmacy and cannot fill script for the tizanidine and that needs to be sent to a different pharmacy.     Call back#: [phone]

## 2024-03-21 NOTE — TELEPHONE ENCOUNTER
Attempted to reach pt to advise that his Tizanidine script was sent to [facility]. ALLISON with Cb# and [place].

## 2024-03-21 NOTE — TELEPHONE ENCOUNTER
Marquita is calling from [facility] asking us to please fax back SWO to     FAX: [phone]

## 2024-03-29 NOTE — TELEPHONE ENCOUNTER
Marquita called again regarding this request they had originally faxed on 2/28/24. The [employer] SWO form is scanned into the patients Media on 2/29/24. They are in need of the Doctor to sign and complete this and fax back to them as soon as possible. Please advise    [facility]  Fax #[phone]

## 2024-04-04 NOTE — TELEPHONE ENCOUNTER
I spoke w/ Marquita at [facility]. I explained that completed forms were faxed to [phone] w/ a Transaction confirmed receipt for date 2/28/24 (2:34pm). She stated that sometimes the faxes do not come to them at all or the next day. I gave her my contact info and asked if she could call me around 3 today if she doesn't receive form. She agreed to do so.

## 2024-04-22 ENCOUNTER — OFFICE VISIT (OUTPATIENT)
Dept: PAIN MEDICINE | Facility: CLINIC | Age: 84
End: 2024-04-22
Payer: COMMERCIAL

## 2024-04-22 VITALS — HEART RATE: 88 BPM | DIASTOLIC BLOOD PRESSURE: 88 MMHG | SYSTOLIC BLOOD PRESSURE: 160 MMHG

## 2024-04-22 DIAGNOSIS — F11.20 UNCOMPLICATED OPIOID DEPENDENCE (HCC): ICD-10-CM

## 2024-04-22 DIAGNOSIS — Z79.891 LONG-TERM CURRENT USE OF OPIATE ANALGESIC: ICD-10-CM

## 2024-04-22 DIAGNOSIS — M51.16 INTERVERTEBRAL DISC DISORDER WITH RADICULOPATHY OF LUMBAR REGION: ICD-10-CM

## 2024-04-22 DIAGNOSIS — M54.12 CERVICAL RADICULOPATHY: ICD-10-CM

## 2024-04-22 DIAGNOSIS — G89.4 CHRONIC PAIN SYNDROME: Primary | ICD-10-CM

## 2024-04-22 PROCEDURE — 99214 OFFICE O/P EST MOD 30 MIN: CPT

## 2024-04-22 RX ORDER — PREGABALIN 100 MG/1
CAPSULE ORAL
Qty: 360 CAPSULE | Refills: 2 | Status: SHIPPED | OUTPATIENT
Start: 2024-04-22 | End: 2024-04-22 | Stop reason: SDUPTHER

## 2024-04-22 RX ORDER — HYDROCODONE BITARTRATE AND ACETAMINOPHEN 10; 325 MG/1; MG/1
1 TABLET ORAL EVERY 6 HOURS PRN
Qty: 120 TABLET | Refills: 0 | Status: SHIPPED | OUTPATIENT
Start: 2024-04-22 | End: 2024-04-22 | Stop reason: SDUPTHER

## 2024-04-22 RX ORDER — HYDROCODONE BITARTRATE AND ACETAMINOPHEN 10; 325 MG/1; MG/1
1 TABLET ORAL EVERY 6 HOURS PRN
Qty: 120 TABLET | Refills: 0 | Status: SHIPPED | OUTPATIENT
Start: 2024-04-22

## 2024-04-22 RX ORDER — HYDROCODONE BITARTRATE AND ACETAMINOPHEN 10; 325 MG/1; MG/1
1 TABLET ORAL EVERY 6 HOURS PRN
Qty: 120 TABLET | Refills: 0 | Status: SHIPPED | OUTPATIENT
Start: 2024-05-21

## 2024-04-22 RX ORDER — PREGABALIN 100 MG/1
CAPSULE ORAL
Qty: 360 CAPSULE | Refills: 2 | Status: SHIPPED | OUTPATIENT
Start: 2024-04-22

## 2024-04-22 NOTE — PROGRESS NOTES
Pain Medicine Follow-Up Note    Assessment:  1. Chronic pain syndrome    2. Intervertebral disc disorder with radiculopathy of lumbar region    3. Cervical radiculopathy    4. Uncomplicated opioid dependence (HCC)    5. Long-term current use of opiate analgesic        Plan:  Orders Placed This Encounter   Procedures    MM OF_Alprazolam Definitive    MM OF_Amphetamine Definitive Test    MM OF_Atomoxetine Definitive Test    MM OF_Buprenorphine Definitive Test    MM OF_Clonazepam Definitive    MM OF_Cocaine Definitive Test    MM OF_Codeine Definitive    MM OF_Diazepam Definitive    MM OF_Fentanyl Definitive Test    MM OF_Heroin Definitive Test    MM OF_Hydrocodone Definitive    MM OF_Hydromorphone Definitive    MM OF_Lorazepam Definitive    MM OF_MDMA Definitive Test    MM OF_Meperidine Definitive Test    MM OF_Methadone Definitive Test    MM OF_Methylphenidate Definitive Test    MM OF_Morphine Definitive    MM OF_Oxazepam Definitive    MM OF_Oxycodone Definitive Test - Oral Fluid    MM OF_Oxymorphone Definitive Test    MM OF_Phencyclidine Definitive Test    MM OF_Temazepam Definitive    MM OF_THC Definitive Test    MM OF_Tramadol Definitive Test    MM ALL_Prescribed Meds and Special Instructions     Order Specific Question:   [facility] Is ALBUTEROL prescribed?     Answer:   Yes     Order Specific Question:   [facility] Is HYDROCODONE/APAP prescribed?     Answer:   Yes     Order Specific Question:   [facility] Is ATORVASTATIN prescribed?     Answer:   Yes     Order Specific Question:   [facility] Is PREGABALIN Prescribed?     Answer:   Yes     Order Specific Question:   [facility] Is NALOXONE Prescribed?     Answer:   Yes     Order Specific Question:   [facility] Is OMEPRAZOLE prescribed?     Answer:   Yes     Order Specific Question:   [facility] Is TRAZODONE prescribed?     Answer:   Yes       New Medications Ordered This Visit   Medications    HYDROcodone-acetaminophen (NORCO)  mg per tablet     Sig: Take 1  tablet by mouth every 6 (six) hours as needed for moderate pain Max Daily Amount: 4 tablets Do not start before May 21, 2024.     Dispense:  120 tablet     Refill:  0     Fill on 5/21/2024    HYDROcodone-acetaminophen (NORCO)  mg per tablet     Sig: Take 1 tablet by mouth every 6 (six) hours as needed for moderate pain Max Daily Amount: 4 tablets     Dispense:  120 tablet     Refill:  0     Fill today-- patient is on way over, please let me know if not in stock... thank you!    pregabalin (LYRICA) 100 mg capsule     Sig: Take 1 capsule (100 mg total) by mouth 2 (two) times a day AND 2 capsules (200 mg total) daily at bedtime.     Dispense:  360 capsule     Refill:  2       My impressions and treatment recommendations were discussed in detail with the patient who verbalized understanding and had no further questions.      The patient reports overall increasing pain. I am unable to increase his opioid medications due to the risk of respiration depression. The patient denies side effects from pregabalin,  I will increase pregabalin 100 mg bid and 200 mg at bedtime.    The patient continues to report an overall reduction of his pain level and improvement with his functioning without significant side effects using hydrocodone/acetaminophen 10/325 mg tablet patient uses 1 tablet up to 4 times a day as needed for moderate pain, therefore I will continue the patient on this medication.  Hydrocodone/acetaminophen 10/325 mg tablet E-prescribed to the patient's pharmacy with a do not fill until date of today 4/22/2024 and 5/21/2024.    [place] Prescription Drug Monitoring Program report was reviewed and was appropriate     There are risks associated with opioid medications, including dependence, addiction and tolerance. The patient understands and agrees to use these medications only as prescribed. Potential side effects of the medications include, but are not limited to, constipation, drowsiness, addiction, impaired  judgment and risk of fatal overdose if not taken as prescribed. The patient was warned against driving while taking sedation medications.  Sharing medications is a felony. At this point in time, the patient is showing no signs of addiction, abuse, diversion or suicidal ideation.    Follow-up is planned in 8 weeks time or sooner as warranted.  Discharge instructions were provided. I personally saw and examined the patient and I agree with the above discussed plan of care.    History of Present Illness:    Drew Fair is a 83 y.o. male who presents to [facility] for interval re-evaluation of the above stated pain complaints. The patient has a past medical and chronic pain history as outlined in the assessment section. He was last seen on 2/21/2024.    At today's visit patient states that their pain symptoms are worse with a pain score of 8-9/10 on the verbal numeric pain scale.  The patient's pain is worse in the morning, evening, and at night.  The patient's pain is intermittent in nature.  And the quality of the patient's pain is described as sharp.  The patient's pain is located in the across his bilateral low back radiating down his right leg.  The patient states the amount of pain relief he is obtaining from his pain relievers is not enough to make a difference in his life.  Patient is currently using hydrocodone/acetaminophen and pregabalin which he denies any significant side effects from.  Unfortunately I am unable to escalate his opioids secondary to respiratory depression.    Last Drug Screen: 4/22/2024    Other than as stated above, the patient denies any interval changes in medications, medical condition, mental condition, symptoms, or allergies since the last office visit.         Review of Systems:    Review of Systems   Respiratory:  Positive for shortness of breath.    Cardiovascular:  Negative for chest pain.   Gastrointestinal:  Negative for constipation, diarrhea,  "nausea and vomiting.   Musculoskeletal:  Positive for gait problem. Negative for arthralgias, joint swelling and myalgias.        Decreased ROM    Skin:  Negative for rash.   Neurological:  Positive for weakness. Negative for dizziness and seizures.        Memory loss    All other systems reviewed and are negative.        Past Medical History:   Diagnosis Date    Acid reflux     Arthritis     BPH (benign prostatic hyperplasia)     no treatment    Calcium disorder     Chronic atrial fibrillation (HCC) 2/6/2018    Chronic pain     Chronic pain disorder     COPD (chronic obstructive pulmonary disease) (HCC)     Coronary artery disease 2001    PTCA with one stent    Eye redness     Poked his eye on 3/18/16 \"bloody\" right eye    Hiatal hernia     Hydronephrosis     Hyperlipidemia     Hypertension     Irregular heart beat     A-Fib    Low back pain     Neck pain     Nocturnal leg cramps     Osteopenia     Peripheral artery disease (HCC)     Peripheral neuropathy     Pleural effusion     Sleep apnea     REFUSES MACHINE HS    Spinal stenosis     Vocal cord polyp 10/2012    panendoscopy       Past Surgical History:   Procedure Laterality Date    ANGIOPLASTY      APPENDECTOMY  04/2007    CARDIOVERSION      x2, for A-Fib    CARDIOVERSION      CATARACT EXTRACTION      CATARACT EXTRACTION W/ INTRAOCULAR LENS IMPLANT Left 01/2014    CORONARY ANGIOPLASTY WITH STENT PLACEMENT  2001    one stent    HERNIA REPAIR Right     inquinal    JOINT REPLACEMENT Left     Hip    KNEE SURGERY  1994    also had arthroscopy right knee 2005    PANENDOSCOPY  10/23/2012    vocal cord polyp    PILONIDAL CYST DRAINAGE      x 3    HI VALLADARES IMPLTJ NSTIM ELTRDS PLATE/PADDLE EDRL Left 8/23/2017    Procedure: Placement of Thoracic SCS with left buttock IPG (IMPULSE);  Surgeon: Sathya Gonzalez MD;  Location: [facility] MAIN OR;  Service: Neurosurgery    HI XCAPSL CTRC RMVL INSJ IO LENS PROSTH W/O ECP Right 4/6/2016    Procedure: EXTRACTION EXTRACAPSULAR CATARACT " PHACO INTRAOCULAR LENS (IOL);  Surgeon: Gume Singh MD;  Location: [facility] MAIN OR;  Service: Ophthalmology    SPINAL CORD STIMULATOR TRIAL W/ LAMINOTOMY N/A 4/13/2017    Procedure: INSERTION DORSAL COLUMN SPINAL CORD STIMULATOR TRIAL;  Surgeon: Dipak Celaya MD;  Location: [facility] MAIN OR;  Service:     TONSILLECTOMY      US GUIDED INJECTION FOR RESEARCH STUDY  5/28/2015       Family History   Problem Relation Age of Onset    Cancer Father 81        prostate    No Known Problems Mother     No Known Problems Sister     No Known Problems Brother     No Known Problems Daughter     No Known Problems Son     No Known Problems Maternal Aunt     No Known Problems Maternal Uncle     No Known Problems Paternal Aunt     No Known Problems Paternal Uncle     No Known Problems Maternal Grandmother     No Known Problems Maternal Grandfather     No Known Problems Paternal Grandmother     No Known Problems Paternal Grandfather        Social History     Occupational History    Not on file   Tobacco Use    Smoking status: Every Day     Current packs/day: 0.75     Average packs/day: 0.8 packs/day for 60.0 years (45.0 ttl pk-yrs)     Types: Cigarettes    Smokeless tobacco: Never   Vaping Use    Vaping status: Never Used   Substance and Sexual Activity    Alcohol use: Yes     Comment: occasionally    Drug use: No    Sexual activity: Not on file         Current Outpatient Medications:     [START ON 5/21/2024] HYDROcodone-acetaminophen (NORCO)  mg per tablet, Take 1 tablet by mouth every 6 (six) hours as needed for moderate pain Max Daily Amount: 4 tablets Do not start before May 21, 2024., Disp: 120 tablet, Rfl: 0    HYDROcodone-acetaminophen (NORCO)  mg per tablet, Take 1 tablet by mouth every 6 (six) hours as needed for moderate pain Max Daily Amount: 4 tablets, Disp: 120 tablet, Rfl: 0    pregabalin (LYRICA) 100 mg capsule, Take 1 capsule (100 mg total) by mouth 2 (two) times a day AND 2 capsules (200 mg total) daily at  bedtime., Disp: 360 capsule, Rfl: 2    apixaban (Eliquis) 5 mg, Take 1 tablet (5 mg total) by mouth 2 (two) times a day, Disp: 60 tablet, Rfl: 11    aspirin (ECOTRIN LOW STRENGTH) 81 mg EC tablet, Take 81 mg by mouth daily, Disp: , Rfl:     atorvastatin (LIPITOR) 40 mg tablet, Take 40 mg by mouth daily  , Disp: , Rfl:     budesonide (PULMICORT) 0.5 mg/2 mL nebulizer solution, USE 1 VIAL  IN  NEBULIZER TWICE  DAILY - (Rinse Mouth After Each Treatment), Disp: 120 mL, Rfl: 11    diltiazem (DILACOR XR) 240 MG 24 hr capsule, Take 1 capsule (240 mg total) by mouth daily at bedtime, Disp: 90 capsule, Rfl: 3    formoterol (Perforomist) 20 MCG/2ML nebulizer solution, USE 1 VIAL  IN  NEBULIZER TWICE  DAILY - MORNING AND EVENING, Disp: 120 mL, Rfl: 11    ipratropium-albuterol (DUO-NEB) 0.5-2.5 mg/3 mL nebulizer solution, Take 3 mL by nebulization 3 (three) times a day, Disp: 252 mL, Rfl: 5    naloxone (NARCAN) 4 mg/0.1 mL nasal spray, Administer 1 spray into a nostril. If no response after 2-3 minutes, give another dose in the other nostril using a new spray., Disp: 1 each, Rfl: 1    nebivolol (BYSTOLIC) 10 mg tablet, Take 1 tablet (10 mg total) by mouth daily, Disp: 90 tablet, Rfl: 3    nicotine (NICOTROL) 10 MG inhaler, Use 1  Cartridge per hour as needed.  Do not exceed 10 cartridges per day, Disp: 168 each, Rfl: 6    omeprazole (PriLOSEC) 20 mg delayed release capsule, Take 1 capsule by mouth as needed for heartburn , Disp: , Rfl:     OXYGEN-HELIUM IN, Inhale 2 L daily at bedtime, Disp: , Rfl:     tamsulosin (FLOMAX) 0.4 mg, Take 0.4 mg by mouth daily with dinner, Disp: , Rfl:     traZODone (DESYREL) 50 mg tablet, , Disp: , Rfl:     Yupelri 175 MCG/3ML SOLN, USE 1 VIAL IN NEBULIZER DAILY - DO NOT MIX WITH OTHER NEB MEDS, USE BEFORE OR AFTER, Disp: 90 mL, Rfl: 11    No Known Allergies    Physical Exam:    /88   Pulse 88     Constitutional:normal, well developed, well nourished, alert, in no distress and non-toxic and  no overt pain behavior. and obese  Eyes:anicteric  HEENT:grossly intact  Neck:supple, symmetric, trachea midline and no masses   Pulmonary:even and unlabored  Cardiovascular:No edema or pitting edema present  Skin:Normal without rashes or lesions and well hydrated  Psychiatric:Mood and affect appropriate  Neurologic:Cranial Nerves II-XII grossly intact  Musculoskeletal:ambulates with cane and in wheelchair      Orders Placed This Encounter   Procedures    MM OF_Alprazolam Definitive    MM OF_Amphetamine Definitive Test    MM OF_Atomoxetine Definitive Test    MM OF_Buprenorphine Definitive Test    MM OF_Clonazepam Definitive    MM OF_Cocaine Definitive Test    MM OF_Codeine Definitive    MM OF_Diazepam Definitive    MM OF_Fentanyl Definitive Test    MM OF_Heroin Definitive Test    MM OF_Hydrocodone Definitive    MM OF_Hydromorphone Definitive    MM OF_Lorazepam Definitive    MM OF_MDMA Definitive Test    MM OF_Meperidine Definitive Test    MM OF_Methadone Definitive Test    MM OF_Methylphenidate Definitive Test    MM OF_Morphine Definitive    MM OF_Oxazepam Definitive    MM OF_Oxycodone Definitive Test - Oral Fluid    MM OF_Oxymorphone Definitive Test    MM OF_Phencyclidine Definitive Test    MM OF_Temazepam Definitive    MM OF_THC Definitive Test    MM OF_Tramadol Definitive Test    MM ALL_Prescribed Meds and Special Instructions       This document was created using speech voice recognition software.   Grammatical errors, random word insertions, pronoun errors, and incomplete sentences are an occasional consequence of this system due to software limitations, ambient noise, and hardware issues.   Any formal questions or concerns about content, text, or information contained within the body of this dictation should be directly addressed to the provider for clarification.

## 2024-04-22 NOTE — PATIENT INSTRUCTIONS

## 2024-05-02 ENCOUNTER — TELEPHONE (OUTPATIENT)
Age: 84
End: 2024-05-02

## 2024-05-02 NOTE — TELEPHONE ENCOUNTER
Caller: [facility]     Doctor: Mohini BEAVERS    Reason for call: Prior authorization for the medication Lyrica 100 mg     Call back#: [phone]              Insurance company:[employer]   Member ID:[id number]  Insurance company phone: [phone]

## 2024-05-02 NOTE — TELEPHONE ENCOUNTER
PA for PREGABALIN Approved     Date(s) approved UNTIL 12/31/2024       Pharmacy advised by    [x]Fax  []Phone call    Approval letter scanned into Media Yes

## 2024-05-02 NOTE — TELEPHONE ENCOUNTER
PA for PREGABALIN    Submitted via    [x]CMM-KEY [id number]   []Surescripts-Case ID #   []Faxed to plan   []Other website   []Phone call Case ID #     Office notes sent, clinical questions answered. Awaiting determination    Turnaround time for your insurance to make a decision on your Prior Authorization can take 7-21 business days.

## 2024-05-02 NOTE — TELEPHONE ENCOUNTER
Caller: [facility]     Doctor/Office: Ellis    Call regarding :  medication      Call was transferred to: SPA

## 2024-05-21 ENCOUNTER — TELEPHONE (OUTPATIENT)
Age: 84
End: 2024-05-21

## 2024-06-15 ENCOUNTER — NURSE TRIAGE (OUTPATIENT)
Dept: OTHER | Facility: OTHER | Age: 84
End: 2024-06-15

## 2024-06-15 DIAGNOSIS — J43.2 CENTRILOBULAR EMPHYSEMA (HCC): ICD-10-CM

## 2024-06-15 RX ORDER — REVEFENACIN 175 UG/3ML
175 SOLUTION RESPIRATORY (INHALATION) DAILY
Qty: 90 ML | Refills: 11 | Status: SHIPPED | OUTPATIENT
Start: 2024-06-15

## 2024-06-15 NOTE — TELEPHONE ENCOUNTER
Regarding: Medication refill  ----- Message from Agnes BRYAN sent at 6/15/2024 10:07 AM EDT -----    Medication name: Yupelri 175 MCG/3ML SOLN  Dose, Route, Frequency: As Directed  Dispense Quantity: 90 mL

## 2024-06-15 NOTE — TELEPHONE ENCOUNTER
"Patient is calling about his Yupelri nebs. States he's called the office 3 times now and he's been out of his medicine for a week now. He states he uses mail-order because he can't get to a local pharmacy. He states he feels some breathing changes but is \"fine\".     On-call provider sent script for patient. Advised use of duonebs and rescue inhaler until patient receives the medicine. Patient understood and stated that is what he currently was doing. Patient refused local pharmacy due to accessibility and wanted use of mail-order pharmacy.    Reason for Disposition   [1] Caller has URGENT medicine question about med that PCP or specialist prescribed AND [2] triager unable to answer question    Protocols used: Medication Question Call-ADULT-    "

## 2024-06-20 DIAGNOSIS — G89.4 CHRONIC PAIN SYNDROME: ICD-10-CM

## 2024-06-20 RX ORDER — HYDROCODONE BITARTRATE AND ACETAMINOPHEN 10; 325 MG/1; MG/1
1 TABLET ORAL EVERY 6 HOURS PRN
Qty: 120 TABLET | Refills: 0 | Status: SHIPPED | OUTPATIENT
Start: 2024-06-20 | End: 2024-06-26 | Stop reason: SDUPTHER

## 2024-06-20 NOTE — TELEPHONE ENCOUNTER
Reason for call:   [x] Refill   [] Prior Auth  [] Other:     Office:   [] PCP/Provider -   [x] Specialty/Provider - [facility]    Medication:   Hydrocodone-acetaminophen (Norco) 10-325mg- take 1 tablet by mouth every 6 hours as needed    Pharmacy: [facility]    Does the patient have enough for 3 days?   [] Yes   [x] No - Send as HP to POD

## 2024-06-20 NOTE — TELEPHONE ENCOUNTER
S/w Pt, Pt requesting refill of Norco 10-325mg.   Pt last OV 04/22, Pt had no show appt on 06/10. RN advised Pt he would need an OV, Pt scheduled for 06/26 at 2pm with MG. Pt verbalized understanding. Is MG willing to send short script for Pt until his OV? Pt states he has enough medication for the next 2 days. Please review and advise.

## 2024-06-21 NOTE — TELEPHONE ENCOUNTER
Caller: Drew     Doctor: SHERI    Reason for call: adv pt script has been sent as requested     Call back#: [phone]

## 2024-06-26 ENCOUNTER — OFFICE VISIT (OUTPATIENT)
Dept: PAIN MEDICINE | Facility: CLINIC | Age: 84
End: 2024-06-26
Payer: COMMERCIAL

## 2024-06-26 VITALS — HEART RATE: 60 BPM | SYSTOLIC BLOOD PRESSURE: 115 MMHG | DIASTOLIC BLOOD PRESSURE: 60 MMHG

## 2024-06-26 DIAGNOSIS — G89.4 CHRONIC PAIN SYNDROME: Primary | ICD-10-CM

## 2024-06-26 DIAGNOSIS — Z79.891 LONG-TERM CURRENT USE OF OPIATE ANALGESIC: ICD-10-CM

## 2024-06-26 DIAGNOSIS — M54.12 CERVICAL RADICULOPATHY: ICD-10-CM

## 2024-06-26 DIAGNOSIS — M51.16 INTERVERTEBRAL DISC DISORDER WITH RADICULOPATHY OF LUMBAR REGION: ICD-10-CM

## 2024-06-26 DIAGNOSIS — F11.20 UNCOMPLICATED OPIOID DEPENDENCE (HCC): ICD-10-CM

## 2024-06-26 PROCEDURE — 99214 OFFICE O/P EST MOD 30 MIN: CPT

## 2024-06-26 RX ORDER — HYDROCODONE BITARTRATE AND ACETAMINOPHEN 10; 325 MG/1; MG/1
1 TABLET ORAL EVERY 6 HOURS PRN
Qty: 120 TABLET | Refills: 0 | Status: SHIPPED | OUTPATIENT
Start: 2024-07-19

## 2024-06-26 RX ORDER — HYDROCODONE BITARTRATE AND ACETAMINOPHEN 10; 325 MG/1; MG/1
1 TABLET ORAL EVERY 6 HOURS PRN
Qty: 120 TABLET | Refills: 0 | Status: SHIPPED | OUTPATIENT
Start: 2024-08-17

## 2024-06-26 NOTE — PROGRESS NOTES
Pain Medicine Follow-Up Note    Assessment:  1. Chronic pain syndrome    2. Intervertebral disc disorder with radiculopathy of lumbar region    3. Cervical radiculopathy    4. Long-term current use of opiate analgesic    5. Uncomplicated opioid dependence (HCC)        Plan:    New Medications Ordered This Visit   Medications    HYDROcodone-acetaminophen (NORCO)  mg per tablet     Sig: Take 1 tablet by mouth every 6 (six) hours as needed for moderate pain Max Daily Amount: 4 tablets Do not start before August 17, 2024.     Dispense:  120 tablet     Refill:  0     Fill on 8/17/2024    HYDROcodone-acetaminophen (NORCO)  mg per tablet     Sig: Take 1 tablet by mouth every 6 (six) hours as needed for moderate pain Max Daily Amount: 4 tablets Do not start before July 19, 2024.     Dispense:  120 tablet     Refill:  0     Fill  on 7/19/2024       My impressions and treatment recommendations were discussed in detail with the patient who verbalized understanding and had no further questions.      The patient continues to report an overall reduction of his pain level and improvement with his functioning without significant side effects using hydrocodone/acetaminophen 10/325 mg tablet patient uses 1 tablet up to 4 times a day as needed for moderate pain along with pregabalin 100 mg during the day and 200 mg at bedtime, therefore I will continue the patient on this medication.  Hydrocodone/acetaminophen 10/325 mg tablet E-prescribed to the patient's pharmacy with a do not fill until date of 7/19/2024 and 8/17/2024.    [place] Prescription Drug Monitoring Program report was reviewed and was appropriate     There are risks associated with opioid medications, including dependence, addiction and tolerance. The patient understands and agrees to use these medications only as prescribed. Potential side effects of the medications include, but are not limited to, constipation, drowsiness, addiction, impaired judgment and  History: family history is not on file. No h/o sudden cardiac death. No for premature CAD    REVIEW OF SYSTEMS:    · Cannot be obtained. PHYSICAL EXAM:      BP (!) 77/42   Pulse 82   Temp (!) 86.5 °F (30.3 °C) (Core)   Resp 16   SpO2 100%    No intake or output data in the 24 hours ending 12/09/19 0719      Constitutional and General Appearance: Unresponsive on vent. HEENT: PERRL, no cervical lymphadenopathy. No masses palpable. Normal oral mucosa  Respiratory:  · Normal excursion and expansion without use of accessory muscles  · Resp Auscultation: Good respiratory effort. No for increased work of breathing. On auscultation: clear to auscultation bilaterally  Cardiovascular:  · The apical impulse is not displaced  · Heart tones are crisp and normal. regular S1 and S2.  · Jugular venous pulsation Normal  · The carotid upstroke is normal in amplitude and contour without delay or bruit  · Peripheral pulses are symmetrical and full     Abdomen:   · No masses or tenderness  · Bowel sounds present  Extremities:  ·  No Cyanosis or Clubbing  ·  Lower extremity edema: No  ·  Skin: Warm and dry      DATA:    Diagnostics:    EKG: ST elevations in V1 and V2, ST depressions in lateral leads and aVR  Labs:     CBC:   Recent Labs     12/09/19 0526   WBC 22.9   HGB 12.4   HCT 43.0          BMP:   Recent Labs     12/09/19 0526      K 5.0   CO2 7   BUN 14   CREATININE 1.53   LABGLOM Unable to calculate due to missing demographic information. GLUCOSE 125     Pro-BNP:    Recent Labs     12/09/19 0526   PROBNP 93     CARDIAC ENZYMES:  Recent Labs     12/09/19 0526   CKTOTAL 182     Recent Labs     12/09/19 0526   TROPONINT NOT REPORTED      Recent Labs     12/09/19  0526   TROPHS 65     LIVER PROFILE:  Recent Labs     12/09/19 0526   AST 1,115   ALT 1,107   LABALBU 3.6       Patient's Active Problem List  Active Problems:    Cardiac arrest [facility])  Resolved Problems:    * No resolved hospital problems. risk of fatal overdose if not taken as prescribed. The patient was warned against driving while taking sedation medications.  Sharing medications is a felony. At this point in time, the patient is showing no signs of addiction, abuse, diversion or suicidal ideation.    An opioid contract was reviewed with the patient.  The patient was made aware they are only to receive opioid medication from our office, and must take the medication as prescribed.  If the medication is lost or stolen, it will not be replaced.  We also do not condone the use of illegal substances or alcohol with opioid medication.  Random urine drug screens and pill counts will also be performed at office visits. Lastly, the patient was informed that office visits are needed for refills.  Patient was agreeable and signed the contract.      Follow-up is planned in 8 weeks time or sooner as warranted.  Discharge instructions were provided. I personally saw and examined the patient and I agree with the above discussed plan of care.    History of Present Illness:    Drew Fair is a 83 y.o. male who presents to [facility] for interval re-evaluation of the above stated pain complaints. The patient has a past medical and chronic pain history as outlined in the assessment section. He was last seen on 4/22/2024.    At today's visit patient states that their pain symptoms are worse with a pain score of 8/10 on the verbal numeric pain scale.  The patient's pain is worse at no specific time.  The patient's pain is constant in nature.  And the quality of the patient's pain is described as burning, dull-aching, sharp, pressure-like, and shooting.  The patient's pain is located in the bilateral low back shooting down his right posterior leg to his knee.  Patient states the amount of pain relief he is obtaining from his current pain relievers is enough to make a real difference in his life by reducing his pain symptoms by 50%.   "Patient denies any side effects using hydrocodone/acetaminophen or pregabalin.    Pain Contract Signed:  6/26/2024  Last Urine Drug Screen:  4/22/2024    Other than as stated above, the patient denies any interval changes in medications, medical condition, mental condition, symptoms, or allergies since the last office visit.         Review of Systems:    Review of Systems   Constitutional:  Negative for unexpected weight change.   Eyes:  Negative for visual disturbance.   Respiratory:  Negative for shortness of breath and wheezing.    Cardiovascular:  Negative for chest pain.   Gastrointestinal:  Negative for abdominal pain, constipation, diarrhea, nausea and vomiting.   Musculoskeletal:  Positive for back pain, gait problem, joint swelling, neck pain and neck stiffness. Negative for arthralgias and myalgias.        Decreased ROM, joint and muscle pain    Skin:  Negative for rash.   Neurological:  Positive for weakness, numbness and headaches. Negative for dizziness and seizures.   Psychiatric/Behavioral:  Positive for dysphoric mood and sleep disturbance. Negative for decreased concentration. The patient is nervous/anxious.    All other systems reviewed and are negative.        Past Medical History:   Diagnosis Date    Acid reflux     Arthritis     BPH (benign prostatic hyperplasia)     no treatment    Calcium disorder     Chronic atrial fibrillation (HCC) 2/6/2018    Chronic pain     Chronic pain disorder     COPD (chronic obstructive pulmonary disease) (HCC)     Coronary artery disease 2001    PTCA with one stent    Eye redness     Poked his eye on 3/18/16 \"bloody\" right eye    Hiatal hernia     Hydronephrosis     Hyperlipidemia     Hypertension     Irregular heart beat     A-Fib    Low back pain     Neck pain     Nocturnal leg cramps     Osteopenia     Peripheral artery disease (HCC)     Peripheral neuropathy     Pleural effusion     Sleep apnea     REFUSES MACHINE HS    Spinal stenosis     Vocal cord polyp " *        IMPRESSION:    1. V. fib cardiac arrest with ST elevations in V1 and V2  2. Unknown downtime  3. Severe metabolic acidosis  4. Lactic acidosis  5. H/o substance abuse      RECOMMENDATIONS:  1. Proceed with cardiac cath  2. Further recommendations postprocedure            Discussed with patient and Nurse. Taz Hussein M.D. Fellow, [address]        Attending Physician Statement  I have discussed the case of Shannan Gutierrez including pertinent history and exam findings with the resident. I have seen and examined the patient and the key elements of the encounter have been performed by me. I agree with the assessment, plan and orders as documented by the resident With changes made to the note.      Electronically signed by Harriet Boles MD on 12/10/2019 at 2:15 PM.    [facility]      [phone] 10/2012    panendoscopy       Past Surgical History:   Procedure Laterality Date    ANGIOPLASTY      APPENDECTOMY  04/2007    CARDIOVERSION      x2, for A-Fib    CARDIOVERSION      CATARACT EXTRACTION      CATARACT EXTRACTION W/ INTRAOCULAR LENS IMPLANT Left 01/2014    CORONARY ANGIOPLASTY WITH STENT PLACEMENT  2001    one stent    HERNIA REPAIR Right     inquinal    JOINT REPLACEMENT Left     Hip    KNEE SURGERY  1994    also had arthroscopy right knee 2005    PANENDOSCOPY  10/23/2012    vocal cord polyp    PILONIDAL CYST DRAINAGE      x 3    MD VALLADARES IMPLTJ NSTIM ELTRDS PLATE/PADDLE EDRL Left 8/23/2017    Procedure: Placement of Thoracic SCS with left buttock IPG (IMPULSE);  Surgeon: Sathya Gonzalez MD;  Location:  MAIN OR;  Service: Neurosurgery    MD XCAPSL CTRC RMVL INSJ IO LENS PROSTH W/O ECP Right 4/6/2016    Procedure: EXTRACTION EXTRACAPSULAR CATARACT PHACO INTRAOCULAR LENS (IOL);  Surgeon: Gume Singh MD;  Location: [facility] MAIN OR;  Service: Ophthalmology    SPINAL CORD STIMULATOR TRIAL W/ LAMINOTOMY N/A 4/13/2017    Procedure: INSERTION DORSAL COLUMN SPINAL CORD STIMULATOR TRIAL;  Surgeon: Dipak Celaya MD;  Location: [facility] MAIN OR;  Service:     TONSILLECTOMY      US GUIDED INJECTION FOR RESEARCH STUDY  5/28/2015       Family History   Problem Relation Age of Onset    Cancer Father 81        prostate    No Known Problems Mother     No Known Problems Sister     No Known Problems Brother     No Known Problems Daughter     No Known Problems Son     No Known Problems Maternal Aunt     No Known Problems Maternal Uncle     No Known Problems Paternal Aunt     No Known Problems Paternal Uncle     No Known Problems Maternal Grandmother     No Known Problems Maternal Grandfather     No Known Problems Paternal Grandmother     No Known Problems Paternal Grandfather        Social History     Occupational History    Not on file   Tobacco Use    Smoking status: Every Day     Current packs/day: 0.75     Average  packs/day: 0.8 packs/day for 60.0 years (45.0 ttl pk-yrs)     Types: Cigarettes    Smokeless tobacco: Never   Vaping Use    Vaping status: Never Used   Substance and Sexual Activity    Alcohol use: Yes     Comment: occasionally    Drug use: No    Sexual activity: Not on file         Current Outpatient Medications:     apixaban (Eliquis) 5 mg, Take 1 tablet (5 mg total) by mouth 2 (two) times a day, Disp: 60 tablet, Rfl: 11    aspirin (ECOTRIN LOW STRENGTH) 81 mg EC tablet, Take 81 mg by mouth daily, Disp: , Rfl:     atorvastatin (LIPITOR) 40 mg tablet, Take 40 mg by mouth daily  , Disp: , Rfl:     budesonide (PULMICORT) 0.5 mg/2 mL nebulizer solution, USE 1 VIAL  IN  NEBULIZER TWICE  DAILY - (Rinse Mouth After Each Treatment), Disp: 120 mL, Rfl: 11    diltiazem (DILACOR XR) 240 MG 24 hr capsule, Take 1 capsule (240 mg total) by mouth daily at bedtime, Disp: 90 capsule, Rfl: 3    formoterol (Perforomist) 20 MCG/2ML nebulizer solution, USE 1 VIAL  IN  NEBULIZER TWICE  DAILY - MORNING AND EVENING, Disp: 120 mL, Rfl: 11    [START ON 8/17/2024] HYDROcodone-acetaminophen (NORCO)  mg per tablet, Take 1 tablet by mouth every 6 (six) hours as needed for moderate pain Max Daily Amount: 4 tablets Do not start before August 17, 2024., Disp: 120 tablet, Rfl: 0    [START ON 7/19/2024] HYDROcodone-acetaminophen (NORCO)  mg per tablet, Take 1 tablet by mouth every 6 (six) hours as needed for moderate pain Max Daily Amount: 4 tablets Do not start before July 19, 2024., Disp: 120 tablet, Rfl: 0    ipratropium-albuterol (DUO-NEB) 0.5-2.5 mg/3 mL nebulizer solution, Take 3 mL by nebulization 3 (three) times a day, Disp: 252 mL, Rfl: 5    naloxone (NARCAN) 4 mg/0.1 mL nasal spray, Administer 1 spray into a nostril. If no response after 2-3 minutes, give another dose in the other nostril using a new spray., Disp: 1 each, Rfl: 1    nebivolol (BYSTOLIC) 10 mg tablet, Take 1 tablet (10 mg total) by mouth daily, Disp: 90 tablet,  Rfl: 3    omeprazole (PriLOSEC) 20 mg delayed release capsule, Take 1 capsule by mouth as needed for heartburn , Disp: , Rfl:     OXYGEN-HELIUM IN, Inhale 2 L daily at bedtime, Disp: , Rfl:     pregabalin (LYRICA) 100 mg capsule, Take 1 capsule (100 mg total) by mouth 2 (two) times a day AND 2 capsules (200 mg total) daily at bedtime., Disp: 360 capsule, Rfl: 2    Revefenacin (Yupelri) 175 MCG/3ML SOLN, Take 3 mL (175 mcg total) by nebulization daily, Disp: 90 mL, Rfl: 11    tamsulosin (FLOMAX) 0.4 mg, Take 0.4 mg by mouth daily with dinner, Disp: , Rfl:     traZODone (DESYREL) 50 mg tablet, , Disp: , Rfl:     nicotine (NICOTROL) 10 MG inhaler, Use 1  Cartridge per hour as needed.  Do not exceed 10 cartridges per day (Patient not taking: Reported on 6/26/2024), Disp: 168 each, Rfl: 6    No Known Allergies    Physical Exam:    /60   Pulse 60     Constitutional:normal, well developed, well nourished, alert, in no distress and non-toxic and no overt pain behavior. and obese  Eyes:anicteric  HEENT:grossly intact  Neck:supple, symmetric, trachea midline and no masses   Pulmonary:even and unlabored using oxygen condenser and nasal cannula  Cardiovascular:No edema or pitting edema present  Skin:Normal without rashes or lesions and well hydrated  Psychiatric:Mood and affect appropriate  Neurologic:Cranial Nerves II-XII grossly intact  Musculoskeletal: Using an electric scooter    This document was created using speech voice recognition software.   Grammatical errors, random word insertions, pronoun errors, and incomplete sentences are an occasional consequence of this system due to software limitations, ambient noise, and hardware issues.   Any formal questions or concerns about content, text, or information contained within the body of this dictation should be directly addressed to the provider for clarification.

## 2024-06-26 NOTE — PATIENT INSTRUCTIONS

## 2024-07-21 ENCOUNTER — TELEPHONE (OUTPATIENT)
Dept: OTHER | Facility: OTHER | Age: 84
End: 2024-07-21

## 2024-07-21 NOTE — TELEPHONE ENCOUNTER
Pt was calling to verify his next appointment [facility] Advised it is on 9/11/24 at 1100 am.

## 2024-08-15 DIAGNOSIS — I48.20 CHRONIC ATRIAL FIBRILLATION (HCC): ICD-10-CM

## 2024-08-15 DIAGNOSIS — I10 ESSENTIAL HYPERTENSION: ICD-10-CM

## 2024-08-15 RX ORDER — NEBIVOLOL 10 MG/1
10 TABLET ORAL DAILY
Qty: 90 TABLET | Refills: 3 | Status: SHIPPED | OUTPATIENT
Start: 2024-08-15 | End: 2024-08-15 | Stop reason: SDUPTHER

## 2024-08-15 RX ORDER — DILTIAZEM HYDROCHLORIDE 240 MG/1
240 CAPSULE, EXTENDED RELEASE ORAL
Qty: 90 CAPSULE | Refills: 3 | Status: SHIPPED | OUTPATIENT
Start: 2024-08-15 | End: 2024-08-15 | Stop reason: SDUPTHER

## 2024-08-15 RX ORDER — DILTIAZEM HYDROCHLORIDE 240 MG/1
240 CAPSULE, EXTENDED RELEASE ORAL
Qty: 90 CAPSULE | Refills: 1 | Status: SHIPPED | OUTPATIENT
Start: 2024-08-15

## 2024-08-15 RX ORDER — ATORVASTATIN CALCIUM 40 MG/1
40 TABLET, FILM COATED ORAL DAILY
Qty: 90 TABLET | Refills: 3 | Status: SHIPPED | OUTPATIENT
Start: 2024-08-15 | End: 2024-08-15 | Stop reason: SDUPTHER

## 2024-08-15 RX ORDER — ATORVASTATIN CALCIUM 40 MG/1
40 TABLET, FILM COATED ORAL DAILY
Qty: 90 TABLET | Refills: 1 | Status: SHIPPED | OUTPATIENT
Start: 2024-08-15

## 2024-08-15 RX ORDER — NEBIVOLOL 10 MG/1
10 TABLET ORAL DAILY
Qty: 90 TABLET | Refills: 1 | Status: SHIPPED | OUTPATIENT
Start: 2024-08-15

## 2024-08-15 NOTE — TELEPHONE ENCOUNTER
Medication: nebivolol (BYSTOLIC) 10 mg tablet     Dose/Frequency: Take 1 tablet (10 mg total) by mouth daily     Quantity: 90 tablet     Pharmacy: [facility] [id number] [[id number]]     Office:   [] PCP/Provider -   [x] Speciality/Provider - Ramesh Perdomo MD     Does the patient have enough for 3 days?   [x] Yes   [] No - Send as HP to POD              Medication: diltiazem (DILACOR XR) 240 MG 24 hr capsule     Dose/Frequency: Take 1 capsule (240 mg total) by mouth daily at bedtime     Quantity: 90 capsule     Pharmacy: [facility] [id number] [[id number]]     Office:   [] PCP/Provider -   [x] Speciality/Provider - Ramesh Perdomo MD     Does the patient have enough for 3 days?   [x] Yes   [] No - Send as HP to POD               Medication: atorvastatin (LIPITOR) 40 mg tablet     Dose/Frequency:  40 mg, Oral, Daily     Quantity:      Pharmacy: [facility] [id number] [[id number]]     Office:   [] PCP/Provider -   [x] Speciality/Provider - Lorelei Rodriguez MD     Does the patient have enough for 3 days?   [x] Yes   [] No - Send as HP to POD      Not sure if this one can be filled, note attached: [facility] Received Sig: Lipitor 40 MG Oral Tablet Refills: 0 Active  Please advise

## 2024-08-15 NOTE — TELEPHONE ENCOUNTER
THIS IS NOT A DUPLICATE REQUEST    SCRIPTS WERE SENT TO [facility] AND THEY DO NOT DO MEDICATIONS    Please resend scripts center well

## 2024-10-01 ENCOUNTER — TELEPHONE (OUTPATIENT)
Age: 84
End: 2024-10-01

## 2024-10-01 NOTE — TELEPHONE ENCOUNTER
Lacey Vasquez from [facility] Medical Examiner's office calling to see if Dr. Elise would be willing to sign patient's death certificate.  They are unable to get a hold of patient's PCP.    ([employer]) [phone]

## 2024-10-01 NOTE — TELEPHONE ENCOUNTER
That is not something I would normally do.  I have not seen this gentleman while I do not know how or why he
